# Patient Record
Sex: MALE | Race: WHITE | HISPANIC OR LATINO | Employment: UNEMPLOYED | URBAN - METROPOLITAN AREA
[De-identification: names, ages, dates, MRNs, and addresses within clinical notes are randomized per-mention and may not be internally consistent; named-entity substitution may affect disease eponyms.]

---

## 2017-01-16 ENCOUNTER — APPOINTMENT (EMERGENCY)
Dept: CT IMAGING | Facility: HOSPITAL | Age: 47
End: 2017-01-16

## 2017-01-16 ENCOUNTER — HOSPITAL ENCOUNTER (EMERGENCY)
Facility: HOSPITAL | Age: 47
Discharge: HOME | End: 2017-01-17
Attending: EMERGENCY MEDICINE | Admitting: EMERGENCY MEDICINE

## 2017-01-16 ENCOUNTER — APPOINTMENT (EMERGENCY)
Dept: RADIOLOGY | Facility: HOSPITAL | Age: 47
End: 2017-01-16

## 2017-01-16 DIAGNOSIS — R07.9 CHEST PAIN: Primary | ICD-10-CM

## 2017-01-16 LAB
ANION GAP SERPL CALCULATED.3IONS-SCNC: 11 MMOL/L (ref 4–13)
APTT PPP: 20 SECONDS (ref 24–36)
ATRIAL RATE: 107 BPM
BASOPHILS # BLD AUTO: 0 THOUSANDS/ΜL (ref 0–0.1)
BASOPHILS NFR BLD AUTO: 0 % (ref 0–1)
BUN SERPL-MCNC: 12 MG/DL (ref 5–25)
CALCIUM SERPL-MCNC: 9.2 MG/DL (ref 8.3–10.1)
CHLORIDE SERPL-SCNC: 101 MMOL/L (ref 100–108)
CO2 SERPL-SCNC: 29 MMOL/L (ref 21–32)
CREAT SERPL-MCNC: 1.05 MG/DL (ref 0.6–1.3)
DEPRECATED D DIMER PPP: 899 NG/ML (FEU) (ref 0–424)
EOSINOPHIL # BLD AUTO: 0 THOUSAND/ΜL (ref 0–0.61)
EOSINOPHIL NFR BLD AUTO: 0 % (ref 0–6)
ERYTHROCYTE [DISTWIDTH] IN BLOOD BY AUTOMATED COUNT: 14 % (ref 11.6–15.1)
GFR SERPL CREATININE-BSD FRML MDRD: >60 ML/MIN/1.73SQ M
GLUCOSE SERPL-MCNC: 147 MG/DL (ref 65–140)
HCT VFR BLD AUTO: 39.8 % (ref 36.5–49.3)
HGB BLD-MCNC: 13.7 G/DL (ref 12–17)
INR PPP: 0.95 (ref 0.86–1.16)
LYMPHOCYTES # BLD AUTO: 1.13 THOUSANDS/ΜL (ref 0.6–4.47)
LYMPHOCYTES NFR BLD AUTO: 15 % (ref 14–44)
MCH RBC QN AUTO: 30.4 PG (ref 26.8–34.3)
MCHC RBC AUTO-ENTMCNC: 34.4 G/DL (ref 31.4–37.4)
MCV RBC AUTO: 88 FL (ref 82–98)
MONOCYTES # BLD AUTO: 0.15 THOUSAND/ΜL (ref 0.17–1.22)
MONOCYTES NFR BLD AUTO: 2 % (ref 4–12)
NEUTROPHILS # BLD AUTO: 6.14 THOUSANDS/ΜL (ref 1.85–7.62)
NEUTS SEG NFR BLD AUTO: 83 % (ref 43–75)
NRBC BLD AUTO-RTO: 0 /100 WBCS
P AXIS: 85 DEGREES
PLATELET # BLD AUTO: 196 THOUSANDS/UL (ref 149–390)
PMV BLD AUTO: 10.5 FL (ref 8.9–12.7)
POTASSIUM SERPL-SCNC: 4.5 MMOL/L (ref 3.5–5.3)
PR INTERVAL: 162 MS
PROTHROMBIN TIME: 12.7 SECONDS (ref 12–14.3)
QRS AXIS: 87 DEGREES
QRSD INTERVAL: 94 MS
QT INTERVAL: 320 MS
QTC INTERVAL: 427 MS
RBC # BLD AUTO: 4.5 MILLION/UL (ref 3.88–5.62)
SODIUM SERPL-SCNC: 141 MMOL/L (ref 136–145)
SPECIMEN SOURCE: NORMAL
T WAVE AXIS: 54 DEGREES
TROPONIN I BLD-MCNC: 0.01 NG/ML (ref 0–0.08)
VENTRICULAR RATE: 107 BPM
WBC # BLD AUTO: 7.42 THOUSAND/UL (ref 4.31–10.16)

## 2017-01-16 PROCEDURE — 96374 THER/PROPH/DIAG INJ IV PUSH: CPT

## 2017-01-16 PROCEDURE — 71020 HB CHEST X-RAY 2VW FRONTAL&LATL: CPT

## 2017-01-16 PROCEDURE — 81002 URINALYSIS NONAUTO W/O SCOPE: CPT | Performed by: EMERGENCY MEDICINE

## 2017-01-16 PROCEDURE — 85610 PROTHROMBIN TIME: CPT | Performed by: EMERGENCY MEDICINE

## 2017-01-16 PROCEDURE — 84484 ASSAY OF TROPONIN QUANT: CPT

## 2017-01-16 PROCEDURE — 85379 FIBRIN DEGRADATION QUANT: CPT | Performed by: EMERGENCY MEDICINE

## 2017-01-16 PROCEDURE — 36415 COLL VENOUS BLD VENIPUNCTURE: CPT | Performed by: EMERGENCY MEDICINE

## 2017-01-16 PROCEDURE — 80048 BASIC METABOLIC PNL TOTAL CA: CPT | Performed by: EMERGENCY MEDICINE

## 2017-01-16 PROCEDURE — 96361 HYDRATE IV INFUSION ADD-ON: CPT

## 2017-01-16 PROCEDURE — 85025 COMPLETE CBC W/AUTO DIFF WBC: CPT | Performed by: EMERGENCY MEDICINE

## 2017-01-16 PROCEDURE — 93005 ELECTROCARDIOGRAM TRACING: CPT

## 2017-01-16 PROCEDURE — 85730 THROMBOPLASTIN TIME PARTIAL: CPT | Performed by: EMERGENCY MEDICINE

## 2017-01-16 RX ORDER — ASPIRIN 81 MG/1
324 TABLET, CHEWABLE ORAL ONCE
Status: COMPLETED | OUTPATIENT
Start: 2017-01-16 | End: 2017-01-16

## 2017-01-16 RX ORDER — AMITRIPTYLINE HYDROCHLORIDE 50 MG/1
50 TABLET, FILM COATED ORAL
COMMUNITY
End: 2018-10-19 | Stop reason: SDUPTHER

## 2017-01-16 RX ORDER — MORPHINE SULFATE 4 MG/ML
4 INJECTION, SOLUTION INTRAMUSCULAR; INTRAVENOUS ONCE
Status: COMPLETED | OUTPATIENT
Start: 2017-01-16 | End: 2017-01-16

## 2017-01-16 RX ORDER — OMEPRAZOLE 20 MG/1
20 CAPSULE, DELAYED RELEASE ORAL EVERY MORNING
COMMUNITY
End: 2019-07-24

## 2017-01-16 RX ORDER — ONDANSETRON 2 MG/ML
4 INJECTION INTRAMUSCULAR; INTRAVENOUS ONCE
Status: COMPLETED | OUTPATIENT
Start: 2017-01-16 | End: 2017-01-16

## 2017-01-16 RX ORDER — TRAMADOL HYDROCHLORIDE 50 MG/1
50 TABLET ORAL EVERY 6 HOURS PRN
COMMUNITY
End: 2017-04-30

## 2017-01-16 RX ADMIN — ONDANSETRON 4 MG: 2 INJECTION INTRAMUSCULAR; INTRAVENOUS at 22:05

## 2017-01-16 RX ADMIN — ASPIRIN 81 MG 324 MG: 81 TABLET ORAL at 21:51

## 2017-01-16 RX ADMIN — SODIUM CHLORIDE 1000 ML: 0.9 INJECTION, SOLUTION INTRAVENOUS at 22:06

## 2017-01-16 RX ADMIN — MORPHINE SULFATE 4 MG: 4 INJECTION, SOLUTION INTRAMUSCULAR; INTRAVENOUS at 22:06

## 2017-01-17 ENCOUNTER — APPOINTMENT (EMERGENCY)
Dept: CT IMAGING | Facility: HOSPITAL | Age: 47
End: 2017-01-17

## 2017-01-17 VITALS
WEIGHT: 315 LBS | RESPIRATION RATE: 16 BRPM | DIASTOLIC BLOOD PRESSURE: 81 MMHG | TEMPERATURE: 98.7 F | OXYGEN SATURATION: 97 % | SYSTOLIC BLOOD PRESSURE: 166 MMHG | HEART RATE: 91 BPM

## 2017-01-17 LAB
BACTERIA UR QL AUTO: ABNORMAL /HPF
BILIRUB UR QL STRIP: NEGATIVE
CLARITY UR: CLEAR
COLOR UR: YELLOW
GLUCOSE UR STRIP-MCNC: NEGATIVE MG/DL
HGB UR QL STRIP.AUTO: ABNORMAL
KETONES UR STRIP-MCNC: NEGATIVE MG/DL
LEUKOCYTE ESTERASE UR QL STRIP: NEGATIVE
NITRITE UR QL STRIP: NEGATIVE
NON-SQ EPI CELLS URNS QL MICRO: ABNORMAL /HPF
PH UR STRIP.AUTO: 7 [PH] (ref 4.5–8)
PROT UR STRIP-MCNC: NEGATIVE MG/DL
RBC #/AREA URNS AUTO: ABNORMAL /HPF
SP GR UR STRIP.AUTO: 1.01 (ref 1–1.03)
UROBILINOGEN UR QL STRIP.AUTO: 0.2 E.U./DL
WBC #/AREA URNS AUTO: ABNORMAL /HPF

## 2017-01-17 PROCEDURE — 99285 EMERGENCY DEPT VISIT HI MDM: CPT

## 2017-01-17 PROCEDURE — 71275 CT ANGIOGRAPHY CHEST: CPT

## 2017-01-17 PROCEDURE — 87086 URINE CULTURE/COLONY COUNT: CPT

## 2017-01-17 PROCEDURE — 81001 URINALYSIS AUTO W/SCOPE: CPT

## 2017-01-17 RX ORDER — NAPROXEN 500 MG/1
500 TABLET ORAL 2 TIMES DAILY WITH MEALS
Qty: 20 TABLET | Refills: 0 | Status: SHIPPED | OUTPATIENT
Start: 2017-01-17 | End: 2017-04-30

## 2017-01-17 RX ADMIN — IOHEXOL 100 ML: 350 INJECTION, SOLUTION INTRAVENOUS at 00:35

## 2017-01-18 LAB — BACTERIA UR CULT: NORMAL

## 2017-01-25 ENCOUNTER — ALLSCRIPTS OFFICE VISIT (OUTPATIENT)
Dept: OTHER | Facility: OTHER | Age: 47
End: 2017-01-25

## 2017-01-25 DIAGNOSIS — R31.9 HEMATURIA: ICD-10-CM

## 2017-01-25 DIAGNOSIS — R07.9 CHEST PAIN: ICD-10-CM

## 2017-01-25 DIAGNOSIS — Z12.5 ENCOUNTER FOR SCREENING FOR MALIGNANT NEOPLASM OF PROSTATE: ICD-10-CM

## 2017-01-25 DIAGNOSIS — R73.09 OTHER ABNORMAL GLUCOSE: ICD-10-CM

## 2017-01-25 DIAGNOSIS — R39.15 URGENCY OF URINATION: ICD-10-CM

## 2017-01-25 DIAGNOSIS — E66.01 MORBID (SEVERE) OBESITY DUE TO EXCESS CALORIES (HCC): ICD-10-CM

## 2017-04-20 ENCOUNTER — TRANSCRIBE ORDERS (OUTPATIENT)
Dept: LAB | Facility: CLINIC | Age: 47
End: 2017-04-20

## 2017-04-20 ENCOUNTER — APPOINTMENT (OUTPATIENT)
Dept: LAB | Facility: CLINIC | Age: 47
End: 2017-04-20
Payer: COMMERCIAL

## 2017-04-20 DIAGNOSIS — E66.01 MORBID (SEVERE) OBESITY DUE TO EXCESS CALORIES (HCC): ICD-10-CM

## 2017-04-20 DIAGNOSIS — Z12.5 ENCOUNTER FOR SCREENING FOR MALIGNANT NEOPLASM OF PROSTATE: ICD-10-CM

## 2017-04-20 DIAGNOSIS — R73.09 OTHER ABNORMAL GLUCOSE: ICD-10-CM

## 2017-04-20 DIAGNOSIS — R39.15 URGENCY OF URINATION: ICD-10-CM

## 2017-04-20 DIAGNOSIS — R31.9 HEMATURIA: ICD-10-CM

## 2017-04-20 DIAGNOSIS — R07.9 CHEST PAIN: ICD-10-CM

## 2017-04-20 LAB
25(OH)D3 SERPL-MCNC: 11.7 NG/ML (ref 30–100)
ALBUMIN SERPL BCP-MCNC: 3.3 G/DL (ref 3.5–5)
ALP SERPL-CCNC: 81 U/L (ref 46–116)
ALT SERPL W P-5'-P-CCNC: 38 U/L (ref 12–78)
ANION GAP SERPL CALCULATED.3IONS-SCNC: 6 MMOL/L (ref 4–13)
AST SERPL W P-5'-P-CCNC: 24 U/L (ref 5–45)
BACTERIA UR QL AUTO: ABNORMAL /HPF
BASOPHILS # BLD AUTO: 0.02 THOUSANDS/ΜL (ref 0–0.1)
BASOPHILS NFR BLD AUTO: 0 % (ref 0–1)
BILIRUB SERPL-MCNC: 0.47 MG/DL (ref 0.2–1)
BILIRUB UR QL STRIP: NEGATIVE
BUN SERPL-MCNC: 10 MG/DL (ref 5–25)
CALCIUM SERPL-MCNC: 8.7 MG/DL (ref 8.3–10.1)
CHLORIDE SERPL-SCNC: 101 MMOL/L (ref 100–108)
CHOLEST SERPL-MCNC: 157 MG/DL (ref 50–200)
CLARITY UR: CLEAR
CO2 SERPL-SCNC: 31 MMOL/L (ref 21–32)
COLOR UR: YELLOW
CREAT SERPL-MCNC: 0.8 MG/DL (ref 0.6–1.3)
CREAT UR-MCNC: 217 MG/DL
DEPRECATED D DIMER PPP: 231 NG/ML (FEU) (ref 0–424)
EOSINOPHIL # BLD AUTO: 0.17 THOUSAND/ΜL (ref 0–0.61)
EOSINOPHIL NFR BLD AUTO: 3 % (ref 0–6)
ERYTHROCYTE [DISTWIDTH] IN BLOOD BY AUTOMATED COUNT: 13.9 % (ref 11.6–15.1)
EST. AVERAGE GLUCOSE BLD GHB EST-MCNC: 123 MG/DL
GFR SERPL CREATININE-BSD FRML MDRD: >60 ML/MIN/1.73SQ M
GLUCOSE P FAST SERPL-MCNC: 94 MG/DL (ref 65–99)
GLUCOSE UR STRIP-MCNC: NEGATIVE MG/DL
HBA1C MFR BLD: 5.9 % (ref 4.2–6.3)
HCT VFR BLD AUTO: 43.2 % (ref 36.5–49.3)
HDLC SERPL-MCNC: 46 MG/DL (ref 40–60)
HGB BLD-MCNC: 14.6 G/DL (ref 12–17)
HGB UR QL STRIP.AUTO: NEGATIVE
HYALINE CASTS #/AREA URNS LPF: ABNORMAL /LPF
KETONES UR STRIP-MCNC: NEGATIVE MG/DL
LDLC SERPL CALC-MCNC: 87 MG/DL (ref 0–100)
LEUKOCYTE ESTERASE UR QL STRIP: NEGATIVE
LYMPHOCYTES # BLD AUTO: 2.38 THOUSANDS/ΜL (ref 0.6–4.47)
LYMPHOCYTES NFR BLD AUTO: 37 % (ref 14–44)
MCH RBC QN AUTO: 30 PG (ref 26.8–34.3)
MCHC RBC AUTO-ENTMCNC: 33.8 G/DL (ref 31.4–37.4)
MCV RBC AUTO: 89 FL (ref 82–98)
MICROALBUMIN UR-MCNC: 50.1 MG/L (ref 0–20)
MICROALBUMIN/CREAT 24H UR: 23 MG/G CREATININE (ref 0–30)
MONOCYTES # BLD AUTO: 0.48 THOUSAND/ΜL (ref 0.17–1.22)
MONOCYTES NFR BLD AUTO: 8 % (ref 4–12)
NEUTROPHILS # BLD AUTO: 3.31 THOUSANDS/ΜL (ref 1.85–7.62)
NEUTS SEG NFR BLD AUTO: 52 % (ref 43–75)
NITRITE UR QL STRIP: NEGATIVE
NON-SQ EPI CELLS URNS QL MICRO: ABNORMAL /HPF
NRBC BLD AUTO-RTO: 0 /100 WBCS
PH UR STRIP.AUTO: 6.5 [PH] (ref 4.5–8)
PLATELET # BLD AUTO: 191 THOUSANDS/UL (ref 149–390)
PMV BLD AUTO: 10.6 FL (ref 8.9–12.7)
POTASSIUM SERPL-SCNC: 3.7 MMOL/L (ref 3.5–5.3)
PROT SERPL-MCNC: 7.1 G/DL (ref 6.4–8.2)
PROT UR STRIP-MCNC: ABNORMAL MG/DL
PSA SERPL-MCNC: 0.4 NG/ML (ref 0–4)
RBC # BLD AUTO: 4.87 MILLION/UL (ref 3.88–5.62)
RBC #/AREA URNS AUTO: ABNORMAL /HPF
SODIUM SERPL-SCNC: 138 MMOL/L (ref 136–145)
SP GR UR STRIP.AUTO: 1.02 (ref 1–1.03)
T4 FREE SERPL-MCNC: 0.99 NG/DL (ref 0.76–1.46)
TRIGL SERPL-MCNC: 118 MG/DL
TSH SERPL DL<=0.05 MIU/L-ACNC: 3.77 UIU/ML (ref 0.36–3.74)
UROBILINOGEN UR QL STRIP.AUTO: 0.2 E.U./DL
WBC # BLD AUTO: 6.38 THOUSAND/UL (ref 4.31–10.16)
WBC #/AREA URNS AUTO: ABNORMAL /HPF

## 2017-04-20 PROCEDURE — 80061 LIPID PANEL: CPT

## 2017-04-20 PROCEDURE — 36415 COLL VENOUS BLD VENIPUNCTURE: CPT

## 2017-04-20 PROCEDURE — 80053 COMPREHEN METABOLIC PANEL: CPT

## 2017-04-20 PROCEDURE — G0103 PSA SCREENING: HCPCS

## 2017-04-20 PROCEDURE — 83036 HEMOGLOBIN GLYCOSYLATED A1C: CPT

## 2017-04-20 PROCEDURE — 82306 VITAMIN D 25 HYDROXY: CPT

## 2017-04-20 PROCEDURE — 82043 UR ALBUMIN QUANTITATIVE: CPT

## 2017-04-20 PROCEDURE — 85025 COMPLETE CBC W/AUTO DIFF WBC: CPT

## 2017-04-20 PROCEDURE — 82570 ASSAY OF URINE CREATININE: CPT

## 2017-04-20 PROCEDURE — 84443 ASSAY THYROID STIM HORMONE: CPT

## 2017-04-20 PROCEDURE — 85379 FIBRIN DEGRADATION QUANT: CPT

## 2017-04-20 PROCEDURE — 84439 ASSAY OF FREE THYROXINE: CPT

## 2017-04-20 PROCEDURE — 81001 URINALYSIS AUTO W/SCOPE: CPT

## 2017-04-30 ENCOUNTER — HOSPITAL ENCOUNTER (EMERGENCY)
Facility: HOSPITAL | Age: 47
Discharge: HOME | End: 2017-04-30
Admitting: EMERGENCY MEDICINE
Payer: COMMERCIAL

## 2017-04-30 VITALS
TEMPERATURE: 97.5 F | RESPIRATION RATE: 20 BRPM | DIASTOLIC BLOOD PRESSURE: 83 MMHG | OXYGEN SATURATION: 96 % | SYSTOLIC BLOOD PRESSURE: 160 MMHG | WEIGHT: 315 LBS | HEART RATE: 90 BPM

## 2017-04-30 DIAGNOSIS — M54.9 CHRONIC BACK PAIN GREATER THAN 3 MONTHS DURATION: Primary | ICD-10-CM

## 2017-04-30 DIAGNOSIS — G89.29 BILATERAL CHRONIC KNEE PAIN: ICD-10-CM

## 2017-04-30 DIAGNOSIS — G89.29 CHRONIC BACK PAIN GREATER THAN 3 MONTHS DURATION: Primary | ICD-10-CM

## 2017-04-30 DIAGNOSIS — M25.561 BILATERAL CHRONIC KNEE PAIN: ICD-10-CM

## 2017-04-30 DIAGNOSIS — M25.562 BILATERAL CHRONIC KNEE PAIN: ICD-10-CM

## 2017-04-30 PROCEDURE — 99283 EMERGENCY DEPT VISIT LOW MDM: CPT

## 2017-04-30 PROCEDURE — 96374 THER/PROPH/DIAG INJ IV PUSH: CPT

## 2017-04-30 RX ORDER — KETOROLAC TROMETHAMINE 30 MG/ML
30 INJECTION, SOLUTION INTRAMUSCULAR; INTRAVENOUS ONCE
Status: COMPLETED | OUTPATIENT
Start: 2017-04-30 | End: 2017-04-30

## 2017-04-30 RX ORDER — NAPROXEN 500 MG/1
500 TABLET ORAL 2 TIMES DAILY WITH MEALS
Qty: 30 TABLET | Refills: 0 | Status: SHIPPED | OUTPATIENT
Start: 2017-04-30 | End: 2017-10-18 | Stop reason: ALTCHOICE

## 2017-04-30 RX ORDER — LIDOCAINE 50 MG/G
1 PATCH TOPICAL ONCE
Status: DISCONTINUED | OUTPATIENT
Start: 2017-04-30 | End: 2017-04-30 | Stop reason: HOSPADM

## 2017-04-30 RX ORDER — LIDOCAINE 50 MG/G
1 PATCH TOPICAL EVERY 24 HOURS
Qty: 10 PATCH | Refills: 0 | Status: SHIPPED | OUTPATIENT
Start: 2017-04-30 | End: 2017-10-18 | Stop reason: ALTCHOICE

## 2017-04-30 RX ADMIN — LIDOCAINE 1 PATCH: 50 PATCH CUTANEOUS at 03:16

## 2017-04-30 RX ADMIN — KETOROLAC TROMETHAMINE 30 MG: 30 INJECTION, SOLUTION INTRAMUSCULAR at 03:16

## 2017-05-03 ENCOUNTER — ALLSCRIPTS OFFICE VISIT (OUTPATIENT)
Dept: OTHER | Facility: OTHER | Age: 47
End: 2017-05-03

## 2017-06-19 ENCOUNTER — HOSPITAL ENCOUNTER (OUTPATIENT)
Dept: NUCLEAR MEDICINE | Facility: HOSPITAL | Age: 47
End: 2017-06-19
Payer: COMMERCIAL

## 2017-06-19 ENCOUNTER — HOSPITAL ENCOUNTER (OUTPATIENT)
Dept: NUCLEAR MEDICINE | Facility: HOSPITAL | Age: 47
Discharge: HOME | End: 2017-06-19
Payer: COMMERCIAL

## 2017-06-19 ENCOUNTER — GENERIC CONVERSION - ENCOUNTER (OUTPATIENT)
Dept: OTHER | Facility: OTHER | Age: 47
End: 2017-06-19

## 2017-06-19 ENCOUNTER — HOSPITAL ENCOUNTER (OUTPATIENT)
Dept: NON INVASIVE DIAGNOSTICS | Facility: HOSPITAL | Age: 47
Discharge: HOME | End: 2017-06-19
Payer: COMMERCIAL

## 2017-06-19 DIAGNOSIS — R07.9 CHEST PAIN: ICD-10-CM

## 2017-06-19 RX ADMIN — REGADENOSON 0.4 MG: 0.08 INJECTION, SOLUTION INTRAVENOUS at 09:39

## 2017-07-12 ENCOUNTER — ALLSCRIPTS OFFICE VISIT (OUTPATIENT)
Dept: OTHER | Facility: OTHER | Age: 47
End: 2017-07-12

## 2017-08-01 ENCOUNTER — ALLSCRIPTS OFFICE VISIT (OUTPATIENT)
Dept: OTHER | Facility: OTHER | Age: 47
End: 2017-08-01

## 2017-08-02 ENCOUNTER — HOSPITAL ENCOUNTER (OUTPATIENT)
Dept: NON INVASIVE DIAGNOSTICS | Facility: HOSPITAL | Age: 47
Discharge: HOME | End: 2017-08-02
Payer: COMMERCIAL

## 2017-08-02 ENCOUNTER — APPOINTMENT (OUTPATIENT)
Dept: LAB | Facility: HOSPITAL | Age: 47
End: 2017-08-02
Payer: COMMERCIAL

## 2017-08-02 ENCOUNTER — TRANSCRIBE ORDERS (OUTPATIENT)
Dept: ADMINISTRATIVE | Facility: HOSPITAL | Age: 47
End: 2017-08-02

## 2017-08-02 DIAGNOSIS — S83.242A ACUTE MEDIAL MENISCUS TEAR OF LEFT KNEE, INITIAL ENCOUNTER: ICD-10-CM

## 2017-08-02 DIAGNOSIS — Z01.812 PRE-OPERATIVE LABORATORY EXAMINATION: Primary | ICD-10-CM

## 2017-08-02 DIAGNOSIS — Z79.01 LONG TERM (CURRENT) USE OF ANTICOAGULANTS: ICD-10-CM

## 2017-08-02 DIAGNOSIS — Z01.812 PRE-OPERATIVE LABORATORY EXAMINATION: ICD-10-CM

## 2017-08-02 LAB
ALBUMIN SERPL BCP-MCNC: 3.2 G/DL (ref 3.5–5)
ALP SERPL-CCNC: 73 U/L (ref 46–116)
ALT SERPL W P-5'-P-CCNC: 46 U/L (ref 12–78)
ANION GAP SERPL CALCULATED.3IONS-SCNC: 4 MMOL/L (ref 4–13)
APTT PPP: 28 SECONDS (ref 23–35)
AST SERPL W P-5'-P-CCNC: 38 U/L (ref 5–45)
ATRIAL RATE: 80 BPM
BASOPHILS # BLD AUTO: 0.03 THOUSANDS/ΜL (ref 0–0.1)
BASOPHILS NFR BLD AUTO: 1 % (ref 0–1)
BILIRUB SERPL-MCNC: 0.42 MG/DL (ref 0.2–1)
BUN SERPL-MCNC: 12 MG/DL (ref 5–25)
CALCIUM SERPL-MCNC: 8.4 MG/DL (ref 8.3–10.1)
CHLORIDE SERPL-SCNC: 102 MMOL/L (ref 100–108)
CO2 SERPL-SCNC: 33 MMOL/L (ref 21–32)
CREAT SERPL-MCNC: 0.97 MG/DL (ref 0.6–1.3)
EOSINOPHIL # BLD AUTO: 0.16 THOUSAND/ΜL (ref 0–0.61)
EOSINOPHIL NFR BLD AUTO: 3 % (ref 0–6)
ERYTHROCYTE [DISTWIDTH] IN BLOOD BY AUTOMATED COUNT: 13.9 % (ref 11.6–15.1)
GFR SERPL CREATININE-BSD FRML MDRD: 93 ML/MIN/1.73SQ M
GLUCOSE P FAST SERPL-MCNC: 93 MG/DL (ref 65–99)
HCT VFR BLD AUTO: 43.3 % (ref 36.5–49.3)
HGB BLD-MCNC: 14.8 G/DL (ref 12–17)
INR PPP: 1.01 (ref 0.86–1.16)
LYMPHOCYTES # BLD AUTO: 2.33 THOUSANDS/ΜL (ref 0.6–4.47)
LYMPHOCYTES NFR BLD AUTO: 41 % (ref 14–44)
MCH RBC QN AUTO: 30.2 PG (ref 26.8–34.3)
MCHC RBC AUTO-ENTMCNC: 34.2 G/DL (ref 31.4–37.4)
MCV RBC AUTO: 88 FL (ref 82–98)
MONOCYTES # BLD AUTO: 0.51 THOUSAND/ΜL (ref 0.17–1.22)
MONOCYTES NFR BLD AUTO: 9 % (ref 4–12)
NEUTROPHILS # BLD AUTO: 2.67 THOUSANDS/ΜL (ref 1.85–7.62)
NEUTS SEG NFR BLD AUTO: 46 % (ref 43–75)
NRBC BLD AUTO-RTO: 0 /100 WBCS
P AXIS: 82 DEGREES
PLATELET # BLD AUTO: 172 THOUSANDS/UL (ref 149–390)
PMV BLD AUTO: 10.4 FL (ref 8.9–12.7)
POTASSIUM SERPL-SCNC: 4.1 MMOL/L (ref 3.5–5.3)
PR INTERVAL: 176 MS
PROT SERPL-MCNC: 7.1 G/DL (ref 6.4–8.2)
PROTHROMBIN TIME: 13.3 SECONDS (ref 12.1–14.4)
QRS AXIS: 77 DEGREES
QRSD INTERVAL: 96 MS
QT INTERVAL: 378 MS
QTC INTERVAL: 435 MS
RBC # BLD AUTO: 4.9 MILLION/UL (ref 3.88–5.62)
SODIUM SERPL-SCNC: 139 MMOL/L (ref 136–145)
T WAVE AXIS: 62 DEGREES
VENTRICULAR RATE: 80 BPM
WBC # BLD AUTO: 5.7 THOUSAND/UL (ref 4.31–10.16)

## 2017-08-02 PROCEDURE — 80053 COMPREHEN METABOLIC PANEL: CPT

## 2017-08-02 PROCEDURE — 93005 ELECTROCARDIOGRAM TRACING: CPT

## 2017-08-02 PROCEDURE — 36415 COLL VENOUS BLD VENIPUNCTURE: CPT

## 2017-08-02 PROCEDURE — 85025 COMPLETE CBC W/AUTO DIFF WBC: CPT

## 2017-08-02 PROCEDURE — 85610 PROTHROMBIN TIME: CPT

## 2017-08-02 PROCEDURE — 85730 THROMBOPLASTIN TIME PARTIAL: CPT

## 2017-08-04 ENCOUNTER — ALLSCRIPTS OFFICE VISIT (OUTPATIENT)
Dept: OTHER | Facility: OTHER | Age: 47
End: 2017-08-04

## 2017-08-04 DIAGNOSIS — Z01.818 ENCOUNTER FOR OTHER PREPROCEDURAL EXAMINATION: ICD-10-CM

## 2017-08-10 ENCOUNTER — GENERIC CONVERSION - ENCOUNTER (OUTPATIENT)
Dept: OTHER | Facility: OTHER | Age: 47
End: 2017-08-10

## 2017-08-15 ENCOUNTER — HOSPITAL ENCOUNTER (OUTPATIENT)
Dept: RADIOLOGY | Facility: HOSPITAL | Age: 47
Discharge: HOME | End: 2017-08-15
Payer: COMMERCIAL

## 2017-08-15 ENCOUNTER — TRANSCRIBE ORDERS (OUTPATIENT)
Dept: ADMINISTRATIVE | Facility: HOSPITAL | Age: 47
End: 2017-08-15

## 2017-08-15 DIAGNOSIS — G47.30 SLEEP APNEA, UNSPECIFIED TYPE: Primary | ICD-10-CM

## 2017-08-15 DIAGNOSIS — Z01.818 ENCOUNTER FOR OTHER PREPROCEDURAL EXAMINATION: ICD-10-CM

## 2017-08-15 PROCEDURE — 71020 HB CHEST X-RAY 2VW FRONTAL&LATL: CPT

## 2017-08-18 ENCOUNTER — GENERIC CONVERSION - ENCOUNTER (OUTPATIENT)
Dept: OTHER | Facility: OTHER | Age: 47
End: 2017-08-18

## 2017-08-23 ENCOUNTER — GENERIC CONVERSION - ENCOUNTER (OUTPATIENT)
Dept: OTHER | Facility: OTHER | Age: 47
End: 2017-08-23

## 2017-09-05 ENCOUNTER — GENERIC CONVERSION - ENCOUNTER (OUTPATIENT)
Dept: OTHER | Facility: OTHER | Age: 47
End: 2017-09-05

## 2017-10-04 ENCOUNTER — HOSPITAL ENCOUNTER (OUTPATIENT)
Dept: SLEEP CENTER | Facility: CLINIC | Age: 47
Discharge: HOME | End: 2017-10-04
Payer: COMMERCIAL

## 2017-10-04 ENCOUNTER — TRANSCRIBE ORDERS (OUTPATIENT)
Dept: SLEEP CENTER | Facility: CLINIC | Age: 47
End: 2017-10-04

## 2017-10-04 DIAGNOSIS — G47.33 OSA (OBSTRUCTIVE SLEEP APNEA): Primary | ICD-10-CM

## 2017-10-04 DIAGNOSIS — G47.30 SLEEP APNEA, UNSPECIFIED TYPE: ICD-10-CM

## 2017-10-04 NOTE — CONSULTS
Consultation - [facility] [id number] y o male1970      [id number]      10/4/2017      Physician Requesting Consult:  Dr Chelle Dobbs  Reason for Consult / Principal Problem:  Obstructive sleep apnea   Hx and PE limited by:   number [id number]  HPI: Michael Alvarez is a 56 y o  male, who was referred by his PCP for sleep apnea, he does have multiple medical problems with a history of hypertension, acid reflux osteoarthritis,  He has states he was working in a [employer] about a year ago currently not working  he goes to bed at around 11:00 p m , it takes about an hour a 0 5 hours to fall asleep and he is out of bed at 9 :00 a m  he does have 1-2 nocturnal awakenings for nocturia  When he wakes up in the morning his still tired and fatigued  Does not take a nap during the daytime, although he is very sleepy he states, 1-2 times in a week he takes a nap in the afternoon for about half an hour to 45 minutes and feels refreshed  Review of current systems, history of very loud snoring 2 0 Jovanny smoking for air and witnessed apneic spells, history of chronic pain in the back, teeth grinding, symptoms of restless legs, urinary frequency, heartburn during sleep, poor short-term memory loss, decreased concentration, feelings of depression, feelings of anxiety, excessive daytime sleepiness especially with sitting and reading a book, watching television, sitting inactive in a public place, as a passenger in a car without a break, lying down to rest in the afternoon, sitting quietly after lunch  Social history never smoked no history of any alcohol use, no history of illicit drug use  Does not drink any coffee or tea during the daytime he states  For past medical history significant for acid reflux, hypertension,  No prior surgical history  Family history mother with hypertension  List of current medications neck amitriptyline, vitamin-D, amlodipine, omeprazole    Today on exam  Weight 391 8 lb  Height 5 feet 7 inches  BMI 61 1  Blood pressure 132/82  Heart rate 80 beats per minute  Neck 49 cm  [place] sleepiness score 11  Today on exam  The eyes anicteric sclera, conjunctivae is clear  ENT nares is patent, no evidence of any discharge, crowded oropharyngeal airways, Mallampati score of 4  Neck short and wide supple no lymphadenopathy normal thyroid no nodules  Lungs clear to auscultation  Heart first and second heart sounds are heard no murmur or gallop is heard  Extremities no pedal edema  CNS awake alert and oriented  Assessment  1  Obstructive sleep apnea  2  Morbid obesity  3  Excessive daytime sleepiness  Plan  Patient has signs and symptoms of obstructive sleep apnea  He will undergo an all-night polysomnogram and there is evidence of abnormal nocturnal breathing he will undergo a CPAP titration study for maximal benefit  Etiology pathogenesis of obstructive sleep apnea discussed in detail  Excessive daytime sleepiness possibly secondary to his obstructive sleep apnea  Cautioned against driving when sleepy  Testing procedure was discussed  Recommend weight loss  Follow-up after the testing    Thank you very much for allowing me to participate in the care of this patient the the  Yours Sincerely,  Alannah Roberts MD

## 2017-10-05 ENCOUNTER — HOSPITAL ENCOUNTER (OUTPATIENT)
Dept: SLEEP CENTER | Facility: CLINIC | Age: 47
Discharge: HOME | End: 2017-10-05
Payer: COMMERCIAL

## 2017-10-05 ENCOUNTER — GENERIC CONVERSION - ENCOUNTER (OUTPATIENT)
Dept: OTHER | Facility: OTHER | Age: 47
End: 2017-10-05

## 2017-10-05 ENCOUNTER — GENERIC CONVERSION - ENCOUNTER (OUTPATIENT)
Dept: BARIATRICS | Facility: CLINIC | Age: 47
End: 2017-10-05

## 2017-10-05 DIAGNOSIS — I10 ESSENTIAL (PRIMARY) HYPERTENSION: ICD-10-CM

## 2017-10-05 DIAGNOSIS — E66.01 MORBID (SEVERE) OBESITY DUE TO EXCESS CALORIES (HCC): ICD-10-CM

## 2017-10-05 DIAGNOSIS — E55.9 VITAMIN D DEFICIENCY: ICD-10-CM

## 2017-10-05 DIAGNOSIS — R73.03 PREDIABETES: ICD-10-CM

## 2017-10-05 DIAGNOSIS — R94.6 ABNORMAL RESULTS OF THYROID FUNCTION STUDIES: ICD-10-CM

## 2017-10-05 DIAGNOSIS — Z01.812 ENCOUNTER FOR PREPROCEDURAL LABORATORY EXAMINATION: ICD-10-CM

## 2017-10-05 DIAGNOSIS — G47.33 OSA (OBSTRUCTIVE SLEEP APNEA): ICD-10-CM

## 2017-10-05 PROCEDURE — 95810 POLYSOM 6/> YRS 4/> PARAM: CPT

## 2017-10-18 ENCOUNTER — GENERIC CONVERSION - ENCOUNTER (OUTPATIENT)
Dept: OTHER | Facility: OTHER | Age: 47
End: 2017-10-18

## 2017-10-18 ENCOUNTER — HOSPITAL ENCOUNTER (EMERGENCY)
Facility: HOSPITAL | Age: 47
Discharge: HOME | End: 2017-10-18
Attending: EMERGENCY MEDICINE
Payer: COMMERCIAL

## 2017-10-18 VITALS
WEIGHT: 315 LBS | RESPIRATION RATE: 18 BRPM | DIASTOLIC BLOOD PRESSURE: 92 MMHG | TEMPERATURE: 98.7 F | HEART RATE: 94 BPM | OXYGEN SATURATION: 96 % | SYSTOLIC BLOOD PRESSURE: 166 MMHG

## 2017-10-18 DIAGNOSIS — R07.89 ATYPICAL CHEST PAIN: ICD-10-CM

## 2017-10-18 DIAGNOSIS — I10 UNCONTROLLED HYPERTENSION: Primary | ICD-10-CM

## 2017-10-18 DIAGNOSIS — F41.9 ANXIETY: ICD-10-CM

## 2017-10-18 LAB
ALBUMIN SERPL BCP-MCNC: 3.8 G/DL (ref 3.5–5)
ALP SERPL-CCNC: 95 U/L (ref 46–116)
ALT SERPL W P-5'-P-CCNC: 60 U/L (ref 12–78)
ANION GAP SERPL CALCULATED.3IONS-SCNC: 8 MMOL/L (ref 4–13)
AST SERPL W P-5'-P-CCNC: 31 U/L (ref 5–45)
BASOPHILS # BLD AUTO: 0.02 THOUSANDS/ΜL (ref 0–0.1)
BASOPHILS NFR BLD AUTO: 0 % (ref 0–1)
BILIRUB SERPL-MCNC: 0.32 MG/DL (ref 0.2–1)
BUN SERPL-MCNC: 11 MG/DL (ref 5–25)
CALCIUM SERPL-MCNC: 9 MG/DL (ref 8.3–10.1)
CHLORIDE SERPL-SCNC: 100 MMOL/L (ref 100–108)
CO2 SERPL-SCNC: 31 MMOL/L (ref 21–32)
CREAT SERPL-MCNC: 1.04 MG/DL (ref 0.6–1.3)
EOSINOPHIL # BLD AUTO: 0.17 THOUSAND/ΜL (ref 0–0.61)
EOSINOPHIL NFR BLD AUTO: 2 % (ref 0–6)
ERYTHROCYTE [DISTWIDTH] IN BLOOD BY AUTOMATED COUNT: 14.3 % (ref 11.6–15.1)
GFR SERPL CREATININE-BSD FRML MDRD: 86 ML/MIN/1.73SQ M
GLUCOSE SERPL-MCNC: 103 MG/DL (ref 65–140)
HCT VFR BLD AUTO: 46.4 % (ref 36.5–49.3)
HGB BLD-MCNC: 16.1 G/DL (ref 12–17)
LYMPHOCYTES # BLD AUTO: 3.16 THOUSANDS/ΜL (ref 0.6–4.47)
LYMPHOCYTES NFR BLD AUTO: 35 % (ref 14–44)
MCH RBC QN AUTO: 31.1 PG (ref 26.8–34.3)
MCHC RBC AUTO-ENTMCNC: 34.7 G/DL (ref 31.4–37.4)
MCV RBC AUTO: 90 FL (ref 82–98)
MONOCYTES # BLD AUTO: 0.6 THOUSAND/ΜL (ref 0.17–1.22)
MONOCYTES NFR BLD AUTO: 7 % (ref 4–12)
NEUTROPHILS # BLD AUTO: 5.14 THOUSANDS/ΜL (ref 1.85–7.62)
NEUTS SEG NFR BLD AUTO: 56 % (ref 43–75)
NRBC BLD AUTO-RTO: 0 /100 WBCS
PLATELET # BLD AUTO: 213 THOUSANDS/UL (ref 149–390)
PMV BLD AUTO: 10.4 FL (ref 8.9–12.7)
POTASSIUM SERPL-SCNC: 3.9 MMOL/L (ref 3.5–5.3)
PROT SERPL-MCNC: 8.1 G/DL (ref 6.4–8.2)
RBC # BLD AUTO: 5.18 MILLION/UL (ref 3.88–5.62)
SODIUM SERPL-SCNC: 139 MMOL/L (ref 136–145)
SPECIMEN SOURCE: NORMAL
TROPONIN I BLD-MCNC: 0 NG/ML (ref 0–0.08)
WBC # BLD AUTO: 9.09 THOUSAND/UL (ref 4.31–10.16)

## 2017-10-18 PROCEDURE — 85025 COMPLETE CBC W/AUTO DIFF WBC: CPT | Performed by: EMERGENCY MEDICINE

## 2017-10-18 PROCEDURE — 99285 EMERGENCY DEPT VISIT HI MDM: CPT

## 2017-10-18 PROCEDURE — 93005 ELECTROCARDIOGRAM TRACING: CPT | Performed by: EMERGENCY MEDICINE

## 2017-10-18 PROCEDURE — 36415 COLL VENOUS BLD VENIPUNCTURE: CPT | Performed by: EMERGENCY MEDICINE

## 2017-10-18 PROCEDURE — 84484 ASSAY OF TROPONIN QUANT: CPT

## 2017-10-18 PROCEDURE — 93005 ELECTROCARDIOGRAM TRACING: CPT

## 2017-10-18 PROCEDURE — 80053 COMPREHEN METABOLIC PANEL: CPT | Performed by: EMERGENCY MEDICINE

## 2017-10-18 RX ORDER — ASPIRIN 81 MG/1
324 TABLET, CHEWABLE ORAL ONCE
Status: COMPLETED | OUTPATIENT
Start: 2017-10-18 | End: 2017-10-18

## 2017-10-18 RX ADMIN — ASPIRIN 81 MG 324 MG: 81 TABLET ORAL at 22:36

## 2017-10-19 LAB
ATRIAL RATE: 0 BPM
ATRIAL RATE: 89 BPM
P AXIS: 74 DEGREES
PR INTERVAL: 168 MS
QRS AXIS: 0 DEGREES
QRS AXIS: 59 DEGREES
QRSD INTERVAL: 0 MS
QRSD INTERVAL: 94 MS
QT INTERVAL: 0 MS
QT INTERVAL: 356 MS
QTC INTERVAL: 0 MS
QTC INTERVAL: 433 MS
T WAVE AXIS: 0 DEGREES
T WAVE AXIS: 33 DEGREES
VENTRICULAR RATE: 0 BPM
VENTRICULAR RATE: 89 BPM

## 2017-10-19 NOTE — DISCHARGE INSTRUCTIONS
Hypertension   WHAT YOU NEED TO KNOW:   Hypertension is high blood pressure (BP)  Your BP is the force of your blood moving against the walls of your arteries  Normal BP is less than 120/80  Prehypertension is between 120/80 and 139/89  Hypertension is 140/90 or higher  Hypertension causes your BP to get so high that your heart has to work much harder than normal  This can damage your heart  You can control hypertension with a healthy lifestyle or medicines  A controlled blood pressure helps protect your organs, such as your heart, lungs, brain, and kidneys  DISCHARGE INSTRUCTIONS:   Call 911 for any of the following:   · You have discomfort in your chest that feels like squeezing, pressure, fullness, or pain  · You become confused or have difficulty speaking  · You suddenly feel lightheaded or have trouble breathing  · You have pain or discomfort in your back, neck, jaw, stomach, or arm  Seek care immediately if:   · You have a severe headache or vision loss  · You have weakness in an arm or leg  Contact your healthcare provider if:   · You feel faint, dizzy, confused, or drowsy  · You have been taking your BP medicine and your BP is still higher than your healthcare provider says it should be  · You have questions or concerns about your condition or care  Medicines: You may  need any of the following:  · Medicine  may be used to help lower your BP  You may need more than one type of medicine  Take the medicine exactly as directed  · Diuretics  help decrease extra fluid that collects in your body  This will help lower your BP  You may urinate more often while you take this medicine  · Cholesterol medicine  helps lower your cholesterol level  A low cholesterol level helps prevent heart disease and makes it easier to control your blood pressure  · Take your medicine as directed  Contact your healthcare provider if you think your medicine is not helping or if you have side effects  Tell him or her if you are allergic to any medicine  Keep a list of the medicines, vitamins, and herbs you take  Include the amounts, and when and why you take them  Bring the list or the pill bottles to follow-up visits  Carry your medicine list with you in case of an emergency  Follow up with your healthcare provider as directed: You will need to return to have your BP checked and to have other lab tests done  Write down your questions so you remember to ask them during your visits  Manage hypertension:  Talk with your healthcare provider about these and other ways to manage hypertension:  · Check your BP at home  Sit and rest for 5 minutes before you take your BP  Extend your arm and support it on a flat surface  Your arm should be at the same level as your heart  Follow the directions that came with your BP monitor  If possible, take at least 2 BP readings each time  Take your BP at least twice a day at the same times each day, such as morning and evening  Keep a record of your BP readings and bring it to your follow-up visits  Ask your healthcare provider what your BP should be  · Limit sodium (salt) as directed  Too much sodium can affect your fluid balance  Check labels to find low-sodium or no-salt-added foods  Some low-sodium foods use potassium salts for flavor  Too much potassium can also cause health problems  Your healthcare provider will tell you how much sodium and potassium are safe for you to have in a day  He or she may recommend that you limit sodium to 2,300 mg a day  · Follow the meal plan recommended by your healthcare provider  A dietitian or your provider can give you more information on low-sodium plans or the DASH (Dietary Approaches to Stop Hypertension) eating plan  The DASH plan is low in sodium, unhealthy fats, and total fat  It is high in potassium, calcium, and fiber  · Exercise to maintain a healthy weight    Exercise at least 30 minutes per day, on most days of the week  This will help decrease your blood pressure  Ask your healthcare provider about the best exercise plan for you  · Decrease stress  This may help lower your BP  Learn ways to relax, such as deep breathing or listening to music  · Limit alcohol  Women should limit alcohol to 1 drink a day  Men should limit alcohol to 2 drinks a day  A drink of alcohol is 12 ounces of beer, 5 ounces of wine, or 1½ ounces of liquor  · Do not smoke  Nicotine and other chemicals in cigarettes and cigars can increase your BP and also cause lung damage  Ask your healthcare provider for information if you currently smoke and need help to quit  E-cigarettes or smokeless tobacco still contain nicotine  Talk to your healthcare provider before you use these products  · Manage any other health conditions you have  Health conditions such as diabetes can increase your risk for hypertension  Follow your healthcare provider's instructions and take all your medicines as directed  © 2017 [employer] [employer] Information is for End User's use only and may not be sold, redistributed or otherwise used for commercial purposes  All illustrations and images included in CareNotes® are the copyrighted property of [employer]  or Mike Johnson  The above information is an  only  It is not intended as medical advice for individual conditions or treatments  Talk to your doctor, nurse or pharmacist before following any medical regimen to see if it is safe and effective for you  Anxiety   WHAT YOU SHOULD KNOW:   Anxiety is a condition that causes you to feel excessive worry, uneasiness, or fear  Family or work stress, smoking, caffeine, and alcohol can increase your risk for anxiety  Certain medicines or health conditions can also increase your risk  Anxiety may begin gradually, and can become a long-term condition if it is not managed or treated     AFTER YOU LEAVE:   Medicines: · Medicines  can help you feel more calm and relaxed, and decrease your symptoms  · Take your medicine as directed  Contact your healthcare provider if you think your medicine is not helping or if you have side effects  Tell him if you are allergic to any medicine  Keep a list of the medicines, vitamins, and herbs you take  Include the amounts, and when and why you take them  Bring the list or the pill bottles to follow-up visits  Carry your medicine list with you in case of an emergency  Follow up with your healthcare provider within 2 weeks or as directed:  Write down your questions so you remember to ask them during your visits  Manage anxiety:   · Go to counseling as directed  Cognitive behavioral therapy can help you understand and change how you react to events that trigger your symptoms  · Find ways to manage your symptoms  Activities such as exercise, meditation, or listening to music can help you relax  · Practice deep breathing  Breathing can change how your body reacts to stress  Focus on taking slow, deep breaths several times a day, or during an anxiety attack  Breathe in through your nose, and out through your mouth  · Avoid caffeine  Caffeine can make your symptoms worse  Avoid foods or drinks that are meant to increase your energy level  · Limit or avoid alcohol  Ask your healthcare provider if alcohol is safe for you  You may not be able to drink alcohol if you take certain anxiety or depression medicines  Limit alcohol to 1 drink per day if you are a woman  Limit alcohol to 2 drinks per day if you are a man  A drink of alcohol is 12 ounces of beer, 5 ounces of wine, or 1½ ounces of liquor  Contact your healthcare provider if:   · Your symptoms get worse or do not get better with treatment  · You think your medicine may be causing side effects  · Your anxiety keeps you from doing your regular daily activities      · You have new symptoms since your last visit     · You have questions or concerns about your condition or care  Seek care immediately or call 911 if:   · You have chest pain, tightness, or heaviness that may spread to your shoulders, arms, jaw, neck, or back  · You feel like hurting yourself or someone else  · You feel dizzy, lightheaded, or faint  © 2014 [address] [employer] is for End User's use only and may not be sold, redistributed or otherwise used for commercial purposes  All illustrations and images included in CareNotes® are the copyrighted property of [employer] [facility] , [employer]  or Mike Johnson  The above information is an  only  It is not intended as medical advice for individual conditions or treatments  Talk to your doctor, nurse or pharmacist before following any medical regimen to see if it is safe and effective for you

## 2017-10-19 NOTE — ED PROVIDER NOTES
History  Chief Complaint   Patient presents with    Headache     patient reports being at [facility] tonight chest BP  BP elevated and low a few times  started having chest pain and headache  head feels karan as well   Chest Pain     32 yo obese male who tells me he was recently diagnosed with HTN (PCP had been monitoring hie elevated BP) and started on norvasc 5mg QD 2 weeks ago  Today he went to [facility] to check his BP and "freaked out" when he saw 176/100  Took it 5 times in a row and "it was all over the place" and got fleeting L sided CP and began breathing fast   Discussed ability to check baseline labs but even he agrees this was likely anxiety related to noting elevated BP  History provided by:  Patient   used: No    Hypertension   Severity:  Mild  Onset quality:  Gradual  Duration: has had high BP for months - PCP placed himon norvasc 2 weeks ago - noted to be 176/100 at pharmacy just PTA  Relieved by:  Nothing  Worsened by:  Nothing  Ineffective treatments:  None tried  Associated symptoms: anxiety and chest pain (fleeting L sided CP when he saw elevated BP)    Associated symptoms: no abdominal pain, no blurred vision, no confusion, no fatigue, no fever, no headaches, no loss of consciousness, no nausea, no neck pain, no palpitations, no shortness of breath, no tinnitus and not vomiting        Prior to Admission Medications   Prescriptions Last Dose Informant Patient Reported? Taking? Cholecalciferol (VITAMIN D PO)   Yes Yes   Sig: Take 1 tablet by mouth   Tapentadol HCl (NUCYNTA PO)   Yes No   Sig: Take by mouth   amitriptyline (ELAVIL) 50 mg tablet   Yes No   Sig: Take 50 mg by mouth 2 (two) times a day   omeprazole (PriLOSEC) 20 mg delayed release capsule   Yes No   Sig: Take 20 mg by mouth daily      Facility-Administered Medications: None       Past Medical History:   Diagnosis Date    Hypertension        History reviewed  No pertinent surgical history      History reviewed  No pertinent family history  I have reviewed and agree with the history as documented  Social History   Substance Use Topics    Smoking status: Never Smoker    Smokeless tobacco: Never Used    Alcohol use No        Review of Systems   Constitutional: Negative for chills, fatigue and fever  HENT: Negative for congestion, sore throat and tinnitus  Eyes: Negative for blurred vision and visual disturbance  Respiratory: Negative for shortness of breath and wheezing  Cardiovascular: Positive for chest pain (fleeting L sided CP when he saw elevated BP)  Negative for palpitations  Gastrointestinal: Negative for abdominal pain, diarrhea, nausea and vomiting  Genitourinary: Negative for dysuria  Musculoskeletal: Negative for neck pain and neck stiffness  Skin: Negative for pallor and rash  Neurological: Negative for loss of consciousness and headaches  Psychiatric/Behavioral: Negative for confusion  The patient is nervous/anxious  All other systems reviewed and are negative  Physical Exam  ED Triage Vitals [10/18/17 2038]   Temperature Pulse Respirations Blood Pressure SpO2   98 7 °F (37 1 °C) 102 20 167/91 98 %      Temp src Heart Rate Source Patient Position - Orthostatic VS BP Location FiO2 (%)   -- Monitor -- Right arm --      Pain Score       8           Physical Exam   Constitutional: He is oriented to person, place, and time  He appears well-developed and well-nourished  No distress  Morbidly obese   HENT:   Head: Normocephalic and atraumatic  Right Ear: External ear normal    Left Ear: External ear normal    Mouth/Throat: Oropharynx is clear and moist    Eyes: EOM are normal  Pupils are equal, round, and reactive to light  Neck: Normal range of motion  Neck supple  Cardiovascular: Normal rate and regular rhythm  No murmur heard  Pulmonary/Chest: Effort normal and breath sounds normal  He exhibits no tenderness  Abdominal: Soft   Bowel sounds are normal  He exhibits no distension  There is no tenderness  Musculoskeletal: Normal range of motion  He exhibits no edema  Neurological: He is alert and oriented to person, place, and time  Skin: Skin is warm  No rash noted  No pallor  Psychiatric: He has a normal mood and affect  His behavior is normal    Nursing note and vitals reviewed  ED Medications  Medications   aspirin chewable tablet 324 mg (324 mg Oral Given 10/18/17 8299)       Diagnostic Studies  Labs Reviewed   CBC AND DIFFERENTIAL - Normal       Result Value Ref Range Status    WBC 9 09  4 31 - 10 16 Thousand/uL Final    RBC 5 18  3 88 - 5 62 Million/uL Final    Hemoglobin 16 1  12 0 - 17 0 g/dL Final    Hematocrit 46 4  36 5 - 49 3 % Final    MCV 90  82 - 98 fL Final    MCH 31 1  26 8 - 34 3 pg Final    MCHC 34 7  31 4 - 37 4 g/dL Final    RDW 14 3  11 6 - 15 1 % Final    MPV 10 4  8 9 - 12 7 fL Final    Platelets 292  642 - 390 Thousands/uL Final    nRBC 0  /100 WBCs Final    Neutrophils Relative 56  43 - 75 % Final    Lymphocytes Relative 35  14 - 44 % Final    Monocytes Relative 7  4 - 12 % Final    Eosinophils Relative 2  0 - 6 % Final    Basophils Relative 0  0 - 1 % Final    Neutrophils Absolute 5 14  1 85 - 7 62 Thousands/µL Final    Lymphocytes Absolute 3 16  0 60 - 4 47 Thousands/µL Final    Monocytes Absolute 0 60  0 17 - 1 22 Thousand/µL Final    Eosinophils Absolute 0 17  0 00 - 0 61 Thousand/µL Final    Basophils Absolute 0 02  0 00 - 0 10 Thousands/µL Final   POCT TROPONIN - Normal    POC Troponin I 0 00  0 00 - 0 08 ng/ml Final    Specimen Type VENOUS   Final    Narrative:     [employer] i-Stat handheld analyzer 99% cutoff is > 0 08ng/mL in [facility]    o cTnI 99% cutoff is useful only when applied to patients in the clinical setting of myocardial ischemia  o cTnI 99% cutoff should be interpreted in the context of clinical history, ECG findings and possibly cardiac imaging to establish correct diagnosis    o cTnI 99% cutoff may be suggestive but clearly not indicative of a coronary event without the clinical setting of myocardial ischemia  COMPREHENSIVE METABOLIC PANEL    Sodium 225  136 - 145 mmol/L Final    Potassium 3 9  3 5 - 5 3 mmol/L Final    Chloride 100  100 - 108 mmol/L Final    CO2 31  21 - 32 mmol/L Final    Anion Gap 8  4 - 13 mmol/L Final    BUN 11  5 - 25 mg/dL Final    Creatinine 1 04  0 60 - 1 30 mg/dL Final    Comment: Standardized to IDMS reference method    Glucose 103  65 - 140 mg/dL Final    Comment:   If the patient is fasting, the ADA then defines impaired fasting glucose as > 100 mg/dL and diabetes as > or equal to 123 mg/dL  Specimen collection should occur prior to Sulfasalazine administration due to the potential for falsely depressed results  Specimen collection should occur prior to Sulfapyridine administration due to the potential for falsely elevated results  Calcium 9 0  8 3 - 10 1 mg/dL Final    AST 31  5 - 45 U/L Final    Comment:   Specimen collection should occur prior to Sulfasalazine administration due to the potential for falsely depressed results  ALT 60  12 - 78 U/L Final    Comment:   Specimen collection should occur prior to Sulfasalazine administration due to the potential for falsely depressed results  Alkaline Phosphatase 95  46 - 116 U/L Final    Total Protein 8 1  6 4 - 8 2 g/dL Final    Albumin 3 8  3 5 - 5 0 g/dL Final    Total Bilirubin 0 32  0 20 - 1 00 mg/dL Final    eGFR 86  ml/min/1 73sq m Final    Narrative:     National Kidney Disease Education Program recommendations are as follows:  GFR calculation is accurate only with a steady state creatinine  Chronic Kidney disease less than 60 ml/min/1 73 sq  meters  Kidney failure less than 15 ml/min/1 73 sq  meters         No orders to display       Procedures  ECG 12 Lead Documentation  Date/Time: 10/18/2017 10:23 PM  Performed by: Francisco Ascencio  Authorized by: Francisco Ascencio     Indications / Diagnosis: CP  Interpretation:     Interpretation: normal    Rate:     ECG rate:  89    ECG rate assessment: normal    Rhythm:     Rhythm: sinus rhythm    Ectopy:     Ectopy: none    QRS:     QRS axis:  Normal    QRS intervals:  Normal  Conduction:     Conduction: normal    ST segments:     ST segments:  Normal  T waves:     T waves: normal            Phone Contacts  ED Phone Contact    ED Course  ED Course as of Oct 18 2357   Wed Oct 18, 2017   2204 Pt seen and examined  34 yo obese male who tells me he was recently diagnosed with HTN (PCP had been monitoring hie elevated BP) and started on norvasc 5mg QD 2 weeks ago  Today he went to [facility] to check his BP and "freaked out" when he saw 176/100  Took it 5 times in a row and "it was all over the place" and got fleeting L sided CP and began breathing fast   Discussed ability to check baseline labs but even he agrees this was likely anxiety related to noting elevated BP  Discussed no need to give anything currently for BP but need to see PCP as scheduled so they can discuss either increasing norvasc to 10mg daily or adding another agent  Also discussed how cuff sizes can effect BP (he is large male) and not to take it 5 times in a row  Will check labs, EKG and give ASA      2302 Trop neg  CBC WNL  [address], pt feels great at present  Will d/c home for f/u with PCP for repeat BP and medication adjustment as needed  MDM  CritCare Time    Disposition  Final diagnoses:   Uncontrolled hypertension   Anxiety   Atypical chest pain     ED Disposition     ED Disposition Condition Comment    Discharge  [facility] discharge to home/self care  Condition at discharge: Good        Follow-up Information     Follow up With Specialties Details Why Contact Info    Ti Leija, [address] [facility] Call for BP check and discussion about BP med increase vs addition of another if elevated [address] [facility] [address]  [phone] Discharge Medication List as of 10/18/2017 11:06 PM      CONTINUE these medications which have NOT CHANGED    Details   Cholecalciferol (VITAMIN D PO) Take 1 tablet by mouth, Historical Med      amitriptyline (ELAVIL) 50 mg tablet Take 50 mg by mouth 2 (two) times a day, Until Discontinued, Historical Med      omeprazole (PriLOSEC) 20 mg delayed release capsule Take 20 mg by mouth daily, Until Discontinued, Historical Med      Tapentadol HCl (NUCYNTA PO) Take by mouth, Until Discontinued, Historical Med           No discharge procedures on file      ED Provider  Electronically Signed by       Dora Muller DO  10/18/17 6512

## 2017-10-19 NOTE — ED NOTES
Pt states "i checked my b/p at [facility] and got 5 differnet readings   My head feels hot"     Charlotte KussmaulGeisinger-Lewistown Hospital  10/18/17 4374

## 2017-10-20 ENCOUNTER — GENERIC CONVERSION - ENCOUNTER (OUTPATIENT)
Dept: OTHER | Facility: OTHER | Age: 47
End: 2017-10-20

## 2017-10-24 ENCOUNTER — HOSPITAL ENCOUNTER (OUTPATIENT)
Dept: SLEEP CENTER | Facility: CLINIC | Age: 47
Discharge: HOME | End: 2017-10-24
Payer: COMMERCIAL

## 2017-10-24 DIAGNOSIS — G47.33 OSA (OBSTRUCTIVE SLEEP APNEA): ICD-10-CM

## 2017-10-24 PROCEDURE — 95811 POLYSOM 6/>YRS CPAP 4/> PARM: CPT

## 2017-10-30 ENCOUNTER — ALLSCRIPTS OFFICE VISIT (OUTPATIENT)
Dept: OTHER | Facility: OTHER | Age: 47
End: 2017-10-30

## 2017-10-30 ENCOUNTER — APPOINTMENT (OUTPATIENT)
Dept: LAB | Facility: CLINIC | Age: 47
End: 2017-10-30
Payer: COMMERCIAL

## 2017-10-30 ENCOUNTER — TRANSCRIBE ORDERS (OUTPATIENT)
Dept: LAB | Facility: CLINIC | Age: 47
End: 2017-10-30

## 2017-10-30 DIAGNOSIS — R94.6 ABNORMAL RESULTS OF THYROID FUNCTION STUDIES: ICD-10-CM

## 2017-10-30 DIAGNOSIS — I10 ESSENTIAL (PRIMARY) HYPERTENSION: ICD-10-CM

## 2017-10-30 DIAGNOSIS — E66.01 MORBID (SEVERE) OBESITY DUE TO EXCESS CALORIES (HCC): ICD-10-CM

## 2017-10-30 DIAGNOSIS — R73.03 PREDIABETES: ICD-10-CM

## 2017-10-30 DIAGNOSIS — E55.9 VITAMIN D DEFICIENCY: ICD-10-CM

## 2017-10-30 LAB
25(OH)D3 SERPL-MCNC: 46.3 NG/ML (ref 30–100)
ALBUMIN SERPL BCP-MCNC: 3.6 G/DL (ref 3.5–5)
ALP SERPL-CCNC: 81 U/L (ref 46–116)
ALT SERPL W P-5'-P-CCNC: 62 U/L (ref 12–78)
ANION GAP SERPL CALCULATED.3IONS-SCNC: 3 MMOL/L (ref 4–13)
AST SERPL W P-5'-P-CCNC: 41 U/L (ref 5–45)
BASOPHILS # BLD AUTO: 0.03 THOUSANDS/ΜL (ref 0–0.1)
BASOPHILS NFR BLD AUTO: 1 % (ref 0–1)
BILIRUB SERPL-MCNC: 0.31 MG/DL (ref 0.2–1)
BUN SERPL-MCNC: 10 MG/DL (ref 5–25)
CALCIUM SERPL-MCNC: 8.8 MG/DL (ref 8.3–10.1)
CHLORIDE SERPL-SCNC: 101 MMOL/L (ref 100–108)
CHOLEST SERPL-MCNC: 96 MG/DL (ref 50–200)
CO2 SERPL-SCNC: 34 MMOL/L (ref 21–32)
CREAT SERPL-MCNC: 1 MG/DL (ref 0.6–1.3)
CREAT UR-MCNC: 43.3 MG/DL
EOSINOPHIL # BLD AUTO: 0.11 THOUSAND/ΜL (ref 0–0.61)
EOSINOPHIL NFR BLD AUTO: 2 % (ref 0–6)
ERYTHROCYTE [DISTWIDTH] IN BLOOD BY AUTOMATED COUNT: 14.4 % (ref 11.6–15.1)
EST. AVERAGE GLUCOSE BLD GHB EST-MCNC: 123 MG/DL
GFR SERPL CREATININE-BSD FRML MDRD: 90 ML/MIN/1.73SQ M
GLUCOSE P FAST SERPL-MCNC: 89 MG/DL (ref 65–99)
HBA1C MFR BLD: 5.9 % (ref 4.2–6.3)
HCT VFR BLD AUTO: 46.4 % (ref 36.5–49.3)
HDLC SERPL-MCNC: 44 MG/DL (ref 40–60)
HGB BLD-MCNC: 15.6 G/DL (ref 12–17)
LDLC SERPL CALC-MCNC: 32 MG/DL (ref 0–100)
LYMPHOCYTES # BLD AUTO: 2.41 THOUSANDS/ΜL (ref 0.6–4.47)
LYMPHOCYTES NFR BLD AUTO: 40 % (ref 14–44)
MCH RBC QN AUTO: 30.4 PG (ref 26.8–34.3)
MCHC RBC AUTO-ENTMCNC: 33.6 G/DL (ref 31.4–37.4)
MCV RBC AUTO: 90 FL (ref 82–98)
MICROALBUMIN UR-MCNC: 7.2 MG/L (ref 0–20)
MICROALBUMIN/CREAT 24H UR: 17 MG/G CREATININE (ref 0–30)
MONOCYTES # BLD AUTO: 0.45 THOUSAND/ΜL (ref 0.17–1.22)
MONOCYTES NFR BLD AUTO: 8 % (ref 4–12)
NEUTROPHILS # BLD AUTO: 3.01 THOUSANDS/ΜL (ref 1.85–7.62)
NEUTS SEG NFR BLD AUTO: 49 % (ref 43–75)
NRBC BLD AUTO-RTO: 0 /100 WBCS
PLATELET # BLD AUTO: 230 THOUSANDS/UL (ref 149–390)
PMV BLD AUTO: 10.7 FL (ref 8.9–12.7)
POTASSIUM SERPL-SCNC: 4.1 MMOL/L (ref 3.5–5.3)
PROT SERPL-MCNC: 7.4 G/DL (ref 6.4–8.2)
RBC # BLD AUTO: 5.14 MILLION/UL (ref 3.88–5.62)
SODIUM SERPL-SCNC: 138 MMOL/L (ref 136–145)
T4 FREE SERPL-MCNC: 0.93 NG/DL (ref 0.76–1.46)
TRIGL SERPL-MCNC: 99 MG/DL
TSH SERPL DL<=0.05 MIU/L-ACNC: 1.52 UIU/ML (ref 0.36–3.74)
WBC # BLD AUTO: 6.02 THOUSAND/UL (ref 4.31–10.16)

## 2017-10-30 PROCEDURE — 82043 UR ALBUMIN QUANTITATIVE: CPT

## 2017-10-30 PROCEDURE — 36415 COLL VENOUS BLD VENIPUNCTURE: CPT

## 2017-10-30 PROCEDURE — 85025 COMPLETE CBC W/AUTO DIFF WBC: CPT

## 2017-10-30 PROCEDURE — 84443 ASSAY THYROID STIM HORMONE: CPT

## 2017-10-30 PROCEDURE — 80061 LIPID PANEL: CPT

## 2017-10-30 PROCEDURE — 84439 ASSAY OF FREE THYROXINE: CPT

## 2017-10-30 PROCEDURE — 82306 VITAMIN D 25 HYDROXY: CPT

## 2017-10-30 PROCEDURE — 80053 COMPREHEN METABOLIC PANEL: CPT

## 2017-10-30 PROCEDURE — 82570 ASSAY OF URINE CREATININE: CPT

## 2017-10-30 PROCEDURE — 83036 HEMOGLOBIN GLYCOSYLATED A1C: CPT

## 2017-11-03 ENCOUNTER — ALLSCRIPTS OFFICE VISIT (OUTPATIENT)
Dept: OTHER | Facility: OTHER | Age: 47
End: 2017-11-03

## 2017-11-04 NOTE — CONSULTS
Assessment  1  Obesity, morbid, BMI 40 0-49 9 (278 01) (E66 01)   2  Hypertension (401 9) (I10)   3  Prediabetes (790 29) (R73 03)   4  Chronic back pain (724 5,338 29) (M54 9,G89 29)   5  GERD (gastroesophageal reflux disease) (530 81) (K21 9)    Review of Systems  Focused-Male:   Constitutional: no fever or chills, feels well, no tiredness, no recent weight loss or weight gain  ENT: no complaints of earache, no loss of hearing, no nosebleeds or nasal discharge, no sore throat or hoarseness  Cardiovascular: no complaints of slow or fast heart rate, no chest pain, no palpitations, no leg claudication or lower extremity edema  Respiratory: no complaints of shortness of breath, no wheezing or cough, no dyspnea on exertion, no orthopnea or PND  Gastrointestinal: no complaints of abdominal pain, no constipation, no nausea or vomiting, no diarrhea or bloody stools  Genitourinary: no complaints of dysuria or incontinence, no hesitancy, no nocturia, no genital lesion, no inadequacy of penile erection  Musculoskeletal: no complaints of arthralgia, no myalgia, no joint swelling or stiffness, no limb pain or swelling  Integumentary: no complaints of skin rash or lesion, no itching or dry skin, no skin wounds  Neurological: no complaints of headache, no confusion, no numbness or tingling, no dizziness or fainting  ROS Reviewed:   ROS reviewed        Active Problems   · Abnormal blood sugar (790 29) (R73 09)   · Abnormal TSH (790 6) (R94 6)   · At risk for obstructive sleep apnea (V49 89) (Z91 89)   · Bilateral anterior knee pain (719 46) (M25 561,M25 562)   · Chronic back pain (724 5,338 29) (M54 9,G89 29)   · Depression screening (V79 0) (Z13 89)   · Encounter for prostate cancer screening (V76 44) (Z12 5)   · GERD (gastroesophageal reflux disease) (530 81) (K21 9)   · Hematuria (599 70) (R31 9)   · Hypertension (401 9) (I10)   · Left knee pain (719 46) (M25 562)   · Microalbuminuria (791 0) (R80 9)   · Need for influenza vaccination (V04 81) (Z23)   · Obesity, morbid, BMI 40 0-49 9 (278 01) (E66 01)   · Prediabetes (790 29) (R73 03)   · Preoperative evaluation to rule out surgical contraindication (V72 83) (Z01 818)   · Pre-operative laboratory examination (V72 63) (S68 406)   · Suspicious nevus (238 2) (D22 9)   · Urinary urgency (788 63) (R39 15)   · Vitamin D deficiency (268 9) (E55 9)    Discussion/Summary  Discussion Summary:   57 yo male with a long standing h/o of obesity and inability to sustain any meaningful weight loss on his own despite several attempts  is interested in the Laparoscopic Gastric Bypass  a part of his pre op evaluation, he will be referred to a cardiologist and for a sleep evaluation and consult  needs an EGD to evaluate the anatomy of his GI tract have spent over 45 minutes with him face to face in the office today discussing his options and details of the surgery  We have seen an animation of the surgery on the computer that illustrates how the operation is done and how the anatomy will be altered with the procedure  Over 50% of this was coordinating care have discussed and educated the patient with regards to the components of our multidisciplinary program and the importance of compliance and follow up in the post operative period  was given the opportunity to ask questions and I have answered all of them  patient was also instructed with regards to the importance of behavior modification, nutritional counseling, support meeting attendance and lifestyle changes that are important to ensure success  there is a great statistical chance of improvement or even resolution of most of his associated comorbidities, the results vary from patient to patient and they largely depend on his commitment and compliance  needs to lose 40 lbs prior to the operation  Chief Complaint  Chief Complaint Free Text Note Form: Patient in office today for for consult  2/6 weight check        History of Present Illness  HPI: 52-year-old male with a longstanding history of morbid obesity  Here today to discuss bariatric options  Bariatric Surgery Evaluation: The patient is being seen for an initial visit for bariatric surgery evaluation  Symptoms:  inability to lose weight  Initial presentation included inability to lose weight  Reported interest in weight loss is high  Diets tried in the past include low calorie, low carbohydrate and low fat  The patient exercises infrequently  Past Medical History  1  History of Intermittent chest pain (786 50) (R07 9)  Active Problems And Past Medical History Reviewed: The active problems and past medical history were reviewed and updated today  Surgical History  1  Denied: History of Recent Surgery  Surgical History Reviewed: The surgical history was reviewed and updated today  Family History  Mother    1  Family history of hypertension (V17 49) (Z82 49)  Father    2  Family history of malignant neoplasm of prostate (V16 42) (Z80 42)   3  Family history of Prostate enlargement  Maternal Grandmother    4  Family history of hypertension (V17 49) (Z82 49)  Family History Reviewed: The family history was reviewed and updated today  Social History   · Current non-drinker of alcohol (V49 89) (Z78 9)   · Does not use illicit drugs (B59 99) (Z78 9)   · Never smoker  Social History Reviewed: The social history was reviewed and updated today  Current Meds   1  Amitriptyline HCl - 50 MG Oral Tablet; TAKE ONE TABLET AT BEDTIME; Therapy: 98KBJ0704 to (Evaluate:42Zho1277) Recorded   2  AmLODIPine Besylate 5 MG Oral Tablet; TOME ALOK TABLETA TODOS LOS ORELLANA; Therapy: 14Rpy7435 to (Evaluate:18Nov2017)  Requested for: 19Oct2017; Last   Rx:19Oct2017 Ordered   3  AmLODIPine Besylate TABS; Therapy: (Recorded:78Btm5141) to Recorded   4  Lisinopril-Hydrochlorothiazide 10-12 5 MG Oral Tablet; take 1 tablet by mouth once daily;    Therapy: 87LPZ6463 to (Evaluate:01Spy4501) Requested for: 07GKO9790; Last   Rx:01Gcg2920 Ordered   5  Nucynta 100 MG Oral Tablet; take one tablet twice daily; Therapy: 17VZA8584 to Recorded   6  Omeprazole 20 MG Oral Capsule Delayed Release; Therapy: 02ZSV1007 to Recorded   7  Vitamin D (Ergocalciferol) 89047 UNIT Oral Capsule; TAKE 1 CAPSULE WEEKLY; Therapy: 21XZC5380 to [id number])  Requested for: 76VMG4155; Last   US:31LHG7253 Ordered  Medication List Reviewed: The medication list was reviewed and updated today  Allergies  1  No Known Drug Allergies    Vitals  Vital Signs    Recorded: 43KBG9991 01:48PM   Temperature 98 F   Heart Rate 80   Respiration 18   Systolic 187   Diastolic 70   Height 5 ft 7 5 in   Weight 379 lb    BMI Calculated 58 48   BSA Calculated 2 67     Physical Exam    Constitutional   General appearance: No acute distress, well appearing and well nourished  well developed,-- appears healthy,-- morbidly obese,-- well hydrated-- and-- appearance reflects stated age     Psychiatric   Orientation to person, place and time: Normal     Mood and affect: Normal          Future Appointments    Date/Time Provider Specialty Site   12/05/2017 01:00 PM [facility] B[facility]ric Medicine [facility]     Signatures   Electronically signed by : LISHA Kim ; Nov  3 2017  2:08PM EST                       (Author)

## 2017-11-06 ENCOUNTER — TRANSCRIBE ORDERS (OUTPATIENT)
Dept: SLEEP CENTER | Facility: CLINIC | Age: 47
End: 2017-11-06

## 2017-11-06 DIAGNOSIS — G47.33 OSA (OBSTRUCTIVE SLEEP APNEA): Primary | ICD-10-CM

## 2017-11-07 ENCOUNTER — GENERIC CONVERSION - ENCOUNTER (OUTPATIENT)
Dept: OTHER | Facility: OTHER | Age: 47
End: 2017-11-07

## 2017-11-14 ENCOUNTER — HOSPITAL ENCOUNTER (EMERGENCY)
Facility: HOSPITAL | Age: 47
Discharge: HOME | End: 2017-11-15
Attending: EMERGENCY MEDICINE | Admitting: EMERGENCY MEDICINE
Payer: COMMERCIAL

## 2017-11-14 ENCOUNTER — APPOINTMENT (EMERGENCY)
Dept: CT IMAGING | Facility: HOSPITAL | Age: 47
End: 2017-11-14
Payer: COMMERCIAL

## 2017-11-14 VITALS
HEART RATE: 89 BPM | SYSTOLIC BLOOD PRESSURE: 149 MMHG | RESPIRATION RATE: 16 BRPM | DIASTOLIC BLOOD PRESSURE: 68 MMHG | TEMPERATURE: 98.8 F | OXYGEN SATURATION: 98 % | WEIGHT: 315 LBS

## 2017-11-14 DIAGNOSIS — M25.562 ACUTE PAIN OF LEFT KNEE: ICD-10-CM

## 2017-11-14 DIAGNOSIS — G89.29 CHRONIC PAIN OF LEFT KNEE: Primary | ICD-10-CM

## 2017-11-14 DIAGNOSIS — M17.12 OSTEOARTHRITIS OF LEFT KNEE: ICD-10-CM

## 2017-11-14 DIAGNOSIS — M25.462 EFFUSION OF LEFT KNEE JOINT: ICD-10-CM

## 2017-11-14 DIAGNOSIS — M25.562 CHRONIC PAIN OF LEFT KNEE: Primary | ICD-10-CM

## 2017-11-14 LAB
ALBUMIN SERPL BCP-MCNC: 3.7 G/DL (ref 3.5–5)
ALP SERPL-CCNC: 83 U/L (ref 46–116)
ALT SERPL W P-5'-P-CCNC: 51 U/L (ref 12–78)
ANION GAP SERPL CALCULATED.3IONS-SCNC: 5 MMOL/L (ref 4–13)
AST SERPL W P-5'-P-CCNC: 40 U/L (ref 5–45)
BASOPHILS # BLD AUTO: 0.03 THOUSANDS/ΜL (ref 0–0.1)
BASOPHILS NFR BLD AUTO: 0 % (ref 0–1)
BILIRUB SERPL-MCNC: 0.51 MG/DL (ref 0.2–1)
BUN SERPL-MCNC: 12 MG/DL (ref 5–25)
CALCIUM SERPL-MCNC: 9.2 MG/DL (ref 8.3–10.1)
CHLORIDE SERPL-SCNC: 99 MMOL/L (ref 100–108)
CO2 SERPL-SCNC: 34 MMOL/L (ref 21–32)
CREAT SERPL-MCNC: 1.19 MG/DL (ref 0.6–1.3)
CRP SERPL QL: 7.4 MG/L
EOSINOPHIL # BLD AUTO: 0.07 THOUSAND/ΜL (ref 0–0.61)
EOSINOPHIL NFR BLD AUTO: 1 % (ref 0–6)
ERYTHROCYTE [DISTWIDTH] IN BLOOD BY AUTOMATED COUNT: 14.3 % (ref 11.6–15.1)
ERYTHROCYTE [SEDIMENTATION RATE] IN BLOOD: 15 MM/HOUR (ref 0–10)
GFR SERPL CREATININE-BSD FRML MDRD: 73 ML/MIN/1.73SQ M
GLUCOSE SERPL-MCNC: 131 MG/DL (ref 65–140)
HCT VFR BLD AUTO: 44.3 % (ref 36.5–49.3)
HGB BLD-MCNC: 14.9 G/DL (ref 12–17)
LYMPHOCYTES # BLD AUTO: 2.45 THOUSANDS/ΜL (ref 0.6–4.47)
LYMPHOCYTES NFR BLD AUTO: 27 % (ref 14–44)
MCH RBC QN AUTO: 29.8 PG (ref 26.8–34.3)
MCHC RBC AUTO-ENTMCNC: 33.6 G/DL (ref 31.4–37.4)
MCV RBC AUTO: 89 FL (ref 82–98)
MONOCYTES # BLD AUTO: 0.57 THOUSAND/ΜL (ref 0.17–1.22)
MONOCYTES NFR BLD AUTO: 6 % (ref 4–12)
NEUTROPHILS # BLD AUTO: 5.99 THOUSANDS/ΜL (ref 1.85–7.62)
NEUTS SEG NFR BLD AUTO: 66 % (ref 43–75)
PLATELET # BLD AUTO: 237 THOUSANDS/UL (ref 149–390)
PMV BLD AUTO: 10.1 FL (ref 8.9–12.7)
POTASSIUM SERPL-SCNC: 3.7 MMOL/L (ref 3.5–5.3)
PROT SERPL-MCNC: 7.5 G/DL (ref 6.4–8.2)
RBC # BLD AUTO: 5 MILLION/UL (ref 3.88–5.62)
SODIUM SERPL-SCNC: 138 MMOL/L (ref 136–145)
WBC # BLD AUTO: 9.11 THOUSAND/UL (ref 4.31–10.16)

## 2017-11-14 PROCEDURE — 85025 COMPLETE CBC W/AUTO DIFF WBC: CPT | Performed by: EMERGENCY MEDICINE

## 2017-11-14 PROCEDURE — 96375 TX/PRO/DX INJ NEW DRUG ADDON: CPT

## 2017-11-14 PROCEDURE — 85652 RBC SED RATE AUTOMATED: CPT | Performed by: EMERGENCY MEDICINE

## 2017-11-14 PROCEDURE — 36415 COLL VENOUS BLD VENIPUNCTURE: CPT | Performed by: EMERGENCY MEDICINE

## 2017-11-14 PROCEDURE — 86140 C-REACTIVE PROTEIN: CPT | Performed by: EMERGENCY MEDICINE

## 2017-11-14 PROCEDURE — 80053 COMPREHEN METABOLIC PANEL: CPT | Performed by: EMERGENCY MEDICINE

## 2017-11-14 PROCEDURE — 96374 THER/PROPH/DIAG INJ IV PUSH: CPT

## 2017-11-14 PROCEDURE — 73701 CT LOWER EXTREMITY W/DYE: CPT

## 2017-11-14 RX ORDER — LIDOCAINE 50 MG/G
1 PATCH TOPICAL ONCE
Status: DISCONTINUED | OUTPATIENT
Start: 2017-11-14 | End: 2017-11-15 | Stop reason: HOSPADM

## 2017-11-14 RX ORDER — KETOROLAC TROMETHAMINE 30 MG/ML
30 INJECTION, SOLUTION INTRAMUSCULAR; INTRAVENOUS ONCE
Status: COMPLETED | OUTPATIENT
Start: 2017-11-14 | End: 2017-11-14

## 2017-11-14 RX ORDER — MORPHINE SULFATE 4 MG/ML
4 INJECTION, SOLUTION INTRAMUSCULAR; INTRAVENOUS ONCE
Status: COMPLETED | OUTPATIENT
Start: 2017-11-14 | End: 2017-11-14

## 2017-11-14 RX ORDER — OXYCODONE HYDROCHLORIDE AND ACETAMINOPHEN 5; 325 MG/1; MG/1
1 TABLET ORAL ONCE
Status: COMPLETED | OUTPATIENT
Start: 2017-11-14 | End: 2017-11-14

## 2017-11-14 RX ORDER — NAPROXEN 500 MG/1
500 TABLET ORAL 2 TIMES DAILY PRN
Qty: 20 TABLET | Refills: 0 | Status: SHIPPED | OUTPATIENT
Start: 2017-11-14 | End: 2018-04-12

## 2017-11-14 RX ORDER — LIDOCAINE 50 MG/G
1 PATCH TOPICAL EVERY 24 HOURS
Qty: 30 PATCH | Refills: 0 | Status: SHIPPED | OUTPATIENT
Start: 2017-11-14 | End: 2018-05-10 | Stop reason: HOSPADM

## 2017-11-14 RX ORDER — TRAMADOL HYDROCHLORIDE 50 MG/1
50 TABLET ORAL EVERY 6 HOURS PRN
Qty: 20 TABLET | Refills: 0 | Status: SHIPPED | OUTPATIENT
Start: 2017-11-14 | End: 2017-11-19

## 2017-11-14 RX ADMIN — KETOROLAC TROMETHAMINE 30 MG: 30 INJECTION, SOLUTION INTRAMUSCULAR at 21:34

## 2017-11-14 RX ADMIN — OXYCODONE HYDROCHLORIDE AND ACETAMINOPHEN 1 TABLET: 5; 325 TABLET ORAL at 23:10

## 2017-11-14 RX ADMIN — MORPHINE SULFATE 4 MG: 4 INJECTION, SOLUTION INTRAMUSCULAR; INTRAVENOUS at 21:36

## 2017-11-14 RX ADMIN — IOHEXOL 100 ML: 350 INJECTION, SOLUTION INTRAVENOUS at 22:08

## 2017-11-14 RX ADMIN — LIDOCAINE 1 PATCH: 50 PATCH CUTANEOUS at 21:32

## 2017-11-15 ENCOUNTER — HOSPITAL ENCOUNTER (OUTPATIENT)
Dept: SLEEP CENTER | Facility: CLINIC | Age: 47
Discharge: HOME | End: 2017-11-15
Payer: COMMERCIAL

## 2017-11-15 ENCOUNTER — TRANSCRIBE ORDERS (OUTPATIENT)
Dept: SLEEP CENTER | Facility: CLINIC | Age: 47
End: 2017-11-15

## 2017-11-15 DIAGNOSIS — G47.33 OSA (OBSTRUCTIVE SLEEP APNEA): ICD-10-CM

## 2017-11-15 DIAGNOSIS — G47.33 OBSTRUCTIVE SLEEP APNEA SYNDROME: Primary | ICD-10-CM

## 2017-11-15 PROCEDURE — 99284 EMERGENCY DEPT VISIT MOD MDM: CPT

## 2017-11-15 NOTE — PROGRESS NOTES
Progress Note - [address] [facility] 56 y o  male MRN: [id number]  Unit/Bed#:  Encounter: [id number]    Dear Barbara Chung   Mr Lynne aSba  Is here for follow-up of his obstructive sleep apnea  He was recently diagnosed with moderate obstructive sleep apnea increasing in severity during the REM stage, and he had a CPAP titration study done and is here for follow-up      medical history list of current current medications unchanged from prior visit  Today on exam   Weight 375 8 lb   Height 5 feet 7 inches   BMI 59   Blood pressure 134/82   Heart rate 80   Neck 49 cm   Wittmann sleepiness score 3   Today on exam   ENT nares is patent, no evidence of any discharge, crowded oropharyngeal airways, Mallampati score of 4   Neck short and wide supple no lymphadenopathy normal thyroid no nodules  Lungs clear to auscultation  Heart first and second heart sounds are heard no murmur or gallop is heard  Extremities bilateral trace pedal edema  CNS awake alert oriented x3  Assessment  1  Obstructive sleep apnea  2  Morbid obesity  Plan   Discussed his recent CPAP titration study where he was titrated to CPAP of 20 cm of water  With attenuation of the apneas and hypopneas  Discussed with the patient the study in detail and discussed the need for compliance with the CPAP machine understands and verbalizes  Recommend weight loss he does follow up with Bariatric Clinic and he states he is planning to have the bariatric surgery  Cautioned against driving when sleepy  follow-up in 3 months with the CPAP download compliance    Thank you very much for allowing me to participate in the care of this patient    yours sincerely   Gloria Wilde

## 2017-11-15 NOTE — ED PROVIDER NOTES
History  Chief Complaint   Patient presents with    Knee Pain     Patient reports in July of 2016 patient injured left knee at work and since has been recieving therapy  today patient recieved an injection for his knee and has since had increased pain and swelling  Morbidly obese 55 yo male with chronic knee pain since injury July 2016  Per pt he was supposed to have a knee replacement 2 weeks ago but no ([facility] decided to try a "gel injection" today and since that time has been in severe pain and unable to move L knee  No warmth noted, difficult to assess for swelling due to body habitus but no difference noted when compared to R knee  NV intact distally  When I went to move leg he immediately jerked it back to position of comfort so he is able to use it but hurts to  Pt clearly in pain, crying  History provided by:  Patient   used: Yes    Knee Pain   Location:  Knee  Time since incident: has hurt for 1/5 years but worsened since injection at [facility] today  Injury: yes    Knee location:  L knee  Pain details:     Quality:  Aching    Radiates to:  Does not radiate    Severity:  Severe    Onset quality:  Gradual    Duration:  8 hours    Timing:  Constant    Progression:  Worsening  Chronicity:  Chronic  Worsened by:  Bearing weight (any movement)  Ineffective treatments:  None tried  Associated symptoms: decreased ROM, stiffness and swelling    Associated symptoms: no fever and no neck pain        Prior to Admission Medications   Prescriptions Last Dose Informant Patient Reported? Taking?    Cholecalciferol (VITAMIN D PO)   Yes Yes   Sig: Take 1 tablet by mouth   Tapentadol HCl (NUCYNTA PO)   Yes Yes   Sig: Take by mouth   amitriptyline (ELAVIL) 50 mg tablet   Yes Yes   Sig: Take 50 mg by mouth 2 (two) times a day   omeprazole (PriLOSEC) 20 mg delayed release capsule   Yes Yes   Sig: Take 20 mg by mouth daily      Facility-Administered Medications: None Past Medical History:   Diagnosis Date    Hypertension        History reviewed  No pertinent surgical history  History reviewed  No pertinent family history  I have reviewed and agree with the history as documented  Social History   Substance Use Topics    Smoking status: Never Smoker    Smokeless tobacco: Never Used    Alcohol use No        Review of Systems   Constitutional: Negative for chills and fever  HENT: Negative for congestion and sore throat  Eyes: Negative for visual disturbance  Respiratory: Negative for shortness of breath and wheezing  Cardiovascular: Negative for chest pain and palpitations  Gastrointestinal: Negative for abdominal pain, diarrhea, nausea and vomiting  Genitourinary: Negative for dysuria  Musculoskeletal: Positive for arthralgias (L knee pain) and stiffness  Negative for neck pain and neck stiffness  Skin: Negative for pallor and rash  Neurological: Negative for headaches  Psychiatric/Behavioral: Negative for confusion  All other systems reviewed and are negative  Physical Exam  ED Triage Vitals [11/14/17 2024]   Temperature Pulse Respirations Blood Pressure SpO2   98 8 °F (37 1 °C) 98 18 161/71 96 %      Temp Source Heart Rate Source Patient Position - Orthostatic VS BP Location FiO2 (%)   Oral Monitor;Radial Lying Right arm --      Pain Score       Worst Possible Pain           Orthostatic Vital Signs  Vitals:    11/14/17 2024 11/14/17 2236   BP: 161/71 149/68   Pulse: 98 89   Patient Position - Orthostatic VS: Lying Lying       Physical Exam   Constitutional: He is oriented to person, place, and time  He appears well-developed and well-nourished  He appears distressed (appears in pain)  Morbidly obese   HENT:   Head: Normocephalic and atraumatic  Mouth/Throat: Oropharynx is clear and moist    Neck: Normal range of motion  Neck supple  Cardiovascular: Normal rate and regular rhythm  No murmur heard    Pulmonary/Chest: Effort normal and breath sounds normal    Abdominal: Soft  Bowel sounds are normal  He exhibits no distension  There is no tenderness  Musculoskeletal: He exhibits tenderness (diffuse tenderness)  He exhibits no edema  L knee - unable to ROM due to pain, NV intact distally, no swelling or warmth noted, no cellulitis at injection site (covered with bandaid)   Neurological: He is alert and oriented to person, place, and time  Skin: Skin is warm  Capillary refill takes less than 2 seconds  No rash noted  No erythema  No pallor  Nursing note and vitals reviewed        ED Medications  Medications   lidocaine (LIDODERM) 5 % patch 1 patch (1 patch Transdermal Medication Applied 11/14/17 2132)   ketorolac (TORADOL) 30 mg/mL injection 30 mg (30 mg Intravenous Given 11/14/17 2134)   morphine (PF) 4 mg/mL injection 4 mg (4 mg Intravenous Given 11/14/17 2136)   iohexol (OMNIPAQUE) 350 MG/ML injection (MULTI-DOSE) 100 mL (100 mL Intravenous Given 11/14/17 2208)   oxyCODONE-acetaminophen (PERCOCET) 5-325 mg per tablet 1 tablet (1 tablet Oral Given 11/14/17 2310)       Diagnostic Studies  Results Reviewed     Procedure Component Value Units Date/Time    Sedimentation rate, automated [[id number]]  (Abnormal) Collected:  11/14/17 2127    Lab Status:  Final result Specimen:  Blood from Arm, Right Updated:  11/14/17 2301     Sed Rate 15 (H) mm/hour     Comprehensive metabolic panel [[id number]]  (Abnormal) Collected:  11/14/17 2127    Lab Status:  Final result Specimen:  Blood from Arm, Right Updated:  11/14/17 2224     Sodium 138 mmol/L      Potassium 3 7 mmol/L      Chloride 99 (L) mmol/L      CO2 34 (H) mmol/L      Anion Gap 5 mmol/L      BUN 12 mg/dL      Creatinine 1 19 mg/dL      Glucose 131 mg/dL      Calcium 9 2 mg/dL      AST 40 U/L      ALT 51 U/L      Alkaline Phosphatase 83 U/L      Total Protein 7 5 g/dL      Albumin 3 7 g/dL      Total Bilirubin 0 51 mg/dL      eGFR 73 ml/min/1 73sq m     Narrative:         National Kidney Disease Education Program recommendations are as follows:  GFR calculation is accurate only with a steady state creatinine  Chronic Kidney disease less than 60 ml/min/1 73 sq  meters  Kidney failure less than 15 ml/min/1 73 sq  meters  C-reactive protein [[id number]]  (Abnormal) Collected:  11/14/17 2127    Lab Status:  Final result Specimen:  Blood from Arm, Right Updated:  11/14/17 2224     CRP 7 4 (H) mg/L     CBC and differential [[id number]]  (Normal) Collected:  11/14/17 2127    Lab Status:  Final result Specimen:  Blood from Arm, Right Updated:  11/14/17 2202     WBC 9 11 Thousand/uL      RBC 5 00 Million/uL      Hemoglobin 14 9 g/dL      Hematocrit 44 3 %      MCV 89 fL      MCH 29 8 pg      MCHC 33 6 g/dL      RDW 14 3 %      MPV 10 1 fL      Platelets 241 Thousands/uL      Neutrophils Relative 66 %      Lymphocytes Relative 27 %      Monocytes Relative 6 %      Eosinophils Relative 1 %      Basophils Relative 0 %      Neutrophils Absolute 5 99 Thousands/µL      Lymphocytes Absolute 2 45 Thousands/µL      Monocytes Absolute 0 57 Thousand/µL      Eosinophils Absolute 0 07 Thousand/µL      Basophils Absolute 0 03 Thousands/µL                  CT knee left w contrast   Final Result by Rakesh Rg MD (11/14 8938)         1  Small joint effusion  Small prepatellar soft tissue swelling/thickening  2  Mild to moderate degenerative osteoarthritis most severe along the medial compartment  Consider follow-up with knee MRI which would be much more sensitive and specific for soft tissue evaluation  If there is concern for osteomyelitis, MRI would be much more sensitive and specific  Workstation performed: [id number]                    Procedures  Procedures       Phone Contacts  ED Phone Contact    ED Course  ED Course as of Nov 15 0009   Tue Nov 14, 2017   2103 Pt seen and examined  Morbidly obese 57 yo male with chronic knee pain since injury July 2016    Per pt he was supposed to have a knee replacement 2 weeks ago but no ([facility] [employer] decided to try a "gel injection" today and since that time has been in severe pain and unable to move L knee  No warmth noted, difficult to assess for swelling due to body habitus but no difference noted when compared to R knee  NV intact distally  When I went to move leg he immediately jerked it back to position of comfort so he is able to use it but hurts to  Pt clearly in pain, crying  Will give IV morphine, toradol, lidoderm patch and check labs, CT L knee  [facility] results  Pt did not get much relief from meds  Percocet ordered, aware nothing stronger will be given  2331 CT confirms - 1  Small joint effusion  Small prepatellar soft tissue swelling/thickening  2  Mild to moderate degenerative osteoarthritis most severe along the medial compartment  Consider follow-up with knee MRI which would be much more sensitive and specific for soft tissue evaluation  Will d/c home in ace wrap and on crutches or walker for f/u with ortho  MDM  CritCare Time    Disposition  Final diagnoses:   Chronic pain of left knee   Acute pain of left knee   Effusion of left knee joint   Osteoarthritis of left knee     Time reflects when diagnosis was documented in both MDM as applicable and the Disposition within this note     Time User Action Codes Description Comment    11/14/2017 11:34 PM Dom Hodges Add [D84 759,  G89 29] Chronic pain of left knee     11/14/2017 11:34 PM Dom Hodges Add [M25 562] Acute pain of left knee     11/14/2017 11:35 PM Estefanía HUSAIN Add [M25 462] Effusion of left knee joint     11/14/2017 11:35 PM Dom Hodges Add [M17 12] Osteoarthritis of left knee       ED Disposition     ED Disposition Condition Comment    Discharge  [facility] discharge to home/self care      Condition at discharge: Good        Follow-up Information     Follow up With Specialties Details Why Contact Info Dejan Webb, [facility] Medicine Call  [address] [place]  [address]  [phone]      your orthopedist who injected your knee  Call          Discharge Medication List as of 11/14/2017 11:37 PM      START taking these medications    Details   lidocaine (LIDODERM) 5 % Place 1 patch on the skin every 24 hours for 30 days Remove & Discard patch within 12 hours or as directed by MD, Starting Tue 11/14/2017, Until Thu 12/14/2017, Print      naproxen (NAPROSYN) 500 mg tablet Take 1 tablet by mouth 2 (two) times a day as needed for mild pain or moderate pain for up to 10 days, Starting Tue 11/14/2017, Until Fri 11/24/2017, Print      traMADol (ULTRAM) 50 mg tablet Take 1 tablet by mouth every 6 (six) hours as needed for moderate pain for up to 5 days, Starting Tue 11/14/2017, Until Sun 11/19/2017, Print         CONTINUE these medications which have NOT CHANGED    Details   amitriptyline (ELAVIL) 50 mg tablet Take 50 mg by mouth 2 (two) times a day, Until Discontinued, Historical Med      Cholecalciferol (VITAMIN D PO) Take 1 tablet by mouth, Historical Med      omeprazole (PriLOSEC) 20 mg delayed release capsule Take 20 mg by mouth daily, Until Discontinued, Historical Med      Tapentadol HCl (NUCYNTA PO) Take by mouth, Until Discontinued, Historical Med           No discharge procedures on file      ED Provider  Electronically Signed by           Francisco Mac DO  11/15/17 0009

## 2017-11-15 NOTE — ED NOTES
Patient transported to [facility], [address] [place] [address]  11/14/17 6656

## 2017-11-15 NOTE — DISCHARGE INSTRUCTIONS
Knee Pain   WHAT YOU NEED TO KNOW:   Knee pain may start suddenly, or it may be a long-term problem  You may have pain on the side, front, or back of your knee  You may have knee stiffness and swelling  You may hear popping sounds or feel like your knee is giving way or locking up as you walk  You may feel pain when you sit, stand, walk, or climb up and down stairs  Knee pain can be caused by conditions such as obesity, inflammation, or strains or tears in ligaments or tendons  DISCHARGE INSTRUCTIONS:   Follow up with your healthcare provider within 24 hours or as directed: You may need follow-up treatments, such as steroid injections to decrease pain  Write down your questions so you remember to ask them during your visits  Self-care:   · Rest  your knee so it can heal  Limit activities that increase your pain  · Ice  can help reduce swelling  Wrap ice in a towel and put it on your knee for as long and as often as directed  · Compression  with a brace or bandage can help reduce swelling  Use a brace or bandage only as directed  · Elevation  helps decrease pain and swelling  Elevate your knee while you are sitting or lying down  Prop your leg on pillows to keep your knee above the level of your heart  Medicines:   · NSAIDs  help decrease swelling and pain or fever  This medicine is available with or without a doctor's order  NSAIDs can cause stomach bleeding or kidney problems in certain people  If you take blood thinner medicine, always ask your healthcare provider if NSAIDs are safe for you  Always read the medicine label and follow directions  · Acetaminophen  decreases pain and fever  It is available without a doctor's order  Ask how much to take and when to take it  Follow directions  Acetaminophen can cause liver damage if not taken correctly  · Take your medicine as directed  Contact your healthcare provider if you think your medicine is not helping or if you have side effects   Tell him or her if you are allergic to any medicine  Keep a list of the medicines, vitamins, and herbs you take  Include the amounts, and when and why you take them  Bring the list or the pill bottles to follow-up visits  Carry your medicine list with you in case of an emergency  Exercise as directed: You may need to see a physical therapist or do recommended exercises to improve movement and decrease your pain  You may be directed to walk, swim, or ride a bike  Follow your exercise plan exactly as directed to avoid further injury  Contact your healthcare provider if:   · You have questions or concerns about your condition or care  Return to the emergency department if:   · Your pain is worse, even after treatment  · You cannot bend or straighten your leg completely  · The swelling around your knee does not go down even with treatment  · Your knee is painful and hot to the touch  © 2017 [address] Information is for End User's use only and may not be sold, redistributed or otherwise used for commercial purposes  All illustrations and images included in CareNotes® are the copyrighted property of [employer]  or [employer] Johnson  The above information is an  only  It is not intended as medical advice for individual conditions or treatments  Talk to your doctor, nurse or pharmacist before following any medical regimen to see if it is safe and effective for you  Osteoarthritis   WHAT YOU NEED TO KNOW:   Osteoarthritis (OA) occurs when cartilage (tissue that cushions a joint) wears away slowly and causes the bones to rub together  OA is a long-term condition that often affects the hands, neck, lower back, knees, and hips  OA is also called arthrosis or degenerative joint disease  DISCHARGE INSTRUCTIONS:   Return to the emergency department if:   · You have severe pain  · You cannot move your joint    Contact your healthcare provider if:   · You have a fever     · Your joint is red and tender  · You have questions or concerns about your condition or care  Medicines:   · Acetaminophen  is used to decrease pain  It is available without a doctor's order  Ask how much to take and how often to take it  Follow directions  Acetaminophen can cause liver damage if not taken correctly  · NSAIDs , such as ibuprofen, help decrease swelling, pain, and fever  This medicine is available with or without a doctor's order  NSAIDs can cause stomach bleeding or kidney problems in certain people  If you take blood thinner medicine, always ask your healthcare provider if NSAIDs are safe for you  Always read the medicine label and follow directions  · Prescription pain medicine  may be given to decrease severe pain if other medicines do not work  Take the medicine as directed  Do not wait until the pain is severe before you take your medicine  · Take your medicine as directed  Contact your healthcare provider if you think your medicine is not helping or if you have side effects  Tell him or her if you are allergic to any medicine  Keep a list of the medicines, vitamins, and herbs you take  Include the amounts, and when and why you take them  Bring the list or the pill bottles to follow-up visits  Carry your medicine list with you in case of an emergency  Follow up with your healthcare provider as directed:  Write down your questions so you remember to ask them during your visits  Go to physical therapy as directed:  A physical therapist teaches you exercises to help improve movement and strength, and to decrease pain in your joints  The exercises also help lower your risk for loss of function  Manage your OA:   · Stay active  Physical activity may reduce your pain and improve your ability to do daily activities  Avoid activities that cause pain  Ask your healthcare provider what type of exercise would be best for you  · Maintain a healthy weight    This helps decrease the strain on the joints in your back, hips, knees, ankles, and feet  Ask your healthcare provider how much you should weigh  Ask him to help you create a weight loss plan if you are overweight  · Use heat or ice  on your joints as directed  Heat and ice help decrease pain, swelling, and muscle spasms  Use a heating pad on a low setting or take a warm bath  Use an ice pack, or put crushed ice in a plastic bag  Cover it with a towel  · Massage  the muscles around the joint to relieve pain and stiffness  · Use a cane, crutches, or a walker  to protect and relieve pressure on your ankle, knee, and hip joints  You may also be prescribed shoe inserts to decrease pressure in your joints  · Wear flat or low-heeled shoes  This will help decrease pain and reduce pressure on your ankle, knee, and hip joints  © 2017 [address] Information is for End User's use only and may not be sold, redistributed or otherwise used for commercial purposes  All illustrations and images included in CareNotes® are the copyrighted property of [employer]  or Mike Johnson  The above information is an  only  It is not intended as medical advice for individual conditions or treatments  Talk to your doctor, nurse or pharmacist before following any medical regimen to see if it is safe and effective for you  Swollen Knee Joint   WHAT YOU NEED TO KNOW:   A swollen knee joint may be caused by arthritis or by an injury or trauma, such as a knee sprain  It may also happen if you exercise too much  It may be painful to bend or straighten your knee, or walk  DISCHARGE INSTRUCTIONS:   Return to the emergency department if:   · Your knee locks or gives way  This may cause you to fall  · Your feet or toes start to look pale or feel cold  · You cannot bear weight on your leg, or you have severe pain even after treatment  Contact your healthcare provider if:   · You have a fever  · You have redness or warmth over your knee  · The swelling does not decrease with treatment  · It gets harder or more painful to straighten your leg at the knee  · Your knee weakens, or you continue to limp  · You have questions or concerns about your condition or care  Medicines:  · NSAIDs , such as ibuprofen, help decrease swelling, pain, and fever  This medicine is available with or without a doctor's order  NSAIDs can cause stomach bleeding or kidney problems in certain people  If you take blood thinner medicine, always ask your healthcare provider if NSAIDs are safe for you  Always read the medicine label and follow directions  · Take your medicine as directed  Contact your healthcare provider if you think your medicine is not helping or if you have side effects  Tell him of her if you are allergic to any medicine  Keep a list of the medicines, vitamins, and herbs you take  Include the amounts, and when and why you take them  Bring the list or the pill bottles to follow-up visits  Carry your medicine list with you in case of an emergency  Self-care:   · Rest  your knee  Avoid activities that make the swelling or pain worse  You may need to avoid putting weight on your knee while you have pain  Crutches, a cane, or a walker can be used to avoid putting weight on your knee while it heals  · Apply ice  on your knee for 15 to 20 minutes every hour or as directed  Use an ice pack, or put crushed ice in a plastic bag  Cover it with a towel  Ice helps prevent tissue damage and decreases swelling and pain  · Compress your knee with a brace or bandage to help reduce swelling  Use a brace or bandage only as directed  · Elevate  your knee above the level of your heart as often as you can  This will help decrease swelling and pain  Prop your joint on pillows or blankets to keep it elevated comfortably       · Apply heat  on your knee for 20 to 30 minutes every 2 hours for as many days as directed  Heat helps decrease pain  Physical therapy:  A physical therapist teaches you exercises to help improve movement and strength, and to decrease pain  Follow up with your healthcare provider as directed:  Write down your questions so you remember to ask them during your visits  © 2017 [address] Information is for End User's use only and may not be sold, redistributed or otherwise used for commercial purposes  All illustrations and images included in CareNotes® are the copyrighted property of [employer]  or Mike Johnson  The above information is an  only  It is not intended as medical advice for individual conditions or treatments  Talk to your doctor, nurse or pharmacist before following any medical regimen to see if it is safe and effective for you

## 2017-12-05 ENCOUNTER — GENERIC CONVERSION - ENCOUNTER (OUTPATIENT)
Dept: OTHER | Facility: OTHER | Age: 47
End: 2017-12-05

## 2017-12-07 ENCOUNTER — ALLSCRIPTS OFFICE VISIT (OUTPATIENT)
Dept: OTHER | Facility: OTHER | Age: 47
End: 2017-12-07

## 2017-12-08 NOTE — CONSULTS
Assessment    1  Preoperative cardiovascular examination (V72 81) (Z01 810)   2  Hypertension (401 9) (I10)   3  Prediabetes (790 29) (R73 03)   4  Obstructive sleep apnea of adult (327 23) (G47 33)   5  Obesity, morbid, BMI 40 0-49 9 (278 01) (E66 01)    Plan  Preoperative cardiovascular examination    · Follow-up PRN Evaluation and Treatment  Follow-up  Status: Complete  Done:[id number]   Ordered;Preoperative cardiovascular examination; Ordered By: Michelle Sorto Performed:  Due: 55NKZ6437    Discussion/Summary    Mr Veronica Licea is a 41-year-old gentleman who presents to the office today for preoperative evaluation for upcoming bariatric surgery  Prior to proceeding a cardiology consultation has been sought  patient denies any cardiopulmonary symptoms with the activity he is able to perform  His ECG is without ischemic changes  He did undergo a stress test in June which did not reveal any evidence of ischemia or scar  Therefore he can proceed to the operating room at relatively low risk without any further testing  specific follow-up will be arranged  However if it is felt he needs re-evaluation at any point in the future I would be glad to see him again  History of Present Illness  Cardiology HPI Free Text Note Form [facility]: Mr Veronica Licea is a 41-year-old male who presents to the office today for preoperative evaluation  He is scheduled to undergo laparoscopic gastric bypass in the near future and prior to proceeding a cardiology consultation has been sought  denies any prior cardiac history  He leads a sedentary lifestyle  He is able to ascend steps albeit slowly without any difficulty  With the activity he is able to he denies any exertional chest pain or shortness of breath  He denies any signs or symptoms suggestive heart failure including increasing lower extremity edema, paroxysmal nocturnal dyspnea, orthopnea, acute weight gain or increasing abdominal girth  He denies lightheadedness, syncope or presyncope  He denies palpitations or signs or symptoms of claudication  does carry the diagnosis of obstructive sleep apnea  He had been compliant with CPAP but has not been wearing it recently due to the fact the settings on his machine need to be adjusted  denies ever having had general anesthesia in the past       Review of Systems     Cardiac: as noted in HPI  Skin: No complaints of nonhealing sores or skin rash  Genitourinary: No complaints of recurrent urinary tract infections, frequent urination at night, difficult urination, blood in urine, kidney stones, loss of bladder control, no kidney or prostate problems, no erectile dysfunction  Psychological: No complaints of feeling depressed, anxiety, panic attacks, or difficulty concentrating  Respiratory: as noted in HPI  HEENT: No complaints of serious problems, hearing problems, nose problems, throat problems, or snoring  Gastrointestinal: No complaints of liver problems, nausea, vomiting, heartburn, constipation, bloody stools, diarrhea, problems swallowing, adbominal pain, or rectal bleeding  Hematologic: No complaints of bleeding disorders, anemia, blood clots, or excessive brusing  Neurological: No complaints of numbness, tingling, dizziness, weakness, seizures, headaches, syncope or fainting, AM fatigue, daytime sleepiness, no witnessed apnea episodes  Musculoskeletal: No complaints of arthritis, back pain, or painfull swelling  Active Problems  1  Abnormal blood sugar (790 29) (R73 09)   2  Abnormal TSH (790 6) (R94 6)   3  At risk for obstructive sleep apnea (V49 89) (Z91 89)   4  Bilateral anterior knee pain (719 46) (M25 561,M25 562)   5  Chronic back pain (724 5,338 29) (M54 9,G89 29)   6  Depression screening (V79 0) (Z13 89)   7  Encounter for prostate cancer screening (V76 44) (Z12 5)   8  GERD (gastroesophageal reflux disease) (530 81) (K21 9)   9  Hematuria (599 70) (R31 9)   10  Hypertension (401 9) (I10)   11   Left knee pain (719 46) (M25 562)   12  Microalbuminuria (791 0) (R80 9)   13  Need for influenza vaccination (V04 81) (Z23)   14  Obesity, morbid, BMI 40 0-49 9 (278 01) (E66 01)   15  Prediabetes (790 29) (R73 03)   16  Preoperative evaluation to rule out surgical contraindication (V72 83) (Z01 818)   17  Pre-operative laboratory examination (V72 63) (Z01 812)   18  Suspicious nevus (238 2) (D22 9)   19  Urinary urgency (788 63) (R39 15)   20  Vitamin D deficiency (268 9) (E55 9)    Past Medical History   · History of Intermittent chest pain (786 50) (R07 9)    The active problems and past medical history were reviewed and updated today  Surgical History   · Denied: History of Recent Surgery    The surgical history was reviewed and updated today  Family History  Mother    · Family history of hypertension (V17 49) (Z82 49)  Father    · Family history of malignant neoplasm of prostate (V16 42) (Z80 45)   · Family history of Prostate enlargement  Maternal Grandmother    · Family history of hypertension (V17 49) (Z82 49)  Family History Reviewed: The family history was reviewed and updated today  Social History     · Current non-drinker of alcohol (V49 89) (Z78 9)   · Does not use illicit drugs (J64 95) (Z78 9)   · Never smoker  The social history was reviewed and updated today  Current Meds   1  Amitriptyline HCl - 50 MG Oral Tablet; TAKE ONE TABLET AT BEDTIME; Therapy: 37SNL9123 to (Evaluate:53Qvs9038) Recorded   2  AmLODIPine Besylate 5 MG Oral Tablet; TOME ALOK TABLETA TODOS LOS ORELLANA; Therapy: 44Arx3170 to (Evaluate:23Mar2018)  Requested for: 85WPJ9511; Last Rx:23Nov2017 Ordered   3  Lisinopril-Hydrochlorothiazide 10-12 5 MG Oral Tablet; take 1 tablet by mouth once daily; Therapy: 83NTO9992 to (Oswald Dickerson)  Requested for: 39PMZ2588; Last Rx:20Oct2017 Ordered   4  Nucynta 100 MG Oral Tablet; take one tablet twice daily; Therapy: 11ODE8226 to Recorded   5   Omeprazole 20 MG Oral Capsule Delayed Release; Therapy: 97SFH5049 to Recorded   6  Vitamin D (Ergocalciferol) 13805 UNIT Oral Capsule; TAKE 1 CAPSULE WEEKLY; Therapy: 52YLQ3544 to (Evaluate:18Oct2017)  Requested for: 34TYP6789; Last KB:81PPK8562 Ordered    The medication list was reviewed and updated today  Allergies  1  No Known Drug Allergies    Vitals  Signs     Heart Rate: 101  Pulse Quality: Normal, L Radial  Respiration Quality: Normal  Respiration: 16  Systolic: 106  Diastolic: 74  Height: 5 ft 7 5 in  Weight: 374 lb   BMI Calculated: 57 71  BSA Calculated: 2 65    Physical Exam   Constitutional  General appearance: No acute distress, well appearing and well nourished  Eyes  Conjunctiva and Sclera examination: Conjunctiva pink, sclera anicteric  Ears, Nose, Mouth, and Throat - Oropharynx: Clear, nares are clear, mucous membranes are moist   Neck  Neck and thyroid: Normal, supple, trachea midline, no thyromegaly  Pulmonary  Respiratory effort: No increased work of breathing or signs of respiratory distress  Auscultation of lungs: Clear to auscultation, no rales, no rhonchi, no wheezing, good air movement  Cardiovascular  Auscultation of heart: Normal rate and rhythm, normal S1 and S2, no murmurs  Heart sounds: the heart sounds were distant  Carotid pulses: Normal, 2+ bilaterally  Examination of extremities for edema and/or varicosities: Normal    Abdomen  Abdomen: Non-tender and no distention  Liver and spleen: No hepatomegaly or splenomegaly  Musculoskeletal Gait and station: Normal gait  -- Digits and nails: Normal without clubbing or cyanosis  -- Inspection/palpation of joints, bones, and muscles: Normal, ROM normal    Skin - Skin and subcutaneous tissue: Normal without rashes or lesions  Skin is warm and well perfused, normal turgor  Neurologic - Cranial nerves: II - XII intact  -- Speech: Normal    Psychiatric - Orientation to person, place, and time: Normal -- Mood and affect: Normal       Results/Data   Rhythm and rate: sinus tachycardia  Future Appointments    Date/Time Provider Specialty Site   01/08/2018 01:30 PM Kathi Davis [facility] Bariatric Medicine [facility]     End of Encounter Meds    1  Amitriptyline HCl - 50 MG Oral Tablet; TAKE ONE TABLET AT BEDTIME; Therapy: 37JQN1802 to (Evaluate:73Mds9396) Recorded   2  Nucynta 100 MG Oral Tablet; take one tablet twice daily; Therapy: 03KNL4066 to Recorded    3  Omeprazole 20 MG Oral Capsule Delayed Release; Therapy: 75UCW1105 to Recorded    4  AmLODIPine Besylate 5 MG Oral Tablet; TOME ALOK TABLETA TODOS LOS ORELLANA; Therapy: 88Fcr5057 to (Evaluate:23Mar2018)  Requested for: 99NBW6009; Last Rx:23Nov2017 Ordered   5  Lisinopril-Hydrochlorothiazide 10-12 5 MG Oral Tablet; take 1 tablet by mouth once daily; Therapy: 37XYG9828 to (Kellie Akers)  Requested for: 29KCU5354; Last Rx:20Oct2017 Ordered    6  Vitamin D (Ergocalciferol) 30538 UNIT Oral Capsule; TAKE 1 CAPSULE WEEKLY;  Therapy: 63GJW6245 to [id number])  Requested for: 14BMU3197; Last HX:96SXX9849 Ordered    Signatures   Electronically signed by : Elicia Najjar, DO; Dec  7 2017  8:25AM EST                       (Author)

## 2017-12-11 RX ORDER — AMLODIPINE BESYLATE 5 MG/1
5 TABLET ORAL DAILY
COMMUNITY
End: 2018-03-23 | Stop reason: SDUPTHER

## 2017-12-11 RX ORDER — LISINOPRIL AND HYDROCHLOROTHIAZIDE 12.5; 1 MG/1; MG/1
1 TABLET ORAL DAILY
COMMUNITY
End: 2018-03-01 | Stop reason: SDUPTHER

## 2017-12-12 ENCOUNTER — ANESTHESIA EVENT (OUTPATIENT)
Dept: GASTROENTEROLOGY | Facility: HOSPITAL | Age: 47
End: 2017-12-12
Payer: COMMERCIAL

## 2017-12-13 ENCOUNTER — ANESTHESIA (OUTPATIENT)
Dept: GASTROENTEROLOGY | Facility: HOSPITAL | Age: 47
End: 2017-12-13
Payer: COMMERCIAL

## 2017-12-13 ENCOUNTER — HOSPITAL ENCOUNTER (OUTPATIENT)
Facility: HOSPITAL | Age: 47
Setting detail: OUTPATIENT SURGERY
Discharge: HOME | End: 2017-12-13
Attending: SURGERY | Admitting: SURGERY
Payer: COMMERCIAL

## 2017-12-13 VITALS
OXYGEN SATURATION: 98 % | RESPIRATION RATE: 18 BRPM | SYSTOLIC BLOOD PRESSURE: 116 MMHG | BODY MASS INDEX: 47.74 KG/M2 | HEART RATE: 86 BPM | TEMPERATURE: 97.3 F | WEIGHT: 315 LBS | DIASTOLIC BLOOD PRESSURE: 86 MMHG | HEIGHT: 68 IN

## 2017-12-13 DIAGNOSIS — E66.01 MORBID (SEVERE) OBESITY DUE TO EXCESS CALORIES (HCC): ICD-10-CM

## 2017-12-13 PROCEDURE — 88342 IMHCHEM/IMCYTCHM 1ST ANTB: CPT | Performed by: SURGERY

## 2017-12-13 PROCEDURE — 88305 TISSUE EXAM BY PATHOLOGIST: CPT | Performed by: SURGERY

## 2017-12-13 RX ORDER — PROPOFOL 10 MG/ML
INJECTION, EMULSION INTRAVENOUS AS NEEDED
Status: DISCONTINUED | OUTPATIENT
Start: 2017-12-13 | End: 2017-12-13 | Stop reason: SURG

## 2017-12-13 RX ORDER — SODIUM CHLORIDE 9 MG/ML
125 INJECTION, SOLUTION INTRAVENOUS CONTINUOUS
Status: DISCONTINUED | OUTPATIENT
Start: 2017-12-13 | End: 2017-12-13 | Stop reason: HOSPADM

## 2017-12-13 RX ADMIN — SODIUM CHLORIDE 125 ML/HR: 0.9 INJECTION, SOLUTION INTRAVENOUS at 08:39

## 2017-12-13 RX ADMIN — PROPOFOL 50 MG: 10 INJECTION, EMULSION INTRAVENOUS at 09:24

## 2017-12-13 RX ADMIN — PROPOFOL 150 MG: 10 INJECTION, EMULSION INTRAVENOUS at 09:23

## 2017-12-13 NOTE — H&P
This is a 56 y o  male with a history of morbid obesity and Body mass index is 58 48 kg/m²  Here for an EGD to evaluate the anatomy of the GI tract  Physical Exam    AAOx3  RRR  CTA B  Abdomen obese  Benign  A/P:    This is a 56 y o  male with a history of morbid obesity and Body mass index is 58 48 kg/m²       Will proceed with the EGD and biopsies        Yvonne Escobar MD  12/13/17

## 2017-12-13 NOTE — ANESTHESIA POSTPROCEDURE EVALUATION
Post-Op Assessment Note      CV Status:  Stable    Mental Status:  Alert and awake    Hydration Status:  Euvolemic    PONV Controlled:  Controlled    Airway Patency:  Patent    Post Op Vitals Reviewed: Yes          Staff: Anesthesiologist, CRNA           /86 (12/13/17 0945)    Temp     Pulse 86 (12/13/17 0945)   Resp 18 (12/13/17 0945)    SpO2 98 % (12/13/17 0945)

## 2017-12-13 NOTE — ANESTHESIA PREPROCEDURE EVALUATION
Review of Systems/Medical History  Patient summary reviewed  Chart reviewed  No history of anesthetic complications     Cardiovascular  Hypertension controlled,    Pulmonary  Sleep apnea CPAP, ,        GI/Hepatic    GERD well controlled,        Negative  ROS        Endo/Other  Diabetes (PRE DIABETIC) ,      GYN       Hematology  Negative hematology ROS      Musculoskeletal  Obesity  super morbid obesity, Back pain , chronic back pain and lumbar pain,        Neurology  Negative neurology ROS      Psychology   Depression , being treated for depression,   Chronic opioid dependence         Physical Exam    Airway    Mallampati score: III  TM Distance: >3 FB  Neck ROM: full     Dental   No notable dental hx     Cardiovascular  Rhythm: regular, Rate: normal, Cardiovascular exam normal    Pulmonary  Pulmonary exam normal Breath sounds clear to auscultation,     Other Findings        Anesthesia Plan  ASA Score- 3       Anesthesia Type- IV sedation with anesthesia with ASA Monitors  Additional Monitors:   Airway Plan:           Induction- intravenous  Informed Consent- Anesthetic plan and risks discussed with patient  I personally reviewed this patient with the CRNA  Discussed and agreed on the Anesthesia Plan with the CRNA  Sherre Soulier

## 2017-12-13 NOTE — OP NOTE
[facility]   [address] [place] [address], [address]  [phone] (Fax)      Operative Report  ESOPHAGOGASTRODUODENOSCOPY (EGD), with BIOPSY     Patient Name: Reyna Bravo    :  1970  MRN: [id number]  Patient Location: [facility] 01  Surgery Date : 2017  Surgeons:  Surgeon(s) and Role:     Ernestina Evans MD - Primary     * Francine Hunt MD - Fellow    Diagnosis:    Pre-Op Diagnosis Codes: Morbid (severe) obesity due to excess calories ([facility] [address] ) [E66 01]  Body mass index is 58 48 kg/m²  Post-Op Diagnosis Codes:     * Morbid (severe) obesity due to excess calories ([address] ) [E66 01]     * Body mass index is 58 48 kg/m²  Procedure  1  Endoscopy with Biopsy    Specimen(s):  ID Type Source Tests Collected by Time Destination   1 : gastric bx r/o h pylori Tissue Stomach TISSUE EXAM Perry Wilkins MD 2017 [id number]        Estimated Blood Loss:  Minimal      Anesthesia Type:   IV Sedation with Anesthesia    Operative Indications: Morbid (severe) obesity due to excess calories ([address] ) [E66 01]  Body mass index is 58 48 kg/m²  Operative Findings:    Gastritis    Complications:   None    Procedure and Technique:        Indication for the procedure     The patient is a 56 y o  male with a long standing history of morbid obesity who presented to the office for a bariatric operation  The risks, benefits and alternatives to the procedure were discussed with the patient and informed consent was obtained for an upper endoscopy and biopsy to asses the anatomy of the GI tract prior to the surgical procedure      Operative Technique     The patient was brought to the GI suite and was placed in a supine position  EKG trace, pulse, blood pressure and oxygen saturation were monitored throughout the procedure  A time out was called and the patient was identified by name and arm band   The patient was placed in a left lateral decubitus position and received IV sedation with propofol by the anesthesia staff  The endoscope was introduced through the mouth and advanced under under direct visualization  The esophagus was normal in appearance  The stomach showed evidence of  inflammation  There were no mucosal lesions, polyps nor ulcerations    The first and second portions of the duodenum were inspected and were normal in appearance  A biopsy was taken from the antrum times two with a forceps grasper to rule out the presence of H  Pylori  A retroflex view of the GE junction was obtained and was normal in appearance     The GE junction was again inspected upon withdrawing the scope and was normal in appearance      Impression     Gastritis        Patient Disposition:  PACU     Signature: Jeri Lance MD  Date: December 13, 2017  Time: 9:30 AM

## 2017-12-20 ENCOUNTER — HOSPITAL ENCOUNTER (OUTPATIENT)
Dept: SLEEP CENTER | Facility: CLINIC | Age: 47
Discharge: HOME | End: 2017-12-21
Payer: COMMERCIAL

## 2017-12-20 ENCOUNTER — TRANSCRIBE ORDERS (OUTPATIENT)
Dept: SLEEP CENTER | Facility: CLINIC | Age: 47
End: 2017-12-20

## 2017-12-20 DIAGNOSIS — G47.33 OBSTRUCTIVE SLEEP APNEA SYNDROME: Primary | ICD-10-CM

## 2017-12-20 DIAGNOSIS — G47.33 OBSTRUCTIVE SLEEP APNEA SYNDROME: ICD-10-CM

## 2017-12-20 NOTE — PROGRESS NOTES
Progress Note - [address] [facility] 56 y o  male MRN: [id number]  Unit/Bed#:  Encounter: [id number]    Dear Ayesha Castro   Mr Ivonne Petty now is here for follow-up of his obstructive sleep apnea  He was recently diagnosed with moderate obstructive sleep apnea increasing is severe ED during the REM stage and was given his CPAP last visit, which he has been using, has been having some difficulty using the CPAP machine for which he was call back for an earlier appointment for evaluation  He states he has not able to tolerate the CPAP because of the type of mask that he has he states, with the nasal pillows he wants a change of mask to a fullface mask to give it a try, also he states he has had insomnia in the past which has gotten worse with the use of the CPAP he states, not able to sleep for more than 3 hours during the night  Also he was set up at 20 cm of water initially could not tolerate for which his  pressure has been decreased to 15 currently  Today on exam   Weight 371 2 lb   Height 5 feet 7 inches   BMI 58   Blood pressure 138/80   Heart rate 100   Neck 49 cm   Eva sleepiness score 2    CPAP pressure of 15 cm of water  DME company and medical   Today on exam   The ENT nares is patent, no evidence of any discharge, crowded oropharyngeal airways, Mallampati score of 4   Neck short and wide supple no lymphadenopathy normal thyroid no nodules  Lungs clear to auscultation  Heart first and second heart sounds are heard no murmur or gallop is heard  Extremities no pedal edema  CNS awake alert oriented x3  Assessment  1  Obstructive sleep apnea  2  Insomnia  3  Morbid  obesity   Plan   Reviewed his CPAP download compliance with 73 3% compliance on an average, for greater than 4 hours is 0%, residual AHI 3 7      discussed with the respiratory therapist, for a change in mask to a fullface mask, he will have a mask that appointment  Also will give him zolpidem 10 mg at bedtime, p r n   As needed for insomnia  Discussed with the patient regarding the need for compliance with the CPAP machine understands and verbalizes  Saline nasal spray 1 spray into each nostril twice daily  Recommend weight loss  Follow-up in 6 weeks p r n  Earlier as needed   with number [id number] was used to discussed with the patient      Thank you very much for allowing me to participate in the care of this patient  Yours sincerely   Dinh Enriquez

## 2018-01-08 ENCOUNTER — GENERIC CONVERSION - ENCOUNTER (OUTPATIENT)
Dept: OTHER | Facility: OTHER | Age: 48
End: 2018-01-08

## 2018-01-09 ENCOUNTER — GENERIC CONVERSION - ENCOUNTER (OUTPATIENT)
Dept: OTHER | Facility: OTHER | Age: 48
End: 2018-01-09

## 2018-01-09 NOTE — RESULT NOTES
Discussion/Summary     A1C is still 5 9, which shows prediabetes  Otherwise, lab testing looks good  Continue to work on lifestyle changes to lose weight for planned bariatric surgery  Follow up in January, as planned  Verified Results  (1) CBC/PLT/DIFF 30Oct2017 10:29AM Ruth Solitario Order Number: [id number]     Test Name Result Flag Reference   WBC COUNT 6 02 Thousand/uL  4 31-10 16   RBC COUNT 5 14 Million/uL  3 88-5 62   HEMOGLOBIN 15 6 g/dL  12 0-17 0   HEMATOCRIT 46 4 %  36 5-49 3   MCV 90 fL  82-98   MCH 30 4 pg  26 8-34 3   MCHC 33 6 g/dL  31 4-37 4   RDW 14 4 %  11 6-15 1   MPV 10 7 fL  8 9-12 7   PLATELET COUNT 019 Thousands/uL  149-390   nRBC AUTOMATED 0 /100 WBCs     NEUTROPHILS RELATIVE PERCENT 49 %  43-75   LYMPHOCYTES RELATIVE PERCENT 40 %  14-44   MONOCYTES RELATIVE PERCENT 8 %  4-12   EOSINOPHILS RELATIVE PERCENT 2 %  0-6   BASOPHILS RELATIVE PERCENT 1 %  0-1   NEUTROPHILS ABSOLUTE COUNT 3 01 Thousands/? ??L  1 85-7 62   LYMPHOCYTES ABSOLUTE COUNT 2 41 Thousands/? ??L  0 60-4 47   MONOCYTES ABSOLUTE COUNT 0 45 Thousand/? ??L  0 17-1 22   EOSINOPHILS ABSOLUTE COUNT 0 11 Thousand/? ??L  0 00-0 61   BASOPHILS ABSOLUTE COUNT 0 03 Thousands/? ??L  0 00-0 10     (1) COMPREHENSIVE METABOLIC PANEL [id number] 52:58OY Ruth Solitario Order Number: [id number]     Test Name Result Flag Reference   SODIUM 138 mmol/L  136-145   POTASSIUM 4 1 mmol/L  3 5-5 3   CHLORIDE 101 mmol/L  100-108   CARBON DIOXIDE 34 mmol/L H 21-32   ANION GAP (CALC) 3 mmol/L L 4-13   BLOOD UREA NITROGEN 10 mg/dL  5-25   CREATININE 1 00 mg/dL  0 60-1 30   Standardized to IDMS reference method   CALCIUM 8 8 mg/dL  8 3-10 1   BILI, TOTAL 0 31 mg/dL  0 20-1 00   ALK PHOSPHATAS 81 U/L     ALT (SGPT) 62 U/L  12-78   Specimen collection should occur prior to Sulfasalazine and/or Sulfapyridine administration due to the potential for falsely depressed results     AST(SGOT) 41 U/L  5-45   Specimen collection should occur prior to Sulfasalazine administration due to the potential for falsely depressed results  ALBUMIN 3 6 g/dL  3 5-5 0   TOTAL PROTEIN 7 4 g/dL  6 4-8 2   eGFR 90 ml/min/1 73sq m     National Kidney Disease Education Program recommendations are as follows:  GFR calculation is accurate only with a steady state creatinine  Chronic Kidney disease less than 60 ml/min/1 73 sq  meters  Kidney failure less than 15 ml/min/1 73 sq  meters  GLUCOSE FASTING 89 mg/dL  65-99   Specimen collection should occur prior to Sulfasalazine administration due to the potential for falsely depressed results  Specimen collection should occur prior to Sulfapyridine administration due to the potential for falsely elevated results  (1) HEMOGLOBIN A1C 30Oct2017 10:29AM Laneta Reading Order Number: [id number]     Test Name Result Flag Reference   HEMOGLOBIN A1C 5 9 %  4 2-6 3   EST  AVG  GLUCOSE 123 mg/dl       (1) LIPID PANEL, FASTING [id number] 10:29AM Laneta Reading Order Number: [id number]     Test Name Result Flag Reference   CHOLESTEROL 96 mg/dL     HDL,DIRECT 44 mg/dL  40-60   Specimen collection should occur prior to Metamizole administration due to the potential for falsley depressed results  LDL CHOLESTEROL CALCULATED 32 mg/dL  0-100   Triglyceride:        Normal <150 mg/dl   Borderline High 150-199 mg/dl   High 200-499 mg/dl   Very High >499 mg/dl      Cholesterol:       Desirable <200 mg/dl    Borderline High 200-239 mg/dl    High >239 mg/dl      HDL Cholesterol:       High>59 mg/dL    Low <41 mg/dL      This screening LDL is a calculated result  It does not have the accuracy of the Direct Measured LDL in the monitoring of patients with hyperlipidemia and/or statin therapy  Direct Measure LDL (JMO131) must be ordered separately in these patients     TRIGLYCERIDES 99 mg/dL  <=150   Specimen collection should occur prior to N-Acetylcysteine or Metamizole administration due to the potential for falsely depressed results  (1) MICROALBUMIN CREATININE RATIO, RANDOM URINE 30Oct2017 10:29AM [employer] Order Number: [id number]     Test Name Result Flag Reference   MICROALBUMIN/ CREAT R 17 mg/g creatinine  0-30   MICROALBUMIN,URINE 7 2 mg/L  0 0-20 0   CREATININE URINE 43 3 mg/dL       (1) T4, FREE [id number] 10:29AM Ford Solorzano    Order Number: [id number]     Test Name Result Flag Reference   T4,FREE 0 93 ng/dL  0 76-1 46   Specimen collection should occur prior to Sulfasalazine administration due to the potential for falsely elevated results  (1) TSH 30Oct2017 10:29AM [employer] Order Number: [id number]     Test Name Result Flag Reference   TSH 1 520 uIU/mL  0 358-3 740   Patients undergoing fluorescein dye angiography may retain small amounts of fluorescein in the body for 48-72 hours post procedure  Samples containing fluorescein can produce falsely depressed TSH values  If the patient had this procedure,a specimen should be resubmitted post fluorescein clearance  (1) VITAMIN D 25-HYDROXY 30Oct2017 10:29AM [employer] Order Number: [id number]     Test Name Result Flag Reference   VIT D 25-HYDROX 46 3 ng/mL  30 0-100 0   This assay is a certified procedure of the CDC Vitamin D Standardization Certification Program (VDSCP)     Deficiency <20ng/ml   Insufficiency 20-30ng/ml   Sufficient  ng/ml     *Patients undergoing fluorescein dye angiography may retain small amounts of fluorescein in the body for 48-72 hours post procedure  Samples containing fluorescein can produce falsely elevated Vitamin D values  If the patient had this procedure, a specimen should be resubmitted post fluorescein clearance         Signatures   Electronically signed by : Jaimie Varner, Brenden West ; Nov 7 2017 10:09PM EST                       (Author)

## 2018-01-12 NOTE — RESULT NOTES
Discussion/Summary   Stress test was negative for ischemia or lack of appropriate blood flow to the heart  Verified Results  NM MYOCARDIAL PERFUSION SPECT (RX STRESS AND/OR REST) [id number] 07:51AM Doristine Dancer TW Order Number: [id number]    - Patient Instructions: To schedule this appointment, please contact Central Scheduling at [phone]  Test Name Result Flag Reference   NM MYOCARDIAL PERFUSION SPECT (RX STRESS AND/OR REST) (Report)     Tasha Deluca [address]  [facility], [address]   [phone]     Rest/Stress Gated SPECT Myocardial Perfusion Imaging After Regadenoson     Patient: Rubi Villeda   MR number: [id number]   Account number: [de-identified]   : 1970   Age: 40 years   Gender: Male   Status: Outpatient   Location: Stress lab   Height: 68 in   Weight: 387 lb   BP: 148/ 107 mmHg     Allergies: NO KNOWN ALLERGIES     Diagnosis: R07 9 - Chest pain, unspecified     Primary Physician: HERMELINDA Santos   Technician: Thomas Duong   RN: Alfredo Guidry RN BSN   Referring Physician: HERMELINDA Santos   Group: [facility]   Report Prepared By[de-identified] Alfredo Guidry RN BSN   Interpreting Physician: Cecilio Gilamn MD     INDICATIONS: Detection of coronary artery disease  HISTORY: The patient is a 40year old  male  Chest pain status: chest pain  Coronary artery disease risk factors: none  Cardiovascular history: none significant  Co-morbidity: obesity  Medications: no cardiac drugs  PHYSICAL EXAM: Baseline physical exam screening: normal lung exam      REST ECG: Normal sinus rhythm at 90 beats per minute  PROCEDURE: The study was performed in the stress lab  A regadenoson infusion pharmacologic stress test was performed  Gated SPECT myocardial perfusion imaging was performed after stress and at rest  Systolic blood pressure was 148 mmHg, at   the start of the study   Diastolic blood pressure was 107 mmHg, at the start of the study  The heart rate was 92 bpm, at the start of the study  IV double checked  Regadenoson protocol:   HR bpm SBP mmHg DBP mmHg Symptoms   Baseline 92 148 107 none   Immediate 125 179 104 mild dyspnea, fatigue   3 min 114 198 100 subsiding   5 min 104 177 99 none   No medications or fluids given  The patient also performed low level exercise  STRESS SUMMARY: Duration of pharmacologic stress was 3 min  Functional capacity could not be adequately assessed  Maximal heart rate during stress was 125 bpm  The heart rate response to stress was normal  There was resting hypertension   with a hypertensive blood pressure response to stress  The rate-pressure product for the peak heart rate and blood pressure was 22662  There was no chest pain during stress  The stress test was terminated due to protocol completion  Pre   oxygen saturation: 98 %  Peak oxygen saturation: 98 %  The stress ECG was negative for ischemia  There were no stress arrhythmias or conduction abnormalities  ISOTOPE ADMINISTRATION:   Resting isotope administration Stress isotope administration   Agent Tetrofosmin Tetrofosmin   Dose 15 1 mCi 48 8 mCi   Date 06/19/2017 06/19/2017   Injection time 08:08 09:39   Imaging time 08:39 10:18   Injection-image interval 31 min 39 min     The radiopharmaceutical was injected at the peak effect of pharmacologic stress  MYOCARDIAL PERFUSION IMAGING:   The image quality was fair  Rotating projection images reveal mild diaphragmatic attenuation  Left ventricular size was normal  The TID ratio was 0 96  PERFUSION DEFECTS:   - There was a small, mildly severe, paradoxical (reverse redistribution) myocardial perfusion defect of the basal inferior wall due to diaphragm attenuation  GATED SPECT:   The calculated left ventricular ejection fraction was 52 %  Left ventricular ejection fraction was within normal limits by visual estimate   There was no left ventricular regional abnormality  SUMMARY:   - Stress results: There was resting hypertension with a hypertensive blood pressure response to stress  There was no chest pain during stress  - ECG conclusions: The stress ECG was negative for ischemia  - Perfusion imaging: There was a small, mildly severe, paradoxical (reverse redistribution) myocardial perfusion defect of the basal inferior wall due to diaphragm attenuation    - Gated SPECT: The calculated left ventricular ejection fraction was 52 %  Left ventricular ejection fraction was within normal limits by visual estimate  There was no left ventricular regional abnormality  IMPRESSIONS: Normal study after pharmacologic vasodilation without reproduction of symptoms  There was image artifact, without diagnostic evidence for perfusion abnormality   Left ventricular systolic function was normal      Prepared and signed by     Effie Duane, MD   Signed 06/19/2017 12:41:13       Signatures   Electronically signed by : Sandie Nguyen, [address] [place] [id number]; Jun 28 2017  1:00PM EST                       (Author)

## 2018-01-12 NOTE — CONSULTS
Chief Complaint  Pre op clearance, plan left knee surgery 17, states had EKG done 2 days ago @ [facility]  [facility]  Walks with cane  Speaks Nepalese  States surgery to be done at [facility]       History of Present Illness  Pre-Op Visit (Brief): The patient is being seen for a preoperative visit and left knee surgery on 17  Surgical Risk Assessment:   Prior Anesthesia: He had no prior anesthesia and no prior adverse reaction to general anesthesia  Pertinent Past Medical History: wears dentures and obesity, but no angina, no arrhythmia, no CAD, CAD without prior MI, CAD without recent PCI, no CHF, no chronic liver disease, no acute hepatitis, no coagulation delay, no primary hypercoagulable state, no secondary hypercoagulable state, no pulmonary embolism, no DVT, does not use anticoagulants, no diabetes, does not use insulin, no thyroid disease, no neck osteoarthrosis, no TMJ osteoarthrosis, no seizure disorder, no CVA, no asthma, no COPD, not KAT, no renal disease and no low serum albumin  Exercise Capacity: Does have pain in his knees, but able to walk four blocks without symptoms and able to walk two flights of stairs without symptoms  Lifestyle Factors: denies alcohol use, denies tobacco use and denies illegal drug use  Symptoms: no easy bleeding, no easy bruising, no frequent nosebleeds, no chest pain, no cough, no dyspnea, no edema, no palpitations and no wheezing  Other KAT risk factors include high BMI, male gender and large neck circumference, but age under 48  Predicted risk of KAT: Moderate  Pertinent Family History: Half-brother with brain cancer  at age 21, but no family history of an adverse reaction to anesthesia, no aneurysm, no bleeding problems, no ischemic heart disease and no stroke  Living Situation: May need assistance at home, friend works  living independently with non-family members, private residence, more than one floor and lives on the second floor  (Lives with a friend who works)  HPI: [facility]  #[id number]  Here for preoperative evaluation for upcoming left knee surgery on 8/25/17 at [facility]  Dr Samantha Arreguin  He also sees Dr Los Jeffers from work comp for his leg pain and difficulty sleeping  When he was there, he was given a prescription for norvasc for his blood pressure, which he states was elevated that day  He was also recommended to have a sleep study  Review of Systems    Constitutional: no fever and no chills  Eyes: No complaints of eye pain, no red eyes, no discharge from eyes, no itchy eyes  ENT: no complaints of earache, no hearing loss, no nosebleeds, no nasal discharge, no sore throat, no hoarseness  Cardiovascular: No complaints of slow heart rate, no fast heart rate, no chest pain, no palpitations, no leg claudication, no lower extremity  Respiratory: No complaints of shortness of breath, no wheezing, no cough, no SOB on exertion, no orthopnea or PND  Gastrointestinal: No complaints of abdominal pain, no constipation, no nausea or vomiting, no diarrhea or bloody stools  Genitourinary: No complaints of dysuria, no incontinence, no hesitancy, no nocturia, no genital lesion, no testicular pain  Musculoskeletal: Bilateral knee pain, but as noted in HPI  Integumentary: No complaints of skin rash or skin lesions, no itching, no skin wound, no dry skin  Neurological: No compliants of headache, no confusion, no convulsions, no numbness or tingling, no dizziness or fainting, no limb weakness, no difficulty walking  Psychiatric: Is not suicidal, no sleep disturbances, no anxiety or depression, no change in personality, no emotional problems  Endocrine: No complaints of proptosis, no hot flashes, no muscle weakness, no erectile dysfunction, no deepening of the voice, no feelings of weakness  Hematologic/Lymphatic: No complaints of swollen glands, no swollen glands in the neck, does not bleed easily, no easy bruising  Active Problems    1  Abnormal blood sugar (790 29) (R73 09)   2  Bilateral anterior knee pain (719 46) (M25 561,M25 562)   3  Chronic back pain (724 5,338 29) (M54 9,G89 29)   4  Depression screening (V79 0) (Z13 89)   5  Encounter for prostate cancer screening (V76 44) (Z12 5)   6  GERD (gastroesophageal reflux disease) (530 81) (K21 9)   7  Hematuria (599 70) (R31 9)   8  Microalbuminuria (791 0) (R80 9)   9  Need for influenza vaccination (V04 81) (Z23)   10  Obesity, morbid, BMI 40 0-49 9 (278 01) (E66 01)   11  Prediabetes (790 29) (R73 03)   12  Suspicious nevus (238 2) (D22 9)   13  Urinary urgency (788 63) (R39 15)   14  Vitamin D deficiency (268 9) (E55 9)    Past Medical History    · History of Intermittent chest pain (786 50) (R07 9)    The active problems and past medical history were reviewed and updated today  Surgical History    · Denied: History of Recent Surgery    The surgical history was reviewed and updated today  Family History    · Family history of hypertension (V17 49) (Z82 49)    · Family history of malignant neoplasm of prostate (V16 42) (Z80 42)   · Family history of Prostate enlargement    The family history was reviewed and updated today  Social History    · Current non-drinker of alcohol (V49 89) (Z78 9)   · Does not use illicit drugs (E92 90) (Z78 9)   · Never smoker  The social history was reviewed and updated today  Current Meds   1  Amitriptyline HCl - 50 MG Oral Tablet; TAKE ONE TABLET AT BEDTIME; Therapy: 08QIZ2834 to (Evaluate:69Qkr4308) Recorded   2  Naproxen 500 MG Oral Tablet; Therapy: 40WVN4811 to Recorded   3  Nucynta 100 MG Oral Tablet; take one tablet twice daily; Therapy: 22KER7562 to Recorded   4  Omeprazole 20 MG Oral Capsule Delayed Release; Therapy: 65MBD3077 to Recorded   5  Vitamin D (Ergocalciferol) 03305 UNIT Oral Capsule; TAKE 1 CAPSULE WEEKLY;    Therapy: 27QTK3521 to (Evaluate:18Oct2017)  Requested for: 67YVZ0273; Last   FN:42DYY6758 Ordered    The medication list was reviewed and updated today  Allergies    1  No Known Drug Allergies    Vitals  Signs    Temperature: 96 4 F, Tympanic  Heart Rate: 90  Respiration: 20  Systolic: 148  Diastolic: 78  Height: 5 ft 7 5 in  Weight: 394 lb   BMI Calculated: 60 8  BSA Calculated: 2 71  O2 Saturation: 97  Pain Scale: 8    Physical Exam    Constitutional   General appearance: Abnormal   well developed, morbidly obese, clothing appropriate, well groomed and well hydrated  Eyes   Conjunctiva and lids: No swelling, erythema, or discharge  Pupils and irises: Equal, round and reactive to light  Ears, Nose, Mouth, and Throat   External inspection of ears and nose: Normal     Otoscopic examination: Tympanic membrance translucent with normal light reflex  Canals patent without erythema  Oropharynx: Abnormal   The posterior pharynx was not erythematous and did not have an exudate  Inspection of the oropharynx showed visible hard and soft palate and base of the uvula (Mallampati class 3)  Oral mucosa was moist, but was normal  The palate examination showed no abnormalities  The tongue was normal  The tonsils were normal    Pulmonary   Respiratory effort: No increased work of breathing or signs of respiratory distress  Auscultation of lungs: Clear to auscultation  Cardiovascular   Auscultation of heart: Normal rate and rhythm, normal S1 and S2, without murmurs  Examination of extremities for edema and/or varicosities: Abnormal   no edema  varicosities noted bilaterally  Abdomen   Abdomen: Abnormal   The abdomen was obese  Bowel sounds were normal  Skin findings: striae  The abdomen was soft and nontender  no masses palpated  no CVA tenderness   Liver and spleen: No hepatomegaly or splenomegaly  Lymphatic   Palpation of lymph nodes in neck: No lymphadenopathy  Musculoskeletal   Gait and station: Abnormal   (Ambulates with a cane) Gait evaluation demonstrated limping on the left     Digits and nails: Normal without clubbing or cyanosis  Inspection/palpation of joints, bones, and muscles: Abnormal   Tenderness in knees bilaterally with flexion/extension  No crepitus noted  Skin   Skin and subcutaneous tissue: Normal without rashes or lesions  Neurologic   Cranial nerves: Cranial nerves 2-12 intact  Reflexes: 2+ and symmetric  Sensation: No sensory loss  Psychiatric   Orientation to person, place and time: Normal     Mood and affect: Normal        Results/Data  Concan Sleepiness Score 04Aug2017 09:33AM Shala Banda     Test Name Result Flag Reference   Concan Score 2 - Normal     Answer Summary: Q1-0, Q2-1, Q3-0, Q4-0, Q5-1, Q6-0, Q7-0, Q8-0     * XR CHEST PA & LATERAL 15Aug2017 04:08PM Khurram Cervantes Order Number: [id number]     Test Name Result Flag Reference   XR CHEST PA & LATERAL (Report)     CHEST      INDICATION: Preprocedural examination  COMPARISON: Chest radiograph 1/16/2017     VIEWS: Frontal and lateral projections     IMAGES: 2     FINDINGS:        Cardiomediastinal silhouette appears unremarkable  The lungs are clear  No pneumothorax or pleural effusion  Visualized osseous structures appear within normal limits for the patient's age  IMPRESSION:     No active pulmonary disease         Workstation performed: [id number]     Signed by:   Shreya Mims MD   8/18/17     (1) PT WITH INR [id number] 01:47PM [facility], Provider   Test ordered by: Malorie Manrique     Test Name Result Flag Reference   INR 1 01  0 86-1 16   PT 13 3 seconds  12 1-14 4     (1) CBC/PLT/DIFF 66Rvu4779 01:47PM [facility], Provider   Test ordered by: Malorie Manrique     Test Name Result Flag Reference   WBC COUNT 5 70 Thousand/uL  4 31-10 16   RBC COUNT 4 90 Million/uL  3 88-5 62   HEMOGLOBIN 14 8 g/dL  12 0-17 0   HEMATOCRIT 43 3 %  36 5-49 3   MCV 88 fL  82-98   MCH 30 2 pg  26 8-34 3   MCHC 34 2 g/dL  31 4-37 4   RDW 13 9 %  11 6-15 1   MPV 10 4 fL  8 9-12 7   PLATELET COUNT 534 Thousands/uL  149-390 nRBC AUTOMATED 0 /100 WBCs     NEUTROPHILS RELATIVE PERCENT 46 %  43-75   LYMPHOCYTES RELATIVE PERCENT 41 %  14-44   MONOCYTES RELATIVE PERCENT 9 %  4-12   EOSINOPHILS RELATIVE PERCENT 3 %  0-6   BASOPHILS RELATIVE PERCENT 1 %  0-1   NEUTROPHILS ABSOLUTE COUNT 2 67 Thousands/? ??L  1 85-7 62   LYMPHOCYTES ABSOLUTE COUNT 2 33 Thousands/? ??L  0 60-4 47   MONOCYTES ABSOLUTE COUNT 0 51 Thousand/? ??L  0 17-1 22   EOSINOPHILS ABSOLUTE COUNT 0 16 Thousand/? ??L  0 00-0 61   BASOPHILS ABSOLUTE COUNT 0 03 Thousands/? ??L  0 00-0 10   This bloodwork is non-fasting  Please drink two glasses of water morning of  bloodwork  (1) COMPREHENSIVE METABOLIC PANEL [id number] 25:87PE [facility], Provider   Test ordered by: Jose Carlos Duff     Test Name Result Flag Reference   SODIUM 139 mmol/L  136-145   POTASSIUM 4 1 mmol/L  3 5-5 3   Slightly Hemolyzed; Results May be Affected   CHLORIDE 102 mmol/L  100-108   CARBON DIOXIDE 33 mmol/L H 21-32   ANION GAP (CALC) 4 mmol/L  4-13   BLOOD UREA NITROGEN 12 mg/dL  5-25   CREATININE 0 97 mg/dL  0 60-1 30   Standardized to IDMS reference method   CALCIUM 8 4 mg/dL  8 3-10 1   BILI, TOTAL 0 42 mg/dL  0 20-1 00   ALK PHOSPHATAS 73 U/L     ALT (SGPT) 46 U/L  12-78   AST(SGOT) 38 U/L  5-45   Slightly Hemolyzed; Results May be Affected   ALBUMIN 3 2 g/dL L 3 5-5 0   TOTAL PROTEIN 7 1 g/dL  6 4-8 2   eGFR 93 ml/min/1 73sq m     National Kidney Disease Education Program recommendations are as follows:  GFR calculation is accurate only with a steady state creatinine  Chronic Kidney disease less than 60 ml/min/1 73 sq  meters  Kidney failure less than 15 ml/min/1 73 sq  meters     GLUCOSE FASTING 93 mg/dL  65-99     (1) APTT [id number] 01:47PM [facility], Provider   Test ordered by: Jose Carlos Duff     Test Name Result Flag Reference   PARTIAL THROMBOPLASTIN TIME 28 seconds  23-35   Therapeutic Heparin Range = 60-90 seconds     ECG 12-LEAD [id number] 01:44PM [facility], Provider   Test ordered by: Jose Carlos Duff     Test Name Result Flag Reference   ECG 12-LEAD      Normal sinus rhythm   Normal ECG   Confirmed by Meeta Toribio ([id number]) on 2017 2:36:05 PM     NM MYOCARDIAL PERFUSION SPECT (RX STRESS AND/OR REST) 66OMJ8853 07:51AM Casey Neff   TW Order Number: [id number]    - Patient Instructions: To schedule this appointment, please contact Central Scheduling at [phone]  Test Name Result Flag Reference   NM MYOCARDIAL PERFUSION SPECT (RX STRESS AND/OR REST) (Report)     [facility]  [facility], [address]   [phone]     Rest/Stress Gated SPECT Myocardial Perfusion Imaging After Regadenoson     Patient: Neeru Julian   MR number: [id number]   Account number: [de-identified]   : 1970   Age: 40 years   Gender: Male   Status: Outpatient   Location: Stress lab   Height: 68 in   Weight: 387 lb   BP: 148/ 107 mmHg     Allergies: NO KNOWN ALLERGIES     Diagnosis: R07 9 - Chest pain, unspecified     Primary Physician: HERMELINDA Palmer   Technician: Margurette Siemens   RN: Lawana Nyhan, RN BSN   Referring Physician: HERMELINDA Palmer   Group: [facility]   Report Prepared By[de-identified] Lawana Nyhan, RN BSN   Interpreting Physician: Shai Crawford MD     INDICATIONS: Detection of coronary artery disease  HISTORY: The patient is a 40year old  male  Chest pain status: chest pain  Coronary artery disease risk factors: none  Cardiovascular history: none significant  Co-morbidity: obesity  Medications: no cardiac drugs  PHYSICAL EXAM: Baseline physical exam screening: normal lung exam      REST ECG: Normal sinus rhythm at 90 beats per minute  PROCEDURE: The study was performed in the stress lab  A regadenoson infusion pharmacologic stress test was performed  Gated SPECT myocardial perfusion imaging was performed after stress and at rest  Systolic blood pressure was 148 mmHg, at   the start of the study   Diastolic blood pressure was 107 mmHg, at the start of the study  The heart rate was 92 bpm, at the start of the study  IV double checked  Regadenoson protocol:   HR bpm SBP mmHg DBP mmHg Symptoms   Baseline 92 148 107 none   Immediate 125 179 104 mild dyspnea, fatigue   3 min 114 198 100 subsiding   5 min 104 177 99 none   No medications or fluids given  The patient also performed low level exercise  STRESS SUMMARY: Duration of pharmacologic stress was 3 min  Functional capacity could not be adequately assessed  Maximal heart rate during stress was 125 bpm  The heart rate response to stress was normal  There was resting hypertension   with a hypertensive blood pressure response to stress  The rate-pressure product for the peak heart rate and blood pressure was 51558  There was no chest pain during stress  The stress test was terminated due to protocol completion  Pre   oxygen saturation: 98 %  Peak oxygen saturation: 98 %  The stress ECG was negative for ischemia  There were no stress arrhythmias or conduction abnormalities  ISOTOPE ADMINISTRATION:   Resting isotope administration Stress isotope administration   Agent Tetrofosmin Tetrofosmin   Dose 15 1 mCi 48 8 mCi   Date 06/19/2017 06/19/2017   Injection time 08:08 09:39   Imaging time 08:39 10:18   Injection-image interval 31 min 39 min     The radiopharmaceutical was injected at the peak effect of pharmacologic stress  MYOCARDIAL PERFUSION IMAGING:   The image quality was fair  Rotating projection images reveal mild diaphragmatic attenuation  Left ventricular size was normal  The TID ratio was 0 96  PERFUSION DEFECTS:   - There was a small, mildly severe, paradoxical (reverse redistribution) myocardial perfusion defect of the basal inferior wall due to diaphragm attenuation  GATED SPECT:   The calculated left ventricular ejection fraction was 52 %  Left ventricular ejection fraction was within normal limits by visual estimate   There was no left ventricular regional abnormality  SUMMARY:   - Stress results: There was resting hypertension with a hypertensive blood pressure response to stress  There was no chest pain during stress  - ECG conclusions: The stress ECG was negative for ischemia  - Perfusion imaging: There was a small, mildly severe, paradoxical (reverse redistribution) myocardial perfusion defect of the basal inferior wall due to diaphragm attenuation    - Gated SPECT: The calculated left ventricular ejection fraction was 52 %  Left ventricular ejection fraction was within normal limits by visual estimate  There was no left ventricular regional abnormality  IMPRESSIONS: Normal study after pharmacologic vasodilation without reproduction of symptoms  There was image artifact, without diagnostic evidence for perfusion abnormality  Left ventricular systolic function was normal      Prepared and signed by     Gloria Paz MD   Signed 06/19/2017 12:41:13     Assessment    1  Preoperative evaluation to rule out surgical contraindication (V72 83) (Z01 818)   2  Obesity, morbid, BMI 40 0-49 9 (278 01) (E66 01)   3  Left knee pain (719 46) (M25 562)   4  At risk for obstructive sleep apnea (V49 89) (Z91 89)    Plan   * -  SLEEP CENTER Co-Management  risk for sleep apnea  Status: Hold For - Scheduling  Requested for: 35Fuw9900  Ordered; For: At risk for obstructive sleep apnea;  Ordered By: Agustín Blake  Performed:   Due: 19FMJ5510   * XR CHEST PA & LATERAL; Status:Resulted - Requires Verification;   Done: 15YKV5748 12:00AM  CQT:00JWS2039; Ordered; For:Preoperative evaluation to rule out surgical contraindication; Ordered By:Mingo Bruno;      Discussion/Summary  Surgical Clearance: He is at a LOW TO MODERATE risk from a cardiovascular standpoint at this time without any additional cardiac testing  Reevaluation needed, if he should present with symptoms prior to surgery/procedure  Attachments: nuclear study  Additional medical information:   Fax for Dr Molly Torres - [phone]  Will hold on prescribing norvasc at this time  Patient has not had elevated blood pressures at office visits  Possible side effects of new medications were reviewed with the patient/guardian today  The treatment plan was reviewed with the patient/guardian  The patient/guardian understands and agrees with the treatment plan      End of Encounter Meds    1  Naproxen 500 MG Oral Tablet; Therapy: 50UTS7082 to Recorded    2  Amitriptyline HCl - 50 MG Oral Tablet; TAKE ONE TABLET AT BEDTIME; Therapy: 96XYE7061 to (Evaluate:17Lmp8708) Recorded   3  Nucynta 100 MG Oral Tablet; take one tablet twice daily; Therapy: 41OMQ7166 to Recorded    4  Omeprazole 20 MG Oral Capsule Delayed Release; Therapy: 49WRG2357 to Recorded    5  Vitamin D (Ergocalciferol) 18578 UNIT Oral Capsule; TAKE 1 CAPSULE WEEKLY; Therapy: 18KJV4878 to 40-45-11-94)  Requested for: 92QYO9088; Last   [id number] Ordered    Signatures   Electronically signed by : Brenden Richardson [facility];  Aug 18 2017  1:18PM EST                       (Author)    Electronically signed by : LISHA Leija ; Aug 22 2017  2:02PM EST                       (Author)

## 2018-01-12 NOTE — PROGRESS NOTES
History of Present Illness  Bariatric MNT [facility] Screening Preop [facility]:     He was on time  His surgeon is Dr Irma Oakes  The patient was present at the session and his Lorena Prince (provided Scottish interpretation) attended the session  The diagnosis/reason for the appointment is: He has a BMI of 60 8 and will need to lose 10% of his ABW  He has the following comorbidities: hypertension and GERD  Exercise Frequency:  He exercises currrently in physical therapy  Relationship to food: He grazes  He felt/thought they felt his best at 140-180 lbs pounds He did not complete a food journal 24-Hour Food Recall Breakfast 7am 2 eggs, ham, 1 slice of bread  Lunch: 1pm vegetables and fish  Dinner: 7pm vegetables and fish  Snacks: 10am banana  3-4pm cornflakes and 2% milk  He drinks at least 64oz daily cups of water daily He drinks 4oz coffee and 2% milk caffienated beverages daily   His obesity/being overweight is related to excessive enrgy intakes, sedentary lifestyle and is evidenced by: BMI 60 8  Knowledge Deficit Prior to Education  He is new to the diet  Barriers to education: language  Medical Nutrition Therapy Intervention: Daily Food /Exercise Diary, Lifestyle /Behavior Modification Techniques, Basic Pathophysiology of Obesity, Checklist of the Overview of Lesson Plans and Surgical changes to Stomach/GI  Area's Reviewed: Lifestyle [place] Modification Techniques, Lesson Plans in [place] and Pre- surgery goals /rules  Brief Review of Vitamins  His comprehension of the presented material was good  His receptivity to the presented material was good  His motivation was good  Provided: Nutrition Guidelines for Gastric Surgery, Bariatric Program Pre- Surgery Nutrition and Goals  Patient is a 56year old who is here for nutritional evaluation for weight loss surgery  Patient is Scottish speaking, interpretation provided by Lorena Prince LCSW   I reviewed comorbidities and medications prior to session  he first recalls having problems with weight gain at the age of 41   he has dieted in the past with variable success but would regain the weight back  (refer to diet history for details)  For his personal pre-operative goals he will: 1  measure portions 2  food journal at least 3 days per week 3  practice 30/60 rule he will complete all 6 lesson plans in his bariatric booklet  he was instructed on the importance of consistent vitamin and mineral intake after his surgery to prevent deficiencies  he currently takes a vitamin d   Recommended that he take an adult multivitamin/mineral plus 2000 IU of vitamin D3  Reviewed importance of support after surgery and discussed the web forum, ben, pep rally and support group  Patient states adequate knowledge of nutrition, exercise and behavior modification required for long term success  Pt  is recommended for surgery and is aware that he will be required to follow the 2 week pre operative liver shrinking diet prior to surgery  Pt also understands that he needs to implement lifestyle changes in preparation for surgery  Patient is aware that he has 6 months of weigh ins required by his insurance  Patient is also aware that 10% weight loss is required prior to surgery due to his BMI being above 55  10% =39 45 lbs, goal xlniwi=442 6 lbs      Active Problems    1  Abnormal blood sugar (790 29) (R73 09)   2  At risk for obstructive sleep apnea (V49 89) (Z91 89)   3  Bilateral anterior knee pain (719 46) (M25 561,M25 562)   4  Chronic back pain (724 5,338 29) (M54 9,G89 29)   5  Depression screening (V79 0) (Z13 89)   6  Encounter for prostate cancer screening (V76 44) (Z12 5)   7  GERD (gastroesophageal reflux disease) (530 81) (K21 9)   8  Hematuria (599 70) (R31 9)   9  Hypertension (401 9) (I10)   10  Left knee pain (719 46) (M25 562)   11  Microalbuminuria (791 0) (R80 9)   12  Need for influenza vaccination (V04 81) (Z23)   13   Obesity, morbid, BMI 40 0-49 9 (278 01) (E66 01)   14  Prediabetes (790 29) (R73 03)   15  Preoperative evaluation to rule out surgical contraindication (V72 83) (Z01 818)   16  Suspicious nevus (238 2) (D22 9)   17  Urinary urgency (788 63) (R39 15)   18  Vitamin D deficiency (268 9) (E55 9)    Past Medical History    1  History of Intermittent chest pain (786 50) (R07 9)    Surgical History    1  Denied: History of Recent Surgery    Family History  Mother    1  Family history of hypertension (V17 49) (Z82 49)  Father    2  Family history of malignant neoplasm of prostate (V16 42) (Z80 42)   3  Family history of Prostate enlargement    Social History    · Current non-drinker of alcohol (V49 89) (Z78 9)   · Does not use illicit drugs (U78 66) (Z78 9)   · Never smoker    Current Meds   1  Amitriptyline HCl - 50 MG Oral Tablet; TAKE ONE TABLET AT BEDTIME; Therapy: 96HYL2540 to (Evaluate:88Szb3199) Recorded   2  AmLODIPine Besylate TABS; Therapy: (Recorded:06Nyi7969) to Recorded   3  Naproxen 500 MG Oral Tablet; Therapy: 07RQZ1011 to Recorded   4  Nucynta 100 MG Oral Tablet; take one tablet twice daily; Therapy: 45XIV6331 to Recorded   5  Omeprazole 20 MG Oral Capsule Delayed Release; Therapy: 87SAR5473 to Recorded   6  Vitamin D (Ergocalciferol) 12488 UNIT Oral Capsule; TAKE 1 CAPSULE WEEKLY; Therapy: 30ROS3112 to (Evaluate:18Oct2017)  Requested for: 19GVN7312; Last   TV:92SRR5283 Ordered    Allergies    1  No Known Drug Allergies    Vitals  Signs   Recorded: 05Sep2017 03:34PM   Height: 5 ft 7 5 in  Weight: 394 lb   BMI Calculated: 60 8  BSA Calculated: 2 71    Future Appointments    Date/Time Provider Specialty Site   11/03/2017 02:00 PM LISHA Brunner   General Surgery [facility]   10/05/2017 02:00 PM Harrell Canavan, [facility] Bariatric Medicine [facility]     Signatures   Electronically signed by : RICH Schmitt; Sep  5 2017  3:38PM EST                       (Author) Electronically signed by : LISHA Tamayo ; Sep  6 2017 10:50AM EST                       (Validation)

## 2018-01-13 VITALS
HEART RATE: 114 BPM | BODY MASS INDEX: 47.74 KG/M2 | TEMPERATURE: 97.8 F | DIASTOLIC BLOOD PRESSURE: 70 MMHG | WEIGHT: 315 LBS | HEIGHT: 68 IN | OXYGEN SATURATION: 97 % | SYSTOLIC BLOOD PRESSURE: 118 MMHG | RESPIRATION RATE: 20 BRPM

## 2018-01-13 VITALS
BODY MASS INDEX: 47.74 KG/M2 | SYSTOLIC BLOOD PRESSURE: 134 MMHG | TEMPERATURE: 97.8 F | HEART RATE: 88 BPM | DIASTOLIC BLOOD PRESSURE: 80 MMHG | HEIGHT: 68 IN | WEIGHT: 315 LBS

## 2018-01-13 VITALS
TEMPERATURE: 97.9 F | BODY MASS INDEX: 47.74 KG/M2 | WEIGHT: 315 LBS | SYSTOLIC BLOOD PRESSURE: 130 MMHG | HEART RATE: 88 BPM | HEIGHT: 68 IN | RESPIRATION RATE: 20 BRPM | OXYGEN SATURATION: 96 % | DIASTOLIC BLOOD PRESSURE: 80 MMHG

## 2018-01-13 VITALS
HEIGHT: 68 IN | OXYGEN SATURATION: 97 % | DIASTOLIC BLOOD PRESSURE: 78 MMHG | WEIGHT: 315 LBS | RESPIRATION RATE: 20 BRPM | TEMPERATURE: 96.4 F | HEART RATE: 90 BPM | BODY MASS INDEX: 47.74 KG/M2 | SYSTOLIC BLOOD PRESSURE: 114 MMHG

## 2018-01-13 VITALS — WEIGHT: 315 LBS | HEIGHT: 68 IN | BODY MASS INDEX: 47.74 KG/M2

## 2018-01-14 VITALS
TEMPERATURE: 98 F | DIASTOLIC BLOOD PRESSURE: 70 MMHG | SYSTOLIC BLOOD PRESSURE: 122 MMHG | RESPIRATION RATE: 18 BRPM | HEIGHT: 68 IN | HEART RATE: 80 BPM | WEIGHT: 315 LBS | BODY MASS INDEX: 47.74 KG/M2

## 2018-01-14 VITALS
RESPIRATION RATE: 20 BRPM | WEIGHT: 315 LBS | DIASTOLIC BLOOD PRESSURE: 80 MMHG | BODY MASS INDEX: 47.74 KG/M2 | HEART RATE: 92 BPM | OXYGEN SATURATION: 95 % | TEMPERATURE: 97.6 F | HEIGHT: 68 IN | SYSTOLIC BLOOD PRESSURE: 138 MMHG

## 2018-01-14 VITALS
SYSTOLIC BLOOD PRESSURE: 160 MMHG | HEIGHT: 68 IN | BODY MASS INDEX: 47.74 KG/M2 | TEMPERATURE: 98.1 F | RESPIRATION RATE: 14 BRPM | WEIGHT: 315 LBS | HEART RATE: 96 BPM | DIASTOLIC BLOOD PRESSURE: 110 MMHG

## 2018-01-14 VITALS — BODY MASS INDEX: 47.74 KG/M2 | HEIGHT: 68 IN | WEIGHT: 315 LBS

## 2018-01-15 NOTE — PROGRESS NOTES
Chief Complaint  Pt is here to check her Blood Pressure  LC      Active Problems    1  Abnormal blood sugar (790 29) (R73 09)   2  Abnormal TSH (790 6) (R94 6)   3  At risk for obstructive sleep apnea (V49 89) (Z91 89)   4  Bilateral anterior knee pain (719 46) (M25 561,M25 562)   5  Chronic back pain (724 5,338 29) (M54 9,G89 29)   6  Depression screening (V79 0) (Z13 89)   7  Encounter for prostate cancer screening (V76 44) (Z12 5)   8  GERD (gastroesophageal reflux disease) (530 81) (K21 9)   9  Hematuria (599 70) (R31 9)   10  Hypertension (401 9) (I10)   11  Left knee pain (719 46) (M25 562)   12  Microalbuminuria (791 0) (R80 9)   13  Need for influenza vaccination (V04 81) (Z23)   14  Obesity, morbid, BMI 40 0-49 9 (278 01) (E66 01)   15  Prediabetes (790 29) (R73 03)   16  Preoperative evaluation to rule out surgical contraindication (V72 83) (Z01 818)   17  Pre-operative laboratory examination (V72 63) (Z01 812)   18  Suspicious nevus (238 2) (D22 9)   19  Urinary urgency (788 63) (R39 15)   20  Vitamin D deficiency (268 9) (E55 9)    Current Meds   1  Amitriptyline HCl - 50 MG Oral Tablet; TAKE ONE TABLET AT BEDTIME; Therapy: 04XUT7243 to (Evaluate:10Our9167) Recorded   2  AmLODIPine Besylate 5 MG Oral Tablet; TOME ALOK TABLETA TODOS LOS ORELLANA; Therapy: 00Rul3255 to (Evaluate:77Dub9178)  Requested for: 19Oct2017; Last   Rx:19Oct2017 Ordered   3  AmLODIPine Besylate TABS; Therapy: (Recorded:40Cfp1031) to Recorded   4  Lisinopril-Hydrochlorothiazide 10-12 5 MG Oral Tablet; take 1 tablet by mouth once   daily; Therapy: 95SPR2203 to (53 459 91 54)  Requested for: 50RTJ0507; Last   Rx:20Oct2017 Ordered   5  Nucynta 100 MG Oral Tablet; take one tablet twice daily; Therapy: 17DXI7997 to Recorded   6  Omeprazole 20 MG Oral Capsule Delayed Release; Therapy: 17MJS0188 to Recorded   7  Vitamin D (Ergocalciferol) 94906 UNIT Oral Capsule; TAKE 1 CAPSULE WEEKLY;    Therapy: 23SOD1351 to (Evaluate:10Nct6079)  Requested for: 25JYU3791; Last   AU:91DKQ5809 Ordered    Allergies    1  No Known Drug Allergies    Vitals  Signs    Heart Rate: 88  Systolic: 818  Diastolic: 70    Assessment    1  Hypertension (401 9) (I10)    Discussion/Summary    Will continue medications as prescribed  If patient experiences any dizziness he is to call the office and we will reassess his medications  Future Appointments    Date/Time Provider Specialty Site   11/03/2017 02:00 PM LISHA Pollock   General Surgery [facility]   12/05/2017 01:00 PM Cecilio Johnson [facility] Bariatric Medicine [facility]     Signatures   Electronically signed by : Scooby Pina; Oct 31 2017  9:43PM EST                       (Author)    Electronically signed by : LISHA Orellana ; Nov 1 2017  4:26PM EST
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
## 2018-01-16 NOTE — PROGRESS NOTES
Assessment    1  Preoperative evaluation to rule out surgical contraindication (V72 83) (Z01 818)   2  Obesity, morbid, BMI 40 0-49 9 (278 01) (E66 01)   3  Left knee pain (719 46) (M25 562)   4  At risk for obstructive sleep apnea (V49 89) (Z91 89)    Plan   *1 -  SLEEP CENTER Co-Management  risk for sleep apnea  Status: Hold For - Scheduling  Requested for: 33Wva5903  Ordered; For: At risk for obstructive sleep apnea;  Ordered By: Henrietta Other  Performed:   Due: 68YPH6774   * XR CHEST PA & LATERAL; Status:Resulted - Requires Verification;   Done: 17MJV9941 12:00AM  IIS:85PSU7529; Ordered; For:Preoperative evaluation to rule out surgical contraindication; Ordered By:Laura Bruno;      Discussion/Summary  Surgical Clearance: He is at a LOW TO MODERATE risk from a cardiovascular standpoint at this time without any additional cardiac testing  Reevaluation needed, if he should present with symptoms prior to surgery/procedure  Attachments: nuclear study  Additional medical information:  Fax for Dr Aristides Truong - [phone]  Will hold on prescribing norvasc at this time  Patient has not had elevated blood pressures at office visits  Possible side effects of new medications were reviewed with the patient/guardian today  The treatment plan was reviewed with the patient/guardian  The patient/guardian understands and agrees with the treatment plan      Chief Complaint  Pre op clearance, plan left knee surgery 08/25/17, states had EKG done 2 days ago @ [facility]  Walks with cane  Speaks Kiswahili  States [facility] to [facility] done [email or web address]       History of Present Illness  Pre-Op Visit (Brief): The patient is being seen for a preoperative visit and left knee surgery on 8/25/17  Surgical Risk Assessment:   Prior Anesthesia: He had no prior anesthesia and no prior adverse reaction to general anesthesia   Pertinent Past Medical History: wears dentures and obesity, but no angina, no arrhythmia, no CAD, CAD without prior MI, CAD without recent PCI, no CHF, no chronic liver disease, no acute hepatitis, no coagulation delay, no primary hypercoagulable state, no secondary hypercoagulable state, no pulmonary embolism, no DVT, does not use anticoagulants, no diabetes, does not use insulin, no thyroid disease, no neck osteoarthrosis, no TMJ osteoarthrosis, no seizure disorder, no CVA, no asthma, no COPD, not KAT, no renal disease and no low serum albumin  Exercise Capacity: Does have pain in his knees, but able to walk four blocks without symptoms and able to walk two flights of stairs without symptoms  Lifestyle Factors: denies alcohol use, denies tobacco use and denies illegal drug use  Symptoms: no easy bleeding, no easy bruising, no frequent nosebleeds, no chest pain, no cough, no dyspnea, no edema, no palpitations and no wheezing  Other KAT risk factors include high BMI, male gender and large neck circumference, but age under 48  Predicted risk of KAT: Moderate  Pertinent Family History: Half-brother with brain cancer  at age 21, but no family history of an adverse reaction to anesthesia, no aneurysm, no bleeding problems, no ischemic heart disease and no stroke  Living Situation: May need assistance at home, friend works  living independently with non-family members, private residence, more than one floor and lives on the second floor  (Lives with a friend who works)  HPI: [employer]  #[id number]  Here for preoperative evaluation for upcoming left knee surgery on 17 at [facility]  Dr Hector Mittal  He also sees Dr Leigh Jarrell from work comp for his leg pain and difficulty sleeping  When he was there, he was given a prescription for norvasc for his blood pressure, which he states was elevated that day  He was also recommended to have a sleep study  Review of Systems    Constitutional: no fever and no chills  Eyes: No complaints of eye pain, no red eyes, no discharge from eyes, no itchy eyes     ENT: no complaints of earache, no hearing loss, no nosebleeds, no nasal discharge, no sore throat, no hoarseness  Cardiovascular: No complaints of slow heart rate, no fast heart rate, no chest pain, no palpitations, no leg claudication, no lower extremity  Respiratory: No complaints of shortness of breath, no wheezing, no cough, no SOB on exertion, no orthopnea or PND  Gastrointestinal: No complaints of abdominal pain, no constipation, no nausea or vomiting, no diarrhea or bloody stools  Genitourinary: No complaints of dysuria, no incontinence, no hesitancy, no nocturia, no genital lesion, no testicular pain  Musculoskeletal: Bilateral knee pain, but as noted in HPI  Integumentary: No complaints of skin rash or skin lesions, no itching, no skin wound, no dry skin  Neurological: No compliants of headache, no confusion, no convulsions, no numbness or tingling, no dizziness or fainting, no limb weakness, no difficulty walking  Psychiatric: Is not suicidal, no sleep disturbances, no anxiety or depression, no change in personality, no emotional problems  Endocrine: No complaints of proptosis, no hot flashes, no muscle weakness, no erectile dysfunction, no deepening of the voice, no feelings of weakness  Hematologic/Lymphatic: No complaints of swollen glands, no swollen glands in the neck, does not bleed easily, no easy bruising  Active Problems    1  Abnormal blood sugar (790 29) (R73 09)   2  Bilateral anterior knee pain (719 46) (M25 561,M25 562)   3  Chronic back pain (724 5,338 29) (M54 9,G89 29)   4  Depression screening (V79 0) (Z13 89)   5  Encounter for prostate cancer screening (V76 44) (Z12 5)   6  GERD (gastroesophageal reflux disease) (530 81) (K21 9)   7  Hematuria (599 70) (R31 9)   8  Microalbuminuria (791 0) (R80 9)   9  Need for influenza vaccination (V04 81) (Z23)   10  Obesity, morbid, BMI 40 0-49 9 (278 01) (E66 01)   11  Prediabetes (790 29) (R73 03)   12   Suspicious nevus (238 2) (D22 9)   13  Urinary urgency (788 63) (R39 15)   14  Vitamin D deficiency (268 9) (E55 9)    Past Medical History    · History of Intermittent chest pain (786 50) (R07 9)    The active problems and past medical history were reviewed and updated today  Surgical History    · Denied: History of Recent Surgery    The surgical history was reviewed and updated today  Family History  Mother    · Family history of hypertension (V17 49) (Z82 49)  Father    · Family history of malignant neoplasm of prostate (V16 42) (Z80 45)   · Family history of Prostate enlargement    The family history was reviewed and updated today  Social History    · Current non-drinker of alcohol (V49 89) (Z78 9)   · Does not use illicit drugs (L07 86) (Z78 9)   · Never smoker  The social history was reviewed and updated today  Current Meds   1  Amitriptyline HCl - 50 MG Oral Tablet; TAKE ONE TABLET AT BEDTIME; Therapy: 03FYO3117 to (Evaluate:63Psy0125) Recorded   2  Naproxen 500 MG Oral Tablet; Therapy: 83FYV4750 to Recorded   3  Nucynta 100 MG Oral Tablet; take one tablet twice daily; Therapy: 27BQJ0255 to Recorded   4  Omeprazole 20 MG Oral Capsule Delayed Release; Therapy: 30CAY6531 to Recorded   5  Vitamin D (Ergocalciferol) 54093 UNIT Oral Capsule; TAKE 1 CAPSULE WEEKLY; Therapy: 92ZEM5512 to (Evaluate:70Gsn0270)  Requested for: 91XUJ3566; Last   HB:26DNJ7013 Ordered    The medication list was reviewed and updated today  Allergies    1  No Known Drug Allergies    Vitals   Recorded: 09Cml8842 08:34AM   Temperature 96 4 F, Tympanic   Heart Rate 90   Respiration 20   Systolic 672   Diastolic 78   Height 5 ft 7 5 in   Weight 394 lb    BMI Calculated 60 8   BSA Calculated 2 71   O2 Saturation 97   Pain Scale 8     Physical Exam    Constitutional   General appearance: Abnormal   well developed, morbidly obese, clothing appropriate, well groomed and well hydrated     Eyes   Conjunctiva and lids: No swelling, erythema, or discharge  Pupils and irises: Equal, round and reactive to light  Ears, Nose, Mouth, and Throat   External inspection of ears and nose: Normal     Otoscopic examination: Tympanic membrance translucent with normal light reflex  Canals patent without erythema  Oropharynx: Abnormal   The posterior pharynx was not erythematous and did not have an exudate  Inspection of the oropharynx showed visible hard and soft palate and base of the uvula (Mallampati class 3)  Oral mucosa was moist, but was normal  The palate examination showed no abnormalities  The tongue was normal  The tonsils were normal    Pulmonary   Respiratory effort: No increased work of breathing or signs of respiratory distress  Auscultation of lungs: Clear to auscultation  Cardiovascular   Auscultation of heart: Normal rate and rhythm, normal S1 and S2, without murmurs  Examination of extremities for edema and/or varicosities: Abnormal   no edema  varicosities noted bilaterally  Abdomen   Abdomen: Abnormal   The abdomen was obese  Bowel sounds were normal  Skin findings: striae  The abdomen was soft and nontender  no masses palpated  no CVA tenderness   Liver and spleen: No hepatomegaly or splenomegaly  Lymphatic   Palpation of lymph nodes in neck: No lymphadenopathy  Musculoskeletal   Gait and station: Abnormal   (Ambulates with a cane) Gait evaluation demonstrated limping on the left  Digits and nails: Normal without clubbing or cyanosis  Inspection/palpation of joints, bones, and muscles: Abnormal   Tenderness in knees bilaterally with flexion/extension  No crepitus noted  Skin   Skin and subcutaneous tissue: Normal without rashes or lesions  Neurologic   Cranial nerves: Cranial nerves 2-12 intact  Reflexes: 2+ and symmetric  Sensation: No sensory loss      Psychiatric   Orientation to person, place and time: Normal     Mood and affect: Normal        Results/Data  [place] Sleepiness Score 13Yzx9668 09: 33AM Hyland Mcardle     Test Name Result Flag Reference   [place] Score 2 - Normal     Answer Summary: Q1-0, Q2-1, Q3-0, Q4-0, Q5-1, Q6-0, Q7-0, Q8-0     * XR CHEST PA & LATERAL 31Vdq7514 04:08PM Ruth Solitario Order Number: [id number]     Test Name Result Flag Reference   XR CHEST PA & LATERAL (Report)     CHEST      INDICATION: Preprocedural examination  COMPARISON: Chest radiograph 1/16/2017     VIEWS: Frontal and lateral projections     IMAGES: 2     FINDINGS:        Cardiomediastinal silhouette appears unremarkable  The lungs are clear  No pneumothorax or pleural effusion  Visualized osseous structures appear within normal limits for the patient's age  IMPRESSION:     No active pulmonary disease  Workstation performed: [id number]     Signed by:   Juan Pastor MD   8/18/17     (1) PT WITH INR 84Rcm3780 01:47PM EPIC, Provider   Test ordered by: Jose Carlos Duff     Test Name Result Flag Reference   INR 1 01  0 86-1 16   PT 13 3 seconds  12 1-14 4     (1) CBC/PLT/DIFF 35You4362 01:47PM EPIC, Provider   Test ordered by: Jose Carlos Duff     Test Name Result Flag Reference   WBC COUNT 5 70 Thousand/uL  4 31-10 16   RBC COUNT 4 90 Million/uL  3 88-5 62   HEMOGLOBIN 14 8 g/dL  12 0-17 0   HEMATOCRIT 43 3 %  36 5-49 3   MCV 88 fL  82-98   MCH 30 2 pg  26 8-34 3   MCHC 34 2 g/dL  31 4-37 4   RDW 13 9 %  11 6-15 1   MPV 10 4 fL  8 9-12 7   PLATELET COUNT 769 Thousands/uL  149-390   nRBC AUTOMATED 0 /100 WBCs     NEUTROPHILS RELATIVE PERCENT 46 %  43-75   LYMPHOCYTES RELATIVE PERCENT 41 %  14-44   MONOCYTES RELATIVE PERCENT 9 %  4-12   EOSINOPHILS RELATIVE PERCENT 3 %  0-6   BASOPHILS RELATIVE PERCENT 1 %  0-1   NEUTROPHILS ABSOLUTE COUNT 2 67 Thousands/? ??L  1 85-7 62   LYMPHOCYTES ABSOLUTE COUNT 2 33 Thousands/? ??L  0 60-4 47   MONOCYTES ABSOLUTE COUNT 0 51 Thousand/? ??L  0 17-1 22   EOSINOPHILS ABSOLUTE COUNT 0 16 Thousand/? ??L  0 00-0 61   BASOPHILS ABSOLUTE COUNT 0 03 Thousands/? ? ? L 0  00-0 10   This bloodwork is non-fasting  Please drink two glasses of water morning of  bloodwork  (1) COMPREHENSIVE METABOLIC PANEL 84GDN2617 72:15MD [facility], Provider   Test ordered by: Sherol Pac     Test Name Result Flag Reference   SODIUM 139 mmol/L  136-145   POTASSIUM 4 1 mmol/L  3 5-5 3   Slightly Hemolyzed; Results May be Affected   CHLORIDE 102 mmol/L  100-108   CARBON DIOXIDE 33 mmol/L H 21-32   ANION GAP (CALC) 4 mmol/L  4-13   BLOOD UREA NITROGEN 12 mg/dL  5-25   CREATININE 0 97 mg/dL  0 60-1 30   Standardized to IDMS reference method   CALCIUM 8 4 mg/dL  8 3-10 1   BILI, TOTAL 0 42 mg/dL  0 20-1 00   ALK PHOSPHATAS 73 U/L     ALT (SGPT) 46 U/L  12-78   AST(SGOT) 38 U/L  5-45   Slightly Hemolyzed; Results May be Affected   ALBUMIN 3 2 g/dL L 3 5-5 0   TOTAL PROTEIN 7 1 g/dL  6 4-8 2   eGFR 93 ml/min/1 73sq m     National Kidney Disease Education Program recommendations are as follows:  GFR calculation is accurate only with a steady state creatinine  Chronic Kidney disease less than 60 ml/min/1 73 sq  meters  Kidney failure less than 15 ml/min/1 73 sq  meters  GLUCOSE FASTING 93 mg/dL  65-99     (1) APTT 30Xyl8779 01:47PM EPIC, Provider   Test ordered by: Sherol Pac     Test Name Result Flag Reference   PARTIAL THROMBOPLASTIN TIME 28 seconds  23-35   Therapeutic Heparin Range = 60-90 seconds     ECG 12-LEAD 30Jbu2134 01:44PM EP[facility], Provider   Test ordered by: Sherol Pac     Test Name Result Flag Reference   ECG 12-LEAD      Normal sinus rhythm   Normal ECG   Confirmed by Magy Wheeler ([id number]) on 8/2/2017 2:36:05 PM     NM MYOCARDIAL PERFUSION SPECT (RX STRESS AND/OR REST) 26JTE3009 07:51AM Khushboo Harrington Order Number: [id number]    - Patient Instructions: To schedule this appointment, please contact Central Scheduling at [phone]       Test Name Result Flag Reference   NM MYOCARDIAL PERFUSION SPECT (RX STRESS AND/OR REST) (Report)     [facility]   [address]  [place], [address]   [phone]     Rest/Stress Gated SPECT Myocardial Perfusion Imaging After Regadenoson     Patient: Martita Zhang   MR number: [id number]   Account number: [de-identified]   : 1970   Age: 40 years   Gender: Male   Status: Outpatient   Location: Stress lab   Height: 68 in   Weight: 387 lb   BP: 148/ 107 mmHg     Allergies: NO KNOWN ALLERGIES     Diagnosis: R07 9 - Chest pain, unspecified     Primary Physician: HERMELINDA Russell   Technician: Coby Donnelly   RN: ALESHA DunnN   Referring Physician: HERMELINDA Russell   Group: [facility]   Report Prepared By[de-identified] ALESHA DunnN   Interpreting Physician: Pavel Min MD     INDICATIONS: Detection of coronary artery disease  HISTORY: The patient is a 40year old  male  Chest pain status: chest pain  Coronary artery disease risk factors: none  Cardiovascular history: none significant  Co-morbidity: obesity  Medications: no cardiac drugs  PHYSICAL EXAM: Baseline physical exam screening: normal lung exam      REST ECG: Normal sinus rhythm at 90 beats per minute  PROCEDURE: The study was performed in the stress lab  A regadenoson infusion pharmacologic stress test was performed  Gated SPECT myocardial perfusion imaging was performed after stress and at rest  Systolic blood pressure was 148 mmHg, at   the start of the study  Diastolic blood pressure was 107 mmHg, at the start of the study  The heart rate was 92 bpm, at the start of the study  IV double checked  Regadenoson protocol:   HR bpm SBP mmHg DBP mmHg Symptoms   Baseline 92 148 107 none   Immediate 125 179 104 mild dyspnea, fatigue   3 min 114 198 100 subsiding   5 min 104 177 99 none   No medications or fluids given  The patient also performed low level exercise  STRESS SUMMARY: Duration of pharmacologic stress was 3 min  Functional capacity could not be adequately assessed   Maximal heart rate during stress was 125 bpm  The heart rate response to stress was normal  There was resting hypertension   with a hypertensive blood pressure response to stress  The rate-pressure product for the peak heart rate and blood pressure was 60738  There was no chest pain during stress  The stress test was terminated due to protocol completion  Pre   oxygen saturation: 98 %  Peak oxygen saturation: 98 %  The stress ECG was negative for ischemia  There were no stress arrhythmias or conduction abnormalities  ISOTOPE ADMINISTRATION:   Resting isotope administration Stress isotope administration   Agent Tetrofosmin Tetrofosmin   Dose 15 1 mCi 48 8 mCi   Date 06/19/2017 06/19/2017   Injection time 08:08 09:39   Imaging time 08:39 10:18   Injection-image interval 31 min 39 min     The radiopharmaceutical was injected at the peak effect of pharmacologic stress  MYOCARDIAL PERFUSION IMAGING:   The image quality was fair  Rotating projection images reveal mild diaphragmatic attenuation  Left ventricular size was normal  The TID ratio was 0 96  PERFUSION DEFECTS:   - There was a small, mildly severe, paradoxical (reverse redistribution) myocardial perfusion defect of the basal inferior wall due to diaphragm attenuation  GATED SPECT:   The calculated left ventricular ejection fraction was 52 %  Left ventricular ejection fraction was within normal limits by visual estimate  There was no left ventricular regional abnormality  SUMMARY:   - Stress results: There was resting hypertension with a hypertensive blood pressure response to stress  There was no chest pain during stress  - ECG conclusions: The stress ECG was negative for ischemia  - Perfusion imaging: There was a small, mildly severe, paradoxical (reverse redistribution) myocardial perfusion defect of the basal inferior wall due to diaphragm attenuation    - Gated SPECT: The calculated left ventricular ejection fraction was 52 %   Left ventricular ejection fraction was within normal limits by visual estimate  There was no left ventricular regional abnormality  IMPRESSIONS: Normal study after pharmacologic vasodilation without reproduction of symptoms  There was image artifact, without diagnostic evidence for perfusion abnormality  Left ventricular systolic function was normal      Prepared and signed by     Alcon Barr MD   Signed 06/19/2017 12:41:13     Attending Note  Collaborating Physician Note: Collaborating Physician: I agree with the Advanced Practitioner note  End of Encounter Meds    1  Naproxen 500 MG Oral Tablet; Therapy: 16NYT5301 to Recorded    2  Amitriptyline HCl - 50 MG Oral Tablet; TAKE ONE TABLET AT BEDTIME; Therapy: 68MRI7194 to (Evaluate:68Vos2567) Recorded   3  Nucynta 100 MG Oral Tablet; take one tablet twice daily; Therapy: 41ERS6346 to Recorded    4  Omeprazole 20 MG Oral Capsule Delayed Release; Therapy: 31GXR1118 to Recorded    5  Vitamin D (Ergocalciferol) 70335 UNIT Oral Capsule; TAKE 1 CAPSULE WEEKLY; Therapy: 01YOF2544 to [id number])  Requested for: 36AIH9238; Last   GE:02PPG5792 Ordered    Future Appointments    Date/Time Provider Specialty Site   09/05/2017 01:00 PM Bariatric 1, Nurse Schedule  [facility]     Signatures   Electronically signed by : Sonya Devlin [address] [place] [address];  Aug 18 2017  1:18PM EST                       (Author)    Electronically signed by : Dallie Shone, M D ; Aug 22 2017  2:02PM EST                       (Author)

## 2018-01-16 NOTE — RESULT NOTES
Verified Results  * XR CHEST PA & LATERAL [id number] 04:08PM Isaias Pierson Order Number: [id number]     Test Name Result Flag Reference   XR CHEST PA & LATERAL (Report)     CHEST      INDICATION: Preprocedural examination  COMPARISON: Chest radiograph 1/16/2017     VIEWS: Frontal and lateral projections     IMAGES: 2     FINDINGS:        Cardiomediastinal silhouette appears unremarkable  The lungs are clear  No pneumothorax or pleural effusion  Visualized osseous structures appear within normal limits for the patient's age  IMPRESSION:     No active pulmonary disease         Workstation performed: [id number]     Signed by:   Ellyn Marcano MD   8/18/17

## 2018-01-22 ENCOUNTER — GENERIC CONVERSION - ENCOUNTER (OUTPATIENT)
Dept: OTHER | Facility: OTHER | Age: 48
End: 2018-01-22

## 2018-01-22 VITALS — HEART RATE: 88 BPM | SYSTOLIC BLOOD PRESSURE: 108 MMHG | DIASTOLIC BLOOD PRESSURE: 70 MMHG

## 2018-01-22 VITALS
WEIGHT: 315 LBS | SYSTOLIC BLOOD PRESSURE: 138 MMHG | HEIGHT: 68 IN | BODY MASS INDEX: 47.74 KG/M2 | HEART RATE: 100 BPM | DIASTOLIC BLOOD PRESSURE: 94 MMHG | RESPIRATION RATE: 16 BRPM | TEMPERATURE: 98.2 F

## 2018-01-22 VITALS
SYSTOLIC BLOOD PRESSURE: 142 MMHG | WEIGHT: 315 LBS | RESPIRATION RATE: 16 BRPM | OXYGEN SATURATION: 96 % | DIASTOLIC BLOOD PRESSURE: 86 MMHG | HEIGHT: 68 IN | TEMPERATURE: 98 F | HEART RATE: 109 BPM | BODY MASS INDEX: 47.74 KG/M2

## 2018-01-22 VITALS — SYSTOLIC BLOOD PRESSURE: 140 MMHG | DIASTOLIC BLOOD PRESSURE: 92 MMHG

## 2018-01-22 VITALS — HEART RATE: 88 BPM

## 2018-01-23 VITALS — BODY MASS INDEX: 47.74 KG/M2 | WEIGHT: 315 LBS | HEIGHT: 68 IN

## 2018-01-23 VITALS
WEIGHT: 315 LBS | HEART RATE: 101 BPM | RESPIRATION RATE: 16 BRPM | DIASTOLIC BLOOD PRESSURE: 74 MMHG | HEIGHT: 68 IN | BODY MASS INDEX: 47.74 KG/M2 | SYSTOLIC BLOOD PRESSURE: 132 MMHG

## 2018-01-23 NOTE — CONSULTS
I had the pleasure of evaluating your patient, Gaston Louis  My full evaluation follows:      History of Present Illness  Cardiology [facility] Free Text Note Form [place]: Mr Adalberto Lyons is a 42-year-old male who presents to the office today for preoperative evaluation  He is scheduled to undergo laparoscopic gastric bypass in the near future and prior to proceeding a cardiology consultation has been sought  He denies any prior cardiac history  He leads a sedentary lifestyle  He is able to ascend steps albeit slowly without any difficulty  With the activity he is able to he denies any exertional chest pain or shortness of breath  He denies any signs or symptoms suggestive heart failure including increasing lower extremity edema, paroxysmal nocturnal dyspnea, orthopnea, acute weight gain or increasing abdominal girth  He denies lightheadedness, syncope or presyncope  He denies palpitations or signs or symptoms of claudication  He does carry the diagnosis of obstructive sleep apnea  He had been compliant with CPAP but has not been wearing it recently due to the fact the settings on his machine need to be adjusted  He denies ever having had general anesthesia in the past       Review of Systems      Cardiac: as noted in HPI  Skin: No complaints of nonhealing sores or skin rash  Genitourinary: No complaints of recurrent urinary tract infections, frequent urination at night, difficult urination, blood in urine, kidney stones, loss of bladder control, no kidney or prostate problems, no erectile dysfunction  Psychological: No complaints of feeling depressed, anxiety, panic attacks, or difficulty concentrating  Respiratory: as noted in HPI  HEENT: No complaints of serious problems, hearing problems, nose problems, throat problems, or snoring     Gastrointestinal: No complaints of liver problems, nausea, vomiting, heartburn, constipation, bloody stools, diarrhea, problems swallowing, adbominal pain, or rectal bleeding  Hematologic: No complaints of bleeding disorders, anemia, blood clots, or excessive brusing  Neurological: No complaints of numbness, tingling, dizziness, weakness, seizures, headaches, syncope or fainting, AM fatigue, daytime sleepiness, no witnessed apnea episodes  Musculoskeletal: No complaints of arthritis, back pain, or painfull swelling  Active Problems    1  Abnormal blood sugar (790 29) (R73 09)   2  Abnormal TSH (790 6) (R94 6)   3  At risk for obstructive sleep apnea (V49 89) (Z91 89)   4  Bilateral anterior knee pain (719 46) (M25 561,M25 562)   5  Chronic back pain (724 5,338 29) (M54 9,G89 29)   6  Depression screening (V79 0) (Z13 89)   7  Encounter for prostate cancer screening (V76 44) (Z12 5)   8  GERD (gastroesophageal reflux disease) (530 81) (K21 9)   9  Hematuria (599 70) (R31 9)   10  Hypertension (401 9) (I10)   11  Left knee pain (719 46) (M25 562)   12  Microalbuminuria (791 0) (R80 9)   13  Need for influenza vaccination (V04 81) (Z23)   14  Obesity, morbid, BMI 40 0-49 9 (278 01) (E66 01)   15  Prediabetes (790 29) (R73 03)   16  Preoperative evaluation to rule out surgical contraindication (V72 83) (Z01 818)   17  Pre-operative laboratory examination (V72 63) (Z01 812)   18  Suspicious nevus (238 2) (D22 9)   19  Urinary urgency (788 63) (R39 15)   20  Vitamin D deficiency (268 9) (E55 9)    Past Medical History    · History of Intermittent chest pain (786 50) (R07 9)    The active problems and past medical history were reviewed and updated today  Surgical History    · Denied: History of Recent Surgery    The surgical history was reviewed and updated today  Family History    · Family history of hypertension (V17 49) (Z82 49)    · Family history of malignant neoplasm of prostate (V16 42) (Z80 42)   · Family history of Prostate enlargement    · Family history of hypertension (V17 49) (Z82 49)    The family history was reviewed and updated today         Social History    · Current non-drinker of alcohol (V49 89) (Z78 9)   · Does not use illicit drugs (L23 14) (Z78 9)   · Never smoker  The social history was reviewed and updated today  Current Meds   1  Amitriptyline HCl - 50 MG Oral Tablet; TAKE ONE TABLET AT BEDTIME; Therapy: 19CPJ4912 to (Evaluate:71Oeb1352) Recorded   2  AmLODIPine Besylate 5 MG Oral Tablet; TOME ALOK TABLETA TODOS LOS ORELLANA; Therapy: 93Hwl9284 to (Evaluate:23Mar2018)  Requested for: 63FQN5195; Last   Rx:23Nov2017 Ordered   3  Lisinopril-Hydrochlorothiazide 10-12 5 MG Oral Tablet; take 1 tablet by mouth once daily; Therapy: 31AXQ4629 to (Aubrey Sherman)  Requested for: 16SNQ3702; Last   Rx:20Oct2017 Ordered   4  Nucynta 100 MG Oral Tablet; take one tablet twice daily; Therapy: 96DGO1076 to Recorded   5  Omeprazole 20 MG Oral Capsule Delayed Release; Therapy: 97WJI7634 to Recorded   6  Vitamin D (Ergocalciferol) 47117 UNIT Oral Capsule; TAKE 1 CAPSULE WEEKLY; Therapy: 11SNI5921 to (Evaluate:18Oct2017)  Requested for: 60JCX6769; Last   UB:51JPX1190 Ordered    The medication list was reviewed and updated today  Allergies    1  No Known Drug Allergies    Vitals  Signs    Heart Rate: 101Pulse Quality: Normal, L RadialRespiration Quality: NormalRespiration: 42FWSJSNUU: 929BOBAOYLZU: 13TQTWKK: 5 ft 7 5 inWeight: 374 lb BMI Calculated: 57 71BSA Calculated: 2 65    Physical Exam    Constitutional   General appearance: No acute distress, well appearing and well nourished  Eyes   Conjunctiva and Sclera examination: Conjunctiva pink, sclera anicteric  Ears, Nose, Mouth, and Throat - Oropharynx: Clear, nares are clear, mucous membranes are moist    Neck   Neck and thyroid: Normal, supple, trachea midline, no thyromegaly  Pulmonary   Respiratory effort: No increased work of breathing or signs of respiratory distress  Auscultation of lungs: Clear to auscultation, no rales, no rhonchi, no wheezing, good air movement  Cardiovascular   Auscultation of heart: Normal rate and rhythm, normal S1 and S2, no murmurs  Heart sounds: the heart sounds were distant  Carotid pulses: Normal, 2+ bilaterally  Examination of extremities for edema and/or varicosities: Normal     Abdomen   Abdomen: Non-tender and no distention  Liver and spleen: No hepatomegaly or splenomegaly  Musculoskeletal Gait and station: Normal gait  Digits and nails: Normal without clubbing or cyanosis  Inspection/palpation of joints, bones, and muscles: Normal, ROM normal     Skin - Skin and subcutaneous tissue: Normal without rashes or lesions  Skin is warm and well perfused, normal turgor  Neurologic - Cranial nerves: II - XII intact  Speech: Normal     Psychiatric - Orientation to person, place, and time: Normal  Mood and affect: Normal       Results/Data    Rhythm and rate: sinus tachycardia  Assessment    1  Preoperative cardiovascular examination (V72 81) (Z01 810)   2  Hypertension (401 9) (I10)   3  Prediabetes (790 29) (R73 03)   4  Obstructive sleep apnea of adult (327 23) (G47 33)   5  Obesity, morbid, BMI 40 0-49 9 (278 01) (E66 01)    Plan     Preoperative cardiovascular examination    · Follow-up PRN Evaluation and Treatment  Follow-up  Status: Complete  Done:  29NIT9414   Ordered; For: Preoperative cardiovascular examination; Ordered By: Hermila Castorena Performed:  Due: 48DYQ9519    Discussion/Summary    Mr Jerri Barraza is a 52-year-old gentleman who presents to the office today for preoperative evaluation for upcoming bariatric surgery  Prior to proceeding a cardiology consultation has been sought  The patient denies any cardiopulmonary symptoms with the activity he is able to perform  His ECG is without ischemic changes  He did undergo a stress test in June which did not reveal any evidence of ischemia or scar  Therefore he can proceed to the operating room at relatively low risk without any further testing      No specific follow-up will be arranged  However if it is felt he needs re-evaluation at any point in the future I would be glad to see him again  Thank you very much for allowing me to participate in the care of this patient  If you have any questions, please do not hesitate to contact me        Signatures   Electronically signed by : Beba Tenorio DO; Dec  7 2017  8:25AM EST                       (Author)

## 2018-01-23 NOTE — PROGRESS NOTES
Message  Outreach phone call; appointment(5/6) with ELBA and RD scheduled for [email or web address]  [place]      Active Problems    1  Abnormal blood sugar (790 29) (R73 09)   2  Abnormal TSH (790 6) (R94 6)   3  At risk for obstructive sleep apnea (V49 89) (Z91 89)   4  Bilateral anterior knee pain (719 46) (M25 561,M25 562)   5  Chronic back pain (724 5,338 29) (M54 9,G89 29)   6  Depression screening (V79 0) (Z13 89)   7  Encounter for prostate cancer screening (V76 44) (Z12 5)   8  GERD (gastroesophageal reflux disease) (530 81) (K21 9)   9  Helicobacter pylori (H  pylori) infection (041 86) (A04 8)   10  Hematuria (599 70) (R31 9)   11  Hypertension (401 9) (I10)   12  Left knee pain (719 46) (M25 562)   13  Microalbuminuria (791 0) (R80 9)   14  Morbid obesity with BMI of 50 0-59 9, adult (278 01,V85 43) (E66 01,Z68 43)   15  Need for influenza vaccination (V04 81) (Z23)   16  Obesity, morbid, BMI 40 0-49 9 (278 01) (E66 01)   17  Obstructive sleep apnea of adult (327 23) (G47 33)   18  Prediabetes (790 29) (R73 03)   19  Preoperative cardiovascular examination (V72 81) (Z01 810)   20  Preoperative evaluation to rule out surgical contraindication (V72 83) (Z01 818)   21  Pre-operative laboratory examination (V72 63) (Z01 812)   22  Suspicious nevus (238 2) (D22 9)   23  Urinary urgency (788 63) (R39 15)   24  Vitamin D deficiency (268 9) (E55 9)    Current Meds   1  Amitriptyline HCl - 50 MG Oral Tablet; TAKE ONE TABLET AT BEDTIME; Therapy: 98WQZ2875 to (Evaluate:18Zip7808) Recorded   2  AmLODIPine Besylate 5 MG Oral Tablet; TOME ALOK TABLETA TODOS LOS ORELLANA; Therapy: 04Ptw7320 to (Evaluate:23Mar2018)  Requested for: 00TYW2318; Last   Rx:23Nov2017 Ordered   3  Amoxicillin 500 MG Oral Capsule; TAKE 2 CAPSULE 2 TIMES DAILY UNTIL GONE FOR   14DAYS; Therapy: 54COJ1012 to 26 750019)  Requested for: 86DFO8620; Last   Rx:03Jan2018 Ordered   4   Clarithromycin 500 MG Oral Tablet; TAKE 1 TABLET TWICE DAILY; Therapy: 19WRW5618 to 25 [id number])  Requested for: 77YFS9345; Last   Rx:03Jan2018 Ordered   5  Lisinopril-Hydrochlorothiazide 10-12 5 MG Oral Tablet; take 1 tablet by mouth once   daily; Therapy: 12DCP6311 to (Vivek Fire)  Requested for: 43GLG6765; Last   Rx:20Oct2017 Ordered   6  Nucynta 100 MG Oral Tablet; take one tablet twice daily; Therapy: 07MZM7420 to Recorded   7  Omeprazole 20 MG Oral Capsule Delayed Release; TAKE 1 CAPSULE TWICE DAILY; Therapy: 62SUE1129 to 25 [id number])  Requested for: 79LPO2693; Last   Rx:03Jan2018 Ordered   8  Vitamin D (Ergocalciferol) 27080 UNIT Oral Capsule; Take 1 capsule every other week; Therapy: 26Vvw6589 to (Evaluate:20Jun2018)  Requested for: 19Ogd1200; Last   Rx:38Hwg9234 Ordered   9  Zolpidem Tartrate 5 MG Oral Tablet; Therapy: (Recorded:08Jan2018) to Recorded    Allergies    1   No Known Drug Allergies    Signatures   Electronically signed by : JANELL Hurtado; Jan 9 2018  9:25AM EST                       (Author)

## 2018-01-24 VITALS
WEIGHT: 315 LBS | RESPIRATION RATE: 16 BRPM | HEIGHT: 68 IN | BODY MASS INDEX: 47.74 KG/M2 | TEMPERATURE: 97.7 F | SYSTOLIC BLOOD PRESSURE: 130 MMHG | DIASTOLIC BLOOD PRESSURE: 86 MMHG | HEART RATE: 84 BPM

## 2018-01-24 NOTE — PROGRESS NOTES
Active Problems    1  Abnormal blood sugar (790 29) (R73 09)   2  Abnormal TSH (790 6) (R94 6)   3  At risk for obstructive sleep apnea (V49 89) (Z91 89)   4  Bilateral anterior knee pain (719 46) (M25 561,M25 562)   5  Chronic back pain (724 5,338 29) (M54 9,G89 29)   6  Depression screening (V79 0) (Z13 89)   7  Encounter for prostate cancer screening (V76 44) (Z12 5)   8  GERD (gastroesophageal reflux disease) (530 81) (K21 9)   9  Helicobacter pylori (H  pylori) infection (041 86) (A04 8)   10  Hematuria (599 70) (R31 9)   11  Hypertension (401 9) (I10)   12  Left knee pain (719 46) (M25 562)   13  Microalbuminuria (791 0) (R80 9)   14  Morbid obesity with BMI of 50 0-59 9, adult (278 01,V85 43) (E66 01,Z68 43)   15  Need for influenza vaccination (V04 81) (Z23)   16  Obesity, morbid, BMI 40 0-49 9 (278 01) (E66 01)   17  Obstructive sleep apnea of adult (327 23) (G47 33)   18  Prediabetes (790 29) (R73 03)   19  Preoperative cardiovascular examination (V72 81) (Z01 810)   20  Preoperative evaluation to rule out surgical contraindication (V72 83) (Z01 818)   21  Pre-operative laboratory examination (V72 63) (Z01 812)   22  Suspicious nevus (238 2) (D22 9)   23  Urinary urgency (788 63) (R39 15)   24  Vitamin D deficiency (268 9) (E55 9)    Past Medical History    1  History of Intermittent chest pain (786 50) (R07 9)    Surgical History    1  Denied: History of Recent Surgery    Family History  Mother    1  Family history of hypertension (V17 49) (Z82 49)  Father    2  Family history of malignant neoplasm of prostate (V16 42) (Z80 42)   3  Family history of Prostate enlargement  Maternal Grandmother    4  Family history of hypertension (V17 49) (Z82 49)    Social History    · Current non-drinker of alcohol (V49 89) (Z78 9)   · Does not use illicit drugs (K06 10) (Z78 9)   · Never smoker    Current Meds   1  Amitriptyline HCl - 50 MG Oral Tablet; TAKE ONE TABLET AT BEDTIME;    Therapy: 59PET9618 to (Evaluate:02Loh1257) Recorded   2  AmLODIPine Besylate 5 MG Oral Tablet; TOME ALOK TABLETA TODOS LOS ORELLANA; Therapy: 88Pit1442 to (Evaluate:23Mar2018)  Requested for: 15NWJ9146; Last   Rx:23Nov2017 Ordered   3  Lisinopril-Hydrochlorothiazide 10-12 5 MG Oral Tablet; take 1 tablet by mouth once daily; Therapy: 47XMI7084 to ([phone])  Requested for: 79DBI0531; Last   Rx:20Oct2017 Ordered   4  Nucynta 100 MG Oral Tablet; take one tablet twice daily; Therapy: 37XGT3914 to Recorded   5  Vitamin D (Ergocalciferol) 18371 UNIT Oral Capsule; Take 1 capsule every other week; Therapy: 28Kzf8550 to (Evaluate:20Jun2018)  Requested for: 82Yds0722; Last   Rx:35Vem8010 Ordered   6  Zolpidem Tartrate 5 MG Oral Tablet; Therapy: (Recorded:08Jan2018) to Recorded    Allergies    1  No Known Drug Allergies     Note   Note:   Patient arrived thinking he had appointment today( nope) however I met with him  I weighed him and reviewed workflow  He said he attended support group, completed sleep study and cardiology  Patient said he also reads manual  Patient reminded of appointment with JOYCE Cunningham in March 2018   [place]      Future Appointments    Date/Time Provider Specialty Site   03/08/2018 11:30 AM Nan Reyez [facility] Bariatric Medicine [place] [address] [facility]     Signatures   Electronically signed by : JANELL Mata; Jan 22 2018  3:21PM EST                       (Author)    Electronically signed by : LISHA Mata ; Jan 23 2018  1:37PM EST                       (Validation)

## 2018-02-07 ENCOUNTER — OFFICE VISIT (OUTPATIENT)
Dept: SLEEP CENTER | Facility: CLINIC | Age: 48
End: 2018-02-07
Payer: COMMERCIAL

## 2018-02-07 VITALS
WEIGHT: 315 LBS | HEIGHT: 67 IN | DIASTOLIC BLOOD PRESSURE: 72 MMHG | BODY MASS INDEX: 49.44 KG/M2 | HEART RATE: 82 BPM | SYSTOLIC BLOOD PRESSURE: 122 MMHG

## 2018-02-07 DIAGNOSIS — E66.09 CLASS 1 OBESITY DUE TO EXCESS CALORIES WITH SERIOUS COMORBIDITY IN ADULT, UNSPECIFIED BMI: ICD-10-CM

## 2018-02-07 DIAGNOSIS — G47.33 OBSTRUCTIVE SLEEP APNEA SYNDROME: Primary | ICD-10-CM

## 2018-02-07 PROCEDURE — 99214 OFFICE O/P EST MOD 30 MIN: CPT | Performed by: INTERNAL MEDICINE

## 2018-02-07 NOTE — PROGRESS NOTES
Progress Note - [facility] 56 y o  male MRN: [id number]  Unit/Bed#:  Encounter: [id number]      History of Present Illness   Physician Requesting Consult: No att  providers found  Follow Up Evaluation / Problem:  Obstructive sleep apnea  HPI: Farzaneh Benavidez is a 56y o  year old male who presents with obstructive sleep apnea recently diagnosed with moderate obstructive sleep apnea and on CPAP was titrated to CPAP of 20 centimeters of water last visit when seen he could not tolerate the 20 centimeters he post brought down the pressure to 15 centimeters of water, and also given his insomnia we had given him zolpidem 10 milligrams which she had used, which he states did not work as much, he is currently on amitriptyline and the it is working well for him  ROS: reviewed and no changes  Historical Information   Past History since last sleep center visit: reviewed and no changes    Meds/Allergies   No allergy to any medication no allergies to any medications  No Known Allergies    Today on exam  Eyes anicteric sclera, conjunctivae is clear  ENT nares is patent no evidence of any discharge, crowded oropharyngeal airway Mallampati score of 4  Neck short and wide supple no lymphadenopathy normal thyroid  Heart first and second heart sounds are heard no murmur or gallop is heard  Lungs clear to auscultation  CNS awake alert oriented x3      Lab Results: I have personally reviewed pertinent reports  Reviewed the CPAP download compliance    Assessment:  1    obstructive sleep apnea  2  Insomnia  3    Morbid obesity    Plan:  Reviewed the CPAP download compliance for the past 30 days with 100 percent compliance, he still needs to increase the percent agent time of usage for greater than 4 hours, discussed with the patient understands and verbalizes  Again discussed the need for compliance with the CPAP machine and the consequences of untreated sleep apnea understands and verbalizes  Currently he is not using the zolpidem, stating amitriptyline 50 milligrams twice daily that he he is using is working and he is able to fall asleep which he will continue  I have asked him to continue with the current setting of 15 centimeters we leave him on the setting although the residual AHI is around 7 9, given he is able to tolerate the setting of 15 will leave him on that and asked him to improve his compliance for greater than 4 hours every night and follow-up in the next visit we will slowly bring up the pressure if needed  Recommend weight loss  Cautioned against driving when sleepy  Follow-up in 6 months or p r n  earlier as needed

## 2018-02-07 NOTE — PROGRESS NOTES
Review of Systems      Genitourinary none   Cardiology none   Gastrointestinal none   Neurology none   Constitutional weight change of 10 or more pounds in the past year loss of 26 pounds   Integumentary none   Psychiatry none   Musculoskeletal joint pain, muscle tenderness/aching and back pain   Pulmonary shortness of breath   ENT none   Endocrine none   Hematological none

## 2018-02-07 NOTE — PATIENT INSTRUCTIONS
Apnea del sueño   LO QUE NECESITA SABER:   El apnea del sueño también se conoce soy apnea obstructiva del sueño  Es hilda condición que provoca que usted deje de respirar por 10 segundos o más mientras está dormido  Marylin el sueño normal, gabbie músculos mantienen abierta tatum garganta, permitiendo que el aire pase fácilmente  El apnea del sueño provoca que los músculos y tejidos alrededor de tatum garganta se relajen y obstruyan el paso del aire  El apnea del sueño podría suceder varias veces mientras usted duerme  INSTRUCCIONES SOBRE [facility]ITAL[facility]:   Busque atención médica de inmediato si:   · Usted tiene dolor torácico y dificultad para respirar  Pregúntele a tatum Sharona Pel vitaminas y minerales son adecuados para usted  · Usted se siente cansado o deprimido  · Usted tiene dificultad para mantenerse despierto marylin el día  · Usted tiene dificultad para pensar con claridad  · Usted tiene preguntas o inquietudes acerca de tatum condición o cuidado  Acuda a gabbie consultas de control con tatum médico según le indicaron  Es posible que a usted le negrita exámenes de yessica marylin las citas de seguimiento  Usted necesitará colaborar con tatum médico para determinar el equipo P[employer] y los ajustes adecuados para usted  Anote gabbie preguntas para que se acuerde de hacerlas marylin gabbie visitas  El [place] de tatum síntomas:   · No fume  La nicotina y otras sustancias químicas que contienen los cigarrillos y cigarros pueden dañar los pulmones  Pida información a tatum médico si usted actualmente fuma y necesita ayuda para dejar de fumar  Los cigarrillos electrónicos o tabaco sin humo todavía contienen nicotina  Consulte con tatum médico antes de [employer]  · No ingiera alcohol ni tome sedantes antes de dormir  El alcohol y los sedantes pueden relajar los músculos y tejidos que están alrededor de tatum garganta  [place] puede obstruir el flujo de aire a gabbie pulmones  · Mantenga un peso saludable    El tejido en exceso alrededor de richey garganta puede llegar a restringir richey respiración  [place] a richey médico información si necesita perder peso  · Duerma de un lado o use almohadas especiales diseñadas para evitar el apnea del sueño  Denio ofelia que richey lengua y otros tejidos obstruyan la garganta  Usted también puede intentar elevar la cabecera de richey cama  © 2017 [facility] Information is for End User's use only and may not be sold, redistributed or otherwise used for commercial purposes  All illustrations and images included in CareNotes® are the copyrighted property of [employer]  or Mike Johnson  Esta información es sólo para uso en educación  Richey intención no es darle un consejo médico sobre enfermedades o tratamientos  Colsulte con richey Pia Ledesma farmacéutico antes de seguir cualquier régimen médico para saber si es seguro y efectivo para usted  Sleep Apnea   WHAT YOU NEED TO KNOW:   Sleep apnea is also called obstructive sleep apnea  It is a condition that causes you to stop breathing for 10 seconds or more while you are sleeping  During normal sleep, your throat is kept open by muscles that let air pass through easily  Sleep apnea causes the muscles and tissues around your throat to relax and block air from passing through  Sleep apnea may happen many times while you are asleep  DISCHARGE INSTRUCTIONS:   Seek care immediately if:   · You have chest pain or trouble breathing  Contact your healthcare provider if:   · You feel tired or depressed  · You have trouble staying awake during the day  · You have trouble thinking clearly  · You have questions or concerns about your condition or care  Follow up with your healthcare provider as directed: You may need to have blood tests during your follow-up visits  You will need to work with your healthcare provider to find the right CPAP equipment and settings for you   Write down your questions so you remember to ask them during your visits  Manage your symptoms:   · Do not smoke  Nicotine and other chemicals in cigarettes and cigars can cause lung damage  Ask your healthcare provider for information if you currently smoke and need help to quit  E-cigarettes or smokeless tobacco still contain nicotine  Talk to your healthcare provider before you use these products  · Do not drink alcohol or take sedative medicine before you go to sleep  Alcohol and sedatives can relax the muscles and tissues around your throat  This can block the airflow to your lungs  · Maintain a healthy weight  Excess tissue around your throat may restrict your breathing  Ask your healthcare provider for information if you need to lose weight  · Sleep on your side or use pillows designed to prevent sleep apnea  This prevents your tongue or other tissues from blocking your throat  You can also raise the head of your bed  © 2017 [address] [employer] Information is for End User's use only and may not be sold, redistributed or otherwise used for commercial purposes  All illustrations and images included in CareNotes® are the copyrighted property of A [employer] M [employer]  or Mike Johnson  The above information is an  only  It is not intended as medical advice for individual conditions or treatments  Talk to your doctor, nurse or pharmacist before following any medical regimen to see if it is safe and effective for you

## 2018-02-22 ENCOUNTER — OFFICE VISIT (OUTPATIENT)
Dept: BARIATRICS | Facility: CLINIC | Age: 48
End: 2018-02-22

## 2018-02-22 VITALS — BODY MASS INDEX: 47.74 KG/M2 | HEIGHT: 68 IN | WEIGHT: 315 LBS

## 2018-02-22 DIAGNOSIS — E66.01 MORBID (SEVERE) OBESITY DUE TO EXCESS CALORIES (HCC): Primary | ICD-10-CM

## 2018-02-22 PROCEDURE — RECHECK: Performed by: DIETITIAN, REGISTERED

## 2018-02-22 NOTE — PROGRESS NOTES
Bariatric Follow Up Nutrition Note    Preop  6 Month Program    Type of surgery  Preop  Surgeon: Dr Bhatti Senior  56 y o   male  Ht 5 ft 7 5 in  Wt 271  BMI 57 2      Weight in (lb) to have BMI = 25:161 2 lb   Pre-Op Excess Wt: 209 8 lb      Review of History and Medications   Past Medical History:   Diagnosis Date    Chronic pain disorder     back    CPAP (continuous positive airway pressure) dependence     GERD (gastroesophageal reflux disease)     Hypertension     Morbid obesity ([address] )     Prediabetes     Sleep apnea     Vitamin D deficiency      Past Surgical History:   Procedure Laterality Date    NO PAST SURGERIES      [place] EGD TRANSORAL BIOPSY SINGLE/MULTIPLE N/A 12/13/2017    Procedure: ESOPHAGOGASTRODUODENOSCOPY (EGD), with BIOPSY;  Surgeon: Leonor Stinson MD;  Location: [facility];   Service: Bariatrics     Social History     Social History    Marital status: Single     Spouse name: N/A    Number of children: N/A    Years of education: N/A     Social History Main Topics    Smoking status: Never Smoker    Smokeless tobacco: Never Used    Alcohol use No    Drug use: No    Sexual activity: Not on file     Other Topics Concern    Not on file     Social History Narrative    No narrative on file       Current Outpatient Prescriptions:     amitriptyline (ELAVIL) 50 mg tablet, Take 50 mg by mouth 2 (two) times a day, Disp: , Rfl:     amLODIPine (NORVASC) 5 mg tablet, Take 5 mg by mouth daily, Disp: , Rfl:     Cholecalciferol (VITAMIN D PO), Take 1 tablet by mouth, Disp: , Rfl:     lidocaine (LIDODERM) 5 %, Place 1 patch on the skin every 24 hours for 30 days Remove & Discard patch within 12 hours or as directed by MD, Disp: 30 patch, Rfl: 0    lisinopril-hydrochlorothiazide (PRINZIDE,ZESTORETIC) 10-12 5 MG per tablet, Take 1 tablet by mouth daily, Disp: , Rfl:     naproxen (NAPROSYN) 500 mg tablet, Take 1 tablet by mouth 2 (two) times a day as needed for mild pain or moderate pain for up to 10 days, Disp: 20 tablet, Rfl: 0    omeprazole (PriLOSEC) 20 mg delayed release capsule, Take 20 mg by mouth daily, Disp: , Rfl:     Tapentadol HCl (NUCYNTA PO), Take 100 mg by mouth 2 (two) times a day  , Disp: , Rfl:     Food Intake and Lifestyle Assessment   Food Intake Assessment completed via 24 hour recall  Breakfast: 2 eggs  Snack: diet cheese stick, apple slices ( 1/2 apple)   Lunch: fish, broccoli, 1/2 cup yam or squash  Snack: diet cheese stick apple slices ( 1/2 apple)  Dinner: Premier protein shake or Unjury  Snack: water  Patient was told to increase his potassium intake  He eats a banana every other day  On off days, eats 1/2 yam  Beverage intake: water  Diet texture/stage: regular and high protein  Protein supplement: Premier  Estimated protein intake per day: 70 grams  Estimated fluid intake per day: greater than 64 ounces  Meals eaten away from home: none  Typical meal pattern: 3 meals per day and 2 snacks per day  Eating Behaviors: Large portion sizes    Food allergies or intolerances: none noted  Cultural or Tenriism considerations: n/a    Physical Assessment  Nutrition Related Findings  obesity    Physical Activity  Types of exercise: Walking 40 minutes per day   Current physical limitations: none    Psychosocial Assessment   Support systems: relative(s)  Socioeconomic factors: none noted    Nutrition Diagnosis  Diagnosis: Overweight / Obesity (NC-3 3)  Related to: Food and nutrition-related knowledge deficit  As Evidenced by: BMI>35     Interventions and Teaching   Patient educated on post-op nutrition guidelines  Patient educated and handouts provided    Capacity of post-surgery stomach  Adequate hydration  Exercise  Suggestions for pre-op diet  Protein supplements  Appropriate carbohydrate, protein, and fat intake, and food/fluid choices to maximize safe weight loss, nutrient intake, and tolerance   Dietary and lifestyle changes  Techniques for self monitoring and keeping daily food journal    Education provided to: patient    Barriers to learning: Language and interpretation provided by [employer] interpretor:  Renetta Villasenor # [id number]    Readiness to change: action    Comprehension: demonstrated understanding     Expected Compliance: good    Goals  Food journal, Complete lession plans 1-6 and continue with 3 meals plus 2 snack, one premier protein shake per day for one meal, continue walking daily  practice chewing food well and timing meals to take 15 minutes     Time Spent:   30 Minutes Education provided in [facility] [place] provided with Khmer list of high potassium foods as requested by patient    Interpretation provided by [employer] interpretor Raquel # [id number]

## 2018-03-01 RX ORDER — LISINOPRIL AND HYDROCHLOROTHIAZIDE 25; 20 MG/1; MG/1
TABLET ORAL
Refills: 5 | COMMUNITY
Start: 2018-02-12 | End: 2018-03-29 | Stop reason: SDUPTHER

## 2018-03-01 RX ORDER — TAPENTADOL HYDROCHLORIDE 100 MG/1
100 TABLET, FILM COATED ORAL 2 TIMES DAILY PRN
Refills: 0 | Status: ON HOLD | COMMUNITY
Start: 2018-02-13 | End: 2018-05-10

## 2018-03-01 RX ORDER — ERGOCALCIFEROL 1.25 MG/1
CAPSULE ORAL
Refills: 1 | COMMUNITY
Start: 2018-02-12 | End: 2018-09-20

## 2018-03-12 PROBLEM — K21.9 GERD (GASTROESOPHAGEAL REFLUX DISEASE): Status: ACTIVE | Noted: 2018-03-12

## 2018-03-12 PROBLEM — I10 BENIGN ESSENTIAL HTN: Status: ACTIVE | Noted: 2018-03-12

## 2018-03-12 PROBLEM — E66.01 MORBID OBESITY WITH BMI OF 50.0-59.9, ADULT (HCC): Status: ACTIVE | Noted: 2018-03-12

## 2018-03-12 PROBLEM — G47.33 OSA (OBSTRUCTIVE SLEEP APNEA): Status: ACTIVE | Noted: 2018-03-12

## 2018-03-13 VITALS — HEIGHT: 68 IN | BODY MASS INDEX: 47.74 KG/M2 | WEIGHT: 315 LBS

## 2018-03-23 DIAGNOSIS — I10 ESSENTIAL HYPERTENSION: Primary | ICD-10-CM

## 2018-03-23 RX ORDER — AMLODIPINE BESYLATE 5 MG/1
5 TABLET ORAL DAILY
Qty: 90 TABLET | Refills: 0 | Status: SHIPPED | OUTPATIENT
Start: 2018-03-23 | End: 2018-03-29 | Stop reason: SDUPTHER

## 2018-03-23 RX ORDER — AMLODIPINE BESYLATE 5 MG/1
TABLET ORAL
Qty: 30 TABLET | Refills: 3 | OUTPATIENT
Start: 2018-03-23

## 2018-03-23 NOTE — TELEPHONE ENCOUNTER
Please review request for medication refill  I am no longer associated with the [facility] and no longer the patient's PCP

## 2018-03-29 ENCOUNTER — OFFICE VISIT (OUTPATIENT)
Dept: FAMILY MEDICINE CLINIC | Facility: CLINIC | Age: 48
End: 2018-03-29
Payer: COMMERCIAL

## 2018-03-29 VITALS
RESPIRATION RATE: 18 BRPM | SYSTOLIC BLOOD PRESSURE: 124 MMHG | OXYGEN SATURATION: 98 % | BODY MASS INDEX: 49.44 KG/M2 | HEIGHT: 67 IN | TEMPERATURE: 97.6 F | WEIGHT: 315 LBS | DIASTOLIC BLOOD PRESSURE: 76 MMHG | HEART RATE: 116 BPM

## 2018-03-29 DIAGNOSIS — E66.01 MORBID OBESITY WITH BMI OF 50.0-59.9, ADULT (HCC): ICD-10-CM

## 2018-03-29 DIAGNOSIS — H04.129 DRY EYE: Primary | ICD-10-CM

## 2018-03-29 DIAGNOSIS — I10 ESSENTIAL HYPERTENSION: ICD-10-CM

## 2018-03-29 PROCEDURE — 99214 OFFICE O/P EST MOD 30 MIN: CPT | Performed by: FAMILY MEDICINE

## 2018-03-29 RX ORDER — AMLODIPINE BESYLATE 5 MG/1
5 TABLET ORAL DAILY
Qty: 90 TABLET | Refills: 0 | Status: SHIPPED | OUTPATIENT
Start: 2018-03-29 | End: 2018-07-17 | Stop reason: SDUPTHER

## 2018-03-29 RX ORDER — LISINOPRIL AND HYDROCHLOROTHIAZIDE 25; 20 MG/1; MG/1
1 TABLET ORAL DAILY
Qty: 90 TABLET | Refills: 1 | Status: SHIPPED | OUTPATIENT
Start: 2018-03-29 | End: 2018-05-10 | Stop reason: HOSPADM

## 2018-03-29 NOTE — PROGRESS NOTES
Assessment/Plan:    Dry eye  Referred to optometrist  Start artificial tears  Start          Problem List Items Addressed This Visit        Other    Morbid obesity with BMI of 50 0-59 9, adult ([facility] )    Dry eye - Primary     Referred to optometrist  Start artificial tears  Start          Relevant Medications    artificial tears (REFRESH) ophthalmic ointment      Other Visit Diagnoses     Essential hypertension        Relevant Medications    amLODIPine (NORVASC) 5 mg tablet    lisinopril-hydrochlorothiazide (PRINZIDE,ZESTORETIC) 20-25 MG per tablet            Subjective:      Patient ID: Jen iRvera is a 56 y o  male  57 yo morbidly obese  male, here today complaining of dry, itchy eyes for about 1 month  States symptoms are worse after working on his computer for more than 2 to 3 hours  Patient states he has been seen and evaluated by bariatric surgery  As per patient, he was told he was ready for the weight loss surgery once his insurance approved, however erik states this was over a month ago and he has not received any further information from Dr Cristopher Acevedo' office  The following portions of the patient's history were reviewed and updated as appropriate:   He  has a past medical history of Abnormal blood sugar; Abnormal TSH; Bilateral anterior knee pain; Chronic pain disorder; CPAP (continuous positive airway pressure) dependence; GERD (gastroesophageal reflux disease); Hematuria; History of Helicobacter pylori infection; Hypertension; Morbid obesity ([facility] ); Prediabetes; Sleep apnea; Suspicious nevus; and Vitamin D deficiency    He   Patient Active Problem List    Diagnosis Date Noted    Dry eye 03/29/2018    Morbid obesity with BMI of 50 0-59 9, adult ([facility] ) 03/12/2018    KAT (obstructive sleep apnea) 03/12/2018    Benign essential HTN 03/12/2018    GERD (gastroesophageal reflux disease) 03/12/2018     He  has a past surgical history that includes pr egd transoral biopsy single/multiple (N/A, 12/13/2017) and No past surgeries  His family history includes Hypertension in his maternal grandmother and mother; Prostate cancer in his father  He  reports that he has never smoked  He has never used smokeless tobacco  He reports that he does not drink alcohol or use drugs  Current Outpatient Prescriptions   Medication Sig Dispense Refill    amitriptyline (ELAVIL) 50 mg tablet Take 50 mg by mouth 2 (two) times a day      amLODIPine (NORVASC) 5 mg tablet Take 1 tablet (5 mg total) by mouth daily 90 tablet 0    artificial tears (REFRESH) ophthalmic ointment Administer 1 application to both eyes daily at bedtime 1 Tube 1    Cholecalciferol (VITAMIN D PO) Take 1 tablet by mouth      ergocalciferol (VITAMIN D2) 50,000 units TAKE 1 CAPSULE EVERY OTHER WEEK  1    lidocaine (LIDODERM) 5 % Place 1 patch on the skin every 24 hours for 30 days Remove & Discard patch within 12 hours or as directed by MD 30 patch 0    lisinopril-hydrochlorothiazide (PRINZIDE,ZESTORETIC) 20-25 MG per tablet Take 1 tablet by mouth daily 90 tablet 1    naproxen (NAPROSYN) 500 mg tablet Take 1 tablet by mouth 2 (two) times a day as needed for mild pain or moderate pain for up to 10 days 20 tablet 0    NUCYNTA tablet Take 100 mg by mouth 2 (two) times a day as needed  0    omeprazole (PriLOSEC) 20 mg delayed release capsule Take 20 mg by mouth daily       No current facility-administered medications for this visit       Review of Systems   Eyes: Positive for redness and itching  Respiratory: Positive for chest tightness  Musculoskeletal: Positive for back pain, gait problem and joint swelling  All other systems reviewed and are negative          Objective:      /76 (BP Location: Left arm, Patient Position: Sitting, Cuff Size: Extra-Large)   Pulse (!) 116   Temp 97 6 °F (36 4 °C) (Tympanic)   Resp 18   Ht 5' 7" (1 702 m)   Wt (!) 167 kg (367 lb 9 oz)   SpO2 98%   BMI 57 57 kg/m²          Physical Exam Constitutional: He is oriented to person, place, and time  He appears well-developed  HENT:   Head: Normocephalic  Right Ear: External ear normal    Left Ear: External ear normal    Nose: Nose normal    Mouth/Throat: Oropharynx is clear and moist    Eyes: Conjunctivae and EOM are normal  Pupils are equal, round, and reactive to light  Neck: Normal range of motion  Neck supple  No thyromegaly present  Cardiovascular: Normal rate, regular rhythm and normal heart sounds  Pulmonary/Chest: Effort normal and breath sounds normal    Abdominal: Soft  There is no tenderness  There is no rebound and no guarding  Musculoskeletal: Normal range of motion  Neurological: He is alert and oriented to person, place, and time  He has normal reflexes  Skin: Skin is dry  Psychiatric: He has a normal mood and affect  Nursing note and vitals reviewed

## 2018-04-05 ENCOUNTER — OFFICE VISIT (OUTPATIENT)
Dept: BARIATRICS | Facility: CLINIC | Age: 48
End: 2018-04-05

## 2018-04-05 VITALS — HEIGHT: 68 IN | BODY MASS INDEX: 47.74 KG/M2 | WEIGHT: 315 LBS

## 2018-04-05 DIAGNOSIS — E66.01 MORBID OBESITY WITH BMI OF 50.0-59.9, ADULT (HCC): Primary | ICD-10-CM

## 2018-04-05 PROCEDURE — RECHECK: Performed by: DIETITIAN, REGISTERED

## 2018-04-05 NOTE — PROGRESS NOTES
Bariatric Follow Up Nutrition Note    Preop  6 Month Program    Type of surgery  Preop  Surgeon: Dr Ramy Borden  56 y o   male  Ht 5 ft 7 5 in  Wt 271  BMI 57 2    Height 5' 7 5" (1 715 m), weight (!) 170 kg (374 lb 4 8 oz)  Body mass index is 57 76 kg/m²  Weight in (lb) to have BMI = 25:161 2 lb   Pre-Op Excess Wt: 209 8 lb      Review of History and Medications   Past Medical History:   Diagnosis Date    Abnormal blood sugar     Abnormal TSH     Bilateral anterior knee pain     Chronic pain disorder     back    CPAP (continuous positive airway pressure) dependence     GERD (gastroesophageal reflux disease)     Hematuria     History of Helicobacter pylori infection     Hypertension     Morbid obesity ([address] )     Prediabetes     Sleep apnea     Suspicious nevus     Vitamin D deficiency      Past Surgical History:   Procedure Laterality Date    NO PAST SURGERIES      AS PER [facility]    [place] EGD TRANSORAL BIOPSY SINGLE/MULTIPLE N/A 12/13/2017    Procedure: ESOPHAGOGASTRODUODENOSCOPY (EGD), with BIOPSY;  Surgeon: Adriane Cardenas MD;  Location: [facility];   Service: Bariatrics     Social History     Social History    Marital status: Single     Spouse name: N/A    Number of children: N/A    Years of education: N/A     Social History Main Topics    Smoking status: Never Smoker    Smokeless tobacco: Never Used    Alcohol use No    Drug use: No    Sexual activity: Not on file     Other Topics Concern    Not on file     Social History Narrative    No narrative on file       Current Outpatient Prescriptions:     amitriptyline (ELAVIL) 50 mg tablet, Take 50 mg by mouth 2 (two) times a day, Disp: , Rfl:     amLODIPine (NORVASC) 5 mg tablet, Take 1 tablet (5 mg total) by mouth daily, Disp: 90 tablet, Rfl: 0    artificial tears (REFRESH) ophthalmic ointment, Administer 1 application to both eyes daily at bedtime, Disp: 1 Tube, Rfl: 1    Cholecalciferol (VITAMIN D PO), Take 1 tablet by mouth, Disp: , Rfl:     ergocalciferol (VITAMIN D2) 50,000 units, TAKE 1 CAPSULE EVERY OTHER WEEK , Disp: , Rfl: 1    lidocaine (LIDODERM) 5 %, Place 1 patch on the skin every 24 hours for 30 days Remove & Discard patch within 12 hours or as directed by MD, Disp: 30 patch, Rfl: 0    lisinopril-hydrochlorothiazide (PRINZIDE,ZESTORETIC) 20-25 MG per tablet, Take 1 tablet by mouth daily, Disp: 90 tablet, Rfl: 1    naproxen (NAPROSYN) 500 mg tablet, Take 1 tablet by mouth 2 (two) times a day as needed for mild pain or moderate pain for up to 10 days, Disp: 20 tablet, Rfl: 0    NUCYNTA tablet, Take 100 mg by mouth 2 (two) times a day as needed, Disp: , Rfl: 0    omeprazole (PriLOSEC) 20 mg delayed release capsule, Take 20 mg by mouth daily, Disp: , Rfl:     Food Intake and Lifestyle Assessment   Food Intake Assessment completed via 24 hour recall( patient forgot his food log )  Breakfast: 2 eggs  Snack: apple or banana   Lunch: salad ( glo, robyn, spinach) with lean protein   Snack: diet cheese stick apple slices ( 1/2 apple) or orange  Dinner: fish or chicken, salad- sometime Premier protein shake   Snack: water  Patient was told to increase his potassium intake  He eats a banana every other day     Beverage intake: water  Diet texture/stage: regular and high protein  Protein supplement: Premier  Estimated protein intake per day: 70 grams  Estimated fluid intake per day: greater than 64 ounces  Meals eaten away from home: none  Typical meal pattern: 3 meals per day and 2 snacks per day  Eating Behaviors: Large portion sizes    Food allergies or intolerances: none noted  Cultural or Druze considerations: n/a    Physical Assessment  Nutrition Related Findings  obesity    Physical Activity  Types of exercise: Walking as able  Current physical limitations: using cane, joint pain     Psychosocial Assessment   Support systems: relative(s)  Socioeconomic factors: none noted    Nutrition Diagnosis  Diagnosis: Overweight / Obesity (NC-3 3)  Related to: Food and nutrition-related knowledge deficit  As Evidenced by: BMI>35     Interventions and Teaching   Patient educated on post-op nutrition guidelines  Patient educated and handouts provided  Adequate hydration  Suggestions for pre-op diet  Protein supplements  Appropriate carbohydrate, protein, and fat intake, and food/fluid choices to maximize safe weight loss, nutrient intake, and tolerance   Dietary and lifestyle changes  Techniques for self monitoring and keeping daily food journal  Daily multivitamin and mineral supplement     Education provided to: patient    Barriers to learning: Language and interpretation provided by Alana Perry LCSW ( Frisian)    Readiness to change: action    Comprehension: demonstrated understanding     Expected Compliance: good    Goals  Food journal, Complete lession plans 1-6 and continue with 3 meals plus 2 snack, one premier protein shake per day for one meal, continue walking daily    practice chewing food well and timing meals to take 15 minutes     Time Spent:   30 Minutes Education provided in [address] [place] [address] St- interpretation provided by Alana Perry LCSW

## 2018-04-10 PROBLEM — K21.9 GASTROESOPHAGEAL REFLUX DISEASE: Status: ACTIVE | Noted: 2018-04-10

## 2018-04-10 PROBLEM — E66.01 MORBID OBESITY (HCC): Status: ACTIVE | Noted: 2018-04-10

## 2018-04-10 PROBLEM — I10 BENIGN HYPERTENSION: Status: ACTIVE | Noted: 2018-04-10

## 2018-04-11 ENCOUNTER — TELEPHONE (OUTPATIENT)
Dept: BARIATRICS | Facility: CLINIC | Age: 48
End: 2018-04-11

## 2018-04-11 DIAGNOSIS — E66.01 MORBID (SEVERE) OBESITY DUE TO EXCESS CALORIES (HCC): Primary | ICD-10-CM

## 2018-04-11 RX ORDER — SCOLOPAMINE TRANSDERMAL SYSTEM 1 MG/1
1 PATCH, EXTENDED RELEASE TRANSDERMAL ONCE AS NEEDED
Status: CANCELLED | OUTPATIENT
Start: 2018-05-08

## 2018-04-11 RX ORDER — MINERAL OIL AND PETROLATUM 150; 830 MG/G; MG/G
OINTMENT OPHTHALMIC
Refills: 1 | COMMUNITY
Start: 2018-04-02 | End: 2021-10-06

## 2018-04-11 RX ORDER — OXYCODONE HYDROCHLORIDE AND ACETAMINOPHEN 5; 325 MG/1; MG/1
1 TABLET ORAL EVERY 4 HOURS PRN
Qty: 30 TABLET | Refills: 0 | Status: SHIPPED | OUTPATIENT
Start: 2018-04-11 | End: 2018-07-12

## 2018-04-11 RX ORDER — SODIUM CHLORIDE 9 MG/ML
125 INJECTION, SOLUTION INTRAVENOUS CONTINUOUS
Status: CANCELLED | OUTPATIENT
Start: 2018-05-08

## 2018-04-11 NOTE — TELEPHONE ENCOUNTER
Outreach phone call; no response but left message about team meeting: time, location, bring manual, eat breakfast, plan for lunch and bring a drink  It is a long day and I will offer the meeting in [facility]

## 2018-04-12 ENCOUNTER — OFFICE VISIT (OUTPATIENT)
Dept: BARIATRICS | Facility: CLINIC | Age: 48
End: 2018-04-12
Payer: COMMERCIAL

## 2018-04-12 ENCOUNTER — APPOINTMENT (OUTPATIENT)
Dept: PREADMISSION TESTING | Facility: HOSPITAL | Age: 48
End: 2018-04-12
Payer: COMMERCIAL

## 2018-04-12 VITALS
TEMPERATURE: 98 F | SYSTOLIC BLOOD PRESSURE: 120 MMHG | HEART RATE: 68 BPM | WEIGHT: 315 LBS | HEIGHT: 68 IN | BODY MASS INDEX: 47.74 KG/M2 | DIASTOLIC BLOOD PRESSURE: 90 MMHG

## 2018-04-12 DIAGNOSIS — K21.9 GASTROESOPHAGEAL REFLUX DISEASE WITHOUT ESOPHAGITIS: ICD-10-CM

## 2018-04-12 DIAGNOSIS — G47.33 OSA (OBSTRUCTIVE SLEEP APNEA): ICD-10-CM

## 2018-04-12 DIAGNOSIS — I10 BENIGN ESSENTIAL HTN: ICD-10-CM

## 2018-04-12 DIAGNOSIS — E66.01 MORBID OBESITY WITH BMI OF 50.0-59.9, ADULT (HCC): Primary | ICD-10-CM

## 2018-04-12 PROBLEM — R73.03 PREDIABETES: Status: ACTIVE | Noted: 2017-05-04

## 2018-04-12 PROCEDURE — 80053 COMPREHEN METABOLIC PANEL: CPT

## 2018-04-12 PROCEDURE — 99213 OFFICE O/P EST LOW 20 MIN: CPT | Performed by: SURGERY

## 2018-04-12 PROCEDURE — 85027 COMPLETE CBC AUTOMATED: CPT

## 2018-04-12 RX ORDER — HEPARIN SODIUM 5000 [USP'U]/ML
5000 INJECTION, SOLUTION INTRAVENOUS; SUBCUTANEOUS
Status: CANCELLED | OUTPATIENT
Start: 2018-04-13 | End: 2018-04-14

## 2018-04-12 NOTE — H&P
BARIATRIC H&P - BARIATRIC [address] [place] 56 y o  male MRN: [id number]  Unit/Bed#:  Encounter: [id number]      HPI:  Reyna Bravo is a 56 y o  male who presents with a long-standing history of morbid obesity  He was found to be a good candidate to undergo a bariatric operation upon [facility]  He is here today to discuss details of his surgery  Review of Systems   All other systems reviewed and are negative  Historical Information   Past Medical History:   Diagnosis Date    Abnormal blood sugar     Abnormal TSH     Back pain     Bilateral anterior knee pain     Chronic pain disorder     back    CPAP (continuous positive airway pressure) dependence     DDD (degenerative disc disease), lumbar     GERD (gastroesophageal reflux disease)     Hematuria     History of Helicobacter pylori infection     Hypertension     Morbid obesity ([address] )     Prediabetes     Sleep apnea     Suspicious nevus     Use of cane as ambulatory aid     Vitamin D deficiency      Past Surgical History:   Procedure Laterality Date    [place] EGD TRANSORAL BIOPSY SINGLE/MULTIPLE N/A 12/13/2017    Procedure: ESOPHAGOGASTRODUODENOSCOPY (EGD), with BIOPSY;  Surgeon: Perry Wilkins MD;  Location: [facility];   Service: Bariatrics     Social History   History   Alcohol Use No     History   Drug Use No     History   Smoking Status    Never Smoker   Smokeless Tobacco    Never Used     Family History: non-contributory    Meds/Allergies   all medications and allergies reviewed  No Known Allergies    Objective     Current Vitals:   Blood Pressure: 120/90 (04/12/18 1329)  Pulse: 68 (04/12/18 1329)  Temperature: 98 °F (36 7 °C) (04/12/18 1329)  Height: 5' 7 5" (171 5 cm) (04/12/18 1329)  Weight - Scale: (!) 170 kg (374 lb 8 oz) (04/12/18 1329)      Invasive Devices     Peripheral Intravenous Line            Peripheral IV 10/18/17 Left Antecubital 175 days                Physical Exam Constitutional: He is oriented to person, place, and time  Vital signs are normal  He appears well-developed and well-nourished  No distress  HENT:   Head: Normocephalic and atraumatic  Nose: Nose normal    Mouth/Throat: Oropharynx is clear and moist    Eyes: Conjunctivae are normal  Right eye exhibits no discharge  No scleral icterus  Neck: Normal range of motion  Neck supple  Cardiovascular: Normal rate, regular rhythm, normal heart sounds and intact distal pulses  Pulmonary/Chest: Effort normal and breath sounds normal  No stridor  No respiratory distress  He has no wheezes  He has no rales  He exhibits no tenderness  Abdominal: Soft  Normal appearance and bowel sounds are normal  There is no tenderness  There is no rebound, no guarding and no CVA tenderness  Obese  Benign  Musculoskeletal: Normal range of motion  Lymphadenopathy:     He has no cervical adenopathy  Neurological: He is alert and oriented to person, place, and time  Skin: Skin is warm and dry  No rash noted  He is not diaphoretic  No erythema  Psychiatric: He has a normal mood and affect  His behavior is normal  Judgment and thought content normal    Nursing note and vitals reviewed  Lab Results: I have personally reviewed pertinent lab results  Imaging: I have personally reviewed pertinent reports  EKG, Pathology, and Other Studies: I have personally reviewed pertinent reports  His EGD revealed gastritis and the pathology  A  Stomach, biopsy:             - Antral and oxyntic mucosa with chronic inactive gastritis  - Helicobacter pylori organisms in moderate numbers are identified on the immunohistochemical stain, performed with an appropriate control              - No intestinal metaplasia, dysplasia or malignancy is identified  He has completed his treatment for the H pylori already      Assessment/PLAN:    56 y o  yo male morbidly obese found to be a good candidate to undergo a weight loss operation upon being enrolled here at the [facility]       Patient has a long history of morbid obesity and is presenting to discuss the surgical weight loss options  Despite the patient best efforts patient was unable to lose any meaningful or sustainable weight using nonsurgical means  We had a long discussion regarding all the surgical weight-loss options at our disposal at this point and reviewed the risks and benefits of each procedure in details as it relates to her age, BMI and medical conditions  He has been pre certified to undergo a Laparoscopic Vonnie-en-Y gastric bypass  Here today to review his pre op test results  Has been medically cleared for the procedure     ++++++++++++++++++++++++++++++++++++  He has obstructive sleep apnea and wears a CPAP machine   ++++++++++++++++++++++++++++++++++++    I have discussed with him at length the risks and benefits of the operation and reiterated the components of our multidisciplinary program and the importance of compliance and follow up in the post operative period  Although there is a great statistical chance of improvement or even resolution of most of his associated comorbidities, the results vary from patient to patient and they largely depend on his commitment  The patient was also instructed with regards to the importance of behavior modification, nutritional counseling, support meeting attendance and lifestyle changes that are important to ensure success  He was given the opportunity to ask questions and I have answered all of them  I have addressed with the patient the level of CODE STATUS for this hospital stay and after explaining the different options currently he wishes to be a Level I  He understands and wishes to proceed  Still needs to lose 20 lbs prior to surgery    Will return next week for a weight check and if the weight loss is at least half the way there, the surgery date will not be re scheduled  Patient understands and agrees

## 2018-04-23 ENCOUNTER — TELEPHONE (OUTPATIENT)
Dept: BARIATRICS | Facility: CLINIC | Age: 48
End: 2018-04-23

## 2018-05-04 PROBLEM — G47.30 SLEEP APNEA: Status: ACTIVE | Noted: 2018-05-04

## 2018-05-04 PROBLEM — I10 BENIGN ESSENTIAL HYPERTENSION: Status: ACTIVE | Noted: 2018-05-04

## 2018-05-07 ENCOUNTER — ANESTHESIA EVENT (OUTPATIENT)
Dept: PERIOP | Facility: HOSPITAL | Age: 48
DRG: 403 | End: 2018-05-07
Payer: COMMERCIAL

## 2018-05-07 NOTE — ANESTHESIA PREPROCEDURE EVALUATION
Review of Systems/Medical History  Patient summary reviewed  Chart reviewed  No history of anesthetic complications     Cardiovascular  Hypertension on > 1 medication,    Pulmonary  Sleep apnea CPAP,        GI/Hepatic    GERD well controlled,        Negative  ROS        Endo/Other  Diabetes (PRE DIABETIC) well controlled type 2 Oral agent,   Obesity  super morbid obesity   GYN       Hematology  Negative hematology ROS      Musculoskeletal  Back pain , lumbar pain,   Arthritis     Neurology  Negative neurology ROS      Psychology   Depression , being treated for depression,   Chronic opioid dependence            Physical Exam    Airway    Mallampati score: III  TM Distance: >3 FB  Neck ROM: full     Dental   No notable dental hx     Cardiovascular  Rhythm: regular, Rate: normal, Cardiovascular exam normal    Pulmonary  Pulmonary exam normal Breath sounds clear to auscultation,     Other Findings        Anesthesia Plan  ASA Score- 3     Anesthesia Type- general with ASA Monitors  Additional Monitors:   Airway Plan:     Comment: SCOP patch ordered  Plan Factors- Patient instructed to abstain from smoking on day of procedure  Patient did not smoke on day of surgery  Induction- intravenous  Postoperative Plan- Plan for postoperative opioid use  Informed Consent- Anesthetic plan and risks discussed with patient

## 2018-05-08 ENCOUNTER — HOSPITAL ENCOUNTER (INPATIENT)
Facility: HOSPITAL | Age: 48
LOS: 2 days | Discharge: HOME | DRG: 403 | End: 2018-05-10
Attending: SURGERY | Admitting: SURGERY
Payer: COMMERCIAL

## 2018-05-08 ENCOUNTER — ANESTHESIA (OUTPATIENT)
Dept: PERIOP | Facility: HOSPITAL | Age: 48
DRG: 403 | End: 2018-05-08
Payer: COMMERCIAL

## 2018-05-08 DIAGNOSIS — N99.89 POSTOPERATIVE URINARY RETENTION: ICD-10-CM

## 2018-05-08 DIAGNOSIS — I10 BENIGN ESSENTIAL HTN: ICD-10-CM

## 2018-05-08 DIAGNOSIS — R33.8 POSTOPERATIVE URINARY RETENTION: ICD-10-CM

## 2018-05-08 DIAGNOSIS — G47.33 OSA (OBSTRUCTIVE SLEEP APNEA): Primary | ICD-10-CM

## 2018-05-08 DIAGNOSIS — E66.01 MORBID OBESITY WITH BMI OF 50.0-59.9, ADULT (HCC): ICD-10-CM

## 2018-05-08 DIAGNOSIS — I10 BENIGN ESSENTIAL HYPERTENSION: ICD-10-CM

## 2018-05-08 DIAGNOSIS — R73.03 PREDIABETES: ICD-10-CM

## 2018-05-08 LAB — GLUCOSE SERPL-MCNC: 137 MG/DL (ref 65–140)

## 2018-05-08 PROCEDURE — 0D164ZA BYPASS STOMACH TO JEJUNUM, PERCUTANEOUS ENDOSCOPIC APPROACH: ICD-10-PCS | Performed by: SURGERY

## 2018-05-08 PROCEDURE — 82948 REAGENT STRIP/BLOOD GLUCOSE: CPT

## 2018-05-08 PROCEDURE — C9113 INJ PANTOPRAZOLE SODIUM, VIA: HCPCS | Performed by: SURGERY

## 2018-05-08 PROCEDURE — 99252 IP/OBS CONSLTJ NEW/EST SF 35: CPT | Performed by: PHYSICIAN ASSISTANT

## 2018-05-08 PROCEDURE — 43644 LAP GASTRIC BYPASS/ROUX-EN-Y: CPT | Performed by: SURGERY

## 2018-05-08 RX ORDER — OXYCODONE HCL 5 MG/5 ML
5 SOLUTION, ORAL ORAL EVERY 4 HOURS PRN
Status: DISCONTINUED | OUTPATIENT
Start: 2018-05-08 | End: 2018-05-10 | Stop reason: HOSPADM

## 2018-05-08 RX ORDER — MORPHINE SULFATE 4 MG/ML
4 INJECTION, SOLUTION INTRAMUSCULAR; INTRAVENOUS EVERY 2 HOUR PRN
Status: DISCONTINUED | OUTPATIENT
Start: 2018-05-08 | End: 2018-05-10 | Stop reason: HOSPADM

## 2018-05-08 RX ORDER — PROPOFOL 10 MG/ML
INJECTION, EMULSION INTRAVENOUS AS NEEDED
Status: DISCONTINUED | OUTPATIENT
Start: 2018-05-08 | End: 2018-05-08 | Stop reason: SURG

## 2018-05-08 RX ORDER — ACETAMINOPHEN 160 MG/5ML
325 SUSPENSION, ORAL (FINAL DOSE FORM) ORAL EVERY 4 HOURS PRN
Status: DISCONTINUED | OUTPATIENT
Start: 2018-05-08 | End: 2018-05-10 | Stop reason: HOSPADM

## 2018-05-08 RX ORDER — FENTANYL CITRATE 50 UG/ML
INJECTION, SOLUTION INTRAMUSCULAR; INTRAVENOUS AS NEEDED
Status: DISCONTINUED | OUTPATIENT
Start: 2018-05-08 | End: 2018-05-08 | Stop reason: SURG

## 2018-05-08 RX ORDER — ONDANSETRON 2 MG/ML
4 INJECTION INTRAMUSCULAR; INTRAVENOUS EVERY 4 HOURS PRN
Status: DISCONTINUED | OUTPATIENT
Start: 2018-05-08 | End: 2018-05-10 | Stop reason: HOSPADM

## 2018-05-08 RX ORDER — GLYCOPYRROLATE 0.2 MG/ML
INJECTION INTRAMUSCULAR; INTRAVENOUS AS NEEDED
Status: DISCONTINUED | OUTPATIENT
Start: 2018-05-08 | End: 2018-05-08 | Stop reason: SURG

## 2018-05-08 RX ORDER — ACETAMINOPHEN 160 MG/5ML
320 SUSPENSION, ORAL (FINAL DOSE FORM) ORAL EVERY 4 HOURS PRN
Status: DISCONTINUED | OUTPATIENT
Start: 2018-05-08 | End: 2018-05-10 | Stop reason: HOSPADM

## 2018-05-08 RX ORDER — SODIUM CHLORIDE, SODIUM LACTATE, POTASSIUM CHLORIDE, CALCIUM CHLORIDE 600; 310; 30; 20 MG/100ML; MG/100ML; MG/100ML; MG/100ML
50 INJECTION, SOLUTION INTRAVENOUS CONTINUOUS
Status: DISCONTINUED | OUTPATIENT
Start: 2018-05-08 | End: 2018-05-10

## 2018-05-08 RX ORDER — BUPIVACAINE HYDROCHLORIDE AND EPINEPHRINE 5; 5 MG/ML; UG/ML
INJECTION, SOLUTION PERINEURAL AS NEEDED
Status: DISCONTINUED | OUTPATIENT
Start: 2018-05-08 | End: 2018-05-08 | Stop reason: HOSPADM

## 2018-05-08 RX ORDER — MIDAZOLAM HYDROCHLORIDE 1 MG/ML
INJECTION INTRAMUSCULAR; INTRAVENOUS AS NEEDED
Status: DISCONTINUED | OUTPATIENT
Start: 2018-05-08 | End: 2018-05-08 | Stop reason: SURG

## 2018-05-08 RX ORDER — OXYCODONE HCL 5 MG/5 ML
10 SOLUTION, ORAL ORAL EVERY 4 HOURS PRN
Status: DISCONTINUED | OUTPATIENT
Start: 2018-05-08 | End: 2018-05-10 | Stop reason: HOSPADM

## 2018-05-08 RX ORDER — HYDROMORPHONE HYDROCHLORIDE 2 MG/ML
INJECTION, SOLUTION INTRAMUSCULAR; INTRAVENOUS; SUBCUTANEOUS AS NEEDED
Status: DISCONTINUED | OUTPATIENT
Start: 2018-05-08 | End: 2018-05-08 | Stop reason: SURG

## 2018-05-08 RX ORDER — HYDRALAZINE HYDROCHLORIDE 20 MG/ML
5 INJECTION INTRAMUSCULAR; INTRAVENOUS EVERY 4 HOURS PRN
Status: DISCONTINUED | OUTPATIENT
Start: 2018-05-08 | End: 2018-05-10 | Stop reason: HOSPADM

## 2018-05-08 RX ORDER — MORPHINE SULFATE 2 MG/ML
2 INJECTION, SOLUTION INTRAMUSCULAR; INTRAVENOUS EVERY 2 HOUR PRN
Status: DISCONTINUED | OUTPATIENT
Start: 2018-05-08 | End: 2018-05-10 | Stop reason: HOSPADM

## 2018-05-08 RX ORDER — ONDANSETRON 2 MG/ML
4 INJECTION INTRAMUSCULAR; INTRAVENOUS ONCE AS NEEDED
Status: DISCONTINUED | OUTPATIENT
Start: 2018-05-08 | End: 2018-05-08 | Stop reason: HOSPADM

## 2018-05-08 RX ORDER — PANTOPRAZOLE SODIUM 40 MG/1
40 INJECTION, POWDER, FOR SOLUTION INTRAVENOUS
Status: DISCONTINUED | OUTPATIENT
Start: 2018-05-08 | End: 2018-05-10 | Stop reason: HOSPADM

## 2018-05-08 RX ORDER — FENTANYL CITRATE/PF 50 MCG/ML
25 SYRINGE (ML) INJECTION
Status: COMPLETED | OUTPATIENT
Start: 2018-05-08 | End: 2018-05-08

## 2018-05-08 RX ORDER — ROCURONIUM BROMIDE 10 MG/ML
INJECTION, SOLUTION INTRAVENOUS AS NEEDED
Status: DISCONTINUED | OUTPATIENT
Start: 2018-05-08 | End: 2018-05-08 | Stop reason: SURG

## 2018-05-08 RX ORDER — SODIUM CHLORIDE 9 MG/ML
125 INJECTION, SOLUTION INTRAVENOUS CONTINUOUS
Status: DISCONTINUED | OUTPATIENT
Start: 2018-05-08 | End: 2018-05-08

## 2018-05-08 RX ORDER — HEPARIN SODIUM 5000 [USP'U]/ML
5000 INJECTION, SOLUTION INTRAVENOUS; SUBCUTANEOUS
Status: COMPLETED | OUTPATIENT
Start: 2018-05-08 | End: 2018-05-08

## 2018-05-08 RX ORDER — SCOLOPAMINE TRANSDERMAL SYSTEM 1 MG/1
1 PATCH, EXTENDED RELEASE TRANSDERMAL ONCE AS NEEDED
Status: DISCONTINUED | OUTPATIENT
Start: 2018-05-08 | End: 2018-05-08

## 2018-05-08 RX ORDER — DEXAMETHASONE SODIUM PHOSPHATE 4 MG/ML
8 INJECTION, SOLUTION INTRA-ARTICULAR; INTRALESIONAL; INTRAMUSCULAR; INTRAVENOUS; SOFT TISSUE ONCE AS NEEDED
Status: DISCONTINUED | OUTPATIENT
Start: 2018-05-08 | End: 2018-05-08 | Stop reason: HOSPADM

## 2018-05-08 RX ADMIN — METRONIDAZOLE 500 MG: 500 INJECTION, SOLUTION INTRAVENOUS at 11:13

## 2018-05-08 RX ADMIN — ACETAMINOPHEN 325 MG: 160 SUSPENSION ORAL at 18:38

## 2018-05-08 RX ADMIN — ACETAMINOPHEN 325 MG: 160 SUSPENSION ORAL at 23:11

## 2018-05-08 RX ADMIN — OXYCODONE HYDROCHLORIDE 10 MG: 5 SOLUTION ORAL at 18:38

## 2018-05-08 RX ADMIN — HYDROMORPHONE HYDROCHLORIDE 1 MG: 2 INJECTION, SOLUTION INTRAMUSCULAR; INTRAVENOUS; SUBCUTANEOUS at 13:03

## 2018-05-08 RX ADMIN — ROCURONIUM BROMIDE 50 MG: 10 INJECTION INTRAVENOUS at 11:02

## 2018-05-08 RX ADMIN — SODIUM CHLORIDE: 0.9 INJECTION, SOLUTION INTRAVENOUS at 11:47

## 2018-05-08 RX ADMIN — OXYCODONE HYDROCHLORIDE 10 MG: 5 SOLUTION ORAL at 23:11

## 2018-05-08 RX ADMIN — HYDROMORPHONE HYDROCHLORIDE 0.5 MG: 1 INJECTION, SOLUTION INTRAMUSCULAR; INTRAVENOUS; SUBCUTANEOUS at 13:47

## 2018-05-08 RX ADMIN — PANTOPRAZOLE SODIUM 40 MG: 40 INJECTION, POWDER, FOR SOLUTION INTRAVENOUS at 16:41

## 2018-05-08 RX ADMIN — LIDOCAINE HYDROCHLORIDE 50 MG: 20 INJECTION, SOLUTION INTRAVENOUS at 11:00

## 2018-05-08 RX ADMIN — SODIUM CHLORIDE: 0.9 INJECTION, SOLUTION INTRAVENOUS at 12:31

## 2018-05-08 RX ADMIN — SODIUM CHLORIDE 125 ML/HR: 0.9 INJECTION, SOLUTION INTRAVENOUS at 13:58

## 2018-05-08 RX ADMIN — GLYCOPYRROLATE 0.4 MG: 0.2 INJECTION, SOLUTION INTRAMUSCULAR; INTRAVENOUS at 12:39

## 2018-05-08 RX ADMIN — MIDAZOLAM 2 MG: 1 INJECTION INTRAMUSCULAR; INTRAVENOUS at 10:54

## 2018-05-08 RX ADMIN — PROPOFOL 200 MG: 10 INJECTION, EMULSION INTRAVENOUS at 11:00

## 2018-05-08 RX ADMIN — SCOPALAMINE 1 PATCH: 1 PATCH, EXTENDED RELEASE TRANSDERMAL at 09:12

## 2018-05-08 RX ADMIN — SODIUM CHLORIDE, SODIUM LACTATE, POTASSIUM CHLORIDE, AND CALCIUM CHLORIDE 125 ML/HR: .6; .31; .03; .02 INJECTION, SOLUTION INTRAVENOUS at 16:41

## 2018-05-08 RX ADMIN — FENTANYL CITRATE 25 MCG: 50 INJECTION, SOLUTION INTRAMUSCULAR; INTRAVENOUS at 13:30

## 2018-05-08 RX ADMIN — FENTANYL CITRATE 25 MCG: 50 INJECTION, SOLUTION INTRAMUSCULAR; INTRAVENOUS at 13:41

## 2018-05-08 RX ADMIN — HYDROMORPHONE HYDROCHLORIDE 1 MG: 2 INJECTION, SOLUTION INTRAMUSCULAR; INTRAVENOUS; SUBCUTANEOUS at 11:29

## 2018-05-08 RX ADMIN — SODIUM CHLORIDE 125 ML/HR: 0.9 INJECTION, SOLUTION INTRAVENOUS at 09:28

## 2018-05-08 RX ADMIN — NEOSTIGMINE METHYLSULFATE 3 MG: 1 INJECTION, SOLUTION INTRAMUSCULAR; INTRAVENOUS; SUBCUTANEOUS at 12:39

## 2018-05-08 RX ADMIN — MORPHINE SULFATE 2 MG: 2 INJECTION, SOLUTION INTRAMUSCULAR; INTRAVENOUS at 16:41

## 2018-05-08 RX ADMIN — HYDROMORPHONE HYDROCHLORIDE 0.5 MG: 1 INJECTION, SOLUTION INTRAMUSCULAR; INTRAVENOUS; SUBCUTANEOUS at 13:55

## 2018-05-08 RX ADMIN — HEPARIN SODIUM 5000 UNITS: 5000 INJECTION, SOLUTION INTRAVENOUS; SUBCUTANEOUS at 09:34

## 2018-05-08 RX ADMIN — CEFAZOLIN SODIUM 3000 MG: 10 INJECTION, POWDER, FOR SOLUTION INTRAVENOUS at 18:38

## 2018-05-08 RX ADMIN — FENTANYL CITRATE 25 MCG: 50 INJECTION, SOLUTION INTRAMUSCULAR; INTRAVENOUS at 13:25

## 2018-05-08 RX ADMIN — FENTANYL CITRATE 25 MCG: 50 INJECTION, SOLUTION INTRAMUSCULAR; INTRAVENOUS at 13:35

## 2018-05-08 RX ADMIN — FENTANYL CITRATE 150 MCG: 50 INJECTION, SOLUTION INTRAMUSCULAR; INTRAVENOUS at 11:00

## 2018-05-08 RX ADMIN — Medication 3000 MG: at 11:06

## 2018-05-08 RX ADMIN — FENTANYL CITRATE 50 MCG: 50 INJECTION, SOLUTION INTRAMUSCULAR; INTRAVENOUS at 12:06

## 2018-05-08 NOTE — CONSULTS
Inpatient Medical Consultation - [facility]    Patient Information: Pham Hernandez 56 y o  male MRN: [id number]  Unit/Bed#: E5 -01 Encounter: [id number]  PCP: Olivier Ramon MD  Date of Admission:  5/8/2018  Date of Consultation: 05/08/18  Requesting Physician: Tanya Baires MD    Reason For Consultation:   Postoperative medical management    Assessment/Plan:    · Morbid obesity/BMI 55 26:  Status post elective laparoscopic Vonnie-en-Y gastric bypass with intraoperative EGD  Postoperative day number 0  Management per primary team, bariatric  Counseled on avoidance NSAID use  · Essential hypertension:  Home regimen of amlodipine 5 mg daily, lisinopril/HCTZ 20/25 mg daily  BP stable here 120/66, 127/59  If SBP is greater than 150 and 3 separate occasions consider discharge on a antihypertensive  · GERD: continue PPI    · Chronic pain: On nycynta 100 mg BID  This will ED switched to short-acting formulation  · KAT continue CPAP    · Anxiety/depression: continue amitriptyline      VTE Prophylaxis: Heparin  / sequential compression device     Counseling / Coordination of Care Time: 30 minutes  Greater than 50% of total time spent on patient counseling and coordination of care  History of Present Illness:    Pham Hernandez is a 56 y o  male who is originally admitted to the bariatric service on 5/8/2018 due to inability to lose meaningful and sustainable weight loss through conservative measures  Patient speaks broken [place]  He presents for elective laparoscopic Vonnie-en-Y gastric bypass with intraoperative EGD Patient tolerated the procedure well with no known complications and minimal blood loss  We are consulted for postoperative medical management  Patient's past medical history of essential hypertension, GERD, KAT, pre diabetes, chronic pain for which he takes nucynta 100 mg BID  This is the long acting form       Postoperatively patient is in expected abdominal pain relief with pain medication  Currently on 3 L oxygen nasal cannula  Has not ambulated yet  Belching on exam   Denies any other complaints  No chest pain, chest pressure, palpitations, shortness of breath  Does not smoke, drink alcohol, or use drugs  Review of Systems:    Review of Systems   Constitutional: Negative for chills, diaphoresis and fatigue  HENT: Negative for congestion  Respiratory: Negative for apnea, cough, choking, chest tightness, shortness of breath, wheezing and stridor  Cardiovascular: Negative for chest pain, palpitations and leg swelling  Gastrointestinal: Positive for abdominal pain and nausea  Negative for abdominal distention, constipation and diarrhea  Genitourinary: Negative for dysuria  Musculoskeletal: Negative for arthralgias  Neurological: Negative for dizziness, facial asymmetry and headaches  Psychiatric/Behavioral: Negative for agitation and behavioral problems  Past Medical and Surgical History:     Past Medical History:   Diagnosis Date    Abnormal blood sugar     Abnormal TSH     Back pain     Bilateral anterior knee pain     Chronic pain disorder     back    CPAP (continuous positive airway pressure) dependence     DDD (degenerative disc disease), lumbar     GERD (gastroesophageal reflux disease)     Hematuria     History of Helicobacter pylori infection     Hypertension     Morbid obesity ([address] )     Prediabetes     Sleep apnea     Suspicious nevus     Use of cane as ambulatory aid     Vitamin D deficiency        Past Surgical History:   Procedure Laterality Date    [place] EGD TRANSORAL BIOPSY SINGLE/MULTIPLE N/A 12/13/2017    Procedure: ESOPHAGOGASTRODUODENOSCOPY (EGD), with BIOPSY;  Surgeon: Brian Mcallister MD;  Location: [facility];   Service: Bariatrics       Meds/Allergies:    all medications and allergies reviewed    Allergies: No Known Allergies    Social History:     Marital Status: Single    Substance Use History:   History Alcohol Use No     History   Smoking Status    Never Smoker   Smokeless Tobacco    Never Used     History   Drug Use No       Family History:    non-contributory    Physical Exam:     Vitals:   Blood Pressure: 120/66 (05/08/18 1432)  Pulse: 82 (05/08/18 1432)  Temperature: 98 °F (36 7 °C) (05/08/18 1432)  Temp Source: Temporal (05/08/18 1432)  Respirations: 18 (05/08/18 1432)  Height: 5' 7" (170 2 cm) (05/08/18 0839)  Weight - Scale: (!) 160 kg (352 lb 12 8 oz) (05/08/18 0839)  SpO2: 94 % (05/08/18 1432)    Physical Exam   Constitutional: He appears well-developed and well-nourished  No distress  HENT:   Head: Normocephalic and atraumatic  Eyes: Conjunctivae are normal    Cardiovascular: Normal rate, regular rhythm and normal heart sounds  Pulmonary/Chest: Effort normal and breath sounds normal    On 3 L oxygen NC   Abdominal: Soft  Bowel sounds are normal  He exhibits no distension and no mass  There is no tenderness  There is no rebound and no guarding  Abdominal incisions clean dry and intact   Skin: He is not diaphoretic  Nursing note and vitals reviewed  Additional Data:     Lab Results: I have personally reviewed pertinent reports  Invalid input(s): LABALBU        Imaging: I have personally reviewed pertinent reports  No results found  ** Please Note: This note has been constructed using a voice recognition system   **

## 2018-05-08 NOTE — H&P (VIEW-ONLY)
BARIATRIC H&P - BARIATRIC [address] [place] 56 y o  male MRN: [id number]  Unit/Bed#:  Encounter: [id number]      HPI:  Valentina Chatterjee is a 56 y o  male who presents with a long-standing history of morbid obesity  He was found to be a good candidate to undergo a bariatric operation upon [facility]  He is here today to discuss details of his surgery  Review of Systems   All other systems reviewed and are negative  Historical Information   Past Medical History:   Diagnosis Date    Abnormal blood sugar     Abnormal TSH     Back pain     Bilateral anterior knee pain     Chronic pain disorder     back    CPAP (continuous positive airway pressure) dependence     DDD (degenerative disc disease), lumbar     GERD (gastroesophageal reflux disease)     Hematuria     History of Helicobacter pylori infection     Hypertension     Morbid obesity ([address] )     Prediabetes     Sleep apnea     Suspicious nevus     Use of cane as ambulatory aid     Vitamin D deficiency      Past Surgical History:   Procedure Laterality Date    [place] EGD TRANSORAL BIOPSY SINGLE/MULTIPLE N/A 12/13/2017    Procedure: ESOPHAGOGASTRODUODENOSCOPY (EGD), with BIOPSY;  Surgeon: Jessica Anderson MD;  Location: [facility];   Service: Bariatrics     Social History   History   Alcohol Use No     History   Drug Use No     History   Smoking Status    Never Smoker   Smokeless Tobacco    Never Used     Family History: non-contributory    Meds/Allergies   all medications and allergies reviewed  No Known Allergies    Objective     Current Vitals:   Blood Pressure: 120/90 (04/12/18 1329)  Pulse: 68 (04/12/18 1329)  Temperature: 98 °F (36 7 °C) (04/12/18 1329)  Height: 5' 7 5" (171 5 cm) (04/12/18 1329)  Weight - Scale: (!) 170 kg (374 lb 8 oz) (04/12/18 1329)      Invasive Devices     Peripheral Intravenous Line            Peripheral IV 10/18/17 Left Antecubital 175 days                Physical Exam Constitutional: He is oriented to person, place, and time  Vital signs are normal  He appears well-developed and well-nourished  No distress  HENT:   Head: Normocephalic and atraumatic  Nose: Nose normal    Mouth/Throat: Oropharynx is clear and moist    Eyes: Conjunctivae are normal  Right eye exhibits no discharge  No scleral icterus  Neck: Normal range of motion  Neck supple  Cardiovascular: Normal rate, regular rhythm, normal heart sounds and intact distal pulses  Pulmonary/Chest: Effort normal and breath sounds normal  No stridor  No respiratory distress  He has no wheezes  He has no rales  He exhibits no tenderness  Abdominal: Soft  Normal appearance and bowel sounds are normal  There is no tenderness  There is no rebound, no guarding and no CVA tenderness  Obese  Benign  Musculoskeletal: Normal range of motion  Lymphadenopathy:     He has no cervical adenopathy  Neurological: He is alert and oriented to person, place, and time  Skin: Skin is warm and dry  No rash noted  He is not diaphoretic  No erythema  Psychiatric: He has a normal mood and affect  His behavior is normal  Judgment and thought content normal    Nursing note and vitals reviewed  Lab Results: I have personally reviewed pertinent lab results  Imaging: I have personally reviewed pertinent reports  EKG, Pathology, and Other Studies: I have personally reviewed pertinent reports  His EGD revealed gastritis and the pathology  A  Stomach, biopsy:             - Antral and oxyntic mucosa with chronic inactive gastritis  - Helicobacter pylori organisms in moderate numbers are identified on the immunohistochemical stain, performed with an appropriate control              - No intestinal metaplasia, dysplasia or malignancy is identified  He has completed his treatment for the H pylori already      Assessment/PLAN:    56 y o  yo male morbidly obese found to be a good candidate to undergo a [facility] loss [facility]       Patient has a long history of morbid obesity and is presenting to discuss the surgical weight loss options  Despite the patient best efforts patient was unable to lose any meaningful or sustainable weight using nonsurgical means  We had a long discussion regarding all the surgical weight-loss options at our disposal at this point and reviewed the risks and benefits of each procedure in details as it relates to her age, BMI and medical conditions  He has been pre certified to undergo a Laparoscopic Vonnie-en-Y gastric bypass  Here today to review his pre op test results  Has been medically cleared for the procedure     ++++++++++++++++++++++++++++++++++++  He has obstructive sleep apnea and wears a CPAP machine   ++++++++++++++++++++++++++++++++++++    I have discussed with him at length the risks and benefits of the operation and reiterated the components of our multidisciplinary program and the importance of compliance and follow up in the post operative period  Although there is a great statistical chance of improvement or even resolution of most of his associated comorbidities, the results vary from patient to patient and they largely depend on his commitment  The patient was also instructed with regards to the importance of behavior modification, nutritional counseling, support meeting attendance and lifestyle changes that are important to ensure success  He was given the opportunity to ask questions and I have answered all of them  I have addressed with the patient the level of CODE STATUS for this hospital stay and after explaining the different options currently he wishes to be a Level I  He understands and wishes to proceed  Still needs to lose 20 lbs prior to surgery    Will return next week for a weight check and if the weight loss is at least half the way there, the surgery date will not be re scheduled  Patient understands and agrees

## 2018-05-08 NOTE — OP NOTE
[facility]   [address] [facility], [address]  [phone] (Fax)      Operative Report  BYPASS GASTRIC  ENRICO-EN-Y LAPAROSCOPIC AND INTRAOPERATIVE EGD     Patient Name: Julia Walton    :  1970  MRN: [id number]  [facility] Location: [facility]  Surgery Date : 2018  Surgeons:  Surgeon(s) and Role:     * Fay Starkey MD - Primary     * Kellie River, [facility] - Observing    Diagnosis:    Pre-Op Diagnosis Codes: Morbid obesity ([facility] ) [E66 01]  Body mass index is 55 26 kg/m²  Benign essential hypertension [I10]  Gastroesophageal reflux disease, esophagitis presence not specified [K21 9]  Sleep apnea, unspecified type [G47 30]    Post-Op Diagnosis Codes:     * Morbid obesity ([facility] ) [E66 01]     * Body mass index is 55 26 kg/m²  * Benign essential hypertension [I10]     * Gastroesophageal reflux disease, esophagitis presence not specified [K21 9]     * Sleep apnea, unspecified type [G47 30]    Procedure  1  Laparoscopic Enrico-en-Y Gastric Bypass  2  Intraoperative Endoscopy    Specimen(s):  * No specimens in log *    Estimated Blood Loss:    20 cc    [REMOVED] NG/OG/Enteral Tube Orogastric 18 Fr Center mouth (Removed)   Status Suction-low continuous 2018 11:20 AM   Number of days: 0       Anesthesia Type:     General    Operative Indications: Morbid obesity ([facility] ) [E66 01]  Body mass index is 55 26 kg/m²  Benign essential hypertension [I10]  Gastroesophageal reflux disease, esophagitis presence not specified [K21 9]  Sleep apnea, unspecified type [G47 30]      Operative Findings:    Normal    Complications:     None    Procedure and Technique:    INDICATION:    Julia Walton is a 56 y o  male with a Body mass index is 55 26 kg/m²  and a long standing history of morbid obesity and inability to lose a significant amount of weight on its own    This patient was found to be a good candidate to undergo a bariatric procedure upon being enrolled here at the [address] [place] [address]  OPERATIVE TECHNIQUE    The patient was taken to the operating room and placed in a supine position  A dose of IV antibiotic prophylaxis that consisted of Ancef 3g and Metronidazole 500mg was given  Also, 5000 units of subcutaneous unfractionated heparin to prevent DVT were administered  Sequential compression devices were placed on both lower extremities  After satisfactory general anesthesia induction and endotracheal intubation was achieved, the extremities were secured to prevent neurovascular and musculoskeletal injuries as best as possible  Subsequently, the abdominal wall was prepped and draped in a surgical standard sterile fashion  After a timeout was done and the patient was properly identified and the type of procedure was confirmed a supra-umbilical transverse skin incision was made, and the subcutaneous tissues dissected  Access to the peritoneal cavity was gained with an optical trocar  With this device, we were able to visualize the layers of the abdominal wall, and enter the peritoneal cavity under direct visualization  Pneumoperitoneum was then established with CO2 insufflation  Under direct laparoscopic visualization, four additional trocars were placed: a 12 mm in the right upper quadrant subcostal position in the anterior axillary line, a 12-mm port was placed in the right flank midclavicular line, a 12-mm port was placed in the left upper quadrant subcostal position in the midclavicular line and another 12-mm port was placed in the left quadrant anterior axillary line lateral to the supraumbilical port  With the trocars in place, the dissection was begun  The omentum of the transverse colon was identified and elevated, this allowed for the ligament of Treitz to be visualized  The small bowel was run about 60 cm to a point distal from the ligament of Treitz and was divided with a stapler and a 60 mm cartridge  The Vonnie limb was then measured at 150 cm, and the 150 cm roby was brought in side-to-side opposition to the biliopancreatic limb  A side-to-side jejunojejunostomy was then created  This was accomplished by first making an antimesenteric enterotomy with cautery energy device  We then positioned the laparoscopic stapler with a 60-mm cartridge within the lumen of the bowel to create a stapled side-to-side anastomosis  The enterotomy was then approximated with a 2-0 silk suture, subsequently elevated and closed transversely utilizing an additional 60-mm cartridge  The resulting mesenteric defect was then closed with a running nonabsorbable suture  A Brolin stitch was placed to prevent kinking  At this point we repositioned the patient into a reverse Trendelenburg and the [facility] liver retractor was placed in the subxiphoid position through the use of a 5-mm trocar incision  We then turned our attention to the gastroesophageal junction  The left maren was skeletonized dissecting at the angle of His  The pars flaccida was identified and incised  The lesser sac was entered below the lesser curve at the level just inferior to the take off of the left gastric artery  The left gastric artery and hepatic vagal branches were preserved  We then created a 30 cc gastric pouch  To accomplish this, serial firings of a laparoscopic stapler 60-mm cartridge were utilized  The staple lines were reinforced with a butressing material  We created a pouch based on the lesser curve and in vertical orientation  This was accomplished by a  transverse firing of the stapler along the inferior edge of the pouch and then vertical serial firings of the stapler to the angle of His  This completely  the pouch from the gastric remnant  A 25 mm circular stapler anvil was passed through the mouth and into the esophagus and subsequently placed within the pouch    The inferior edge of the pouch was then opened with cautery and the anvil stem was brought through the anterior aspect of the pouch close to the staple line  We proceeded to divide the omentum all the way to the transverse colon  The end of the Vonnie limb was opened with the cautery and the circular stapling device was brought through the dilated left upper quadrant 12-mm port site, and passed through the open end of the Vonnie limb  The Vonnie limb was then passed in a antecolic and antegastric position to the pouch  This was accomplished without tension and without twist   The stem of the stapling device was then brought through the antimesenteric border of the Vonnie limb adjacent to the pouch  The stem and the anvil were united and the stapler was fired  This created an end-to-side gastrojejunostomy  The excess Vonnie limb proximal to the anastomosis was then resected with the cautery energy device and a laparoscopic stapler with a 60-mm cartridge  The anastomosis was reinforced with interrupted absorbable sutures at the intersection of the staple lines  The distal Vonnie limb was occluded and an EGD as well as an air insufflation test were performed  No intraoperative bleeding nor leaks were detected  I then covered the G-J anastomosis with a tongue of omentum in a Stepan patch fashion and secured it in place with a single 2/0 Vicryl stitch  The sponge, needle and instrument count was reported complete  The 12-mm port site on the left flank that was dilated for the circular stapler as well as the Visiport trocar were then closed with the use of a suture closure device and a 0 absorbable suture  The liver retractor was removed under direct laparoscopic visualization, and no bleeding was noted  The remaining ports were then also removed under laparoscopic visualization  The skin incisions were all closed with 4-0 absorbable subcuticular suture   The patient tolerated the procedure well, was extubated uneventfully and was transferred to the recovery room in stable condition  I was present for the entire length of the procedure as the attending of record  No qualified resident was available to assist   The presence of an assistant was necessary for camera holding, traction and counter traction and for help with suturing and stapling in addition to performing the intraop-EGD        Patient Disposition:    PACU     Signature: Ligia Murray MD  Date: May 8, 2018  Time: 12:36 PM

## 2018-05-08 NOTE — CASE MANAGEMENT
Initial Clinical Review    Age/Sex: 56 y o  male    Surgery Date:    5/8/2018    Procedure:   Pre-Op Diagnosis Codes: [facility] ) [E66 01]  Body mass index is 55 26 kg/m²  Benign essential hypertension [I10]  Gastroesophageal reflux disease, esophagitis presence not specified [K21 9]  Sleep apnea, unspecified type [G47 30]     Post-Op Diagnosis Codes:     * Morbid obesity ([facility] ) [E66 01]     * Body mass index is 55 26 kg/m²  * Benign essential hypertension [I10]     * Gastroesophageal reflux disease, esophagitis presence not specified [K21 9]     * Sleep apnea, unspecified type [G47 30]     Procedure  1  Laparoscopic Vonnie-en-Y Gastric Bypass  Intraoperative Endoscopy    Anesthesia:    general    Admission Orders: Date/Time/Statement: 5/8/18 @ 1244     Orders Placed This Encounter   Procedures    Inpatient Admission     Standing Status:   Standing     Number of Occurrences:   1     Order Specific Question:   Admitting Physician     Answer:   Ayesha Guzmán     Order Specific Question:   Level of Care     Answer:   Med Surg [16]     Order Specific Question:   Bed Type     Answer:   Bariatric [1]     Order Specific Question:   Estimated length of stay     Answer:   One Midnight       Vital Signs: /69   Pulse 97   Temp (!) 97 4 °F (36 3 °C)   Resp 18   Ht 5' 7" (1 702 m)   Wt (!) 160 kg (352 lb 12 8 oz)   SpO2 93%   BMI 55 26 kg/m²     Diet:        Diet Orders            Start     Ordered    05/08/18 1800  Diet Bariatric; Bariatric Clear Liquid  Diet effective now     Question Answer Comment   Diet Type Bariatric    Bariatric Bariatric Clear Liquid    RD to adjust diet per protocol?  No        05/08/18 1418          Mobility:   As  celine    DVT Prophylaxis:   SCD'S    Pain Control:   Pain Medications             amitriptyline (ELAVIL) 50 mg tablet Take 50 mg by mouth 2 (two) times a day    NUCYNTA tablet Take 100 mg by mouth 2 (two) times a day as needed    oxyCODONE-acetaminophen (PERCOCET) 5-325 mg per tablet Take 1 tablet by mouth every 4 (four) hours as needed for moderate pain Earliest Fill Date: 4/11/18 Max Daily Amount: 6 tablets        Tele  Cons  IM    PER   IM CONSULT:  · Morbid obesity/BMI 55 26:  Status post elective laparoscopic Vonnie-en-Y gastric bypass with intraoperative EGD  Postoperative day number 0  Management per primary team, bariatric  Counseled on avoidance NSAID use      · Essential hypertension:  Home regimen of amlodipine 5 mg daily, lisinopril/HCTZ 20/25 mg daily  BP stable here 120/66, 127/59  If SBP is greater than 150 and 3 separate occasions consider discharge on a antihypertensive      · GERD: continue PPI     · Chronic pain: On nycynta 100 mg BID  This will ED switched to short-acting formulation      · KAT continue CPAP     · Anxiety/depression: continue amitriptyline    Thank you,  [facility] in the [facility] by Mike Johnson for 2017  Network Utilization Review Department  Phone: [phone]; Fax [phone]  ATTENTION: The Network Utilization Review Department is now centralized for our 7 Facilities  Make a note that we have a new phone and fax numbers for our Department  Please call with any questions or concerns to [phone] and carefully follow the prompts so that you are directed to the right person  All voicemails are confidential  Fax any determinations, approvals, denials, and requests for initial or continue stay review clinical to [phone]  Due to HIGH CALL volume, it would be easier if you could please send faxed requests to expedite your requests and in part, help us provide discharge notifications faster

## 2018-05-09 LAB
ANION GAP SERPL CALCULATED.3IONS-SCNC: 8 MMOL/L (ref 4–13)
BUN SERPL-MCNC: 8 MG/DL (ref 5–25)
CALCIUM SERPL-MCNC: 8.2 MG/DL (ref 8.3–10.1)
CHLORIDE SERPL-SCNC: 102 MMOL/L (ref 100–108)
CO2 SERPL-SCNC: 28 MMOL/L (ref 21–32)
CREAT SERPL-MCNC: 0.83 MG/DL (ref 0.6–1.3)
ERYTHROCYTE [DISTWIDTH] IN BLOOD BY AUTOMATED COUNT: 14.6 % (ref 11.6–15.1)
GFR SERPL CREATININE-BSD FRML MDRD: 106 ML/MIN/1.73SQ M
GLUCOSE SERPL-MCNC: 134 MG/DL (ref 65–140)
HCT VFR BLD AUTO: 39.2 % (ref 36.5–49.3)
HGB BLD-MCNC: 13.2 G/DL (ref 12–17)
MCH RBC QN AUTO: 29.9 PG (ref 26.8–34.3)
MCHC RBC AUTO-ENTMCNC: 33.7 G/DL (ref 31.4–37.4)
MCV RBC AUTO: 89 FL (ref 82–98)
PLATELET # BLD AUTO: 168 THOUSANDS/UL (ref 149–390)
PMV BLD AUTO: 10.6 FL (ref 8.9–12.7)
POTASSIUM SERPL-SCNC: 3.5 MMOL/L (ref 3.5–5.3)
RBC # BLD AUTO: 4.42 MILLION/UL (ref 3.88–5.62)
SODIUM SERPL-SCNC: 138 MMOL/L (ref 136–145)
WBC # BLD AUTO: 10.36 THOUSAND/UL (ref 4.31–10.16)

## 2018-05-09 PROCEDURE — 85027 COMPLETE CBC AUTOMATED: CPT | Performed by: SURGERY

## 2018-05-09 PROCEDURE — 99232 SBSQ HOSP IP/OBS MODERATE 35: CPT | Performed by: INTERNAL MEDICINE

## 2018-05-09 PROCEDURE — C9113 INJ PANTOPRAZOLE SODIUM, VIA: HCPCS | Performed by: SURGERY

## 2018-05-09 PROCEDURE — 80048 BASIC METABOLIC PNL TOTAL CA: CPT | Performed by: SURGERY

## 2018-05-09 RX ORDER — POTASSIUM CHLORIDE 20MEQ/15ML
20 LIQUID (ML) ORAL ONCE
Status: COMPLETED | OUTPATIENT
Start: 2018-05-09 | End: 2018-05-09

## 2018-05-09 RX ORDER — TAMSULOSIN HYDROCHLORIDE 0.4 MG/1
0.4 CAPSULE ORAL
Status: DISCONTINUED | OUTPATIENT
Start: 2018-05-09 | End: 2018-05-10 | Stop reason: HOSPADM

## 2018-05-09 RX ORDER — AMLODIPINE BESYLATE 5 MG/1
5 TABLET ORAL EVERY MORNING
Status: DISCONTINUED | OUTPATIENT
Start: 2018-05-09 | End: 2018-05-10 | Stop reason: HOSPADM

## 2018-05-09 RX ORDER — AMITRIPTYLINE HYDROCHLORIDE 50 MG/1
50 TABLET, FILM COATED ORAL 2 TIMES DAILY
Status: DISCONTINUED | OUTPATIENT
Start: 2018-05-09 | End: 2018-05-10 | Stop reason: HOSPADM

## 2018-05-09 RX ORDER — TAMSULOSIN HYDROCHLORIDE 0.4 MG/1
0.4 CAPSULE ORAL
Status: DISCONTINUED | OUTPATIENT
Start: 2018-05-09 | End: 2018-05-09

## 2018-05-09 RX ADMIN — ACETAMINOPHEN 325 MG: 160 SUSPENSION ORAL at 12:23

## 2018-05-09 RX ADMIN — OXYCODONE HYDROCHLORIDE 10 MG: 5 SOLUTION ORAL at 12:23

## 2018-05-09 RX ADMIN — ACETAMINOPHEN 325 MG: 160 SUSPENSION ORAL at 20:51

## 2018-05-09 RX ADMIN — OXYCODONE HYDROCHLORIDE 10 MG: 5 SOLUTION ORAL at 20:50

## 2018-05-09 RX ADMIN — PANTOPRAZOLE SODIUM 40 MG: 40 INJECTION, POWDER, FOR SOLUTION INTRAVENOUS at 09:01

## 2018-05-09 RX ADMIN — SODIUM CHLORIDE, SODIUM LACTATE, POTASSIUM CHLORIDE, AND CALCIUM CHLORIDE 125 ML/HR: .6; .31; .03; .02 INJECTION, SOLUTION INTRAVENOUS at 08:20

## 2018-05-09 RX ADMIN — TAMSULOSIN HYDROCHLORIDE 0.4 MG: 0.4 CAPSULE ORAL at 12:17

## 2018-05-09 RX ADMIN — CEFAZOLIN SODIUM 3000 MG: 10 INJECTION, POWDER, FOR SOLUTION INTRAVENOUS at 03:34

## 2018-05-09 RX ADMIN — AMITRIPTYLINE HYDROCHLORIDE 50 MG: 50 TABLET, FILM COATED ORAL at 09:32

## 2018-05-09 RX ADMIN — ACETAMINOPHEN 325 MG: 160 SUSPENSION ORAL at 08:19

## 2018-05-09 RX ADMIN — ACETAMINOPHEN 325 MG: 160 SUSPENSION ORAL at 03:38

## 2018-05-09 RX ADMIN — AMLODIPINE BESYLATE 5 MG: 5 TABLET ORAL at 09:32

## 2018-05-09 RX ADMIN — OXYCODONE HYDROCHLORIDE 10 MG: 5 SOLUTION ORAL at 08:19

## 2018-05-09 RX ADMIN — OXYCODONE HYDROCHLORIDE 10 MG: 5 SOLUTION ORAL at 03:38

## 2018-05-09 RX ADMIN — POTASSIUM CHLORIDE 20 MEQ: 20 SOLUTION ORAL at 08:18

## 2018-05-09 RX ADMIN — AMITRIPTYLINE HYDROCHLORIDE 50 MG: 50 TABLET, FILM COATED ORAL at 17:26

## 2018-05-09 NOTE — POST OP PROGRESS NOTES
Bariatric Surgery Progress Note    Subjective  + Urinary retention requiring straight catheterization twice last night  Advancing PO hydration, ambulation, spirometry  Pain/nausea control adequate      Objective  Vitals:    05/09/18 0728   BP: 147/83   Pulse: 83   Resp: 19   Temp: 98 8 °F (37 1 °C)   SpO2: 97%     NAD, alert  Normal inspiratory effort  Abdomen soft, non-distended, appropriately tender  Incisions c/d/i    Labs  K 3 5  H/H 13 2/39 2    Assessment  47M POD 1 s/p lap RYGB    Plan  - decrease IVF  - stop telemetry/pulse ox  - start flomax  - replete K  - will discharge once able to void spontaneously and more comfortable  - encourage incentive spirometry, ambulation, clear liquids

## 2018-05-10 VITALS
HEART RATE: 91 BPM | RESPIRATION RATE: 18 BRPM | BODY MASS INDEX: 49.44 KG/M2 | SYSTOLIC BLOOD PRESSURE: 151 MMHG | DIASTOLIC BLOOD PRESSURE: 78 MMHG | HEIGHT: 67 IN | WEIGHT: 315 LBS | TEMPERATURE: 98.4 F | OXYGEN SATURATION: 93 %

## 2018-05-10 PROCEDURE — C9113 INJ PANTOPRAZOLE SODIUM, VIA: HCPCS | Performed by: SURGERY

## 2018-05-10 PROCEDURE — 99024 POSTOP FOLLOW-UP VISIT: CPT | Performed by: SURGERY

## 2018-05-10 PROCEDURE — 99232 SBSQ HOSP IP/OBS MODERATE 35: CPT | Performed by: INTERNAL MEDICINE

## 2018-05-10 RX ORDER — LISINOPRIL 20 MG/1
20 TABLET ORAL DAILY
Qty: 30 TABLET | Refills: 0 | Status: SHIPPED | OUTPATIENT
Start: 2018-05-10 | End: 2018-05-10 | Stop reason: HOSPADM

## 2018-05-10 RX ORDER — TAMSULOSIN HYDROCHLORIDE 0.4 MG/1
0.4 CAPSULE ORAL
Qty: 10 CAPSULE | Refills: 0 | Status: SHIPPED | OUTPATIENT
Start: 2018-05-10 | End: 2018-05-10 | Stop reason: HOSPADM

## 2018-05-10 RX ORDER — TAPENTADOL HYDROCHLORIDE 100 MG/1
50 TABLET, FILM COATED ORAL 2 TIMES DAILY PRN
Qty: 20 TABLET | Refills: 0 | Status: SHIPPED | OUTPATIENT
Start: 2018-05-10 | End: 2018-05-20

## 2018-05-10 RX ADMIN — PANTOPRAZOLE SODIUM 40 MG: 40 INJECTION, POWDER, FOR SOLUTION INTRAVENOUS at 08:07

## 2018-05-10 RX ADMIN — ACETAMINOPHEN 325 MG: 160 SUSPENSION ORAL at 11:07

## 2018-05-10 RX ADMIN — ACETAMINOPHEN 325 MG: 160 SUSPENSION ORAL at 02:39

## 2018-05-10 RX ADMIN — AMLODIPINE BESYLATE 5 MG: 5 TABLET ORAL at 08:07

## 2018-05-10 RX ADMIN — OXYCODONE HYDROCHLORIDE 10 MG: 5 SOLUTION ORAL at 11:07

## 2018-05-10 RX ADMIN — OXYCODONE HYDROCHLORIDE 10 MG: 5 SOLUTION ORAL at 02:38

## 2018-05-10 RX ADMIN — SODIUM CHLORIDE, SODIUM LACTATE, POTASSIUM CHLORIDE, AND CALCIUM CHLORIDE 50 ML/HR: .6; .31; .03; .02 INJECTION, SOLUTION INTRAVENOUS at 06:13

## 2018-05-10 NOTE — DISCHARGE SUMMARY
Discharge Summary - Alex James 56 y o  male MRN: [id number]    Unit/Bed#: E5 -01 Encounter: [id number]    Admission Date: 5/8/2018     Discharge Date: 05/10/18    Admitting Diagnosis: Morbid obesity ([facility] [address] ) [E66 01]  Benign essential hypertension [I10]  Gastroesophageal reflux disease, esophagitis presence not specified [K21 9]  Sleep apnea, unspecified type [G47 30]    Secondary Diagnosis:   Past Medical History:   Diagnosis Date    Abnormal blood sugar     Abnormal TSH     Back pain     Bilateral anterior knee pain     Chronic pain disorder     back    CPAP (continuous positive airway pressure) dependence     DDD (degenerative disc disease), lumbar     GERD (gastroesophageal reflux disease)     Hematuria     History of Helicobacter pylori infection     Hypertension     Morbid obesity ([facility] )     Prediabetes     Sleep apnea     Suspicious nevus     Use of cane as ambulatory aid     Vitamin D deficiency        Discharge Diagnosis: Same    Procedures Performed: Procedure(s):  BYPASS GASTRIC  ENRICO-EN-Y LAPAROSCOPIC AND INTRAOPERATIVE EGD    Consults: [facility] Course: Patient with morbid obesity was admitted to the hospital on 5/8/2018 for elective Procedure(s):  BYPASS GASTRIC  ENRICO-EN-Y LAPAROSCOPIC AND INTRAOPERATIVE EGD  Postoperatively, the patient was stable and transferred to the [place] [address] floor for further observation  Postop day one, the patient was tolerating a liquid diet and pain was controlled oral pain medications, however his comorbidities and some postoperative urinary retention that required 2 episodes of straight catheterization prompted continued observation  On postoperative day 2 his vital signs were in a normal range and he had successfully voided twice  The patient was ambulating without assistance, vital signs and lab work were stable  The patient was cleared for discharge on 05/10/18        Disposition: The patient should follow up with Tracy Sigala MD in 2 weeks for a post op check and with Kassi Vazuqez MD as needed for medical management  The patient should refer to the handout "Discharge Instructions" for further information  Call physician for temperature over 101, wound redness or discharge, vomiting, or intolerance to diet  Discharge Medications:  See after visit summary for reconciled discharge medications provided to patient and family  This text is generated with voice recognition software  There may be translation, syntax,  or grammatical errors  If you have any questions, please contact the dictating provider

## 2018-05-10 NOTE — PROGRESS NOTES
[facility] Internal Medicine Progress Note  Patient: Nile Rios 56 y o  male   MRN: [id number]  PCP: Maru Lora MD  Unit/Bed#: E5 -01 Encounter: [id number]  Date Of Visit: 05/10/18    Assessment:    Principal Problem: Morbid obesity ([address] )  Active Problems:    Gastroesophageal reflux disease    Benign essential hypertension    Sleep apnea    CPAP (continuous positive airway pressure) dependence    Hypertension      Plan:    · Morbid obesity status post RYGB by bariatric service  · Hypertension would continue amlodipine lisinopril hydrochlorothiazide combination on discharge presently stable  · Urine retention postop no doubt in relation at underlying anesthesia was started on Flomax by primary service a now able to void and may be DC  · Sleep apnea nightly CPAP  · Hypokalemia repletion ordered  · Chronic pain,onNucynta as outpatient which is long-acting 100 mg b i d  presently on oxycodone which he had also been taking at home for breakthrough pain will need to changed to short-acting Nucynta as outpatient unavailable here should follow up also with pain management      VTE Pharmacologic Prophylaxis:   Pharmacologic: Enoxaparin (Lovenox)  Mechanical VTE Prophylaxis in Place: Yes    Discussions with Specialists or Other Care Team Provider: no    Time Spent for Care: 45 minutes  More than 50% of total time spent on counseling and coordination of care as described above  Subjective:   Some urine retention overnight requiring straight catheterization seems to be tolerating clear liquids, usual knee pain, somewhat ameliorated by oxycodone ordered, no signs of significant withdrawal symptoms, was placed on Flomax in now able to avoid on his own    Objective:     Vitals:   Temp (24hrs), Av °F (36 7 °C), Min:97 7 °F (36 5 °C), Max:98 4 °F (36 9 °C)    HR:  [83-93] 91  Resp:  [18] 18  BP: (103-151)/(54-80) 151/78  SpO2:  [92 %-96 %] 93 %  Body mass index is 55 26 kg/m²       Input and Output Summary (last 24 hours): Intake/Output Summary (Last 24 hours) at 05/10/18 0825  Last data filed at 05/10/18 4522   Gross per 24 hour   Intake              900 ml   Output              551 ml   Net              349 ml       Physical Exam:     Physical Exam:   General appearance: alert, morbidly obese, appears stated age and cooperative  Head: Normocephalic, without obvious abnormality, atraumatic  Lungs: clear to auscultation bilaterally  Heart: regular rate and rhythm  Abdomen: soft, non-tender; bowel sounds normal; no masses,  no organomegaly incisions clean dry intact  Back: negative  Extremities: extremities normal, atraumatic, no cyanosis or edema bilateral knee pains  Neurologic: Grossly normal      Additional Data:     Labs:      Results from last 7 days  Lab Units 05/09/18  0518   WBC Thousand/uL 10 36*   HEMOGLOBIN g/dL 13 2   HEMATOCRIT % 39 2   PLATELETS Thousands/uL 168       Results from last 7 days  Lab Units 05/09/18  0518   SODIUM mmol/L 138   POTASSIUM mmol/L 3 5   CHLORIDE mmol/L 102   CO2 mmol/L 28   BUN mg/dL 8   CREATININE mg/dL 0 83   CALCIUM mg/dL 8 2*   GLUCOSE RANDOM mg/dL 134           * I Have Reviewed All Lab Data Listed Above  * Additional Pertinent Lab Tests Reviewed: [facility] Admission Reviewed    Imaging:  No results found  Imaging Reports Reviewed Today Include:  None  Imaging Personally Reviewed by Myself Includes:  None  No results found       Recent Cultures (last 7 days):           Last 24 Hours Medication List:     Current Facility-Administered Medications:  acetaminophen 320 mg Oral Q4H PRN Cedric Noguera MD   oxyCODONE 5 mg Oral Q4H PRN Cedric Noguera MD   And       acetaminophen 325 mg Oral Q4H PRN Cedric Noguera MD   oxyCODONE 10 mg Oral Q4H PRN Cedric Noguera MD   And       acetaminophen 325 mg Oral Q4H PRN Cedric Noguera MD   amitriptyline 50 mg Oral BID Crow Coronado MD   amLODIPine 5 mg Oral QAM Crow Coronado MD   hydrALAZINE 5 mg Intravenous Q4H PRN Jeri Lance MD   morphine injection 4 mg Intravenous Q2H PRN Jeri Lance MD   morphine injection 2 mg Intravenous Q2H PRN Jeri Lance MD   ondansetron 4 mg Intravenous Q4H PRN Jeri Lance MD   pantoprazole 40 mg Intravenous Q24H Dimitri Khan MD   phenol 2 spray Mouth/Throat Q2H PRN Jeri Lance MD   tamsulosin 0 4 mg Oral Daily With Philip Craig MD        Today, Patient Was Seen By: Arya Hardy MD    ** Please Note: Dragon 360 Dictation voice to text software may have been used in the creation of this document   **

## 2018-05-10 NOTE — POST OP PROGRESS NOTES
Bariatric Surgery Progress Note    Subjective  No adverse events  Advancing PO hydration, ambulation, spirometry  Pain/nausea control adequate  Blood pressure control has improved, and the patient has recently had full regain of bladder function this morning      Objective  Vitals:    05/10/18 0735   BP: 151/78   Pulse: 91   Resp: 18   Temp: 98 4 °F (36 9 °C)   SpO2: 93%     NAD, alert  Normal inspiratory effort  Abdomen soft, non-distended, appropriately tender  Incisions c/d/i    Labs  None    Assessment  47M POD 2 s/p lap RYGB; urinary retention resolved    Plan  - discharge home today  - patient stated that he does not take lisinopril/hydrochlorothiazide at home, this will be discontinued given his good blood pressure control following surgery  - encourage incentive spirometry, ambulation, clear liquids

## 2018-05-10 NOTE — NURSING NOTE
Reviewed all after care summary with  on blue line: Ernestine Meier [id number]  Patient stated he was comfortable with instructions and all questions were answered adequately  Patient left with female relative/friend and

## 2018-05-11 ENCOUNTER — TRANSITIONAL CARE MANAGEMENT (OUTPATIENT)
Dept: FAMILY MEDICINE CLINIC | Facility: CLINIC | Age: 48
End: 2018-05-11

## 2018-05-11 ENCOUNTER — TELEPHONE (OUTPATIENT)
Dept: BARIATRICS | Facility: CLINIC | Age: 48
End: 2018-05-11

## 2018-05-11 NOTE — TELEPHONE ENCOUNTER
Outreach phone call; no response but left message for a return phone call  I would like to ask a few post-op ?

## 2018-05-14 RX ORDER — LISINOPRIL AND HYDROCHLOROTHIAZIDE 25; 20 MG/1; MG/1
TABLET ORAL
COMMUNITY
Start: 2018-04-04 | End: 2018-07-12

## 2018-05-16 ENCOUNTER — TELEPHONE (OUTPATIENT)
Dept: BARIATRICS | Facility: CLINIC | Age: 48
End: 2018-05-16

## 2018-05-16 NOTE — TELEPHONE ENCOUNTER
Outreach phone call; reviewed discharge instructions and asked patient a series of questions regarding  To care while in the hospital and so forth  Patient asked several questions which were addressed  He understood all discharge instructions and reported he received excellent care  He was instructed to call office if he has further concerns or questions  I spoke with patient in [address] [facility]

## 2018-05-18 ENCOUNTER — OFFICE VISIT (OUTPATIENT)
Dept: BARIATRICS | Facility: CLINIC | Age: 48
End: 2018-05-18

## 2018-05-18 VITALS
SYSTOLIC BLOOD PRESSURE: 122 MMHG | BODY MASS INDEX: 47.74 KG/M2 | TEMPERATURE: 98.5 F | DIASTOLIC BLOOD PRESSURE: 68 MMHG | RESPIRATION RATE: 20 BRPM | HEIGHT: 68 IN | HEART RATE: 80 BPM | WEIGHT: 315 LBS

## 2018-05-18 DIAGNOSIS — Z98.84 BARIATRIC SURGERY STATUS: ICD-10-CM

## 2018-05-18 DIAGNOSIS — E66.01 MORBID OBESITY WITH BMI OF 50.0-59.9, ADULT (HCC): Primary | ICD-10-CM

## 2018-05-18 DIAGNOSIS — E66.01 MORBID OBESITY (HCC): Primary | ICD-10-CM

## 2018-05-18 PROCEDURE — 99024 POSTOP FOLLOW-UP VISIT: CPT | Performed by: SURGERY

## 2018-05-18 PROCEDURE — RECHECK: Performed by: DIETITIAN, REGISTERED

## 2018-05-18 NOTE — PROGRESS NOTES
POST OP UP VISIT - BARIATRIC SURGERY  Graciela Llanos 56 y o  male MRN: [id number]  Unit/Bed#:  Encounter: [id number]      HPI:  Graciela Llanos is a 56 y o  male status post laparoscopic Vonnie-en-Y gastric bypass  Review of Systems    Historical Information   Past Medical History:   Diagnosis Date    Abnormal blood sugar     Abnormal TSH     Back pain     Bilateral anterior knee pain     Chronic pain disorder     back    CPAP (continuous positive airway pressure) dependence     DDD (degenerative disc disease), lumbar     GERD (gastroesophageal reflux disease)     Hematuria     History of Helicobacter pylori infection     Hypertension     Morbid obesity ([address] )     Prediabetes     Sleep apnea     Suspicious nevus     Use of cane as ambulatory aid     Vitamin D deficiency      Past Surgical History:   Procedure Laterality Date    AK EGD TRANSORAL BIOPSY SINGLE/MULTIPLE N/A 12/13/2017    Procedure: ESOPHAGOGASTRODUODENOSCOPY (EGD), with BIOPSY;  Surgeon: Joe Garcia MD;  Location: [facility];   Service: Bariatrics    AK LAP GASTRIC BYPASS/VONNIE-EN-Y N/A 5/8/2018    Procedure: BYPASS GASTRIC  VONNIE-EN-Y LAPAROSCOPIC AND INTRAOPERATIVE EGD;  Surgeon: Joe Garcia MD;  Location: [facility];  Service: Bariatrics     Social History   History   Alcohol Use No     History   Drug Use No     History   Smoking Status    Never Smoker   Smokeless Tobacco    Never Used     Family History: non-contributory    Meds/Allergies   all medications and allergies reviewed  No Known Allergies    Objective       Current Vitals:   Blood Pressure: 122/68 (05/18/18 0938)  Pulse: 80 (05/18/18 0938)  Temperature: 98 5 °F (36 9 °C) (05/18/18 0938)  Respirations: 20 (05/18/18 0938)  Height: 5' 7 5" (171 5 cm) (05/18/18 2421)  Weight - Scale: (!) 172 kg (378 lb 8 oz) (05/18/18 2722)    [unfilled]    Invasive Devices          No matching active lines, drains, or airways          Physical Exam   Constitutional: He is oriented to person, place, and time  He appears well-developed  No distress  Abdominal: Soft  Bowel sounds are normal  He exhibits no distension and no mass  There is no tenderness  There is no rebound and no guarding  Abdomen is benign  Incisions are healing well  No evidence of infection   Neurological: He is alert and oriented to person, place, and time  Psychiatric: He has a normal mood and affect  His behavior is normal  Judgment and thought content normal    Vitals reviewed  Assessment/PLAN:    56 y o  male status post laparoscopic Vonnie-en-Y gastric bypass in May 8 of 2018  Doing well post op  No major issues  Increase physical activity as tolerated and as instructed  Advance diet as instructed by our dietitians today and as indicated in the binder  Follow up in one month as scheduled                Cedric Noguera MD  5/18/2018  9:54 AM

## 2018-05-18 NOTE — PROGRESS NOTES
Weight Management Nutrition Class     Diagnosis: Morbid Obesity    Bariatric Surgeon: Dr Perry Wilkins    Surgery: Gastric Bypass Laparoscopic    Class: first post op note   Kalyan April present and provided Maltese translation  Topics discussed today include:     fluid goals post op, protein goals post op, chew food well, diet progression, protein supplems, vitamin/mineral supplements and calcium supplements    Patient was able to verbalize basic diet (protein, fluid, vitamin and mineral) recommendations and possible nutrition-related complications   Yes

## 2018-05-29 ENCOUNTER — PROCEDURE VISIT (OUTPATIENT)
Dept: FAMILY MEDICINE CLINIC | Facility: CLINIC | Age: 48
End: 2018-05-29
Payer: COMMERCIAL

## 2018-05-29 VITALS
RESPIRATION RATE: 18 BRPM | DIASTOLIC BLOOD PRESSURE: 86 MMHG | TEMPERATURE: 97.6 F | WEIGHT: 315 LBS | BODY MASS INDEX: 49.44 KG/M2 | SYSTOLIC BLOOD PRESSURE: 134 MMHG | OXYGEN SATURATION: 96 % | HEART RATE: 108 BPM | HEIGHT: 67 IN

## 2018-05-29 DIAGNOSIS — E66.01 MORBID OBESITY WITH BMI OF 50.0-59.9, ADULT (HCC): ICD-10-CM

## 2018-05-29 DIAGNOSIS — I10 BENIGN ESSENTIAL HYPERTENSION: Primary | ICD-10-CM

## 2018-05-29 PROCEDURE — 99495 TRANSJ CARE MGMT MOD F2F 14D: CPT | Performed by: FAMILY MEDICINE

## 2018-05-29 NOTE — PROGRESS NOTES
Assessment/Plan:     Stop Lisinopril/HCTZ   BW ordered         Problem List Items Addressed This Visit        Cardiovascular and Mediastinum    Benign essential hypertension - Primary    Relevant Orders    CBC and differential    Comprehensive metabolic panel    Lipid panel    Vitamin D 25 hydroxy    Microalbumin / creatinine urine ratio    TSH, 3rd generation with T4 reflex       Other    Morbid obesity with BMI of 50 0-59 9, adult (HCC)    Relevant Orders    CBC and differential    Comprehensive metabolic panel    Lipid panel    Vitamin D 25 hydroxy    Microalbumin / creatinine urine ratio    TSH, 3rd generation with T4 reflex           Subjective:     Patient ID: Kristal Lazcano is a 56 y o  male  57 yo  male with morbid obesity status post bypass gastric dulce-en-y laparoscopic and intraoperative EGD  Postoperatively, patient was tolerating a liquid diet and pain was controlled oral pain medications, however had some postoperative urinary retention that required 2 episodes of straight catheterization prompted continued observation  On postoperative day 2 his vital signs were in a normal range and he had successfully voided twice  The patient was ambulating without assistance, vital signs and lab work were stable  The patient was cleared for discharge on 05/10/18  He is here today for follow up  Has had one episode of emesis after eating a piece of dry fish  No other issues with swallowing  Tolerating liquids and solids well  Complains of mild abdominal pain on LUQ near surgical site  Pain is sporadic, not radiated lasts for several seconds, described as a piercing sensation, not related to food, worse with movement  Also complains of pain under his L scapula  Not radiated worse with deep breath, has bee improving since discharge  Review of Systems   Gastrointestinal:        As described in HPI   All other systems reviewed and are negative          Objective:     Physical Exam Constitutional: He is oriented to person, place, and time  He appears well-developed  HENT:   Head: Normocephalic  Right Ear: External ear normal    Left Ear: External ear normal    Nose: Nose normal    Mouth/Throat: Oropharynx is clear and moist    Eyes: Conjunctivae and EOM are normal  Pupils are equal, round, and reactive to light  Neck: Normal range of motion  Neck supple  No thyromegaly present  Cardiovascular: Normal rate, regular rhythm and normal heart sounds  Pulmonary/Chest: Effort normal and breath sounds normal    Abdominal: Soft  There is tenderness in the left upper quadrant  There is no rebound and no guarding  Musculoskeletal: Normal range of motion  Neurological: He is alert and oriented to person, place, and time  He has normal reflexes  Skin: Skin is dry  Psychiatric: He has a normal mood and affect  Nursing note and vitals reviewed  Vitals:    05/29/18 0901   BP: 134/86   BP Location: Left arm   Patient Position: Sitting   Cuff Size: Extra-Large   Pulse: (!) 108   Resp: 18   Temp: 97 6 °F (36 4 °C)   TempSrc: Tympanic   SpO2: 96%   Weight: (!) 153 kg (338 lb 5 oz)   Height: 5' 7" (1 702 m)       Transitional Care Management Review:  Rene Diaz is a 56 y o  male here for TCM follow up       During the TCM phone call patient stated:    Date and time hospital follow up call was made:  5/11/2018  2:21 PM  Hospital care reviewed:  Records reviewed  Patient was hopsitalized at:  [address]  Date of admission:  5/8/18  Date of discharge:  5/10/18  Diagnosis:  BYPASS GASTRIC PROCEDURE  Were the patients medicaitons reviewed and updated:  No  Comments:  TCMXX/NC (Pt will f/u with Dr Irma Mccoy)             Marco A Ching MD

## 2018-06-28 ENCOUNTER — OFFICE VISIT (OUTPATIENT)
Dept: BARIATRICS | Facility: CLINIC | Age: 48
End: 2018-06-28

## 2018-06-28 VITALS — BODY MASS INDEX: 47.74 KG/M2 | HEIGHT: 68 IN | WEIGHT: 315 LBS

## 2018-06-28 VITALS — BODY MASS INDEX: 47.74 KG/M2 | WEIGHT: 315 LBS | HEIGHT: 68 IN

## 2018-06-28 DIAGNOSIS — Z98.84 BARIATRIC SURGERY STATUS: Primary | ICD-10-CM

## 2018-06-28 DIAGNOSIS — E66.01 OBESITY, CLASS III, BMI 40-49.9 (MORBID OBESITY) (HCC): ICD-10-CM

## 2018-06-28 PROCEDURE — RECHECK: Performed by: DIETITIAN, REGISTERED

## 2018-06-28 PROCEDURE — RECHECK

## 2018-06-28 NOTE — PROGRESS NOTES
Weight Management Nutrition Class     Diagnosis: Morbid Obesity    Bariatric Surgeon: Dr Vanessa Medina    Surgery: Gastric Bypass Laparoscopic    Class: 5 week post op     Topics discussed today include:     fluid goals post op, protein goals post op, constipation, chew food well, exercise, avoidance of alcohol, PPI use, diet progression, dumping syndrome, protein supplems, vitamin/mineral supplements, calcium supplements and additional vitamin B12    Patient was able to verbalize basic diet (protein, fluid, vitamin and mineral) recommendations and possible nutrition-related complications  Yes     Inga Olmos LCSW present and provided Lao interpretation

## 2018-06-28 NOTE — PROGRESS NOTES
Met with patient and provided translation services when patient met with RD for 5 week follow-up class  Patient reminded of appointment with RD on 8/28/18

## 2018-06-29 ENCOUNTER — TRANSCRIBE ORDERS (OUTPATIENT)
Dept: LAB | Facility: CLINIC | Age: 48
End: 2018-06-29

## 2018-06-29 ENCOUNTER — APPOINTMENT (OUTPATIENT)
Dept: LAB | Facility: CLINIC | Age: 48
End: 2018-06-29
Payer: COMMERCIAL

## 2018-06-29 LAB
25(OH)D3 SERPL-MCNC: 37.1 NG/ML (ref 30–100)
ALBUMIN SERPL BCP-MCNC: 3.6 G/DL (ref 3.5–5)
ALP SERPL-CCNC: 76 U/L (ref 46–116)
ALT SERPL W P-5'-P-CCNC: 86 U/L (ref 12–78)
ANION GAP SERPL CALCULATED.3IONS-SCNC: 7 MMOL/L (ref 4–13)
AST SERPL W P-5'-P-CCNC: 58 U/L (ref 5–45)
BASOPHILS # BLD AUTO: 0.03 THOUSANDS/ΜL (ref 0–0.1)
BASOPHILS NFR BLD AUTO: 0 % (ref 0–1)
BILIRUB SERPL-MCNC: 1.01 MG/DL (ref 0.2–1)
BUN SERPL-MCNC: 18 MG/DL (ref 5–25)
CALCIUM SERPL-MCNC: 8.6 MG/DL (ref 8.3–10.1)
CHLORIDE SERPL-SCNC: 102 MMOL/L (ref 100–108)
CHOLEST SERPL-MCNC: 99 MG/DL (ref 50–200)
CO2 SERPL-SCNC: 31 MMOL/L (ref 21–32)
CREAT SERPL-MCNC: 1 MG/DL (ref 0.6–1.3)
CREAT UR-MCNC: 461 MG/DL
EOSINOPHIL # BLD AUTO: 0.04 THOUSAND/ΜL (ref 0–0.61)
EOSINOPHIL NFR BLD AUTO: 1 % (ref 0–6)
ERYTHROCYTE [DISTWIDTH] IN BLOOD BY AUTOMATED COUNT: 15.4 % (ref 11.6–15.1)
GFR SERPL CREATININE-BSD FRML MDRD: 89 ML/MIN/1.73SQ M
GLUCOSE P FAST SERPL-MCNC: 82 MG/DL (ref 65–99)
HCT VFR BLD AUTO: 44.7 % (ref 36.5–49.3)
HDLC SERPL-MCNC: 41 MG/DL (ref 40–60)
HGB BLD-MCNC: 14.8 G/DL (ref 12–17)
IMM GRANULOCYTES # BLD AUTO: 0.01 THOUSAND/UL (ref 0–0.2)
IMM GRANULOCYTES NFR BLD AUTO: 0 % (ref 0–2)
LDLC SERPL CALC-MCNC: 39 MG/DL (ref 0–100)
LYMPHOCYTES # BLD AUTO: 2.97 THOUSANDS/ΜL (ref 0.6–4.47)
LYMPHOCYTES NFR BLD AUTO: 43 % (ref 14–44)
MCH RBC QN AUTO: 29.5 PG (ref 26.8–34.3)
MCHC RBC AUTO-ENTMCNC: 33.1 G/DL (ref 31.4–37.4)
MCV RBC AUTO: 89 FL (ref 82–98)
MICROALBUMIN UR-MCNC: 30.5 MG/L (ref 0–20)
MICROALBUMIN/CREAT 24H UR: 7 MG/G CREATININE (ref 0–30)
MONOCYTES # BLD AUTO: 0.6 THOUSAND/ΜL (ref 0.17–1.22)
MONOCYTES NFR BLD AUTO: 9 % (ref 4–12)
NEUTROPHILS # BLD AUTO: 3.22 THOUSANDS/ΜL (ref 1.85–7.62)
NEUTS SEG NFR BLD AUTO: 47 % (ref 43–75)
NONHDLC SERPL-MCNC: 58 MG/DL
NRBC BLD AUTO-RTO: 0 /100 WBCS
PLATELET # BLD AUTO: 222 THOUSANDS/UL (ref 149–390)
PMV BLD AUTO: 11.5 FL (ref 8.9–12.7)
POTASSIUM SERPL-SCNC: 3.8 MMOL/L (ref 3.5–5.3)
PROT SERPL-MCNC: 7.1 G/DL (ref 6.4–8.2)
RBC # BLD AUTO: 5.01 MILLION/UL (ref 3.88–5.62)
SODIUM SERPL-SCNC: 140 MMOL/L (ref 136–145)
TRIGL SERPL-MCNC: 93 MG/DL
TSH SERPL DL<=0.05 MIU/L-ACNC: 1.83 UIU/ML (ref 0.36–3.74)
WBC # BLD AUTO: 6.87 THOUSAND/UL (ref 4.31–10.16)

## 2018-06-29 PROCEDURE — 36415 COLL VENOUS BLD VENIPUNCTURE: CPT | Performed by: FAMILY MEDICINE

## 2018-06-29 PROCEDURE — 80061 LIPID PANEL: CPT | Performed by: FAMILY MEDICINE

## 2018-06-29 PROCEDURE — 82570 ASSAY OF URINE CREATININE: CPT | Performed by: FAMILY MEDICINE

## 2018-06-29 PROCEDURE — 80053 COMPREHEN METABOLIC PANEL: CPT | Performed by: FAMILY MEDICINE

## 2018-06-29 PROCEDURE — 82306 VITAMIN D 25 HYDROXY: CPT | Performed by: FAMILY MEDICINE

## 2018-06-29 PROCEDURE — 84443 ASSAY THYROID STIM HORMONE: CPT | Performed by: FAMILY MEDICINE

## 2018-06-29 PROCEDURE — 82043 UR ALBUMIN QUANTITATIVE: CPT | Performed by: FAMILY MEDICINE

## 2018-06-29 PROCEDURE — 85025 COMPLETE CBC W/AUTO DIFF WBC: CPT | Performed by: FAMILY MEDICINE

## 2018-07-12 ENCOUNTER — APPOINTMENT (EMERGENCY)
Dept: CT IMAGING | Facility: HOSPITAL | Age: 48
End: 2018-07-12
Payer: COMMERCIAL

## 2018-07-12 ENCOUNTER — HOSPITAL ENCOUNTER (EMERGENCY)
Facility: HOSPITAL | Age: 48
Discharge: HOME | End: 2018-07-12
Attending: EMERGENCY MEDICINE | Admitting: EMERGENCY MEDICINE
Payer: COMMERCIAL

## 2018-07-12 VITALS
BODY MASS INDEX: 47.06 KG/M2 | WEIGHT: 305 LBS | DIASTOLIC BLOOD PRESSURE: 56 MMHG | OXYGEN SATURATION: 98 % | RESPIRATION RATE: 18 BRPM | TEMPERATURE: 98.7 F | HEART RATE: 87 BPM | SYSTOLIC BLOOD PRESSURE: 144 MMHG

## 2018-07-12 DIAGNOSIS — N30.90 CYSTITIS: Primary | ICD-10-CM

## 2018-07-12 LAB
ALBUMIN SERPL BCP-MCNC: 3.4 G/DL (ref 3.5–5)
ALP SERPL-CCNC: 99 U/L (ref 46–116)
ALT SERPL W P-5'-P-CCNC: 66 U/L (ref 12–78)
ANION GAP SERPL CALCULATED.3IONS-SCNC: 7 MMOL/L (ref 4–13)
AST SERPL W P-5'-P-CCNC: 59 U/L (ref 5–45)
BACTERIA UR QL AUTO: ABNORMAL /HPF
BASOPHILS # BLD AUTO: 0.01 THOUSANDS/ΜL (ref 0–0.1)
BASOPHILS NFR BLD AUTO: 0 % (ref 0–1)
BILIRUB DIRECT SERPL-MCNC: 0.3 MG/DL (ref 0–0.2)
BILIRUB SERPL-MCNC: 0.86 MG/DL (ref 0.2–1)
BILIRUB UR QL STRIP: ABNORMAL
BUN SERPL-MCNC: 16 MG/DL (ref 5–25)
CALCIUM SERPL-MCNC: 9.2 MG/DL (ref 8.3–10.1)
CHLORIDE SERPL-SCNC: 103 MMOL/L (ref 100–108)
CLARITY UR: ABNORMAL
CO2 SERPL-SCNC: 31 MMOL/L (ref 21–32)
COLOR UR: ABNORMAL
CREAT SERPL-MCNC: 1.19 MG/DL (ref 0.6–1.3)
EOSINOPHIL # BLD AUTO: 0.1 THOUSAND/ΜL (ref 0–0.61)
EOSINOPHIL NFR BLD AUTO: 1 % (ref 0–6)
ERYTHROCYTE [DISTWIDTH] IN BLOOD BY AUTOMATED COUNT: 15 % (ref 11.6–15.1)
GFR SERPL CREATININE-BSD FRML MDRD: 72 ML/MIN/1.73SQ M
GLUCOSE SERPL-MCNC: 94 MG/DL (ref 65–140)
GLUCOSE UR STRIP-MCNC: NEGATIVE MG/DL
HCT VFR BLD AUTO: 43.4 % (ref 36.5–49.3)
HGB BLD-MCNC: 14.7 G/DL (ref 12–17)
HGB UR QL STRIP.AUTO: ABNORMAL
KETONES UR STRIP-MCNC: ABNORMAL MG/DL
LEUKOCYTE ESTERASE UR QL STRIP: ABNORMAL
LIPASE SERPL-CCNC: 136 U/L (ref 73–393)
LYMPHOCYTES # BLD AUTO: 2.08 THOUSANDS/ΜL (ref 0.6–4.47)
LYMPHOCYTES NFR BLD AUTO: 23 % (ref 14–44)
MAGNESIUM SERPL-MCNC: 2.6 MG/DL (ref 1.6–2.6)
MCH RBC QN AUTO: 30.6 PG (ref 26.8–34.3)
MCHC RBC AUTO-ENTMCNC: 33.9 G/DL (ref 31.4–37.4)
MCV RBC AUTO: 90 FL (ref 82–98)
MONOCYTES # BLD AUTO: 0.82 THOUSAND/ΜL (ref 0.17–1.22)
MONOCYTES NFR BLD AUTO: 9 % (ref 4–12)
NEUTROPHILS # BLD AUTO: 6.14 THOUSANDS/ΜL (ref 1.85–7.62)
NEUTS SEG NFR BLD AUTO: 67 % (ref 43–75)
NITRITE UR QL STRIP: NEGATIVE
NON-SQ EPI CELLS URNS QL MICRO: ABNORMAL /HPF
NRBC BLD AUTO-RTO: 0 /100 WBCS
PH UR STRIP.AUTO: 6 [PH] (ref 4.5–8)
PLATELET # BLD AUTO: 162 THOUSANDS/UL (ref 149–390)
PMV BLD AUTO: 11.8 FL (ref 8.9–12.7)
POTASSIUM SERPL-SCNC: 4.2 MMOL/L (ref 3.5–5.3)
PROT SERPL-MCNC: 7.7 G/DL (ref 6.4–8.2)
PROT UR STRIP-MCNC: ABNORMAL MG/DL
RBC # BLD AUTO: 4.81 MILLION/UL (ref 3.88–5.62)
RBC #/AREA URNS AUTO: ABNORMAL /HPF
SODIUM SERPL-SCNC: 141 MMOL/L (ref 136–145)
SP GR UR STRIP.AUTO: 1.02 (ref 1–1.03)
UROBILINOGEN UR QL STRIP.AUTO: 1 E.U./DL
WBC # BLD AUTO: 9.15 THOUSAND/UL (ref 4.31–10.16)
WBC #/AREA URNS AUTO: ABNORMAL /HPF

## 2018-07-12 PROCEDURE — 87086 URINE CULTURE/COLONY COUNT: CPT

## 2018-07-12 PROCEDURE — 83690 ASSAY OF LIPASE: CPT | Performed by: EMERGENCY MEDICINE

## 2018-07-12 PROCEDURE — 96374 THER/PROPH/DIAG INJ IV PUSH: CPT

## 2018-07-12 PROCEDURE — 96361 HYDRATE IV INFUSION ADD-ON: CPT

## 2018-07-12 PROCEDURE — 74177 CT ABD & PELVIS W/CONTRAST: CPT

## 2018-07-12 PROCEDURE — 83735 ASSAY OF MAGNESIUM: CPT | Performed by: EMERGENCY MEDICINE

## 2018-07-12 PROCEDURE — 87077 CULTURE AEROBIC IDENTIFY: CPT

## 2018-07-12 PROCEDURE — 85025 COMPLETE CBC W/AUTO DIFF WBC: CPT | Performed by: EMERGENCY MEDICINE

## 2018-07-12 PROCEDURE — 36415 COLL VENOUS BLD VENIPUNCTURE: CPT | Performed by: EMERGENCY MEDICINE

## 2018-07-12 PROCEDURE — 81001 URINALYSIS AUTO W/SCOPE: CPT

## 2018-07-12 PROCEDURE — 87186 SC STD MICRODIL/AGAR DIL: CPT

## 2018-07-12 PROCEDURE — 80076 HEPATIC FUNCTION PANEL: CPT | Performed by: EMERGENCY MEDICINE

## 2018-07-12 PROCEDURE — 99284 EMERGENCY DEPT VISIT MOD MDM: CPT

## 2018-07-12 PROCEDURE — 80048 BASIC METABOLIC PNL TOTAL CA: CPT | Performed by: EMERGENCY MEDICINE

## 2018-07-12 RX ORDER — MORPHINE SULFATE 4 MG/ML
4 INJECTION, SOLUTION INTRAMUSCULAR; INTRAVENOUS ONCE
Status: COMPLETED | OUTPATIENT
Start: 2018-07-12 | End: 2018-07-12

## 2018-07-12 RX ORDER — CEPHALEXIN 250 MG/1
500 CAPSULE ORAL EVERY 6 HOURS SCHEDULED
Status: DISCONTINUED | OUTPATIENT
Start: 2018-07-12 | End: 2018-07-12 | Stop reason: HOSPADM

## 2018-07-12 RX ORDER — CEPHALEXIN 500 MG/1
500 CAPSULE ORAL EVERY 6 HOURS SCHEDULED
Qty: 28 CAPSULE | Refills: 0 | Status: SHIPPED | OUTPATIENT
Start: 2018-07-12 | End: 2018-07-19

## 2018-07-12 RX ORDER — PHENAZOPYRIDINE HYDROCHLORIDE 200 MG/1
200 TABLET, FILM COATED ORAL 3 TIMES DAILY
Qty: 6 TABLET | Refills: 0 | Status: SHIPPED | OUTPATIENT
Start: 2018-07-12 | End: 2018-09-05 | Stop reason: CLARIF

## 2018-07-12 RX ADMIN — MORPHINE SULFATE 4 MG: 4 INJECTION, SOLUTION INTRAMUSCULAR; INTRAVENOUS at 17:03

## 2018-07-12 RX ADMIN — IOHEXOL 50 ML: 240 INJECTION, SOLUTION INTRATHECAL; INTRAVASCULAR; INTRAVENOUS; ORAL at 19:04

## 2018-07-12 RX ADMIN — SODIUM CHLORIDE 1000 ML: 0.9 INJECTION, SOLUTION INTRAVENOUS at 17:02

## 2018-07-12 RX ADMIN — CEPHALEXIN 500 MG: 250 CAPSULE ORAL at 19:52

## 2018-07-12 RX ADMIN — IOHEXOL 100 ML: 350 INJECTION, SOLUTION INTRAVENOUS at 18:39

## 2018-07-12 NOTE — DISCHARGE INSTRUCTIONS
[place] el keflex 4 veces al día marylin los próximos 7 días  El número para el urólogo está incluido en estas instrucciones de mercedes, llame por la mañana para programar hilda lizy para el seguimiento, debe verlo en la oficina para evaluar el sangrado  Puedes usar el piridium marylin los próximos 2 días  [place] puede [employer] de tatum orina  Cistitis intersticial   LO QUE NECESITA SABER:   La cistitis intersticial también se conoce soy síndrome de vejiga dolorosa  La cistitis intersticial es hilda condición que causa dolor en tatum vejiga y [place]  Usted también podría tener úlceras en la vejiga  La causa de la cistitis intersticial aún no se conoce  INSTRUCCIONES SOBRE [facility]:   Pregúntele a tatum Katherine Fent vitaminas y minerales son adecuados para usted  · Gabbie síntomas empeoran o desarrolla síntomas nuevos  · Usted tiene preguntas o inquietudes acerca de tatum condición o cuidado  Medicamentos:   · Medicamentos,  Se podrían administrar medicamentospara aliviar gabbie síntomas, soy el [place], [place] y frecuencia urinaria  · [place] gabbie medicamentos soy se le haya indicado  Consulte con tatum médico si usted lloyd que tatum medicamento no le está ayudando o si presenta efectos secundarios  Infórmele si es alérgico a algún medicamento  Mantenga hilda lista actualizada de los [place], las vitaminas y los productos herbales que janelle  Incluya los siguientes datos de los medicamentos: cantidad, frecuencia y motivo de administración  Traiga con usted la lista o los envases de la píldoras a gabbie citas de seguimiento  Lleve la lista de los medicamentos con usted en ricardo de hilda emergencia  [place] de tatum síntomas:   · [place] los ejercicios de Kegel según las indicaciones  Los ejercicios de Kegel sirven para fortalecer los músculos que [employer] movimientos intestinales y la [place]  P[place] a tatum médico más [employer] ejercicios de Kegel  Contraiga los músculos pélvicos lentamente  Usted seguramente sentirá soy si estuviera aguantando la orina o gases  Contenga los músculos y cuente hasta 3  Relájese, contráigalos rápidamente y suelte  Repita el ciclo 10 veces  [place] 10 series de ejercicios Kegel, 5 veces por día  No contenga la respiración cuando asia los ejercicios Kegel  Mantenga relajados los músculos del BJURHOLM, espalda y piernas  · No fume  El fumado podría empeorar gabbie síntomas  La nicotina y otros químicos de los cigarrillos y cigarros también pueden provocar daño en los pulmones  Pida información a tatum médico si usted actualmente fuma y necesita ayuda para dejar de fumar  Los cigarrillos electrónicos o tabaco sin humo todavía contienen nicotina  Consulte con tatum médico antes de [employer]  · Entrene tatum vejiga para orinar con menos frecuencia  [place] puede hacerse si usted se [place] and [place] a usar el baño a horas ya programadas  Trate de aguantar la orina cuando siente la [place] de orinar  Por ejemplo, contenga tatum orina por 5 minutos cuando sienta ganas de orinar  Conforme se le vaya facilitando, contenga la orina por 10 minutos  · Controle el estrés  El estrés podría empeorar gabbie síntomas  Tatum médico podría recomendarle que busque formas de [employer] estrés, soy técnicas de relajamiento  Acuda a gabbie consultas de control con tatum médico según le indicaron  Anote gabbie preguntas para que se acuerde de hacerlas marylin gabbie visitas  © 2017 [address] [employer] Information is for End User's use only and may not be sold, redistributed or otherwise used for commercial purposes  All illustrations and images included in CareNotes® are the copyrighted property of A D A M , Inc  or Mike Alex  Esta información es sólo para uso en educación  Tatum intención no es darle un consejo médico sobre enfermedades o tratamientos   Colsulte con tatum Mardell Darien farmacéutico antes de seguir cualquier régimen médico para saber si es seguro y [employer] para usted

## 2018-07-12 NOTE — ED NOTES
After triage, patient reports he has also had blood in his stool        Herman Erwin RN  07/12/18 9642

## 2018-07-14 LAB — BACTERIA UR CULT: ABNORMAL

## 2018-07-17 DIAGNOSIS — I10 ESSENTIAL HYPERTENSION: ICD-10-CM

## 2018-07-17 RX ORDER — AMLODIPINE BESYLATE 5 MG/1
5 TABLET ORAL DAILY
Qty: 90 TABLET | Refills: 0 | Status: SHIPPED | OUTPATIENT
Start: 2018-07-17 | End: 2018-10-18 | Stop reason: SDUPTHER

## 2018-07-19 ENCOUNTER — CONSULT (OUTPATIENT)
Dept: FAMILY MEDICINE CLINIC | Facility: CLINIC | Age: 48
End: 2018-07-19
Payer: COMMERCIAL

## 2018-07-19 VITALS
WEIGHT: 308 LBS | RESPIRATION RATE: 18 BRPM | HEART RATE: 102 BPM | BODY MASS INDEX: 46.68 KG/M2 | SYSTOLIC BLOOD PRESSURE: 132 MMHG | HEIGHT: 68 IN | OXYGEN SATURATION: 97 % | TEMPERATURE: 97.6 F | DIASTOLIC BLOOD PRESSURE: 76 MMHG

## 2018-07-19 DIAGNOSIS — I83.12 VARICOSE VEINS OF LEFT LOWER EXTREMITY WITH INFLAMMATION: Primary | ICD-10-CM

## 2018-07-19 DIAGNOSIS — N30.90 CYSTITIS: ICD-10-CM

## 2018-07-19 PROCEDURE — 99213 OFFICE O/P EST LOW 20 MIN: CPT | Performed by: FAMILY MEDICINE

## 2018-07-19 NOTE — PROGRESS NOTES
Assessment/Plan:    No problem-specific Assessment & Plan notes found for this encounter  Problem List Items Addressed This Visit        Cardiovascular and Mediastinum    Varicose veins of left lower extremity with inflammation - Primary    Relevant Orders    Ambulatory referral to Vascular Surgery       Genitourinary    Cystitis    Relevant Orders    UA (URINE) with reflex to Microscopic            Subjective:      Patient ID: Layla Gomes is a 53 y o  male  54 yo  male s/p Baritric surgery, seen at [facility] for hematuria and dysuria, diagnosed with cystitis, started on Keflex, states is still having minor discomfort with urination  Has not seen any blood in the urine but urine is dark yellow  Complains of painful varicose veins of L thigh  Pain is worse as the day goes by  Denies fever, chills, flank pain         The following portions of the patient's history were reviewed and updated as appropriate:   He  has a past medical history of Abnormal blood sugar; Abnormal TSH; Back pain; Bilateral anterior knee pain; Chronic pain disorder; CPAP (continuous positive airway pressure) dependence; DDD (degenerative disc disease), lumbar; GERD (gastroesophageal reflux disease); Hematuria; History of Helicobacter pylori infection; Hypertension; Knee pain; Morbid obesity ([address] ); Prediabetes; Sleep apnea; Suspicious nevus; Use of cane as ambulatory aid; and Vitamin D deficiency    He   Patient Active Problem List    Diagnosis Date Noted    Varicose veins of left lower extremity with inflammation 07/19/2018    Cystitis 07/19/2018    CPAP (continuous positive airway pressure) dependence     Hypertension     Benign essential hypertension 05/04/2018    Sleep apnea 05/04/2018    Morbid obesity ([facility] ) 04/10/2018    Benign hypertension 04/10/2018    Gastroesophageal reflux disease 04/10/2018    Dry eye 03/29/2018    Morbid obesity with BMI of 50 0-59 9, adult ([facility] ) 03/12/2018    KAT (obstructive sleep apnea) 03/12/2018    Benign essential HTN 03/12/2018    GERD (gastroesophageal reflux disease) 03/12/2018    Prediabetes 05/04/2017     He  has a past surgical history that includes pr egd transoral biopsy single/multiple (N/A, 12/13/2017) and pr lap gastric bypass/dulce-en-y (N/A, 5/8/2018)  His family history includes Hypertension in his maternal grandmother and mother; Prostate cancer in his father  He  reports that he has never smoked  He has never used smokeless tobacco  He reports that he does not drink alcohol or use drugs  Current Outpatient Prescriptions   Medication Sig Dispense Refill    amitriptyline (ELAVIL) 50 mg tablet Take 50 mg by mouth 2 (two) times a day      amLODIPine (NORVASC) 5 mg tablet Take 1 tablet (5 mg total) by mouth daily 90 tablet 0    artificial tear (LUBRIFRESH P M ) 83-15 % ophthalmic ointment ADMINISTER 1 APPLICATION TO BOTH EYES DAILY AT BEDTIME  1    artificial tears (REFRESH) ophthalmic ointment Administer 1 application to both eyes daily at bedtime 1 Tube 1    cephalexin (KEFLEX) 500 mg capsule Take 1 capsule (500 mg total) by mouth every 6 (six) hours for 7 days 28 capsule 0    Cholecalciferol (VITAMIN D PO) Take 1 tablet by mouth daily        ergocalciferol (VITAMIN D2) 50,000 units TAKE 1 CAPSULE EVERY OTHER WEEK  1    omeprazole (PriLOSEC) 20 mg delayed release capsule Take 20 mg by mouth every morning        phenazopyridine (PYRIDIUM) 200 mg tablet Take 1 tablet (200 mg total) by mouth 3 (three) times a day 6 tablet 0     No current facility-administered medications for this visit        Current Outpatient Prescriptions on File Prior to Visit   Medication Sig    amitriptyline (ELAVIL) 50 mg tablet Take 50 mg by mouth 2 (two) times a day    amLODIPine (NORVASC) 5 mg tablet Take 1 tablet (5 mg total) by mouth daily    artificial tear (LUBRIFRESH P M ) 83-15 % ophthalmic ointment ADMINISTER 1 APPLICATION TO BOTH EYES DAILY AT BEDTIME    artificial tears (REFRESH) ophthalmic ointment Administer 1 application to both eyes daily at bedtime    cephalexin (KEFLEX) 500 mg capsule Take 1 capsule (500 mg total) by mouth every 6 (six) hours for 7 days    Cholecalciferol (VITAMIN D PO) Take 1 tablet by mouth daily      ergocalciferol (VITAMIN D2) 50,000 units TAKE 1 CAPSULE EVERY OTHER WEEK   omeprazole (PriLOSEC) 20 mg delayed release capsule Take 20 mg by mouth every morning      phenazopyridine (PYRIDIUM) 200 mg tablet Take 1 tablet (200 mg total) by mouth 3 (three) times a day     No current facility-administered medications on file prior to visit       Review of Systems   Cardiovascular:        As per HPI   Genitourinary:        As per HPI   All other systems reviewed and are negative  Objective:      /76 (BP Location: Left arm, Patient Position: Sitting, Cuff Size: Extra-Large)   Pulse 102   Temp 97 6 °F (36 4 °C) (Tympanic)   Resp 18   Ht 5' 7 5" (1 715 m)   Wt (!) 140 kg (308 lb)   SpO2 97%   BMI 47 53 kg/m²          Physical Exam   Constitutional: He is oriented to person, place, and time  He appears well-developed  HENT:   Head: Normocephalic  Right Ear: External ear normal    Left Ear: External ear normal    Nose: Nose normal    Mouth/Throat: Oropharynx is clear and moist    Eyes: Conjunctivae and EOM are normal  Pupils are equal, round, and reactive to light  Neck: Normal range of motion  Neck supple  No thyromegaly present  Cardiovascular: Normal rate, regular rhythm and normal heart sounds  Varicose veins on L lateral thigh    Pulmonary/Chest: Effort normal and breath sounds normal    Abdominal: Soft  There is no tenderness  There is no rebound and no guarding  Musculoskeletal: Normal range of motion  Neurological: He is alert and oriented to person, place, and time  He has normal reflexes  Skin: Skin is dry  Psychiatric: He has a normal mood and affect  Nursing note and vitals reviewed

## 2018-07-24 ENCOUNTER — APPOINTMENT (OUTPATIENT)
Dept: LAB | Facility: CLINIC | Age: 48
End: 2018-07-24
Payer: COMMERCIAL

## 2018-07-24 LAB
BACTERIA UR QL AUTO: ABNORMAL /HPF
BILIRUB UR QL STRIP: ABNORMAL
CAOX CRY URNS QL MICRO: ABNORMAL /HPF
CLARITY UR: ABNORMAL
COLOR UR: ABNORMAL
GLUCOSE UR STRIP-MCNC: NEGATIVE MG/DL
HGB UR QL STRIP.AUTO: NEGATIVE
KETONES UR STRIP-MCNC: ABNORMAL MG/DL
LEUKOCYTE ESTERASE UR QL STRIP: ABNORMAL
MUCOUS THREADS UR QL AUTO: ABNORMAL
NITRITE UR QL STRIP: NEGATIVE
NON-SQ EPI CELLS URNS QL MICRO: ABNORMAL /HPF
PH UR STRIP.AUTO: 6 [PH] (ref 4.5–8)
PROT UR STRIP-MCNC: ABNORMAL MG/DL
RBC #/AREA URNS AUTO: ABNORMAL /HPF
SP GR UR STRIP.AUTO: 1.03 (ref 1–1.03)
UROBILINOGEN UR QL STRIP.AUTO: 1 E.U./DL
WBC #/AREA URNS AUTO: ABNORMAL /HPF

## 2018-07-24 PROCEDURE — 81001 URINALYSIS AUTO W/SCOPE: CPT | Performed by: FAMILY MEDICINE

## 2018-08-01 ENCOUNTER — OFFICE VISIT (OUTPATIENT)
Dept: SLEEP CENTER | Facility: CLINIC | Age: 48
End: 2018-08-01
Payer: COMMERCIAL

## 2018-08-01 ENCOUNTER — TELEPHONE (OUTPATIENT)
Dept: SLEEP CENTER | Facility: CLINIC | Age: 48
End: 2018-08-01

## 2018-08-01 VITALS
WEIGHT: 305 LBS | HEIGHT: 68 IN | BODY MASS INDEX: 46.23 KG/M2 | HEART RATE: 82 BPM | DIASTOLIC BLOOD PRESSURE: 82 MMHG | SYSTOLIC BLOOD PRESSURE: 130 MMHG

## 2018-08-01 DIAGNOSIS — E66.01 MORBID OBESITY (HCC): ICD-10-CM

## 2018-08-01 DIAGNOSIS — G47.33 OSA (OBSTRUCTIVE SLEEP APNEA): Primary | ICD-10-CM

## 2018-08-01 PROCEDURE — 99214 OFFICE O/P EST MOD 30 MIN: CPT | Performed by: INTERNAL MEDICINE

## 2018-08-01 RX ORDER — LISINOPRIL AND HYDROCHLOROTHIAZIDE 25; 20 MG/1; MG/1
1 TABLET ORAL DAILY
Refills: 1 | COMMUNITY
Start: 2018-07-25 | End: 2018-09-05 | Stop reason: CLARIF

## 2018-08-01 RX ORDER — TAPENTADOL HYDROCHLORIDE 50 MG/1
TABLET, FILM COATED ORAL
Refills: 0 | COMMUNITY
Start: 2018-05-23 | End: 2018-09-20

## 2018-08-01 NOTE — PROGRESS NOTES
Assessment/Plan:   Diagnoses and all orders for this visit:    KAT (obstructive sleep apnea)  -     PAP DME Resupply/Reorder    Morbid obesity ([address] )    Other orders  -     lisinopril-hydrochlorothiazide (PRINZIDE,ZESTORETIC) 20-25 MG per tablet; Take 1 tablet by mouth daily  -     NUCYNTA 50 MG tablet; TAKE ONE TABLET BY MOUTH EVERY 4 TO 6 HOURS AS NEEDED, DO not exceed THREE TABLETS PER DAY     obstructiv sleep apnea currently on CPAP of 15 cm of water doing well  [place] Sleepiness Score today is 1  Reviewed CPAP download compliance with 100% compliance, no evidence of any mask leak, with acceptable residual AHI at 15 cm of water  Discussed with the patient to continue with the current settings change of CPAP supplies cleaning of the supplies also discussed  He will call [facility] [place] [place] 6 months for change of CPAP supplies  Recommend weight loss  Follow-up in 1 year or p r n  earlier  as needed  Return in about 1 year (around 8/1/2019)  All questions are answered to the patient's satisfaction and understanding  He verbalizes understanding  He is encouraged to call with any further questions or concerns  Portions of the record may have been created with voice recognition software  Occasional wrong word or "sound a like" substitutions may have occurred due to the inherent limitations of voice recognition software  Read the chart carefully and recognize, using context, where substitutions have occurred  Electronically Signed by Ifrah Lam MD    ______________________________________________________________________    Chief Complaint:   Chief Complaint   Patient presents with    Follow-up       Patient ID: Rickie Peters is a 53 y o  y o  male has a past medical history of Abnormal blood sugar; Abnormal TSH;  Back pain; Bilateral anterior knee pain; Chronic pain disorder; CPAP (continuous positive airway pressure) dependence; DDD (degenerative disc disease), lumbar; GERD (gastroesophageal reflux disease); Hematuria; History of Helicobacter pylori infection; Hypertension; Knee pain; Morbid obesity ([address] ); Prediabetes; Sleep apnea; Suspicious nevus; Use of cane as ambulatory aid; and Vitamin D deficiency  8/1/2018  Patient is a very pleasant 62-year-old gentleman, diagnosed with moderate obstructive sleep apnea increasing in severity during the REM stage, and currently on CPAP consistently using it  Although he initially he was titrated to a CPAP of 20 could not tolerate trach, we had decrease it to 15 cm of water last visit when he came in, and he has been doing well on the 15 cm with decreased nocturnal awakenings and he feels well rested when he wakes up in the morning and he is able to tolerate the CPAP he states  He does not complain of any nasal congestion or postnasal drip  Review of Systems    Genitourinary none   Cardiology none   Gastrointestinal none   Neurology none   Constitutional none   Integumentary none   Psychiatry none   Musculoskeletal back pain   Pulmonary none   ENT none   Endocrine none   Hematological none         Smoking history: He reports that he has never smoked   He has never used smokeless tobacco     The following portions of the patient's history were reviewed and updated as appropriate: allergies, current medications, past family history, past medical history, past social history, past surgical history and problem list     Immunization History   Administered Date(s) Administered    Influenza Quadrivalent Preservative Free 3 years and older IM 10/20/2017     Current Outpatient Prescriptions   Medication Sig Dispense Refill    amitriptyline (ELAVIL) 50 mg tablet Take 50 mg by mouth 2 (two) times a day      amLODIPine (NORVASC) 5 mg tablet Take 1 tablet (5 mg total) by mouth daily 90 tablet 0    artificial tear (LUBRIFRESH P M ) 83-15 % ophthalmic ointment ADMINISTER 1 APPLICATION TO BOTH EYES DAILY AT BEDTIME  1    artificial tears (REFRESH) ophthalmic ointment Administer 1 application to both eyes daily at bedtime 1 Tube 1    Cholecalciferol (VITAMIN D PO) Take 1 tablet by mouth daily        ergocalciferol (VITAMIN D2) 50,000 units TAKE 1 CAPSULE EVERY OTHER WEEK  1    NUCYNTA 50 MG tablet TAKE ONE TABLET BY MOUTH EVERY 4 TO 6 HOURS AS NEEDED, DO not exceed THREE TABLETS PER DAY  0    omeprazole (PriLOSEC) 20 mg delayed release capsule Take 20 mg by mouth every morning        lisinopril-hydrochlorothiazide (PRINZIDE,ZESTORETIC) 20-25 MG per tablet Take 1 tablet by mouth daily  1    phenazopyridine (PYRIDIUM) 200 mg tablet Take 1 tablet (200 mg total) by mouth 3 (three) times a day 6 tablet 0     No current facility-administered medications for this visit  Allergies: Patient has no known allergies  Objective:  Vitals:    08/01/18 1100   BP: 130/82   Pulse: 82   Weight: (!) 138 kg (305 lb)   Height: 5' 7 5" (1 715 m)        Wt Readings from Last 3 Encounters:   08/01/18 (!) 138 kg (305 lb)   07/19/18 (!) 140 kg (308 lb)   07/12/18 (!) 138 kg (305 lb)     Body mass index is 47 06 kg/m²  Physical Exam   Constitutional: He is oriented to person, place, and time  He appears well-developed and well-nourished  HENT:   Head: Normocephalic and atraumatic  Crowded oropharyngeal airways, Mallampati score 4   Eyes: EOM are normal  Pupils are equal, round, and reactive to light  Neck: Normal range of motion  Neck supple  Short and wide neck   Cardiovascular: Normal rate, regular rhythm and normal heart sounds  Pulmonary/Chest: Effort normal and breath sounds normal    Abdominal: Soft  Bowel sounds are normal    Musculoskeletal: Normal range of motion  Neurological: He is alert and oriented to person, place, and time  Skin: Skin is warm and dry  Psychiatric: He has a normal mood and affect   His behavior is normal             ESS: Total score: 1

## 2018-08-06 RX ORDER — ERGOCALCIFEROL 1.25 MG/1
CAPSULE ORAL
Qty: 8 CAPSULE | Refills: 1 | OUTPATIENT
Start: 2018-08-06

## 2018-08-06 NOTE — TELEPHONE ENCOUNTER
Please review request for medication refill  I am no longer associated with the [facility] and no longer the patient's PCP

## 2018-08-28 ENCOUNTER — OFFICE VISIT (OUTPATIENT)
Dept: BARIATRICS | Facility: CLINIC | Age: 48
End: 2018-08-28
Payer: COMMERCIAL

## 2018-08-28 VITALS
TEMPERATURE: 98.8 F | DIASTOLIC BLOOD PRESSURE: 66 MMHG | HEIGHT: 68 IN | RESPIRATION RATE: 18 BRPM | WEIGHT: 299.5 LBS | SYSTOLIC BLOOD PRESSURE: 122 MMHG | BODY MASS INDEX: 45.39 KG/M2 | HEART RATE: 78 BPM

## 2018-08-28 DIAGNOSIS — G47.33 OSA (OBSTRUCTIVE SLEEP APNEA): ICD-10-CM

## 2018-08-28 DIAGNOSIS — Z98.84 BARIATRIC SURGERY STATUS: Primary | ICD-10-CM

## 2018-08-28 DIAGNOSIS — I10 BENIGN ESSENTIAL HYPERTENSION: ICD-10-CM

## 2018-08-28 DIAGNOSIS — K21.9 GASTROESOPHAGEAL REFLUX DISEASE WITHOUT ESOPHAGITIS: ICD-10-CM

## 2018-08-28 DIAGNOSIS — M79.10 MUSCLE PAIN: ICD-10-CM

## 2018-08-28 DIAGNOSIS — K91.2 POSTSURGICAL MALABSORPTION: ICD-10-CM

## 2018-08-28 PROCEDURE — 99214 OFFICE O/P EST MOD 30 MIN: CPT | Performed by: PHYSICIAN ASSISTANT

## 2018-08-28 RX ORDER — MULTIVITAMIN
1 TABLET ORAL DAILY
COMMUNITY
End: 2018-10-19 | Stop reason: SDUPTHER

## 2018-08-28 RX ORDER — TIZANIDINE HYDROCHLORIDE 2 MG/1
2 CAPSULE, GELATIN COATED ORAL 3 TIMES DAILY
Qty: 20 CAPSULE | Refills: 0 | Status: ON HOLD | OUTPATIENT
Start: 2018-08-28 | End: 2018-09-21 | Stop reason: ALTCHOICE

## 2018-08-28 NOTE — ASSESSMENT & PLAN NOTE
Patient is [place] Islander speaking  I asked our [place] Islander-speaking MA (Natan Colorado) interpret for the visit today-s/p Vonnie-En-Y Gastric Bypass with Dr Va Ramos on 5/8/2018  Overall doing Fairly well with his weight loss/as expected based on higher starting bmi  He is complaining of intermittent burning pain to angle of the trochar site on Left side of abdomen  Pain is worse with movement and with eating per patient  He is not having fever,chills, nausea or vomiting  Is taking his ppi daily    Initial:394 lb  Current: 299 5 lb  EWL: 41%   Osmar: Current  Current BMI is Body mass index is 46 22 kg/m²      Tolerating a regular diet-yes  Eating at least 60 grams of protein per day-yes  Following 30/60 minute rule with liquids-yes  Drinking at least 64 ounces of fluid per day-yes  Drinking carbonated beverages-no  Sufficient exercise-yes  Using NSAIDs regularly-no  Using nicotine-no  Using alcohol-no

## 2018-08-28 NOTE — ASSESSMENT & PLAN NOTE
Since his mild pain is also associated with meals will have him increase his ppi to twice a day for one month and if improved he was advised to decrease it back to once daily

## 2018-08-28 NOTE — ASSESSMENT & PLAN NOTE
Bp is controlled on medication   Advised on symptoms of hypotension and if this occurs advised to follow-up with his PCP

## 2018-08-28 NOTE — ASSESSMENT & PLAN NOTE
Malabsorption- patient is at risk for malabsorption of vitamins/minerals secondary to malabsorption from her procedure and restriction of intakes  Reviewed current supplements and advised on same    He is taking appropriate bariatric supplements-will order routine labs now   Taking 2 celebrate and 3-500 mg calcium citrate with D

## 2018-08-28 NOTE — ASSESSMENT & PLAN NOTE
He is complaining of Left sided abdominal pain/discomfort at the angle of the trochar site  Pain is sometimes burning in nature  Feels it is worse with movement and ? With eating  On exam abdomen was slightly tender to palpation to angle of trochar site  No rebound or guarding, negative rodriges's sign  His pain is most likely musculoskeletal but I am having him increase his omeprazole to twice daily now as well  Advised on rest   Try heating pad /microwave roll up to the area for 10 -15 minutes on and 10-15 minutes off  Do not sleep with heating pad  Advised for 3 days ONLY to add 400 mg ibuprofen every 8 hours taken with milk/protein drink and then to stop use  I will order muscle relaxer for him and advised on drowsiness effect  I offered to see him back in 1-2 weeks to recheck but he declined  He will call within this time frame if he continues to have problems

## 2018-08-28 NOTE — PATIENT INSTRUCTIONS
Follow-up in 1-2  Weeks to check abdominal discomfort  Use a heating pad or heat roll-up (microwave) on abdomen where pain is for 10-15 minutes on and 10-15 minutes off  Do NOT sleep with heating pad  Use light towel between  For 3 days ONLY-take 400 mg of ibuprofen with milk or protein drink every 8 hours  Avoid increased exercise for 7 days-but do some walking  Avoid heavy lifting for a week  I will send a prescription for a muscle relaxer to your pharmacy  This can make you drowsy so avoid driving or activities that require alertness  Also schedule a routine follow-up in 3 months     We kindly ask that you arrive 15 minutes before your scheduled appointment time with your provider to allow for our staff to room you and check your vital signs and update your chart  We thank you for your patience at your visit  Follow diet as discussed  Get lab work done prior to next office visit  It is recommended to check with your insurance BEFORE getting labs done to make sure they are covered by your policy  Make sure to HOLD any multivitamins that may contain biotin and any biotin supplements FOR 5 DAYS before any labs since it can affect the results  Follow vitamin  and mineral recommendations as reviewed with you  Exercise as tolerated  Call our office if you have any problems with abdominal pain especially associated with fever, chills, nausea, vomiting or any other concerns  All  Post-bariatric surgery patients should be aware that very small quantities of any alcohol  can cause impairment and it is very possible not to feel the effect  The effect can be in the system for several hours  It is also a stomach irritant  It is advised to AVOID alcohol, Nonsteroidal antiinflammatory drugs (NSAIDS) and nicotine of all forms   Any of these can cause stomach irritation/pain

## 2018-08-28 NOTE — PROGRESS NOTES
Assessment/Plan:    Muscle pain  He is complaining of Left sided abdominal pain/discomfort at the angle of the trochar site  Pain is sometimes burning in nature  Feels it is worse with movement and ? With eating  On exam abdomen was slightly tender to palpation to angle of trochar site  No rebound or guarding, negative rodriges's sign  His pain is most likely musculoskeletal but I am having him increase his omeprazole to twice daily now as well  Advised on rest   Try heating pad /microwave roll up to the area for 10 -15 minutes on and 10-15 minutes off  Do not sleep with heating pad  Advised for 3 days ONLY to add 400 mg ibuprofen every 8 hours taken with milk/protein drink and then to stop use  I will order muscle relaxer for him and advised on drowsiness effect  I offered to see him back in 1-2 weeks to recheck but he declined  He will call within this time frame if he continues to have problems  Bariatric surgery status  Patient is Hungarian speaking  I asked our Hungarian-speaking MA (Natan Colorado) interpret for the visit today-s/p Vonnie-En-Y Gastric Bypass with Dr Va Ramos on 5/8/2018  Overall doing Fairly well with his weight loss/as expected based on higher starting bmi  He is complaining of intermittent burning pain to angle of the trochar site on Left side of abdomen  Pain is worse with movement and with eating per patient  He is not having fever,chills, nausea or vomiting  Is taking his ppi daily    Initial:394 lb  Current: 299 5 lb  EWL: 41%   Osmar: Current  Current BMI is Body mass index is 46 22 kg/m²      Tolerating a regular diet-yes  Eating at least 60 grams of protein per day-yes  Following 30/60 minute rule with liquids-yes  Drinking at least 64 ounces of fluid per day-yes  Drinking carbonated beverages-no  Sufficient exercise-yes  Using NSAIDs regularly-no  Using nicotine-no  Using alcohol-no      Postsurgical malabsorption  Malabsorption- patient is at risk for malabsorption of vitamins/minerals secondary to malabsorption from her procedure and restriction of intakes  Reviewed current supplements and advised on same    He is taking appropriate bariatric supplements-will order routine labs now  Taking 2 celebrate and 3-500 mg calcium citrate with D    KAT (obstructive sleep apnea)  Is wearing Cpap  Advised that we recommend continuing to use Cpap for up to one year post-op unless otherwise advised by sleep medicine as this may have a beneficial effect on long-term weight loss      Benign essential hypertension  Bp is controlled on medication  Advised on symptoms of hypotension and if this occurs advised to follow-up with his PCP    Gastroesophageal reflux disease  Since his mild pain is also associated with meals will have him increase his ppi to twice a day for one month and if improved he was advised to decrease it back to once daily        Diagnoses and all orders for this visit:    Bariatric surgery status  -     CBC and Platelet; Future  -     Comprehensive metabolic panel; Future  -     Copper Level; Future  -     Ferritin; Future  -     Folate; Future  -     Iron Saturation %; Future  -     Lipid panel; Future  -     PTH, intact; Future  -     Vitamin A; Future  -     Vitamin B1, whole blood; Future  -     Vitamin B12; Future  -     Vitamin D 25 hydroxy; Future  -     Zinc; Future  -     TiZANidine (ZANAFLEX) 2 MG capsule; Take 1 capsule (2 mg total) by mouth 3 (three) times a day    Postsurgical malabsorption  -     CBC and Platelet; Future  -     Comprehensive metabolic panel; Future  -     Copper Level; Future  -     Ferritin; Future  -     Folate; Future  -     Iron Saturation %; Future  -     Lipid panel; Future  -     PTH, intact; Future  -     Vitamin A; Future  -     Vitamin B1, whole blood; Future  -     Vitamin B12; Future  -     Vitamin D 25 hydroxy;  Future  -     Zinc; Future    KAT (obstructive sleep apnea)    Gastroesophageal reflux disease without esophagitis    Benign essential hypertension  -     CBC and Platelet; Future  -     Comprehensive metabolic panel; Future  -     Copper Level; Future  -     Ferritin; Future  -     Folate; Future  -     Iron Saturation %; Future  -     Lipid panel; Future  -     PTH, intact; Future  -     Vitamin A; Future  -     Vitamin B1, whole blood; Future  -     Vitamin B12; Future  -     Vitamin D 25 hydroxy; Future  -     Zinc; Future    Muscle pain  -     TiZANidine (ZANAFLEX) 2 MG capsule; Take 1 capsule (2 mg total) by mouth 3 (three) times a day    Other orders  -     Multiple Vitamin (MULTIVITAMIN) tablet; Take 1 tablet by mouth daily  -     calcium-vitamin D 250-100 MG-UNIT per tablet; Take 1 tablet by mouth 2 (two) times a day          Subjective:      Patient ID: Ambrosio Cummins is a 53 y o  male  He is here in routine follow-up  Tolerating a regular diet  He complains of intermittent burning pain to angle of Left trochar abdominal site  He denies fever,chills, nausea or vomiting with the pain and is moving his bowels well  Pain is Worse with movement and with meals at times per patient  He is taking bariatric supplements and is exercising well  The following portions of the patient's history were reviewed and updated as appropriate: allergies, current medications, past family history, past medical history, past social history, past surgical history and problem list     Review of Systems   Constitutional: Negative for chills and fever  Unexpected weight change: planned weight loss  Respiratory: Negative for shortness of breath and wheezing  Cardiovascular: Negative for chest pain and palpitations  Gastrointestinal: Positive for abdominal pain  Negative for constipation, diarrhea, nausea and vomiting  See HPI/discussion   Psychiatric/Behavioral: Suicidal ideas: no complait of anxiety or depression           Objective:      /66   Pulse 78   Temp 98 8 °F (37 1 °C)   Resp 18   Ht 5' 7 5" (1 715 m)   Wt 136 kg (299 lb 8 oz)   BMI 46 22 kg/m²          Physical Exam   Constitutional: He is oriented to person, place, and time  He appears well-developed and well-nourished  HENT:   Mouth/Throat: Oropharynx is clear and moist    Eyes: Conjunctivae are normal  No scleral icterus  Cardiovascular: Normal rate and regular rhythm  Pulmonary/Chest: Effort normal and breath sounds normal    Abdominal: Soft  Bowel sounds are normal  He exhibits no distension and no mass  There is tenderness  There is no rebound and no guarding  No incisional hernias appreciated  + mild tenderness to palpation to angle of trochar site  Negative rodriges's sign   Musculoskeletal:   Normal gait   Neurological: He is alert and oriented to person, place, and time  Psychiatric: He has a normal mood and affect  His behavior is normal  Judgment and thought content normal    Nursing note and vitals reviewed  GOALS: Continued weight loss with good nutrition intakes    Normal vitamin and mineral levels  Exercise as tolerated  Resolve abdominal/muscle pain    BARRIERS: none identified

## 2018-09-05 ENCOUNTER — CONSULT (OUTPATIENT)
Dept: VASCULAR SURGERY | Facility: CLINIC | Age: 48
End: 2018-09-05
Payer: COMMERCIAL

## 2018-09-05 VITALS
HEIGHT: 67 IN | WEIGHT: 300 LBS | DIASTOLIC BLOOD PRESSURE: 72 MMHG | BODY MASS INDEX: 47.09 KG/M2 | TEMPERATURE: 97.6 F | SYSTOLIC BLOOD PRESSURE: 136 MMHG

## 2018-09-05 DIAGNOSIS — I10 BENIGN ESSENTIAL HTN: ICD-10-CM

## 2018-09-05 DIAGNOSIS — I83.12 VARICOSE VEINS OF LEFT LOWER EXTREMITY WITH INFLAMMATION: Primary | ICD-10-CM

## 2018-09-05 DIAGNOSIS — E66.01 MORBID OBESITY WITH BMI OF 50.0-59.9, ADULT (HCC): ICD-10-CM

## 2018-09-05 PROCEDURE — 99244 OFF/OP CNSLTJ NEW/EST MOD 40: CPT | Performed by: PHYSICIAN ASSISTANT

## 2018-09-05 NOTE — PATIENT INSTRUCTIONS
Varicose Veins   WHAT YOU NEED TO KNOW:   What are varicose veins? Varicose veins are veins that become large, twisted, and swollen  They are common on the back of the calves, knees, and thighs  Varicose veins are caused by valves in your veins that do not work properly  This causes blood to collect and increase pressure in the veins of your legs  The increased pressure causes your veins to stretch, get larger, swell, and twist        What increases my risk for varicose veins? · Pregnancy    · A family history of varicose veins    · Being overweight or obese    · Age 48 years or older    · Sitting or standing for long periods of time    · Wearing tight clothing  What are the signs and symptoms of varicose veins? Your symptoms may be worse after you stand or sit for long periods of time  You may have any of the following:  · Blue, purple, or bulging veins in your legs     · Pain, swelling, or muscle cramps in your legs    · Feeling of fatigue or heaviness in your legs  How are varicose veins diagnosed? Your healthcare provider will examine your legs and ask about your medical history  You may need tests, such as a Doppler ultrasound or duplex scan  These tests show your veins and valves, and how your blood is flowing through them  These tests may also show if there is a blockage or blood clot  How are varicose veins treated? The goal of treatment is to decrease symptoms, improve appearance, and prevent further problems  Treatment will depend on which veins are affected and how severe your condition is  You may need procedures to treat or remove your varicose veins  For example, your healthcare provider may inject a solution or use a laser to close the varicose veins  Surgery to remove long veins may also be done  Ask your healthcare provider for more information about procedures used to treat varicose veins  What can I do to manage my symptoms? · Do not sit or stand for long periods of time    This can cause the blood to collect in your legs and make your symptoms worse  Bend or rotate your ankles several times every hour  Walk around for a few minutes every hour to get blood moving in your legs  · Do not cross your legs when you sit  This decreases blood flow to your feet and can make your symptoms worse  · Do not wear tight clothing or shoes  Do not wear high-heeled shoes  Do not wear clothes that are tight around the waist or knees  · Maintain a healthy weight  Being overweight or obese can make your varicose veins worse  Ask your healthcare provider how much you should weigh  Ask him or her to help you create a weight loss plan if you are overweight  · Wear pressure stockings as directed  The stockings are tight and put pressure on your legs  They improve blood flow and help prevent clots  · Elevate your legs  Keep them above the level of your heart for 15 to 30 minutes several times a day  You can also prop the end of your bed up slightly to elevate your legs while you sleep  This will help blood to flow back to your heart  · Get regular exercise  Talk to your healthcare provider about the best exercise plan for you  Exercise can improve blood flow to your legs and feet  When should I seek immediate care? · You have a wound that does not heal or is infected  · You have an injury that has broken your skin and caused your varicose veins to bleed  · Your leg is swollen and hard  · You notice that your legs or feet are turning blue or black  · Your leg feels warm, tender, and painful  It may look swollen and red  When should I contact my healthcare provider? · You have pain in your leg that does not go away or gets worse  · You notice sudden large bruising on your legs  · You have a rash on your leg  · Your symptoms keep you from doing your daily activities  · You have questions or concerns about your condition or care    CARE AGREEMENT:   You have the right to help plan your care  Learn about your health condition and how it may be treated  Discuss treatment options with your caregivers to decide what care you want to receive  You always have the right to refuse treatment  The above information is an  only  It is not intended as medical advice for individual conditions or treatments  Talk to your doctor, nurse or pharmacist before following any medical regimen to see if it is safe and effective for you  © 2017 [employer]  Information is for End User's use only and may not be sold, redistributed or otherwise used for commercial purposes  All illustrations and images included in CareNotes® are the copyrighted property of [employer]  or Mike Johnson

## 2018-09-05 NOTE — ASSESSMENT & PLAN NOTE
81-year-old Kinyarwanda-speaking male with history of morbid obesity, status post Vonnie-en-Y gastric bypass, hypertension and symptomatic left lateral thigh superficial varicosities and symptoms of venous incompetence    -recommend 3 month trial conservative measures to include daily use of compression stockings, lower extremity elevation, low-sodium diet, weight management, aerobic activity and skin moisturization  -LEVDR in 3 months  -return to office with surgeon in 3 months with Rafael Arellano for re-evaluation  -instructed patient to contact the office in the interim with any questions, concerns or change in symptoms  -patient requests to return to office in January as he will be in [facility] until that time

## 2018-09-05 NOTE — PROGRESS NOTES
Varicose veins of left lower extremity with inflammation  50-year-old Welsh-speaking male with history of morbid obesity, status post Vonnie-en-Y gastric bypass, hypertension and symptomatic left lateral thigh superficial varicosities and symptoms of venous incompetence  -recommend 3 month trial conservative measures to include daily use of compression stockings, lower extremity elevation, low-sodium diet, weight management, aerobic activity and skin moisturization  -LEVDR in 3 months  -return to office with surgeon in 3 months with Mickey Parkinson for re-evaluation  -instructed patient to contact the office in the interim with any questions, concerns or change in symptoms  -patient requests to return to office in January as he will be in \[facility]\"" until that time      Benign essential HTN  -continue to optimize medical management  -management per PCP    Mor[facility] with [facility] 50 0-59 9, [facility]  S/p Vonnie-en-Y gastric bypass by Dr Neftali Miguel  -100 pound weight loss so far  -continue weight management for symptomatic relief of varicose veins      Assessment/Plan   Diagnoses and all orders for this visit:    Varicose veins of left lower extremity with inflammation  -     Compression Stocking  -     VAS reflux lower limb venous duplex study with reflux assessment, complete bilateral; Future    Morbid obesity with BMI of 50 0-59 9, adult ([facility] )    Benign essential HTN        No chief complaint on file  Subjective   Patient ID: Jamila Orellana is a 53 y o  male  Chief complaint: pt is here to discuss VV to left leg  Pt states he has had them for 6 months  Pt states veins are painful, bulging, burning, itchy and swelling  Pt denies use of compression stockings  Pt states VV are interfering with work  Pt has HX of leg injury       50-year-old Welsh-speaking male with history of HTN, KAT, GERD, morbid obesity, status post Vonnie-en-Y gastric bypass, hypertension and symptomatic left lateral thigh superficial varicosities and symptoms of venous incompetence who referred for evaluation of his varicose veins  Patient is evaluated today with the utilization language line  Patient complains of burning, status numbness, pruritus and paresthesias over cluster superficial varicosities on the left anterolateral thigh  He also complains of bilateral lower extremity heaviness, pain and edema with standing for prolonged periods of time  The patient has had a 100 pound weight loss since his gastric bypass surgery  He denies prior history of DVT, PE, hypercoagulable disorder, bleeding varicosities, venous ulcerations, recurrent lower extremity cellulitis, superficial thrombophlebitis or venous stasis dermatitis  He does had notable changes of hemosiderin staining bilateral lower extremities  Patient denies family history of DVT, PE or hypercoagulable disorder  Patient has not been evaluated previously related to his varicose veins  Patient does not use compression stockings or elevate his legs  The following portions of the patient's history were reviewed and updated as appropriate: allergies, current medications, past family history, past medical history, past social history, past surgical history and problem list     Review of Systems   Constitutional: Negative  HENT: Negative  Eyes: Negative  Respiratory: Negative  Cardiovascular: Positive for leg swelling  Gastrointestinal: Negative  Endocrine: Negative  Genitourinary: Negative  Musculoskeletal: Positive for back pain  Skin: Positive for color change  Allergic/Immunologic: Negative  Neurological: Negative  Hematological: Negative  Psychiatric/Behavioral: Negative          Patient Active Problem List   Diagnosis    Morbid obesity with BMI of 50 0-59 9, adult ([facility] )    KAT (obstructive sleep apnea)    Benign essential HTN    GERD (gastroesophageal reflux disease)    Dry eye    Morbid obesity ([facility]    Benign hypertension    Gastroesophageal reflux disease    Prediabetes    Benign essential hypertension    Sleep apnea    CPAP (continuous positive airway pressure) dependence    Hypertension    Varicose veins of left lower extremity with inflammation    Cystitis    Bariatric surgery status    Postsurgical malabsorption    Muscle pain       Past Surgical History:   Procedure Laterality Date    KNEE SURGERY Left 07/27/2018    UT EGD TRANSORAL BIOPSY SINGLE/MULTIPLE N/A 12/13/2017    Procedure: ESOPHAGOGASTRODUODENOSCOPY (EGD), with BIOPSY;  Surgeon: Yariel Velez MD;  [facility]: [facility]; Service: Bariatrics    UT LAP GASTRIC BYPASS/ENRICO-EN-Y N/A 5/8/2018    Procedure: BYPASS GASTRIC  ENRICO-EN-Y LAPAROSCOPIC AND INTRAOPERATIVE EGD;  Surgeon: Yariel Velez MD;  [facility];  Service: Bariatrics       Family History   Problem Relation Age of Onset    Hypertension Mother     Prostate cancer Father         ALSO PROSTATE ENLARGEMENT    Hypertension Maternal Grandmother        Social History     Social History    Marital status: Single     Spouse name: N/A    Number of children: N/A    Years of education: N/A     Occupational History    Not on file       Social History Main Topics    Smoking status: Never Smoker    Smokeless tobacco: Never Used    Alcohol use No    Drug use: No    Sexual activity: Not on file     Other Topics Concern    Not on file     Social History Narrative    No narrative on file       No Known Allergies      Current Outpatient Prescriptions:     amitriptyline (ELAVIL) 50 mg tablet, Take 50 mg by mouth 2 (two) times a day, Disp: , Rfl:     amLODIPine (NORVASC) 5 mg tablet, Take 1 tablet (5 mg total) by mouth daily, Disp: 90 tablet, Rfl: 0    artificial tear (LUBRIFRESH P M ) 83-15 % ophthalmic ointment, ADMINISTER 1 APPLICATION TO BOTH EYES DAILY AT BEDTIME, Disp: , Rfl: 1    artificial tears (REFRESH) ophthalmic ointment, Administer 1 application to both eyes daily at bedtime, Disp: 1 Tube, Rfl: 1    ergocalciferol (VITAMIN D2) 50,000 units, TAKE 1 CAPSULE EVERY OTHER WEEK , Disp: , Rfl: 1    Multiple Vitamin (MULTIVITAMIN) tablet, Take 1 tablet by mouth daily, Disp: , Rfl:     NUCYNTA 50 MG tablet, TAKE ONE TABLET BY MOUTH EVERY 4 TO 6 HOURS AS NEEDED, DO not exceed THREE TABLETS PER DAY, Disp: , Rfl: 0    omeprazole (PriLOSEC) 20 mg delayed release capsule, Take 20 mg by mouth every morning  , Disp: , Rfl:     TiZANidine (ZANAFLEX) 2 MG capsule, Take 1 capsule (2 mg total) by mouth 3 (three) times a day, Disp: 20 capsule, Rfl: 0    Objective     Physical Exam:    General appearance: alert and oriented, in no acute distress  Obese  Skin: Skin color, texture, turgor normal  No rashes or lesions  Neurologic: Grossly normal  Head: Normocephalic, without obvious abnormality, atraumatic  Eyes: PERRL, EOMI, sclerae nonicteric  Throat: lips, mucosa, and tongue normal; teeth and gums normal  Neck: no adenopathy, no carotid bruit, no JVD, supple, symmetrical, trachea midline and thyroid not enlarged, symmetric, no tenderness/mass/nodules  Back: symmetric, no curvature  ROM normal  No CVA tenderness  Lungs: clear to auscultation bilaterally  Chest wall: no tenderness  Heart: regular rate and rhythm, S1, S2 normal, no murmur, click, rub or gallop  Abdomen: soft, non-tender; bowel sounds normal; no masses,  no organomegaly  Extremities: extremities normal, warm and well-perfused; no cyanosis, clubbing, varicose veins noted and Cluster superficial varicosities noted left mid anterior lateral thigh no evidence of truncal varicosities  No venous ulcerations or bleeding varicosities  Mild hemosiderin staining bilateral lower legs    1+ edema bilateral lower extremities    Pulse exam:  Radial: Right: 2+ Left[de-identified] 2+  DP: Right: 2+ Left: 2+  PT: Right: 1+ Left: 1+

## 2018-09-05 NOTE — ASSESSMENT & PLAN NOTE
S/p Vonnie-en-Y gastric bypass by Dr Semaj hWitt  -100 pound weight loss so far  -continue weight management for symptomatic relief of varicose veins

## 2018-09-05 NOTE — LETTER
September 5, 2018     Corinne Saenz MD  [facility]  [address] [place] [address]  [address] [facility]    Patient: Jules Gomez   YOB: 1970   Date of Visit: 9/5/2018       Dear Dr Bharathi Sebastian:    Thank you for referring Jules Gomez to me for evaluation  Below are my notes for this consultation  If you have questions, please do not hesitate to call me  I look forward to following your patient along with you  Sincerely,        Marcella Montgomery,         CC: No Recipients  Doug Hanley PA-C  9/5/2018  4:57 PM  Sign at close encounter  Varicose veins of left lower extremity with inflammation  81-year-old Paraguayan-speaking male with history of morbid obesity, status post Vonnie-en-Y gastric bypass, hypertension and symptomatic left lateral thigh superficial varicosities and symptoms of venous incompetence    -recommend 3 month trial conservative measures to include daily use of compression stockings, lower extremity elevation, low-sodium diet, weight management, aerobic activity and skin moisturization  -LEVDR in 3 months  -return to office with surgeon in 3 months with Lawrence Black for re-evaluation  -instructed patient to contact the office in the interim with any questions, concerns or change in symptoms  -patient requests to return to office in January as he will be in [facility] until that time      Benign essential HTN  -continue to optimize medical management  -management per PCP    Morbid obesity with BMI of 50 0-59 9, adult ([facility] )  S/p Vonnie-en-Y gastric bypass by Dr Bassem Quintanilla  -100 pound weight loss so far  -continue weight management for symptomatic relief of varicose veins      Assessment/Plan   Diagnoses and all orders for this visit:    Varicose veins of left lower extremity with inflammation  -     Compression Stocking  -     VAS reflux lower limb venous duplex study with reflux assessment, complete bilateral; Future    Morbid obesity with BMI of 50 0-59 9, adult ([facility] )    Benign essential HTN        No chief complaint on file  Subjective   Patient ID: Yasmine Cook is a 53 y o  male  Chief complaint: pt is here to discuss VV to left leg  Pt states he has had them for 6 months  Pt states veins are painful, bulging, burning, itchy and swelling  Pt denies use of compression stockings  Pt states VV are interfering with work  Pt has HX of leg injury  76-year-old Solomon Islander-speaking male with history of HTN, KAT, GERD, morbid obesity, status post Vonnie-en-Y gastric bypass, hypertension and symptomatic left lateral thigh superficial varicosities and symptoms of venous incompetence who referred for evaluation of his varicose veins  Patient is evaluated today with the utilization language line  Patient complains of burning, status numbness, pruritus and paresthesias over cluster superficial varicosities on the left anterolateral thigh  He also complains of bilateral lower extremity heaviness, pain and edema with standing for prolonged periods of time  The patient has had a 100 pound weight loss since his gastric bypass surgery  He denies prior history of DVT, PE, hypercoagulable disorder, bleeding varicosities, venous ulcerations, recurrent lower extremity cellulitis, superficial thrombophlebitis or venous stasis dermatitis  He does had notable changes of hemosiderin staining bilateral lower extremities  Patient denies family history of DVT, PE or hypercoagulable disorder  Patient has not been evaluated previously related to his varicose veins  Patient does not use compression stockings or elevate his legs  The following portions of the patient's history were reviewed and updated as appropriate: allergies, current medications, past family history, past medical history, past social history, past surgical history and problem list     Review of Systems   Constitutional: Negative  HENT: Negative  Eyes: Negative  Respiratory: Negative  Cardiovascular: Positive for leg swelling  Gastrointestinal: Negative  Endocrine: Negative  Genitourinary: Negative  Musculoskeletal: Positive for back pain  Skin: Positive for color change  Allergic/Immunologic: Negative  Neurological: Negative  Hematological: Negative  Psychiatric/Behavioral: Negative  Patient Active Problem List   Diagnosis    Morbid obesity with BMI of 50 0-59 9, adult ([facility] )    KAT (obstructive sleep apnea)    Benign essential HTN    GERD (gastroesophageal reflux disease)    Dry eye    [facility]    Benign hypertension    Gastroesophageal reflux disease    Prediabetes    Benign essential hypertension    Sleep apnea    CPAP (continuous positive airway pressure) dependence    Hypertension    Varicose veins of left lower extremity with inflammation    Cystitis    Bariatric surgery status    Postsurgical malabsorption    Muscle pain       Past Surgical History:   Procedure Laterality Date    KNEE SURGERY Left 07/27/2018    PA EGD TRANSORAL BIOPSY SINGLE/MULTIPLE N/A 12/13/2017    Procedure: ESOPHAGOGASTRODUODENOSCOPY (EGD), with BIOPSY;  Surgeon: Evaristo Weinstein MD;  [facility]; Service: Bariatrics    PA LAP GASTRIC BYPASS/ENRICO-EN-Y N/A 5/8/2018    Procedure: BYPASS GASTRIC  ENRICO-EN-Y LAPAROSCOPIC AND INTRAOPERATIVE EGD;  Surgeon: Evaristo Weinstein MD;  [facility];  Service: Bariatrics       Family History   Problem Relation Age of Onset    Hypertension Mother     Prostate cancer Father         ALSO PROSTATE ENLARGEMENT    Hypertension Maternal Grandmother        Social History     Social History    Marital status: Single     Spouse name: N/A    Number of children: N/A    Years of education: N/A     Occupational History    Not on file       Social History Main Topics    Smoking status: Never Smoker    Smokeless tobacco: Never Used    Alcohol use No    Drug use: No    Sexual activity: Not on file     Other Topics Concern    Not on file     Social History Narrative    No narrative on file       No Known Allergies      Current Outpatient Prescriptions:     amitriptyline (ELAVIL) 50 mg tablet, Take 50 mg by mouth 2 (two) times a day, Disp: , Rfl:     amLODIPine (NORVASC) 5 mg tablet, Take 1 tablet (5 mg total) by mouth daily, Disp: 90 tablet, Rfl: 0    artificial tear (LUBRIFRESH P M ) 83-15 % ophthalmic ointment, ADMINISTER 1 APPLICATION TO BOTH EYES DAILY AT BEDTIME, Disp: , Rfl: 1    artificial tears (REFRESH) ophthalmic ointment, Administer 1 application to both eyes daily at bedtime, Disp: 1 Tube, Rfl: 1    ergocalciferol (VITAMIN D2) 50,000 units, TAKE 1 CAPSULE EVERY OTHER WEEK , Disp: , Rfl: 1    Multiple Vitamin (MULTIVITAMIN) tablet, Take 1 tablet by mouth daily, Disp: , Rfl:     NUCYNTA 50 MG tablet, TAKE ONE TABLET BY MOUTH EVERY 4 TO 6 HOURS AS NEEDED, DO not exceed THREE TABLETS PER DAY, Disp: , Rfl: 0    omeprazole (PriLOSEC) 20 mg delayed release capsule, Take 20 mg by mouth every morning  , Disp: , Rfl:     TiZANidine (ZANAFLEX) 2 MG capsule, Take 1 capsule (2 mg total) by mouth 3 (three) times a day, Disp: 20 capsule, Rfl: 0    Objective     Physical Exam:    General appearance: alert and oriented, in no acute distress  Obese  Skin: Skin color, texture, turgor normal  No rashes or lesions  Neurologic: Grossly normal  Head: Normocephalic, without obvious abnormality, atraumatic  Eyes: PERRL, EOMI, sclerae nonicteric  Throat: lips, mucosa, and tongue normal; teeth and gums normal  Neck: no adenopathy, no carotid bruit, no JVD, supple, symmetrical, trachea midline and thyroid not enlarged, symmetric, no tenderness/mass/nodules  Back: symmetric, no curvature  ROM normal  No CVA tenderness    Lungs: clear to auscultation bilaterally  Chest wall: no tenderness  Heart: regular rate and rhythm, S1, S2 normal, no murmur, click, rub or gallop  Abdomen: soft, non-tender; bowel sounds normal; no masses,  no organomegaly  Extremities: extremities normal, warm and well-perfused; no cyanosis, clubbing, varicose veins noted and Cluster superficial varicosities noted left mid anterior lateral thigh no evidence of truncal varicosities  No venous ulcerations or bleeding varicosities  Mild hemosiderin staining bilateral lower legs    1+ edema bilateral lower extremities    Pulse exam:  Radial: Right: 2+ Left[de-identified] 2+  DP: Right: 2+ Left: 2+  PT: Right: 1+ Left: 1+

## 2018-09-19 DIAGNOSIS — I10 ESSENTIAL HYPERTENSION: ICD-10-CM

## 2018-09-20 ENCOUNTER — HOSPITAL ENCOUNTER (INPATIENT)
Facility: HOSPITAL | Age: 48
LOS: 2 days | Discharge: HOME | DRG: 421 | End: 2018-09-22
Attending: EMERGENCY MEDICINE | Admitting: INTERNAL MEDICINE
Payer: COMMERCIAL

## 2018-09-20 ENCOUNTER — APPOINTMENT (EMERGENCY)
Dept: CT IMAGING | Facility: HOSPITAL | Age: 48
DRG: 421 | End: 2018-09-20
Payer: COMMERCIAL

## 2018-09-20 DIAGNOSIS — R20.8 DYSESTHESIA OF MULTIPLE SITES: ICD-10-CM

## 2018-09-20 DIAGNOSIS — K91.2 POSTSURGICAL MALABSORPTION: ICD-10-CM

## 2018-09-20 DIAGNOSIS — I63.9 RIGHT-SIDED CEREBROVASCULAR ACCIDENT (CVA) (HCC): ICD-10-CM

## 2018-09-20 DIAGNOSIS — I63.9 STROKE (HCC): Primary | ICD-10-CM

## 2018-09-20 LAB
ALBUMIN SERPL BCP-MCNC: 3.5 G/DL (ref 3.5–5)
ALP SERPL-CCNC: 94 U/L (ref 46–116)
ALT SERPL W P-5'-P-CCNC: 35 U/L (ref 12–78)
ANION GAP SERPL CALCULATED.3IONS-SCNC: 6 MMOL/L (ref 4–13)
AST SERPL W P-5'-P-CCNC: 23 U/L (ref 5–45)
ATRIAL RATE: 69 BPM
BASOPHILS # BLD AUTO: 0.02 THOUSANDS/ΜL (ref 0–0.1)
BASOPHILS NFR BLD AUTO: 0 % (ref 0–1)
BILIRUB SERPL-MCNC: 0.43 MG/DL (ref 0.2–1)
BILIRUB UR QL STRIP: NEGATIVE
BUN SERPL-MCNC: 11 MG/DL (ref 5–25)
CALCIUM SERPL-MCNC: 9.1 MG/DL (ref 8.3–10.1)
CHLORIDE SERPL-SCNC: 101 MMOL/L (ref 100–108)
CLARITY UR: CLEAR
CLARITY, POC: CLEAR
CO2 SERPL-SCNC: 33 MMOL/L (ref 21–32)
COLOR UR: YELLOW
COLOR, POC: YELLOW
CREAT SERPL-MCNC: 0.9 MG/DL (ref 0.6–1.3)
EOSINOPHIL # BLD AUTO: 0.09 THOUSAND/ΜL (ref 0–0.61)
EOSINOPHIL NFR BLD AUTO: 1 % (ref 0–6)
ERYTHROCYTE [DISTWIDTH] IN BLOOD BY AUTOMATED COUNT: 14.7 % (ref 11.6–15.1)
GFR SERPL CREATININE-BSD FRML MDRD: 101 ML/MIN/1.73SQ M
GLUCOSE SERPL-MCNC: 95 MG/DL (ref 65–140)
GLUCOSE UR STRIP-MCNC: NEGATIVE MG/DL
HCT VFR BLD AUTO: 42.6 % (ref 36.5–49.3)
HGB BLD-MCNC: 14.5 G/DL (ref 12–17)
HGB UR QL STRIP.AUTO: NEGATIVE
IMM GRANULOCYTES # BLD AUTO: 0.01 THOUSAND/UL (ref 0–0.2)
IMM GRANULOCYTES NFR BLD AUTO: 0 % (ref 0–2)
KETONES UR STRIP-MCNC: NEGATIVE MG/DL
LEUKOCYTE ESTERASE UR QL STRIP: NEGATIVE
LIPASE SERPL-CCNC: 180 U/L (ref 73–393)
LYMPHOCYTES # BLD AUTO: 2.47 THOUSANDS/ΜL (ref 0.6–4.47)
LYMPHOCYTES NFR BLD AUTO: 39 % (ref 14–44)
MCH RBC QN AUTO: 30.1 PG (ref 26.8–34.3)
MCHC RBC AUTO-ENTMCNC: 34 G/DL (ref 31.4–37.4)
MCV RBC AUTO: 88 FL (ref 82–98)
MONOCYTES # BLD AUTO: 0.52 THOUSAND/ΜL (ref 0.17–1.22)
MONOCYTES NFR BLD AUTO: 8 % (ref 4–12)
NEUTROPHILS # BLD AUTO: 3.18 THOUSANDS/ΜL (ref 1.85–7.62)
NEUTS SEG NFR BLD AUTO: 52 % (ref 43–75)
NITRITE UR QL STRIP: NEGATIVE
NRBC BLD AUTO-RTO: 0 /100 WBCS
P AXIS: 75 DEGREES
PH UR STRIP.AUTO: 6.5 [PH] (ref 4.5–8)
PLATELET # BLD AUTO: 212 THOUSANDS/UL (ref 149–390)
PMV BLD AUTO: 9.9 FL (ref 8.9–12.7)
POTASSIUM SERPL-SCNC: 4.1 MMOL/L (ref 3.5–5.3)
PR INTERVAL: 182 MS
PROT SERPL-MCNC: 7.1 G/DL (ref 6.4–8.2)
PROT UR STRIP-MCNC: NEGATIVE MG/DL
QRS AXIS: 73 DEGREES
QRSD INTERVAL: 88 MS
QT INTERVAL: 378 MS
QTC INTERVAL: 405 MS
RBC # BLD AUTO: 4.82 MILLION/UL (ref 3.88–5.62)
SODIUM SERPL-SCNC: 140 MMOL/L (ref 136–145)
SP GR UR STRIP.AUTO: 1.01 (ref 1–1.03)
T WAVE AXIS: 66 DEGREES
UROBILINOGEN UR QL STRIP.AUTO: 0.2 E.U./DL
VENTRICULAR RATE: 69 BPM
WBC # BLD AUTO: 6.29 THOUSAND/UL (ref 4.31–10.16)

## 2018-09-20 PROCEDURE — 81003 URINALYSIS AUTO W/O SCOPE: CPT

## 2018-09-20 PROCEDURE — 36415 COLL VENOUS BLD VENIPUNCTURE: CPT | Performed by: EMERGENCY MEDICINE

## 2018-09-20 PROCEDURE — 83690 ASSAY OF LIPASE: CPT | Performed by: EMERGENCY MEDICINE

## 2018-09-20 PROCEDURE — 70450 CT HEAD/BRAIN W/O DYE: CPT

## 2018-09-20 PROCEDURE — 93010 ELECTROCARDIOGRAM REPORT: CPT | Performed by: INTERNAL MEDICINE

## 2018-09-20 PROCEDURE — 80053 COMPREHEN METABOLIC PANEL: CPT | Performed by: EMERGENCY MEDICINE

## 2018-09-20 PROCEDURE — 93005 ELECTROCARDIOGRAM TRACING: CPT

## 2018-09-20 PROCEDURE — 85025 COMPLETE CBC W/AUTO DIFF WBC: CPT | Performed by: EMERGENCY MEDICINE

## 2018-09-20 PROCEDURE — 99285 EMERGENCY DEPT VISIT HI MDM: CPT

## 2018-09-20 PROCEDURE — 74177 CT ABD & PELVIS W/CONTRAST: CPT

## 2018-09-20 RX ORDER — OXYCODONE HCL 10 MG/1
10 TABLET, FILM COATED, EXTENDED RELEASE ORAL EVERY 12 HOURS SCHEDULED
Status: ON HOLD | COMMUNITY
End: 2018-09-21 | Stop reason: CLARIF

## 2018-09-20 RX ORDER — LISINOPRIL AND HYDROCHLOROTHIAZIDE 25; 20 MG/1; MG/1
1 TABLET ORAL DAILY
Qty: 30 TABLET | Refills: 3 | Status: SHIPPED | OUTPATIENT
Start: 2018-09-20 | End: 2018-09-20

## 2018-09-20 RX ORDER — LISINOPRIL AND HYDROCHLOROTHIAZIDE 25; 20 MG/1; MG/1
1 TABLET ORAL DAILY
Status: ON HOLD | COMMUNITY
End: 2018-09-21 | Stop reason: ALTCHOICE

## 2018-09-20 RX ADMIN — IOHEXOL 100 ML: 350 INJECTION, SOLUTION INTRAVENOUS at 21:08

## 2018-09-20 RX ADMIN — IOHEXOL 25 ML: 240 INJECTION, SOLUTION INTRATHECAL; INTRAVASCULAR; INTRAVENOUS; ORAL at 21:08

## 2018-09-20 NOTE — ED NOTES
Pt with pain under the incision site since gastric bypass surgery 4 months ago  No redness or swelling at site  Pt denies N/V/D   Pt also states nerve pain starting in left side of hip and radiating down leg and also radiating up into head     Bashir Sevilla RN  09/20/18 [address] RN  09/20/18 [id number]

## 2018-09-21 ENCOUNTER — APPOINTMENT (INPATIENT)
Dept: MRI IMAGING | Facility: HOSPITAL | Age: 48
DRG: 421 | End: 2018-09-21
Payer: COMMERCIAL

## 2018-09-21 ENCOUNTER — APPOINTMENT (INPATIENT)
Dept: CT IMAGING | Facility: HOSPITAL | Age: 48
DRG: 421 | End: 2018-09-21
Payer: COMMERCIAL

## 2018-09-21 ENCOUNTER — APPOINTMENT (INPATIENT)
Dept: NON INVASIVE DIAGNOSTICS | Facility: HOSPITAL | Age: 48
DRG: 421 | End: 2018-09-21
Payer: COMMERCIAL

## 2018-09-21 PROBLEM — I63.9 RIGHT-SIDED CEREBROVASCULAR ACCIDENT (CVA) (HCC): Status: ACTIVE | Noted: 2018-09-21

## 2018-09-21 PROBLEM — R07.89 OTHER CHEST PAIN: Status: ACTIVE | Noted: 2018-09-21

## 2018-09-21 PROBLEM — R19.8 NONSPECIFIC ABDOMINAL SYMPTOM: Status: ACTIVE | Noted: 2018-09-21

## 2018-09-21 PROBLEM — N32.89 BLADDER WALL THICKENING: Status: ACTIVE | Noted: 2018-09-21

## 2018-09-21 LAB
ANION GAP SERPL CALCULATED.3IONS-SCNC: 6 MMOL/L (ref 4–13)
BASOPHILS # BLD AUTO: 0.02 THOUSANDS/ΜL (ref 0–0.1)
BASOPHILS NFR BLD AUTO: 0 % (ref 0–1)
BUN SERPL-MCNC: 10 MG/DL (ref 5–25)
CALCIUM SERPL-MCNC: 8.8 MG/DL (ref 8.3–10.1)
CHLORIDE SERPL-SCNC: 105 MMOL/L (ref 100–108)
CHOLEST SERPL-MCNC: 113 MG/DL (ref 50–200)
CO2 SERPL-SCNC: 29 MMOL/L (ref 21–32)
CREAT SERPL-MCNC: 0.85 MG/DL (ref 0.6–1.3)
EOSINOPHIL # BLD AUTO: 0.12 THOUSAND/ΜL (ref 0–0.61)
EOSINOPHIL NFR BLD AUTO: 2 % (ref 0–6)
ERYTHROCYTE [DISTWIDTH] IN BLOOD BY AUTOMATED COUNT: 14.9 % (ref 11.6–15.1)
EST. AVERAGE GLUCOSE BLD GHB EST-MCNC: 105 MG/DL
FERRITIN SERPL-MCNC: 141 NG/ML (ref 8–388)
FOLATE SERPL-MCNC: >20 NG/ML (ref 3.1–17.5)
GFR SERPL CREATININE-BSD FRML MDRD: 104 ML/MIN/1.73SQ M
GLUCOSE SERPL-MCNC: 91 MG/DL (ref 65–140)
HBA1C MFR BLD: 5.3 % (ref 4.2–6.3)
HCT VFR BLD AUTO: 43.2 % (ref 36.5–49.3)
HDLC SERPL-MCNC: 47 MG/DL (ref 40–60)
HGB BLD-MCNC: 14.7 G/DL (ref 12–17)
IMM GRANULOCYTES # BLD AUTO: 0.01 THOUSAND/UL (ref 0–0.2)
IMM GRANULOCYTES NFR BLD AUTO: 0 % (ref 0–2)
IRON SATN MFR SERPL: 28 %
IRON SERPL-MCNC: 90 UG/DL (ref 65–175)
LDLC SERPL CALC-MCNC: 49 MG/DL (ref 0–100)
LYMPHOCYTES # BLD AUTO: 2.62 THOUSANDS/ΜL (ref 0.6–4.47)
LYMPHOCYTES NFR BLD AUTO: 38 % (ref 14–44)
MAGNESIUM SERPL-MCNC: 2.1 MG/DL (ref 1.6–2.6)
MCH RBC QN AUTO: 30.3 PG (ref 26.8–34.3)
MCHC RBC AUTO-ENTMCNC: 34 G/DL (ref 31.4–37.4)
MCV RBC AUTO: 89 FL (ref 82–98)
MONOCYTES # BLD AUTO: 0.59 THOUSAND/ΜL (ref 0.17–1.22)
MONOCYTES NFR BLD AUTO: 9 % (ref 4–12)
NEUTROPHILS # BLD AUTO: 3.55 THOUSANDS/ΜL (ref 1.85–7.62)
NEUTS SEG NFR BLD AUTO: 51 % (ref 43–75)
NRBC BLD AUTO-RTO: 0 /100 WBCS
PLATELET # BLD AUTO: 204 THOUSANDS/UL (ref 149–390)
PLATELET # BLD AUTO: 230 THOUSANDS/UL (ref 149–390)
PMV BLD AUTO: 10.1 FL (ref 8.9–12.7)
PMV BLD AUTO: 10.2 FL (ref 8.9–12.7)
POTASSIUM SERPL-SCNC: 3.9 MMOL/L (ref 3.5–5.3)
RBC # BLD AUTO: 4.85 MILLION/UL (ref 3.88–5.62)
SODIUM SERPL-SCNC: 140 MMOL/L (ref 136–145)
TIBC SERPL-MCNC: 318 UG/DL (ref 250–450)
TRIGL SERPL-MCNC: 84 MG/DL
TROPONIN I SERPL-MCNC: <0.02 NG/ML
TROPONIN I SERPL-MCNC: <0.02 NG/ML
VIT B12 SERPL-MCNC: 372 PG/ML (ref 100–900)
WBC # BLD AUTO: 6.91 THOUSAND/UL (ref 4.31–10.16)

## 2018-09-21 PROCEDURE — 82652 VIT D 1 25-DIHYDROXY: CPT | Performed by: NURSE PRACTITIONER

## 2018-09-21 PROCEDURE — G8988 SELF CARE GOAL STATUS: HCPCS

## 2018-09-21 PROCEDURE — 94760 N-INVAS EAR/PLS OXIMETRY 1: CPT

## 2018-09-21 PROCEDURE — 83550 IRON BINDING TEST: CPT | Performed by: NURSE PRACTITIONER

## 2018-09-21 PROCEDURE — 99254 IP/OBS CNSLTJ NEW/EST MOD 60: CPT | Performed by: PSYCHIATRY & NEUROLOGY

## 2018-09-21 PROCEDURE — 93306 TTE W/DOPPLER COMPLETE: CPT | Performed by: INTERNAL MEDICINE

## 2018-09-21 PROCEDURE — 82746 ASSAY OF FOLIC ACID SERUM: CPT | Performed by: NURSE PRACTITIONER

## 2018-09-21 PROCEDURE — 93306 TTE W/DOPPLER COMPLETE: CPT

## 2018-09-21 PROCEDURE — 84425 ASSAY OF VITAMIN B-1: CPT | Performed by: NURSE PRACTITIONER

## 2018-09-21 PROCEDURE — 84630 ASSAY OF ZINC: CPT | Performed by: NURSE PRACTITIONER

## 2018-09-21 PROCEDURE — 84207 ASSAY OF VITAMIN B-6: CPT | Performed by: NURSE PRACTITIONER

## 2018-09-21 PROCEDURE — 84484 ASSAY OF TROPONIN QUANT: CPT | Performed by: NURSE PRACTITIONER

## 2018-09-21 PROCEDURE — G8978 MOBILITY CURRENT STATUS: HCPCS

## 2018-09-21 PROCEDURE — G8987 SELF CARE CURRENT STATUS: HCPCS

## 2018-09-21 PROCEDURE — 82180 ASSAY OF ASCORBIC ACID: CPT | Performed by: NURSE PRACTITIONER

## 2018-09-21 PROCEDURE — G8979 MOBILITY GOAL STATUS: HCPCS

## 2018-09-21 PROCEDURE — G8989 SELF CARE D/C STATUS: HCPCS

## 2018-09-21 PROCEDURE — 82728 ASSAY OF FERRITIN: CPT | Performed by: NURSE PRACTITIONER

## 2018-09-21 PROCEDURE — 99223 1ST HOSP IP/OBS HIGH 75: CPT | Performed by: INTERNAL MEDICINE

## 2018-09-21 PROCEDURE — 83036 HEMOGLOBIN GLYCOSYLATED A1C: CPT | Performed by: NURSE PRACTITIONER

## 2018-09-21 PROCEDURE — 80048 BASIC METABOLIC PNL TOTAL CA: CPT | Performed by: NURSE PRACTITIONER

## 2018-09-21 PROCEDURE — 94660 CPAP INITIATION&MGMT: CPT

## 2018-09-21 PROCEDURE — 85025 COMPLETE CBC W/AUTO DIFF WBC: CPT | Performed by: NURSE PRACTITIONER

## 2018-09-21 PROCEDURE — 85049 AUTOMATED PLATELET COUNT: CPT | Performed by: NURSE PRACTITIONER

## 2018-09-21 PROCEDURE — 97165 OT EVAL LOW COMPLEX 30 MIN: CPT

## 2018-09-21 PROCEDURE — 84446 ASSAY OF VITAMIN E: CPT | Performed by: NURSE PRACTITIONER

## 2018-09-21 PROCEDURE — 70496 CT ANGIOGRAPHY HEAD: CPT

## 2018-09-21 PROCEDURE — 83540 ASSAY OF IRON: CPT | Performed by: NURSE PRACTITIONER

## 2018-09-21 PROCEDURE — 82607 VITAMIN B-12: CPT | Performed by: NURSE PRACTITIONER

## 2018-09-21 PROCEDURE — 70551 MRI BRAIN STEM W/O DYE: CPT

## 2018-09-21 PROCEDURE — 82525 ASSAY OF COPPER: CPT | Performed by: NURSE PRACTITIONER

## 2018-09-21 PROCEDURE — 83735 ASSAY OF MAGNESIUM: CPT | Performed by: NURSE PRACTITIONER

## 2018-09-21 PROCEDURE — 97163 PT EVAL HIGH COMPLEX 45 MIN: CPT

## 2018-09-21 PROCEDURE — 70498 CT ANGIOGRAPHY NECK: CPT

## 2018-09-21 PROCEDURE — 84590 ASSAY OF VITAMIN A: CPT | Performed by: NURSE PRACTITIONER

## 2018-09-21 PROCEDURE — 80061 LIPID PANEL: CPT | Performed by: NURSE PRACTITIONER

## 2018-09-21 RX ORDER — AMITRIPTYLINE HYDROCHLORIDE 50 MG/1
50 TABLET, FILM COATED ORAL
Status: DISCONTINUED | OUTPATIENT
Start: 2018-09-21 | End: 2018-09-22 | Stop reason: HOSPADM

## 2018-09-21 RX ORDER — ATORVASTATIN CALCIUM 40 MG/1
40 TABLET, FILM COATED ORAL EVERY EVENING
Status: DISCONTINUED | OUTPATIENT
Start: 2018-09-21 | End: 2018-09-21

## 2018-09-21 RX ORDER — OXYCODONE HCL 10 MG/1
10 TABLET, FILM COATED, EXTENDED RELEASE ORAL 2 TIMES DAILY PRN
Status: DISCONTINUED | OUTPATIENT
Start: 2018-09-21 | End: 2018-09-21

## 2018-09-21 RX ORDER — OXYCODONE HYDROCHLORIDE AND ACETAMINOPHEN 5; 325 MG/1; MG/1
1 TABLET ORAL EVERY 4 HOURS PRN
COMMUNITY
End: 2019-07-24

## 2018-09-21 RX ORDER — CLOPIDOGREL BISULFATE 75 MG/1
75 TABLET ORAL DAILY
Status: DISCONTINUED | OUTPATIENT
Start: 2018-09-21 | End: 2018-09-22 | Stop reason: HOSPADM

## 2018-09-21 RX ORDER — OXYCODONE HCL 10 MG/1
10 TABLET, FILM COATED, EXTENDED RELEASE ORAL DAILY
Status: DISCONTINUED | OUTPATIENT
Start: 2018-09-21 | End: 2018-09-21

## 2018-09-21 RX ORDER — PANTOPRAZOLE SODIUM 20 MG/1
20 TABLET, DELAYED RELEASE ORAL
Status: DISCONTINUED | OUTPATIENT
Start: 2018-09-21 | End: 2018-09-22 | Stop reason: HOSPADM

## 2018-09-21 RX ORDER — CHOLECALCIFEROL (VITAMIN D3) 10 MCG
1 TABLET ORAL
Status: DISCONTINUED | OUTPATIENT
Start: 2018-09-21 | End: 2018-09-22 | Stop reason: HOSPADM

## 2018-09-21 RX ORDER — OXYCODONE HYDROCHLORIDE AND ACETAMINOPHEN 5; 325 MG/1; MG/1
1 TABLET ORAL EVERY 4 HOURS PRN
Status: DISCONTINUED | OUTPATIENT
Start: 2018-09-21 | End: 2018-09-22 | Stop reason: HOSPADM

## 2018-09-21 RX ORDER — MINERAL OIL AND PETROLATUM 150; 830 MG/G; MG/G
OINTMENT OPHTHALMIC AS NEEDED
Status: DISCONTINUED | OUTPATIENT
Start: 2018-09-21 | End: 2018-09-22 | Stop reason: HOSPADM

## 2018-09-21 RX ORDER — ONDANSETRON 2 MG/ML
4 INJECTION INTRAMUSCULAR; INTRAVENOUS EVERY 6 HOURS PRN
Status: DISCONTINUED | OUTPATIENT
Start: 2018-09-21 | End: 2018-09-22 | Stop reason: HOSPADM

## 2018-09-21 RX ADMIN — AMITRIPTYLINE HYDROCHLORIDE 50 MG: 50 TABLET, FILM COATED ORAL at 21:16

## 2018-09-21 RX ADMIN — IOHEXOL 100 ML: 350 INJECTION, SOLUTION INTRAVENOUS at 16:33

## 2018-09-21 RX ADMIN — AMITRIPTYLINE HYDROCHLORIDE 50 MG: 50 TABLET, FILM COATED ORAL at 00:50

## 2018-09-21 RX ADMIN — PANTOPRAZOLE SODIUM 20 MG: 20 TABLET, DELAYED RELEASE ORAL at 06:10

## 2018-09-21 RX ADMIN — OXYCODONE HYDROCHLORIDE AND ACETAMINOPHEN 1 TABLET: 5; 325 TABLET ORAL at 23:38

## 2018-09-21 RX ADMIN — ENOXAPARIN SODIUM 40 MG: 40 INJECTION SUBCUTANEOUS at 08:24

## 2018-09-21 RX ADMIN — CLOPIDOGREL 75 MG: 75 TABLET, FILM COATED ORAL at 08:24

## 2018-09-21 RX ADMIN — ATORVASTATIN CALCIUM 40 MG: 40 TABLET, FILM COATED ORAL at 00:50

## 2018-09-21 RX ADMIN — Medication 1 CAPSULE: at 17:28

## 2018-09-21 RX ADMIN — OXYCODONE HYDROCHLORIDE AND ACETAMINOPHEN 1 TABLET: 5; 325 TABLET ORAL at 19:35

## 2018-09-21 RX ADMIN — OXYCODONE HYDROCHLORIDE 10 MG: 10 TABLET, FILM COATED, EXTENDED RELEASE ORAL at 08:24

## 2018-09-21 RX ADMIN — Medication 1 TABLET: at 08:24

## 2018-09-21 NOTE — OCCUPATIONAL THERAPY NOTE
[address]  Evaluation     Patient Name: Jamila Orellana  IEVUZ'U Date: 9/21/2018  Problem List  Patient Active Problem List   Diagnosis    Morbid obesity with BMI of 50 0-59 9, adult ([facility] )    KAT (obstructive sleep apnea)    Benign essential HTN    GERD (gastroesophageal reflux disease)    Dry eye    [facility]    Benign hypertension    Gastroesophageal reflux disease    Prediabetes    Benign essential hypertension    Sleep apnea    CPAP (continuous positive airway pressure) dependence    Hypertension    Varicose veins of left lower extremity with inflammation    Cystitis    Bariatric surgery status    Postsurgical malabsorption    Muscle pain    Bladder wall thickening    [facility] accident [facility]    Nonspecific abdominal symptom    Other chest pain     Past Medical History  Past Medical History:   Diagnosis Date    Abnormal blood sugar     Abnormal TSH     Back pain     Bilateral anterior knee pain     Chronic pain disorder     back    CPAP (continuous positive airway pressure) dependence     DDD (degenerative disc disease), lumbar     GERD (gastroesophageal reflux disease)     Hematuria     History of Helicobacter pylori infection     Hypertension     Knee pain     Morbid obesity ([facility]     Prediabetes     Sleep apnea     Suspicious nevus     Use of cane as ambulatory aid     Vitamin D deficiency      Past Surgical History  Past Surgical History:   Procedure Laterality Date    KNEE SURGERY Left 07/27/2018    [place] EGD TRANSORAL BIOPSY SINGLE/MULTIPLE N/A 12/13/2017    Procedure: ESOPHAGOGASTRODUODENOSCOPY (EGD), with BIOPSY;  Surgeon: Candy Knox MD;  [facility];   Service: Bariatrics    TN LAP GASTRIC BYPASS/ENRICO-EN-Y N/A 5/8/2018    Procedure: BYPASS GASTRIC  ENRICO-EN-Y LAPAROSCOPIC AND INTRAOPERATIVE EGD;  Surgeon: Candy Knox MD;  [facility];  Service: Annel Lester           09/21/18 1200   Note Type Note type Eval/Treat   Restrictions/Precautions   Weight Bearing Precautions Per Order No   Other Precautions Fall Risk;Pain;Telemetry   Home Living   Type of [address] Multi-level;Stairs to enter with rails   Bathroom Shower/Tub Tub/shower unit   [address]   Prior Function   Level of [place] Independent with ADLs and functional mobility   Lives With Friend(s)   Receives Help From Friend(s)   ADL Assistance Independent   IADLs Independent   Falls in the last 6 months 0   Vocational Workers comp   Comments pt lives w/ friends in [place] and bed on 3rd floor and bath on 2nd floor   Lifestyle   Autonomy per pt independent w/ ADLs, independent w/ functional transfers and mobility w/ crutch, driving   Reciprocal Relationships friends   Service to Others on disability   Intrinsic Gratification fixing cellphones and computers, music   ADL   Where Assessed Chair   Eating Assistance 7  Independent   Grooming Assistance 7  Independent   UB Bathing Assistance 7  Independent   LB Bathing Assistance 7  Independent   UB Dressing Assistance 7  Independent    [address] 6  Modified independent   [address]  6  Modified independent   Bed Mobility   Supine to Sit 5  Supervision   Additional items Increased time required   Additional Comments w/ VCs for positioning and safety   Transfers   Sit to Stand 5  Supervision   Additional items Increased time required   Stand to Sit 5  Supervision   Additional items Increased time required   Functional Mobility   Functional Mobility 5  Supervision   Additional Comments w/ crutch   Balance   Static Sitting Good   Dynamic Sitting Fair +   Static Standing Fair +   Dynamic Standing Fair   Ambulatory Fair   Activity Tolerance   Activity Tolerance Patient tolerated treatment well   Nurse Made Aware appropriate to see per Mary Alice EDUARDO Assessment   SHARYN Assessment WFL  (4/5)   KRISHNA Assessment   LUE Assessment WFL  (4/5)   Hand Function   Gross Motor Coordination Functional   Fine Motor Coordination Functional   Sensation   Light Touch No apparent deficits   Sharp/Dull No apparent deficits   Proprioception   Proprioception No apparent deficits   Vision-Basic Assessment   Current Vision No visual deficits   Patient Visual Report (reports when electric, shooting pain in eyes at times)   Vision - Complex Assessment   Ocular Range of Motion [facility]   Acuity Able to read clock/calendar on wall without difficulty; Able to read employee name badge without difficulty   Perception   Inattention/Neglect Appears intact   Cognition   Overall Cognitive Status [facility]   Arousal/Participation Alert; Cooperative   Attention Within functional limits   Orientation Level Oriented X4   Memory Decreased short term memory   Following Commands Follows one step commands without difficulty   Comments pt reports difficulty word finding, mainly Sudanese speaking only able, able to speak some English,  able to recall 2/3 words   Assessment   Limitation Decreased ADL status; Decreased UE strength;Decreased Safe judgement during ADL;Decreased cognition;Decreased endurance;Decreased self-care trans;Decreased high-level ADLs   Prognosis Good   Assessment Pt is a 53 y o  male seen for OT evaluation s/p admit to [facility] on 9/20/2018 w/ Right-sided cerebrovascular accident (CVA) [facility]) age indeterminate infarct of R caudate  Comorbidities affecting pt's functional performance at time of assessment include: chest pain, KAT, bariatric surgery status, HTN, prediabetes, GERD, h/o knee arthroscopy  Personal factors affecting pt at time of IE include: increased pain  Prior to admission, pt was living w/ friends and reports independent w/ ADLs, independent w/ functional transfers and mobility w/ crutch, independent w/ IADLs, driving    Upon evaluation: Pt requires mod I bed mobility, supervision functional transfers, supervision w/ crutch, MOD I ADLS, MOD I toileting 2* the following deficits impacting occupational performance: increased pain electric shock on L side of body, impaired balance, impaired STM 2/3 words  Pt appears to be at baseline for ADLs, functional transfers and mobility  Pt educated on benefit of outpatient therapy for cognition  D/c OT at this time  From OT standpoint, recommendation at time of d/c would be outpatient OT     Goals   Patient Goals "get stronger"   Plan   OT Frequency Eval only   Recommendation   OT Discharge Recommendation Outpatient OT   OT - OK to Discharge Yes   Barthel Index   Feeding 10   Bathing 5   Grooming Score 5   Dressing Score 10   Bladder Score 10   Bowels Score 10   Toilet Use Score 10   Transfers (Bed/Chair) Score 15   Mobility (Level Surface) Score 10   Stairs Score 5   Barthel Index Score 90   Modified Destini Scale   Modified Garnett Scale 1     Monalisa Ewing MS, OTR/L

## 2018-09-21 NOTE — SOCIAL WORK
CM met with patient to discuss d/c plan and do general open, introduced self and explained role  Patient lives in [place] [facility] [place] [facility] [place] with a friend in a multilevel house  PTA independent with adl's, uses crutches currently, no other dme, still drives  He is on disability since 2016 after work injury  A friend will be able to  at d/c  Rx coverage available, uses [facility] on [address]  PCP is Ji Webb MD    No poa/ad, does not want info at this time  Patient was doing Outpatient PT prior to admission  PT/OT recommend Outpatient PT at d/c, patient states he will continue with outpatient pt at d/c  No CM needs identified at this time by myself or patient, will continue to follow as case progress

## 2018-09-21 NOTE — ASSESSMENT & PLAN NOTE
· Secondary to vitamin deficiency from recent bariatric surgery and noncompliance with supplements  · His symptoms: "Electrical sensation originating from lower abdomen radiating down to both toes, up the spine into the back of the head" do not localized to a single lesion  Straight leg raise is also negative bilaterally  · Will discharge on bariatric multivitamin as well as extra B complex vitamin  · Findings discussed with the patient    Compliance with vitamin supplements emphasized

## 2018-09-21 NOTE — ASSESSMENT & PLAN NOTE
· CT of abdomen shows bladder wall thickening vs underdistention, r/o cystitis  On July,  CT showed same finding; patient was seen in ER with c/o flank pain and hematuria  Was treated for cystitis with rx for keflex and recommended outpt f/u with urology    · Currently denies urinary symptoms, dysuria or hematuria  · UA negative  · Outpatient follow-up with Urology; consider inpatient evaluation

## 2018-09-21 NOTE — SPEECH THERAPY NOTE
Speech Language/Pathology  Speech/Language Pathology  Assessment    Patient Name: Valentina Chatterjee  Today's Date: 9/21/2018     Problem List  Patient Active Problem List   Diagnosis    Morbid obesity with BMI of 50 0-59 9, adult ([facility] )    KAT (obstructive sleep apnea)    Benign essential HTN    GERD (gastroesophageal reflux disease)    Dry eye    Morbid obesity ([facility] )    Benign hypertension    Gastroesophageal reflux disease    Prediabetes    Benign essential hypertension    Sleep apnea    CPAP (continuous positive airway pressure) dependence    Hypertension    Varicose veins of left lower extremity with inflammation    Cystitis    Bariatric surgery status    Postsurgical malabsorption    Muscle pain    Bladder wall thickening    Right-sided cerebrovascular accident (CVA) ([facility] )    Nonspecific abdominal symptom    Other chest pain     Past Medical History  Past Medical History:   Diagnosis Date    Abnormal blood sugar     Abnormal TSH     Back pain     Bilateral anterior knee pain     Chronic pain disorder     back    CPAP (continuous positive airway pressure) dependence     DDD (degenerative disc disease), lumbar     GERD (gastroesophageal reflux disease)     Hematuria     History of Helicobacter pylori infection     Hypertension     Knee pain     Morbid obesity ([facility] )     Prediabetes     Sleep apnea     Suspicious nevus     Use of cane as ambulatory aid     Vitamin D deficiency      Past Surgical History  Past Surgical History:   Procedure Laterality Date    KNEE SURGERY Left 07/27/2018    NM EGD TRANSORAL BIOPSY SINGLE/MULTIPLE N/A 12/13/2017    Procedure: ESOPHAGOGASTRODUODENOSCOPY (EGD), with BIOPSY;  Surgeon: Jessica Anderson MD;  [facility];   Service: Bariatrics    NM LAP GASTRIC BYPASS/ENRICO-EN-Y N/A 5/8/2018    Procedure: BYPASS GASTRIC  ENRICO-EN-Y LAPAROSCOPIC AND INTRAOPERATIVE EGD;  Surgeon: Jessica Anderson MD;  [facility];  [facility] Chief Complaint:   Left sided abdomen sensation of electric current radiating to L leg and head     History of Present Illness:     Bishnu Angel is a 53 y o  male who presents with c/o sensation of electric current in left abdomen radiating to L leg and head x5 days  Reports intermittent dizziness, lightheadedness, headaches, weakness, confusion, sometimes unable to express himself, decreased/blurred vision, left side of his face feels heavy and weak, dropping things from both hands- symptoms are intermittent x5days  Also c/o chest pain associated with slight shortness of breath, no nausea, no diaphoresis but feels hot, not associated with meals, exacerbated when blood pressure is elevated, relieved with relaxation and wetting his head with cold water or with cool air  Denies nausea, emesis, constipation, diarrhea, palpitations, fever, cough, chills, dysuria or hematuria      Ct head:  IMPRESSION:  Age indeterminate infarct in the head of the right cartilage  Further evaluation with MRI is recommended    MRI:IMPRESSION:  Old right caudate head and body lacunar infarction  No acute intracranial pathology or evidence of acute ischemia  Spoke w/ nurse who reported no concerns  Pt observed talking on the phone wnl  Breakfast tray was completed  Noted order was later canceled by neuro

## 2018-09-21 NOTE — ASSESSMENT & PLAN NOTE
· Reports the feeling of an electrical current in the left abdomen radiating to his left leg then up to his head x5 days

## 2018-09-21 NOTE — H&P
H&P- Mayra Deeds 1970, 53 y o  male MRN: [id number]    Unit/Bed#: E4 -01 Encounter: [id number]    Primary Care Provider: Wes Bryant MD   Date and time admitted to hospital: 9/20/2018  7:09 PM      * Right-sided cerebrovascular accident (CVA) [facility])   Assessment & Plan    · Head CT: Age indeterminate infarct in the head of the right caudate  · Stroke pathway  · Add plavix and statin (no ASA d/t recent bariatric surgery)  As per ER physician he discussed with Neurology and was told to start plavix instead  · Brain MRI  · Echo        Other chest pain   Assessment & Plan    · Reports intermittent chest pain for a few days which he relates to high BP  · MIRLANDE: 0  · EKG:  NSR rate 69, no ischemic changes  · Will check troponin  · Telemetry monitoring  · BP control  · Echo ordered for stroke pathway  · Outpatient Cardiology follow-up        Nonspecific abdominal symptom   Assessment & Plan    · Reports the feeling of an electrical current in the left abdomen radiating to his left leg then up to his head x5 days        KAT (obstructive sleep apnea)   Assessment & Plan    · CPAP at hs        Bladder wall thickening   Assessment & Plan    · CT of abdomen shows bladder wall thickening vs underdistention, r/o cystitis  On July,  CT showed same finding; patient was seen in ER with c/o flank pain and hematuria  Was treated for cystitis with rx for keflex and recommended outpt f/u with urology    · Currently denies urinary symptoms, dysuria or hematuria  · UA negative  · Outpatient follow-up with Urology; consider inpatient evaluation        Bariatric surgery status   Assessment & Plan    · S/p BYPASS GASTRIC  ENRICO-EN-Y LAPAROSCOPIC AND INTRAOPERATIVE EGD 5/2018        Hypertension   Assessment & Plan    · Hold to allow for permissive hypertension  · Can likely resume tomorrow, will defer to Neurology        Prediabetes   Assessment & Plan    · A1c 5 9 Oct 2017  · Will check A1c  · ADA diet        GERD (gastroesophageal reflux disease)   Assessment & Plan    · Continue PPI            VTE Prophylaxis: Enoxaparin (Lovenox)  / sequential compression device   Code Status: FC  POLST: POLST is not applicable to this patient  Discussion with family:     Anticipated Length of Stay:  Patient will be admitted on an Inpatient basis with an anticipated length of stay of  > 2 midnights  Justification for Hospital Stay: acute R CVA    Total Time for Visit, including Counseling / Coordination of Care: 1 hour  Greater than 50% of this total time spent on direct patient counseling and coordination of care  Chief Complaint:   Left sided abdomen sensation of electric current radiating to L leg and head    History of Present Illness:    Romina Hayden is a 53 y o  male who presents with c/o sensation of electric current in left abdomen radiating to L leg and head x5 days  Reports intermittent dizziness, lightheadedness, headaches, weakness, confusion, sometimes unable to express himself, decreased/blurred vision, left side of his face feels heavy and weak, dropping things from both hands- symptoms are intermittent x5days  Also c/o chest pain associated with slight shortness of breath, no nausea, no diaphoresis but feels hot, not associated with meals, exacerbated when blood pressure is elevated, relieved with relaxation and wetting his head with cold water or with cool air  Denies nausea, emesis, constipation, diarrhea, palpitations, fever, cough, chills, dysuria or hematuria  Review of Systems:    Review of Systems   Constitutional: Negative for appetite change, chills, diaphoresis, fatigue and fever  HENT: Negative  Eyes: Positive for visual disturbance  Blurred vision   Respiratory: Positive for shortness of breath  Cardiovascular: Positive for chest pain  Gastrointestinal: Negative for constipation, diarrhea, nausea and vomiting  Abdominal sensation of electric current   Genitourinary: Negative  Musculoskeletal:        Knee pain b/l   Neurological: Positive for dizziness, speech difficulty, weakness, light-headedness and headaches  Bilateral arm weakness, difficulty expressing his thoughts, intermittent left face weakness and heaviness   Psychiatric/Behavioral: Positive for confusion  Past Medical and Surgical History:     Past Medical History:   Diagnosis Date    Abnormal blood sugar     Abnormal TSH     Back pain     Bilateral anterior knee pain     Chronic pain disorder     back    CPAP (continuous positive airway pressure) dependence     DDD (degenerative disc disease), lumbar     GERD (gastroesophageal reflux disease)     Hematuria     History of Helicobacter pylori infection     Hypertension     Knee pain     Morbid obesity (HCC)     Prediabetes     Sleep apnea     Suspicious nevus     Use of cane as ambulatory aid     Vitamin D deficiency        Past Surgical History:   Procedure Laterality Date    KNEE SURGERY Left 07/27/2018    [place] EGD TRANSORAL BIOPSY SINGLE/MULTIPLE N/A 12/13/2017    Procedure: ESOPHAGOGASTRODUODENOSCOPY (EGD), with BIOPSY;  Surgeon: Fay Starkey MD;  [facility]; Service: Bariatrics    UT LAP GASTRIC BYPASS/ENRICO-EN-Y N/A 5/8/2018    Procedure: BYPASS GASTRIC  ENRICO-EN-Y LAPAROSCOPIC AND INTRAOPERATIVE EGD;  Surgeon: Fay Starkey MD;  [facility];  Service: Bariatrics       Meds/Allergies:    Prior to Admission medications    Medication Sig Start Date End Date Taking?  Authorizing Provider   amitriptyline (ELAVIL) 50 mg tablet Take 50 mg by mouth 2 (two) times a day   Yes Historical Provider, MD   amLODIPine (NORVASC) 5 mg tablet Take 1 tablet (5 mg total) by mouth daily 7/17/18  Yes Remonia MD Guillermo   artificial tear (LUBRIFRESH P M ) 83-15 % ophthalmic ointment ADMINISTER 1 APPLICATION TO BOTH EYES DAILY AT BEDTIME 4/2/18  Yes Historical Provider, MD   artificial tears (REFRESH) ophthalmic ointment Administer 1 application to both eyes daily at bedtime 3/29/18  Yes Scout Clarke MD   omeprazole (PriLOSEC) 20 mg delayed release capsule Take 20 mg by mouth every morning     Yes Historical Provider, MD   oxyCODONE (OxyCONTIN) 10 mg 12 hr tablet Take 10 mg by mouth every 12 (twelve) hours   Yes Historical Provider, MD   lisinopril-hydrochlorothiazide (PRINZIDE,ZESTORETIC) 20-25 MG per tablet Take 1 tablet by mouth daily    Historical Provider, MD   Multiple Vitamin (MULTIVITAMIN) tablet Take 1 tablet by mouth daily    Historical Provider, MD   TiZANidine (ZANAFLEX) 2 MG capsule Take 1 capsule (2 mg total) by mouth 3 (three) times a day 8/28/18   Santo Shelton PA-C     I have reviewed home medications with patient personally  Allergies: No Known Allergies    Social History:     Marital Status: Single   Occupation: unemployed after work injury   Patient Pre-hospital Living Situation: lives with friend  Patient Pre-hospital Level of Mobility: ambulates with cane  Patient Pre-hospital Diet Restrictions:   Substance Use History:   History   Alcohol Use No     History   Smoking Status    Never Smoker   Smokeless Tobacco    Never Used     History   Drug Use No       Family History:    Family History   Problem Relation Age of Onset    Hypertension Mother     Prostate cancer Father         ALSO PROSTATE ENLARGEMENT    Hypertension Maternal Grandmother        Physical Exam:     Vitals:   Blood Pressure: 118/63 (09/21/18 0300)  Pulse: 80 (09/21/18 0300)  Temperature: (!) 97 3 °F (36 3 °C) (09/21/18 0000)  Temp Source: Tympanic (09/21/18 0000)  Respirations: 18 (09/21/18 0300)  Height: 5' 7" (170 2 cm) (09/20/18 2349)  Weight - Scale: 128 kg (282 lb 3 oz) (09/20/18 2349)  SpO2: 98 % (09/21/18 0300)    Physical Exam   Constitutional: He is oriented to person, place, and time  No distress  Morbid obesity   HENT:   Head: Normocephalic and atraumatic  Eyes: Conjunctivae and EOM are normal  Right eye exhibits no discharge  Left eye exhibits no discharge  No scleral icterus  Neck: Neck supple  Cardiovascular: Normal rate, regular rhythm, normal heart sounds and intact distal pulses  No murmur heard  Pulmonary/Chest: Effort normal and breath sounds normal  No respiratory distress  He has no wheezes  He has no rales  He exhibits no tenderness  Abdominal: Soft  Bowel sounds are normal  He exhibits no distension and no mass  There is no tenderness  There is no rebound and no guarding  Musculoskeletal: He exhibits no edema or tenderness  Neurological: He is alert and oriented to person, place, and time  EOMI, PERRLA, equal strength B/L UE 5/5 B/L LE 5/5, no facial droop or slurred speech   Skin: Skin is warm and dry  No rash noted  He is not diaphoretic  No erythema  No pallor  Psychiatric: He has a normal mood and affect  His behavior is normal  Judgment and thought content normal      Additional Data:     Lab Results: I have personally reviewed pertinent reports  Results from last 7 days  Lab Units 09/21/18  0116 09/20/18 2017   WBC Thousand/uL  --  6 29   HEMOGLOBIN g/dL  --  14 5   HEMATOCRIT %  --  42 6   PLATELETS Thousands/uL 230 212   NEUTROS PCT %  --  52   LYMPHS PCT %  --  39   MONOS PCT %  --  8   EOS PCT %  --  1       Results from last 7 days  Lab Units 09/20/18 2017   SODIUM mmol/L 140   POTASSIUM mmol/L 4 1   CHLORIDE mmol/L 101   CO2 mmol/L 33*   BUN mg/dL 11   CREATININE mg/dL 0 90   CALCIUM mg/dL 9 1   ALK PHOS U/L 94   ALT U/L 35   AST U/L 23                   Imaging: I have personally reviewed pertinent reports  CT head without contrast   Final Result by Farhat Sinclair DO (09/20 2111)   Addendum 1 of 1 by Farhat Sinclair DO (09/20 2156)   ADDENDUM:      Impression should state age indeterminate infarct in the head of the right    caudate  Further evaluation with MRI is recommended      Final      Age indeterminate infarct in the head of the right cartilage    Further evaluation with MRI is recommended  I personally discussed this study with Shiela Espinal on 9/20/2018 at 9:08 PM                      Workstation performed: [id number]         CT abdomen pelvis with contrast   Final Result by Anthony Cassidy DO (09/20 2118)      Thickening versus underdistention of the urinary bladder wall  Clinical correlation for cystitis is recommended  Workstation performed: [id number]         MRI Inpatient Order    (Results Pending)       EKG, Pathology, and Other Studies Reviewed on Admission:   · EKG: CT CXR (7/2018)    Allscripts / Epic Records Reviewed: Yes     ** Please Note: This note has been constructed using a voice recognition system   **

## 2018-09-21 NOTE — ASSESSMENT & PLAN NOTE
· Reports the feeling of an electrical current in the left abdomen radiating to his left leg then up to his head, relates symptoms present shortly after Gastric bypass  · Was seen by bariatric on 08/20/2018 and reported symptom, symptoms felt to be musculoskeletal; given rx for muscle relaxer

## 2018-09-21 NOTE — PLAN OF CARE
Problem: PHYSICAL THERAPY ADULT  Goal: Performs mobility at highest level of function for planned discharge setting  See evaluation for individualized goals  Treatment/Interventions: Functional transfer training, LE strengthening/ROM, Elevations, Therapeutic exercise, Endurance training, Patient/family training, Equipment eval/education, Bed mobility, Gait training, Continued evaluation, Compensatory technique education, Spoke to nursing, OT          See flowsheet documentation for full assessment, interventions and recommendations  Prognosis: Good  Problem List: Decreased strength, Impaired balance, Decreased mobility, Obesity, Orthopedic restrictions, Pain  Assessment: Pt is 52 y/o male admitted to r/o CVA  CT head noted to have age indeterminate infarct  MRI confirms no acute intracranial pathology  PT consulted  Up and OOB as tolerated  Hx of recent R knee scope and ambulation with use of single crutch, receiving OPPT PTA  Presents with mild LE strength and mobility impairments, however S for bed mobiltiy, transfers and ambulation with crutch  Gait antalgic RLE consistent with recent knee arthroscopic surgery  Mild decrease in LE strength  Able to negotiate steps with rail + crutch and S, however cues to improve technique  May benefit from continued IPPT in order to optimize balance, strength and locomotion  Anticipate safe d/c to home with continued OPPT  PT will follow and progress  Barriers to Discharge: Inaccessible home environment  Barriers to Discharge Comments: 3 story home  Recommendation: Home with family support, Outpatient PT     PT - OK to Discharge: Yes    See flowsheet documentation for full assessment

## 2018-09-21 NOTE — PLAN OF CARE
Problem: DISCHARGE PLANNING - CARE MANAGEMENT  Goal: Discharge to post-acute care or home with appropriate resources  INTERVENTIONS:  - Conduct assessment to determine patient/family and health care team treatment goals, and need for post-acute services based on payer coverage, community resources, and patient preferences, and barriers to discharge  - Address psychosocial, clinical, and financial barriers to discharge as identified in assessment in conjunction with the patient/family and health care team  - Arrange appropriate level of post-acute services according to patients   needs and preference and payer coverage in collaboration with the physician and health care team  - Communicate with and update the patient/family, physician, and health care team regarding progress on the discharge plan  - Arrange appropriate transportation to post-acute venues  Outcome: Progressing    D/C home when medically cleared, patient will continue outpatient PT at d/c, friends available to  at d/c

## 2018-09-21 NOTE — ASSESSMENT & PLAN NOTE
· Reports intermittent chest pain for a few days which he relates to high BP  · MIRLANDE: 0  · EKG:  NSR rate 69, no ischemic changes  · Will check troponin  · Telemetry monitoring  · BP control  · Echo ordered for stroke pathway  · Outpatient Cardiology follow-up

## 2018-09-21 NOTE — PLAN OF CARE
Activity Intolerance/Impaired Mobility     Mobility/activity is maintained at optimum level for patient [address] Discharge to home or other facility with appropriate resources Progressing        INFECTION - ADULT     Absence or prevention of progression during hospitalization Progressing     Absence of fever/infection during neutropenic period Progressing        Knowledge Deficit     Patient/family/caregiver demonstrates understanding of disease process, treatment plan, medications, and discharge instructions Progressing        Neurological Deficit     Neurological status is stable or improving Progressing        PAIN - ADULT     Verbalizes/displays adequate comfort level or baseline comfort level Progressing        Potential for Falls     Patient will remain free of falls Progressing        SAFETY ADULT     Patient will remain free of falls Progressing     Maintain or return to baseline ADL function Progressing     Maintain or return mobility status to optimal level Progressing

## 2018-09-21 NOTE — PLAN OF CARE
Activity Intolerance/Impaired Mobility     Mobility/activity is maintained at optimum level for patient [address] Discharge to home or other facility with appropriate resources Progressing        INFECTION - ADULT     Absence or prevention of progression during hospitalization Progressing     Absence of fever/infection during neutropenic period Progressing        Knowledge Deficit     Patient/family/caregiver demonstrates understanding of disease process, treatment plan, medications, and discharge instructions Progressing        Neurological Deficit     Neurological status is stable or improving Progressing        PAIN - ADULT     Verbalizes/displays adequate comfort level or baseline comfort level Progressing        SAFETY ADULT     Patient will remain free of falls Progressing     Maintain or return to baseline ADL function Progressing     Maintain or return mobility status to optimal level Progressing

## 2018-09-21 NOTE — CONSULTS
Consultation - Neurology   [facility] 53 y o  male MRN: [id number]  Unit/Bed#: E4 -01 Encounter: [id number]      Assessment/Plan   Assessment:  1  Intermittent electric shock-like dysesthesias in the left abdomen radiating to the left foot leg, accompanied by isolated right foot and left scalp similar dysesthesias of recent onset  He also notes subacute deterioration in near vision, confusion, sense of being off balance, difficulty with thought and word production, dizziness and lightheadedness  Given his recent bariatric surgery and inability to afford his vitamin supplements, these may represent a nutritional deficiency, albeit atypical   The sensory alterations do not follow a dermatomal pattern  Although unlikely, a lesion of the cutaneous genitofemoral nerve could contribute to some of his abdominal and left anterior thigh complaints, but not the more distal left leg or other sites  No cerebral lesion would cause his deficits  2   Clinical exam and MRI brain are not suggestive of acute stroke  The right caudate head lesion noted on CT head is re-demonstrated as a chronic lesion on MRI, possibly a chronic lacunar infarct given his vascular risk factors (obstructive sleep apnea, hypertension, morbid obesity, prediabetes)    Plan:  1  Check complete nutrition panel:  B12, folate, B1, B6, iron panel, vitamin-E, vitamin-A, vitamin-C, zinc, vitamin-D, copper  2  Nutrition consult  May need social work consult to assist in obtaining nutritional supplements  3  Discontinue statin  4  Discontinue stroke pathway  5  Add daily B complex to multivitamin with minerals  May need to increase to b i d  dosing  6  Defer to attending neurologist to continue or discontinue Plavix, stroke risk factors are being modified as part of his post bariatric surgery regimen  7  Has been on Elavil 50 mg HS for sleep and pain control post back injury 2016    He has intermittently complained of urinary retention, thus we will not increase dosing for neuropathic pain  Consider change to Cymbalta or gabapentin  History of Present Illness     Physician Requesting Consult: Danielle Hernandez MD  Reason for Consult / Principal Problem: R CVA  Hx and PE limited by:  None    HPI: Ambrosio Cummins is a 53y o  year old right-handed male who presented to the emergency department at [facility] in [facility] on 9/20/2018 at [address] hours with 2 complaints:  1  Persistent pain in the left abdomen under his bariatric surgery incision site for 4 months (since 05/2018 surgery) and 2  Intermittent Electric shock dysesthesias radiating from the left abdominal incision site through the left leg to the toes (anterior lateral) accompanied by discontiguous similar dysesthesias in the right foot and in the left mid-vertex scalp  Onset of the dysesthesias is approximately 5 days ago at which time he had 3 jolts of altered sensation accompanied by chest pain  By 9/20 he had at least 20 events  Following HD event he has up to 10 minutes of pressure type pain in the left vertex scalp  He denies lateralizing weakness, but feels fatigue after each episode  He also notes impaired near vision for the past 10 days  He feels off balance, confused, and has difficulty expressing himself  He has been dropping objects from his hands  He underwent Vonnie-en-Y bariatric surgery 05/2018  He reports he could not afford his vitamin supplements and 1st cut them from 3 times a day to once a day, then discontinued completely over 1 week ago  He has had over a 100 lb weight loss  He was recently seen by vascular surgery for symptomatic (burning, numbness,  pruritis, paresthesias) left anterior thigh superficial varicosities bilateral lower extremity heaviness pain and edema when standing for prolonged periods of time    Compression garments were recommended, as well as elevation, continued weight loss    Inpatient consult to Neurology  Consult performed by: Casa Costa  Consult ordered by: Puneet Graf          Review of Systems   Constitutional: Positive for fatigue  HENT: Negative for hearing loss and trouble swallowing  Eyes:        Deteriorating near vision for 10 days   Respiratory: Positive for shortness of breath  Cardiovascular: Positive for chest pain (Left anterior)  Gastrointestinal: Positive for abdominal pain (Localized to the left abdominal surgical incision site)  Negative for nausea and vomiting  Musculoskeletal: Positive for arthralgias and back pain  Neurological: Positive for dizziness (Off-balance), speech difficulty, light-headedness and headaches (Left mid vertex scalp)  Psychiatric/Behavioral: Positive for confusion  All other systems reviewed and are negative  Historical Information   Past Medical History:   Diagnosis Date    Abnormal blood sugar     Abnormal TSH     Back pain     Bilateral anterior knee pain     Chronic pain disorder     back    CPAP (continuous positive airway pressure) dependence     DDD (degenerative disc disease), lumbar     GERD (gastroesophageal reflux disease)     Hematuria     History of Helicobacter pylori infection     Hypertension     Knee pain     Morbid obesity (HCC)     Prediabetes     Sleep apnea     Suspicious nevus     Use of cane as ambulatory aid     Vitamin D deficiency     Denies history of headaches    Past Surgical History:   Procedure Laterality Date    KNEE SURGERY Left 07/27/2018    IL EGD TRANSORAL BIOPSY SINGLE/MULTIPLE N/A 12/13/2017    Procedure: ESOPHAGOGASTRODUODENOSCOPY (EGD), with BIOPSY;  Surgeon: Yariel Velez MD;  Location: [facility];   Service: Bariatrics    IL LAP GASTRIC BYPASS/ENRICO-EN-Y N/A 5/8/2018    Procedure: BYPASS GASTRIC  ENRICO-EN-Y LAPAROSCOPIC AND INTRAOPERATIVE EGD;  Surgeon: Yariel Velez MD;  Location: [facility];  Service: Bariatrics     Social History   History   Alcohol Use No     History   Drug Use No     History Smoking Status    Never Smoker   Smokeless Tobacco    Never Used     Family History:   Family History   Problem Relation Age of Onset    Hypertension Mother     Prostate cancer Father         ALSO PROSTATE ENLARGEMENT    Hypertension Maternal Grandmother        Review of previous medical records was  completed  Meds/Allergies   current meds:   Current Facility-Administered Medications   Medication Dose Route Frequency    amitriptyline (ELAVIL) tablet 50 mg  50 mg Oral HS    artificial tear (LUBRIFRESH P M ) ophthalmic ointment   Both Eyes PRN    atorvastatin (LIPITOR) tablet 40 mg  40 mg Oral QPM    clopidogrel (PLAVIX) tablet 75 mg  75 mg Oral Daily    enoxaparin (LOVENOX) subcutaneous injection 40 mg  40 mg Subcutaneous Daily    multivitamin-minerals (CENTRUM) tablet 1 tablet  1 tablet Oral Daily    ondansetron (ZOFRAN) injection 4 mg  4 mg Intravenous Q6H PRN    oxyCODONE (OxyCONTIN) 12 hr tablet 10 mg  10 mg Oral Daily    oxyCODONE-acetaminophen (PERCOCET) 5-325 mg per tablet 1 tablet  1 tablet Oral Q4H PRN    pantoprazole (PROTONIX) EC tablet 20 mg  20 mg Oral Early Morning    and PTA meds:   Prior to Admission Medications   Prescriptions Last Dose Informant Patient Reported? Taking?    Multiple Vitamin (MULTIVITAMIN) tablet Unknown at Unknown time Self Yes No   Sig: Take 1 tablet by mouth daily   amLODIPine (NORVASC) 5 mg tablet 9/20/2018 at Unknown time  No Yes   Sig: Take 1 tablet (5 mg total) by mouth daily   amitriptyline (ELAVIL) 50 mg tablet 9/19/2018 at Unknown time  Yes Yes   Sig: Take 50 mg by mouth daily at bedtime For insomnia    artificial tear (LUBRIFRESH P M ) 83-15 % ophthalmic ointment 9/19/2018 at Unknown time  Yes Yes   Sig: ADMINISTER 1 APPLICATION TO BOTH EYES DAILY AT BEDTIME   omeprazole (PriLOSEC) 20 mg delayed release capsule 9/20/2018 at Unknown time  Yes Yes   Sig: Take 20 mg by mouth every morning     oxyCODONE-acetaminophen (PERCOCET) 5-325 mg per tablet   Yes Yes   Sig: Take 1 tablet by mouth every 4 (four) hours as needed for moderate pain   tapentadol (NUCYNTA) 50 mg tablet   Yes Yes   Sig: Take 50 mg by mouth 3 (three) times a day as needed      Facility-Administered Medications: None       No Known Allergies    Objective   Vitals:Blood pressure 121/90, pulse 71, temperature 97 5 °F (36 4 °C), temperature source Tympanic, resp  rate 18, height 5' 7" (1 702 m), weight 128 kg (282 lb 3 oz), SpO2 99 %  ,Body mass index is 44 2 kg/m²  No intake or output data in the 24 hours ending 09/21/18 1005  During the time of this exam he was asymptomatic  Physical Exam   Constitutional: He is oriented to person, place, and time  He appears well-developed and well-nourished  No distress  HENT:   Head: Normocephalic and atraumatic  Mouth/Throat: Oropharynx is clear and moist    Tenderness left mid vertex scalp with palpation   Eyes: Conjunctivae and EOM are normal  Pupils are equal, round, and reactive to light  No scleral icterus  Neck: Normal range of motion  Neck supple  No spinous process tenderness and no muscular tenderness present  Carotid bruit is not present  Spurling's negative  No pain radiculopathy with axial load   Cardiovascular: Normal rate, regular rhythm, normal heart sounds and intact distal pulses  No murmur heard  Pulmonary/Chest: Effort normal and breath sounds normal    Abdominal: Soft  Bowel sounds are normal    Musculoskeletal: Normal range of motion  He exhibits no edema or deformity  Neurological: He is oriented to person, place, and time  He has normal strength  He has a normal Finger-Nose-Finger Test and a normal Heel to [employer]  Gait normal    Reflex Scores:       Tricep reflexes are 2+ on the right side and 2+ on the left side  Bicep reflexes are 2+ on the right side and 2+ on the left side  Brachioradialis reflexes are 2+ on the right side and 2+ on the left side         Patellar reflexes are 2+ on the right side and 2+ on the left side  Achilles reflexes are 2+ on the right side and 2+ on the left side  Skin: Skin is warm and dry  He is not diaphoretic  Psychiatric: He has a normal mood and affect  His speech is normal and behavior is normal  Judgment and thought content normal    Vitals reviewed  Neurologic Exam     Mental Status   Oriented to person, place, and time  Attention: normal    Speech: speech is normal   Level of consciousness: alert  Able to repeat  Normal comprehension  Cranial Nerves     CN II   Visual fields full to confrontation  CN III, IV, VI   Pupils are equal, round, and reactive to light  Extraocular motions are normal    Right pupil: Size: 2 mm  Shape: regular  Left pupil: Size: 2 mm  Shape: regular  Nystagmus: none   Conjugate gaze: present    CN V   Right facial sensation deficit: none  Left facial sensation deficit: complete (Decreased pinprick, light touch 30%)    CN VII   Facial expression full, symmetric  Right facial weakness: none  Left facial weakness: none    CN VIII   CN VIII normal    Hearing: intact (Finger rub)    CN IX, X   CN IX normal    CN X normal    Palate: symmetric    CN XI   CN XI normal    Right sternocleidomastoid strength: normal  Left sternocleidomastoid strength: normal  Right trapezius strength: normal  Left trapezius strength: normal    CN XII   CN XII normal    Tongue deviation: none (Intact lateral movements)  Optic discs not visualized     Motor Exam   Muscle bulk: normal  Overall muscle tone: normal  Right arm pronator drift: absent  Left arm pronator drift: absent    Strength   Strength 5/5 throughout       Sensory Exam   Right arm vibration: normal  Left arm vibration: normal  Right leg vibration: normal  Left leg vibration: normal  Right arm pinprick: normal  Left arm pinprick: decreased from wrist (Medial aspect only)  Right leg pinprick: normal  Left leg pinprick: decreased from ankle (Medial aspect only)  Temperature intact throughout  Tinel's negative at both wrists     Gait, Coordination, and Reflexes     Gait  Gait: normal    Coordination   Finger to nose coordination: normal  Heel to shin coordination: normal    Tremor   Resting tremor: absent  Intention tremor: absent  Action tremor: absent    Reflexes   Right brachioradialis: 2+  Left brachioradialis: 2+  Right biceps: 2+  Left biceps: 2+  Right triceps: 2+  Left triceps: 2+  Right patellar: 2+  Left patellar: 2+  Right achilles: 2+  Left achilles: 2+  Right : 2+  Left : 2+  Right plantar: normal  Left plantar reflex: Withdrawal   Right Santos: absent  Left Santos: absent  Right ankle clonus: absent  Left ankle clonus: absentFine motor repetitive movements symmetric, brisk           Lab Results:   CBC:   Results from last 7 days  Lab Units 09/21/18 0443 09/21/18  0116 09/20/18 2017   WBC Thousand/uL 6 91  --  6 29   RBC Million/uL 4 85  --  4 82   HEMOGLOBIN g/dL 14 7  --  14 5   HEMATOCRIT % 43 2  --  42 6   MCV fL 89  --  88   PLATELETS Thousands/uL 204 230 212   , BMP/CMP:   Results from last 7 days  Lab Units 09/21/18 0444 09/20/18 2017   SODIUM mmol/L 140 140   POTASSIUM mmol/L 3 9 4 1   CHLORIDE mmol/L 105 101   CO2 mmol/L 29 33*   BUN mg/dL 10 11   CREATININE mg/dL 0 85 0 90   CALCIUM mg/dL 8 8 9 1   AST U/L  --  23   ALT U/L  --  35   ALK PHOS U/L  --  94   EGFR ml/min/1 73sq m 104 101   , Urinalysis:   Results from last 7 days  Lab Units 09/20/18 1955 09/20/18  1945   COLOR UA  Yellow Yellow   CLARITY UA  Clear Clear   SPEC GRAV UA  1 010  --    PH UA  6 5  --    LEUKOCYTES UA  Negative  --    NITRITE UA  Negative  --    PROTEIN UA mg/dl Negative  --    GLUCOSE UA mg/dl Negative  --    KETONES UA mg/dl Negative  --    BILIRUBIN UA  Negative  --    BLOOD UA  Negative  --     Lab Results   Component Value Date    HGBA1C 5 9 10/30/2017     Results from last 7 days  Lab Units 09/21/18 0444   TRIGLYCERIDES mg/dL 84   LDL CALC mg/dL 49   HDL mg/dL 47     B12 372, folate greater than 20, iron 90  Mag 2 1, Alb 3 5  Lipase 180  Trop <0 02  Glu 95      Imaging Studies: I have personally reviewed pertinent reports  and I have personally reviewed pertinent films in PACS   MRI brain:There is an old right caudate head lacunar infarction  No hemorrhagic transformation  A few punctate white matter hyperintensities are seen on FLAIR imaging within the cerebral hemispheres  Diffusion imaging is unremarkable with no evidence of acute ischemia  Normal corpus callosum and hypothalamus  Normal signal within the brainstem and cerebellum  No mass, mass effect or midline shift  No acute or chronic hemorrhage  The ventricles are normal in size and contour  CT head: No intracranial mass, mass effect or midline shift  No acute parenchymal hemorrhage  There is a age-indeterminate infarct in the head of the right caudate  VENTRICLES AND EXTRA-AXIAL SPACES:  Normal for the patient's age  EKG, Pathology, and Other Studies: I have personally reviewed pertinent reports  CT abd/pelvis: Thickening versus underdistention of the urinary bladder wall        VTE Prophylaxis: Sequential compression device (Venodyne)  and Enoxaparin (Lovenox)

## 2018-09-21 NOTE — ED PROVIDER NOTES
History  Chief Complaint   Patient presents with    Abdominal Pain     Pt reports left sided abd pain that started "months ago"  Denies NVD  History provided by:  Patient  Abdominal Pain   Pain location:  Generalized  Pain quality: aching    Pain radiates to:  Does not radiate  Pain severity:  Mild  Onset quality:  Gradual  Duration:  5 days  Timing:  Intermittent  Progression:  Waxing and waning  Chronicity:  New  Context: previous surgery    Relieved by:  Nothing  Worsened by:  Nothing  Ineffective treatments:  None tried  Associated symptoms: no chest pain, no chills, no cough, no diarrhea, no dysuria, no fever, no nausea, no shortness of breath, no sore throat and no vomiting    Risk factors comment:  Gastric bypass  Medical Problem   Location:  Left side of body  Quality:  'Electric current going through left sided of abdomen and leg'  Severity:  Moderate  Onset quality:  Gradual  Duration:  5 days  Timing:  Intermittent  Progression:  Unchanged  Chronicity:  New  Context:  Patient started witgh sudden electric current going through left side of abdomen to left leg, also c/o similar feeling in his head  Relieved by:  Nothing  Worsened by:  None  Ineffective treatments:  None  Associated symptoms: abdominal pain and headaches    Associated symptoms: no chest pain, no cough, no diarrhea, no fever, no nausea, no rash, no shortness of breath, no sore throat and no vomiting        Prior to Admission Medications   Prescriptions Last Dose Informant Patient Reported? Taking?    Multiple Vitamin (MULTIVITAMIN) tablet Unknown at Unknown time Self Yes No   Sig: Take 1 tablet by mouth daily   amLODIPine (NORVASC) 5 mg tablet 9/20/2018 at Unknown time  No Yes   Sig: Take 1 tablet (5 mg total) by mouth daily   amitriptyline (ELAVIL) 50 mg tablet 9/19/2018 at Unknown time  Yes Yes   Sig: Take 50 mg by mouth daily at bedtime For insomnia    artificial tear (LUBRIFRESH P M ) 83-15 % ophthalmic ointment 9/19/2018 at Unknown time  Yes Yes   Sig: ADMINISTER 1 APPLICATION TO BOTH EYES DAILY AT BEDTIME   omeprazole (PriLOSEC) 20 mg delayed release capsule 9/20/2018 at Unknown time  Yes Yes   Sig: Take 20 mg by mouth every morning     oxyCODONE (OxyCONTIN) 10 mg 12 hr tablet 9/19/2018 at Unknown time Self Yes Yes   Sig: Take 10 mg by mouth every 12 (twelve) hours      Facility-Administered Medications: None       Past Medical History:   Diagnosis Date    Abnormal blood sugar     Abnormal TSH     Back pain     Bilateral anterior knee pain     Chronic pain disorder     back    CPAP (continuous positive airway pressure) dependence     DDD (degenerative disc disease), lumbar     GERD (gastroesophageal reflux disease)     Hematuria     History of Helicobacter pylori infection     Hypertension     Knee pain     Morbid obesity (HCC)     Prediabetes     Sleep apnea     Suspicious nevus     Use of cane as ambulatory aid     Vitamin D deficiency        Past Surgical History:   Procedure Laterality Date    KNEE SURGERY Left 07/27/2018    MO EGD TRANSORAL BIOPSY SINGLE/MULTIPLE N/A 12/13/2017    Procedure: ESOPHAGOGASTRODUODENOSCOPY (EGD), with BIOPSY;  Surgeon: Tanya Baires MD;  Location: [facility]; Service: Bariatrics    MO LAP GASTRIC BYPASS/ENRICO-EN-Y N/A 5/8/2018    Procedure: BYPASS GASTRIC  ENRICO-EN-Y LAPAROSCOPIC AND INTRAOPERATIVE EGD;  Surgeon: Tanya Baires MD;  Location: [facility];  Service: Bariatrics       Family History   Problem Relation Age of Onset    Hypertension Mother     Prostate cancer Father         ALSO PROSTATE ENLARGEMENT    Hypertension Maternal Grandmother      I have reviewed and agree with the history as documented  Social History   Substance Use Topics    Smoking status: Never Smoker    Smokeless tobacco: Never Used    Alcohol use No        Review of Systems   Constitutional: Negative for activity change, chills and fever     HENT: Negative for facial swelling, sore throat and trouble swallowing  Eyes: Negative for pain and visual disturbance  Respiratory: Negative for cough, chest tightness and shortness of breath  Cardiovascular: Negative for chest pain and leg swelling  Gastrointestinal: Positive for abdominal pain  Negative for blood in stool, diarrhea, nausea and vomiting  Genitourinary: Negative for dysuria and flank pain  Musculoskeletal: Negative for back pain, neck pain and neck stiffness  Skin: Negative for pallor and rash  Allergic/Immunologic: Negative for environmental allergies and immunocompromised state  Neurological: Positive for headaches  Negative for dizziness  Hematological: Negative for adenopathy  Does not bruise/bleed easily  Psychiatric/Behavioral: Negative for agitation and behavioral problems  All other systems reviewed and are negative  Physical Exam  Physical Exam   Constitutional: He is oriented to person, place, and time  He appears well-developed and well-nourished  No distress  HENT:   Head: Normocephalic and atraumatic  Eyes: EOM are normal  Pupils are equal, round, and reactive to light  Neck: Normal range of motion  Neck supple  Cardiovascular: Normal rate, regular rhythm, normal heart sounds and intact distal pulses  Pulmonary/Chest: Effort normal and breath sounds normal    Abdominal: Soft  Bowel sounds are normal  There is no tenderness  There is no rebound and no guarding  Musculoskeletal: Normal range of motion  Neurological: He is alert and oriented to person, place, and time  No cranial nerve deficit  Coordination normal    Skin: Skin is warm and dry  Psychiatric: He has a normal mood and affect  Nursing note and vitals reviewed        Vital Signs  ED Triage Vitals   Temperature Pulse Respirations Blood Pressure SpO2   09/20/18 1854 09/20/18 1854 09/20/18 1854 09/20/18 1854 09/20/18 1854   97 8 °F (36 6 °C) 98 20 (!) 153/102 97 %      Temp Source Heart Rate Source Patient Position - Orthostatic VS BP Location FiO2 (%)   09/20/18 1854 09/20/18 1854 09/20/18 2155 09/20/18 1854 --   Temporal Monitor Lying Right arm       Pain Score       09/20/18 1854       5           Vitals:    09/20/18 1854 09/20/18 2155 09/21/18 0000   BP: (!) 153/102 123/73 128/77   Pulse: 98 70 69   Patient Position - Orthostatic VS:  Lying Lying       Visual Acuity  Visual Acuity      Most Recent Value   L Pupil Size (mm)  3   R Pupil Size (mm)  3   L Pupil Shape  Round   R Pupil Shape  Round          ED Medications  Medications   amitriptyline (ELAVIL) tablet 50 mg (not administered)   pantoprazole (PROTONIX) EC tablet 20 mg (not administered)   oxyCODONE (OxyCONTIN) 12 hr tablet 10 mg (not administered)   artificial tear (LUBRIFRESH P M ) ophthalmic ointment (not administered)   multivitamin-minerals (CENTRUM) tablet 1 tablet (not administered)   oxyCODONE (OxyCONTIN) 12 hr tablet 10 mg (not administered)   atorvastatin (LIPITOR) tablet 40 mg (not administered)   clopidogrel (PLAVIX) tablet 75 mg (not administered)   ondansetron (ZOFRAN) injection 4 mg (not administered)   enoxaparin (LOVENOX) subcutaneous injection 40 mg (not administered)   iohexol (OMNIPAQUE) 350 MG/ML injection (MULTI-DOSE) 100 mL (100 mL Intravenous Given 9/20/18 2108)   iohexol (OMNIPAQUE) 240 MG/ML solution 25 mL (25 mL Oral Given 9/20/18 2108)       Diagnostic Studies  Results Reviewed     Procedure Component Value Units Date/Time    Comprehensive metabolic panel [[id number]]  (Abnormal) Collected:  09/20/18 2017    Lab Status:  Final result Specimen:  Blood from Arm, Left Updated:  09/20/18 2042     Sodium 140 mmol/L      Potassium 4 1 mmol/L      Chloride 101 mmol/L      CO2 33 (H) mmol/L      ANION GAP 6 mmol/L      BUN 11 mg/dL      Creatinine 0 90 mg/dL      Glucose 95 mg/dL      Calcium 9 1 mg/dL      AST 23 U/L      ALT 35 U/L      Alkaline Phosphatase 94 U/L      Total Protein 7 1 g/dL      Albumin 3 5 g/dL      Total Bilirubin 0 43 mg/dL      eGFR 101 ml/min/1 73sq m     Narrative:         National Kidney Disease Education Program recommendations are as follows:  GFR calculation is accurate only with a steady state creatinine  Chronic Kidney disease less than 60 ml/min/1 73 sq  meters  Kidney failure less than 15 ml/min/1 73 sq  meters      Lipase [[id number]]  (Normal) Collected:  09/20/18 2017    Lab Status:  Final result Specimen:  Blood from Arm, Left Updated:  09/20/18 2042     Lipase 180 u/L     CBC and differential [[id number]] Collected:  09/20/18 2017    Lab Status:  Final result Specimen:  Blood from Arm, Left Updated:  09/20/18 2022     WBC 6 29 Thousand/uL      RBC 4 82 Million/uL      Hemoglobin 14 5 g/dL      Hematocrit 42 6 %      MCV 88 fL      MCH 30 1 pg      MCHC 34 0 g/dL      RDW 14 7 %      MPV 9 9 fL      Platelets 271 Thousands/uL      nRBC 0 /100 WBCs      Neutrophils Relative 52 %      Immat GRANS % 0 %      Lymphocytes Relative 39 %      Monocytes Relative 8 %      Eosinophils Relative 1 %      Basophils Relative 0 %      Neutrophils Absolute 3 18 Thousands/µL      Immature Grans Absolute 0 01 Thousand/uL      Lymphocytes Absolute 2 47 Thousands/µL      Monocytes Absolute 0 52 Thousand/µL      Eosinophils Absolute 0 09 Thousand/µL      Basophils Absolute 0 02 Thousands/µL     POCT urinalysis dipstick [[id number]]  (Normal) Resulted:  09/20/18 1945    Lab Status:  Final result Specimen:  Urine from Urine, Other Updated:  09/20/18 1948     Color, UA Yellow     Clarity, UA Clear    ED Urine Macroscopic [[id number]] Collected:  09/20/18 1955    Lab Status:  Final result Specimen:  Urine Updated:  09/20/18 1945     Color, UA Yellow     Clarity, UA Clear     pH, UA 6 5     Leukocytes, UA Negative     Nitrite, UA Negative     Protein, UA Negative mg/dl      Glucose, UA Negative mg/dl      Ketones, UA Negative mg/dl      Urobilinogen, UA 0 2 E U /dl      Bilirubin, UA Negative     Blood, UA Negative     Specific Gravity, UA 1 010    Narrative: [facility] RESULT                 CT head without contrast   Final Result by Desmond Nguyễn DO (09/20 2111)   Addendum 1 of 1 by Desmond Nguyễn DO (09/20 2156)   ADDENDUM:      Impression should state age indeterminate infarct in the head of the right    caudate  Further evaluation with MRI is recommended      Final      Age indeterminate infarct in the head of the right cartilage  Further evaluation with MRI is recommended  I personally discussed this study with Eugene Kyle on 9/20/2018 at 9:08 PM                      Workstation performed: [id number]         CT abdomen pelvis with contrast   Final Result by Desmond Nguyễn DO (09/20 2118)      Thickening versus underdistention of the urinary bladder wall  Clinical correlation for cystitis is recommended  Workstation performed: [id number]         MRI Inpatient Order    (Results Pending)              Procedures  ECG 12 Lead Documentation  Date/Time: 9/20/2018 10:44 PM  Performed by: Asmita Rainey  Authorized by: Asmita Rainey     Indications / Diagnosis:  Headche, abd pain  ECG reviewed by me, the ED Provider: yes    Patient location:  ED  Interpretation:     Interpretation: normal    Rate:     ECG rate:  69    ECG rate assessment: normal    Rhythm:     Rhythm: sinus rhythm    Ectopy:     Ectopy: none    QRS:     QRS axis:  Normal  Conduction:     Conduction: normal    ST segments:     ST segments:  Normal  T waves:     T waves: normal             Phone Contacts  ED Phone Contact    ED Course  ED Course as of Sep 21 0048   u Sep 20, 2018   2235 Labs and CT results reviewed, age indeterminate hypodensity in right caudate  Case discussed with Neurology, Dr Edilberto Sy, advised to admit on stroke pathway    Patient remains intact Neurologically, no cranial nerve or focal neuro deficits                                MDM  Number of Diagnoses or Management Options  Stroke [facility]): new and requires workup  Diagnosis management comments: Patient is a 21-year-old male, comes in with complaints of abdominal pain, electric current feeling going down to his left leg, to his head; patient states that this all started about 5 days back, this has never happened before, he is really concerned that something is wrong  Previous surgical history of gastric bypass 4 months back  On exam no acute distress:  Vital signs stable, pupils equal round reactive light, no cranial nerve or focal neuro deficits, abdomen soft nontender; no lower extremity deformity, no calf tenderness or swelling, neurovascular intact distally  Differential diagnosis:  Stroke, intercerebral hemorrhage, bowel obstruction due to gastric bypass history  Will check labs, CT head, CT abdomen pelvis  Amount and/or Complexity of Data Reviewed  Clinical lab tests: ordered and reviewed  Tests in the radiology section of CPT®: ordered and reviewed  Tests in the medicine section of CPT®: reviewed and ordered  Discuss the patient with other providers: yes  Independent visualization of images, tracings, or specimens: yes      CritCare Time    Disposition  Final diagnoses:   Stroke [facility])     Time reflects when diagnosis was documented in both MDM as applicable and the Disposition within this note     Time User Action Codes Description Comment    9/20/2018 10:48 PM Maddy Lenny Add [I63 9] Stroke ([facility] [address] )     9/21/2018 12:37 AM Yasemin Pagan Add [I63 9] Right-sided cerebrovascular accident (CVA) [facility])       ED Disposition     ED Disposition Condition Comment    Admit  Case was discussed with JACKIE and the patient's admission status was agreed to be Admission Status: inpatient status to the service of Dr Hira Campos          Follow-up Information    None         Current Discharge Medication List      CONTINUE these medications which have NOT CHANGED    Details   amitriptyline (ELAVIL) 50 mg tablet Take 50 mg by mouth daily at bedtime For insomnia       amLODIPine (NORVASC) 5 mg tablet Take 1 tablet (5 mg total) by mouth daily  Qty: 90 tablet, Refills: 0    Associated Diagnoses: Essential hypertension      artificial tear (LUBRIFRESH P M ) 83-15 % ophthalmic ointment ADMINISTER 1 APPLICATION TO BOTH EYES DAILY AT BEDTIME  Refills: 1      omeprazole (PriLOSEC) 20 mg delayed release capsule Take 20 mg by mouth every morning        oxyCODONE (OxyCONTIN) 10 mg 12 hr tablet Take 10 mg by mouth every 12 (twelve) hours      Multiple Vitamin (MULTIVITAMIN) tablet Take 1 tablet by mouth daily           No discharge procedures on file      ED Provider  Electronically Signed by           Roro Villela MD  09/21/18 5811

## 2018-09-21 NOTE — PHYSICAL THERAPY NOTE
PT EVALUATION    53 y o     [id number]    Abdominal pain [R10 9]  Stroke ([facility] [address] ) [I63 9]    Past Medical History:   Diagnosis Date    Abnormal blood sugar     Abnormal TSH     Back pain     Bilateral anterior knee pain     Chronic pain disorder     back    CPAP (continuous positive airway pressure) dependence     DDD (degenerative disc disease), lumbar     GERD (gastroesophageal reflux disease)     Hematuria     History of Helicobacter pylori infection     Hypertension     Knee pain     Morbid obesity (HCC)     Prediabetes     Sleep apnea     Suspicious nevus     Use of cane as ambulatory aid     Vitamin D deficiency          Past Surgical History:   Procedure Laterality Date    KNEE SURGERY Left 07/27/2018    SC EGD TRANSORAL BIOPSY SINGLE/MULTIPLE N/A 12/13/2017    Procedure: ESOPHAGOGASTRODUODENOSCOPY (EGD), with BIOPSY;  Surgeon: Christopher Sanchez MD;  Location: [facility];   Service: Bariatrics    SC LAP GASTRIC BYPASS/ENRICO-EN-Y N/A 5/8/2018    Procedure: BYPASS GASTRIC  ENRICO-EN-Y LAPAROSCOPIC AND INTRAOPERATIVE EGD;  Surgeon: Christopher Sanchez MD;  [facility];  Service: Krystina Kiser      09/21/18 1158   Note Type   Note type Eval/Treat   Pain Assessment   Pain Rating: FLACC (Rest) - Face 0   Pain Rating: FLACC (Rest) - Legs 0   Pain Rating: FLACC (Rest) - Activity 0   Pain Rating: FLACC (Rest) - Cry 0   Pain Rating: FLACC (Rest) - Consolability 0   Score: FLACC (Rest) 0   Pain Rating: FLACC (Activity) - Face 0   Pain Rating: FLACC (Activity) - Legs 0   Pain Rating: FLACC (Activity) - Activity 0   Pain Rating: FLACC (Activity) - Cry 1   Pain Rating: FLACC (Activity) - Consolability 0   Score: FLACC (Activity) 1   Home Living   Type of Home House   Home Layout Multi-level;Stairs to enter with rails   Bathroom Shower/Tub Tub/shower unit   Bathroom Toilet Standard   Bathroom Accessibility Accessible   Home Equipment Crutches   Additional Comments was getting OPPT PTA given hx of L knee scope 7/2018, R knee scope 8/2018  Using crutches since   Prior Function   Level of [place] Independent with ADLs and functional mobility   Lives With Friend(s)   Receives Help From Friend(s)  (but reports does not need assist PTA)   ADL Assistance Independent   IADLs Independent   Falls in the last 6 months 0   Vocational On disability  (since work injury 2016)   Comments I PTA residing with friend in 3 story home  His bedroom on 2nd floor  Bath only on 2nd floor  + drives   Restrictions/Precautions   Weight Bearing Precautions Per Order No   Other Precautions Fall Risk;Pain;Telemetry   General   Additional Pertinent History Pt is 54 y/o male admitted with L sided intermittent electrical impulses from abd, down LLE up into head  CT with age indeterminate infarct  PT consulted  Up and OOB as tolerated  Family/Caregiver Present No   Cognition   Overall Cognitive Status WFL   Arousal/Participation Alert   Comments 1* language Marshallese, but able to speak some english   RUE Assessment   RUE Assessment WFL   LUE Assessment   LUE Assessment WFL   RLE Assessment   RLE Assessment WFL  (4/5)   LLE Assessment   LLE Assessment WFL  (4/5)   Coordination   Movements are Fluid and Coordinated 1   Light Touch   RLE Light Touch Grossly intact   LLE Light Touch Grossly intact   Bed Mobility   Supine to Sit 5  Supervision   Additional items Increased time required   Additional Comments dons sneakers at EOB   Transfers   Sit to Stand 5  Supervision   Additional items Increased time required   Stand to Sit 5  Supervision   Additional items Increased time required   Ambulation/Elevation   Gait pattern Antalgic;Decreased R stance  (c/w recent knee scope)   Gait Assistance 5  Supervision   Assistive Device Crutches  (single axillary crutch)   Distance Amb 250'x2 with stair training in between   Stair Management Assistance 5  Supervision   Additional items Verbal cues   Stair Management Technique Step to pattern; One rail R;With crutches  (rail + crutch)   Number of Stairs 4  (cues for technique )   Balance   Static Sitting Good   Dynamic Sitting Fair +   Static Standing Fair +   Dynamic Standing Fair   Ambulatory Fair   Activity Tolerance   Activity Tolerance Patient tolerated treatment well   Medical Staff Made Aware NurseMary Alice   Nurse Made Aware yes   Assessment   Prognosis Good   Problem List Decreased strength; Impaired balance;Decreased mobility;Obesity;Orthopedic restrictions;Pain   Assessment Pt is 52 y/o male admitted to r/o CVA  CT head noted to have age indeterminate infarct  MRI confirms no acute intracranial pathology  PT consulted  Up and OOB as tolerated  Hx of recent R knee scope and ambulation with use of single crutch, receiving OPPT PTA  Presents with mild LE strength and mobility impairments, however S for bed mobiltiy, transfers and ambulation with crutch  Gait antalgic RLE consistent with recent knee arthroscopic surgery  Mild decrease in LE strength  Able to negotiate steps with rail + crutch and S, however cues to improve technique  May benefit from continued IPPT in order to optimize balance, strength and locomotion  Anticipate safe d/c to home with continued OPPT  PT will follow and progress  Barriers to Discharge Inaccessible home environment   Barriers to Discharge Comments 3 story home   Goals   Patient Goals get stronger   STG Expiration Date 09/26/18   Short Term Goal #1 5 days: 1)  Pt will perform bed mobility with Gigi demonstrating appropriate technique 100% of the time in order to improve function  2)  Perform all transfers with Gigi demonstrating safe and appropriate technique 100% of the time in order to improve ability to negotiate safely in home environment  3) Amb with least restrictive AD > 500'x1 with mod I in order to demonstrate ability to negotiate in home environment and in community  4)  Improve overall strength and balance 1/2 grade in order to optimize ability to perform functional tasks and reduce fall risk  5) Increase activity tolerance to 45 minutes in order to improve endurance to functional tasks  6)  Negotiate stairs using most appropriate technique and Gigi in order to be able to negotiate safely in home environment  7) PT for ongoing patient and family/caregiver education, DME needs and d/c planning in order to promote highest level of function in least restrictive environment  Treatment Day 0   Plan   Treatment/Interventions Functional transfer training;LE strengthening/ROM; Elevations; Therapeutic exercise; Endurance training;Patient/family training;Equipment eval/education; Bed mobility;Gait training;Continued evaluation; Compensatory technique education;Spoke to nursing;OT   PT Frequency 2-3x/wk   Recommendation   Recommendation Home with family support; Outpatient PT   PT - OK to Discharge Yes   Modified [place] Scale   Modified [place] Scale 1   Barthel Index   Feeding 10   Bathing 5   Grooming Score 5   Dressing Score 10   Bladder Score 10   Bowels Score 10   Toilet Use Score 10   Transfers (Bed/Chair) Score 10   Mobility (Level Surface) Score 10   Stairs Score 5   Barthel Index Score 85     History: co - morbidities, fall risk, use of assistive device, tele, stairs in home, OPPT PTA  Exam: impairments in locomotion, musculoskeletal, balance,posture, joint integrity ( recent knee scope), barthel 85  Clinical: unstable/unpredictable ( fall risk, more restrictive AD)  Complexity:high      Gerakem Resendiz, PT

## 2018-09-21 NOTE — CASE MANAGEMENT
Initial Clinical Review    Admission: Date/Time/Statement: 9/20/18 @ 2249 Inpatient Written     Orders Placed This Encounter   Procedures    Inpatient Admission (expected length of stay for this patient is greater than two midnights)     Standing Status:   Standing     Number of Occurrences:   1     Order Specific Question:   Admitting Physician     Answer:   Marvin Peña [[id number]]     Order Specific Question:   Level of Care     Answer:   Med Surg [[id number]]     Order Specific Question:   Estimated length of stay     Answer:   More than 2 Midnights     Order Specific Question:   Certification     Answer:   I certify that inpatient services are medically necessary for this patient for a duration of greater than two midnights  See H&P and MD Progress Notes for additional information about the patient's course of treatment  ED: Date/Time/Mode of Arrival:   ED Arrival Information     Expected Arrival Acuity Means of Arrival Escorted By Service Admission Type    - 9/20/2018 18:43 Urgent Walk-In Self General Medicine Urgent    Arrival Complaint    Abdominal Pain          Chief Complaint:   Chief Complaint   Patient presents with    Abdominal Pain     Pt reports left sided abd pain that started "months ago"  Denies NVD  History of Illness: Alex James is a 53 y o  male who presents with c/o sensation of electric current in left abdomen radiating to L leg and head x5 days  Reports intermittent dizziness, lightheadedness, headaches, weakness, confusion, sometimes unable to express himself, decreased/blurred vision, left side of his face feels heavy and weak, dropping things from both hands- symptoms are intermittent x5days  Also c/o chest pain associated with slight shortness of breath, no nausea, no diaphoresis but feels hot, not associated with meals, exacerbated when blood pressure is elevated, relieved with relaxation and wetting his head with cold water or with cool air        ED Vital Signs:   ED Triage Vitals Temperature Pulse Respirations Blood Pressure SpO2   09/20/18 1854 09/20/18 1854 09/20/18 1854 09/20/18 1854 09/20/18 1854   97 8 °F (36 6 °C) 98 20 (!) 153/102 97 %      Temp Source Heart Rate Source Patient Position - Orthostatic VS BP Location FiO2 (%)   09/20/18 1854 09/20/18 1854 09/20/18 2155 09/20/18 1854 --   Temporal Monitor Lying Right arm       Pain Score       09/20/18 1854       5        Wt Readings from Last 1 Encounters:   09/20/18 128 kg (282 lb 3 oz)     Abnormal Labs/Diagnostic Test Results:   CO2 33       CT Head:  Age indeterminate infarct in the head of the right cartilage  Further evaluation with MRI is recommended  CT AP: Thickening versus underdistention of the urinary bladder wall  Clinical correlation for cystitis is recommended     ED Treatment:   Medication Administration from 09/20/2018 1843 to 09/20/2018 2345       Date/Time Order Dose Route Action     09/20/2018 2108 iohexol (OMNIPAQUE) 350 MG/ML injection (MULTI-DOSE) 100 mL 100 mL Intravenous Given     09/20/2018 2108 iohexol (OMNIPAQUE) 240 MG/ML solution 25 mL 25 mL Oral Given          Past Medical/Surgical History:    Active Ambulatory Problems     Diagnosis Date Noted    Morbid obesity with BMI of 50 0-59 9, adult ([facility] ) 03/12/2018    KAT (obstructive sleep apnea) 03/12/2018    Benign essential HTN 03/12/2018    GERD (gastroesophageal reflux disease) 03/12/2018    Dry eye 03/29/2018    Morbid obesity ([facility] ) 04/10/2018    Benign hypertension 04/10/2018    Gastroesophageal reflux disease 04/10/2018    Prediabetes 05/04/2017    Benign essential hypertension 05/04/2018    Sleep apnea 05/04/2018    CPAP (continuous positive airway pressure) dependence     Hypertension     Varicose veins of left lower extremity with inflammation 07/19/2018    Cystitis 07/19/2018    Bariatric surgery status 08/28/2018    Postsurgical malabsorption 08/28/2018    Muscle pain 08/28/2018     Resolved Ambulatory Problems     Diagnosis Date Noted    No Resolved Ambulatory Problems     Past Medical History:   Diagnosis Date    Abnormal blood sugar     Abnormal TSH     Back pain     Bilateral anterior knee pain     Chronic pain disorder     CPAP (continuous positive airway pressure) dependence     DDD (degenerative disc disease), lumbar     GERD (gastroesophageal reflux disease)     Hematuria     History of Helicobacter pylori infection     Hypertension     Knee pain     Morbid obesity (HCC)     Prediabetes     Sleep apnea     Suspicious nevus     Use of cane as ambulatory aid     Vitamin D deficiency        Admitting Diagnosis: Abdominal pain [R10 9]  Stroke ([facility] ) [I63 9]    Age/Sex: 53 y o  male    Assessment/Plan:   * Right-sided cerebrovascular accident (CVA) ([facility] )   Assessment & Plan     · Head CT: Age indeterminate infarct in the head of the right caudate  · Stroke pathway  · Add plavix and statin (no ASA d/t recent bariatric surgery)  As per ER physician he discussed with Neurology and was told to start plavix instead  · Brain MRI  · Echo          Other chest pain   Assessment & Plan     · Reports intermittent chest pain for a few days which he relates to high BP  · MIRLANDE: 0  · EKG:  NSR rate 69, no ischemic changes  · Will check troponin  · Telemetry monitoring  · BP control  · Echo ordered for stroke pathway  · Outpatient Cardiology follow-up          Nonspecific abdominal symptom   Assessment & Plan     · Reports the feeling of an electrical current in the left abdomen radiating to his left leg then up to his head x5 days          KAT (obstructive sleep apnea)   Assessment & Plan     · CPAP at hs          Bladder wall thickening   Assessment & Plan     · CT of abdomen shows bladder wall thickening vs underdistention, r/o cystitis  On July,  CT showed same finding; patient was seen in ER with c/o flank pain and hematuria  Was treated for cystitis with rx for keflex and recommended outpt f/u with urology    · Currently denies urinary symptoms, dysuria or hematuria  · UA negative  · Outpatient follow-up with Urology; consider inpatient evaluation          Bariatric surgery status   Assessment & Plan     · S/p BYPASS GASTRIC  [place]-EN-Y LAPAROSCOPIC AND INTRAOPERATIVE EGD 5/2018          Hypertension   Assessment & Plan     · Hold to allow for permissive hypertension  · Can likely resume tomorrow, will defer to Neurology          Prediabetes   Assessment & Plan     · A1c 5 9 Oct 2017  · Will check A1c  · ADA diet          GERD (gastroesophageal reflux disease)   Assessment & Plan     · Continue PPI                VTE Prophylaxis: Enoxaparin (Lovenox)  / sequential compression device     Anticipated Length of Stay:  Patient will be admitted on an Inpatient basis with an anticipated length of stay of  > 2 midnights  Justification for Hospital Stay: acute R CVA    Admission Orders:  Telemetry, trop q3h  OOB with assist  Incentive spirometry   Consult Neurology, nutr ser, cm, phys med rehab  Baseline NIH stroke scale  Neuro checks and VS qh  Dysphagia assessment prior to starting diet  MRI brain  PT, OT, Speech  Stroke education  Sequential compression device   Cpap    Scheduled Meds:   Current Facility-Administered Medications:  amitriptyline 50 mg Oral HS   artificial tear  Both Eyes PRN   atorvastatin 40 mg Oral QPM   clopidogrel 75 mg Oral Daily   enoxaparin 40 mg Subcutaneous Daily   multivitamin-minerals 1 tablet Oral Daily   ondansetron 4 mg Intravenous Q6H PRN   oxyCODONE 10 mg Oral Daily   oxyCODONE-acetaminophen 1 tablet Oral Q4H PRN   pantoprazole 20 mg Oral Early Morning     Continuous Infusions:    PRN Meds: artificial tear    ondansetron    oxyCODONE-acetaminophen    Thank you,  Padmini Benitez [facility] Review Department  Phone: [phone];  Fax [phone]  ATTENTION: Please call with any questions or concerns to [phone]  and carefully follow the prompts so that you are directed to the right person  Send all requests for admission clinical reviews, approved or denied determinations and any other requests to fax [phone]   All voicemails are confidential

## 2018-09-21 NOTE — ASSESSMENT & PLAN NOTE
· Head CT: Age indeterminate infarct in the head of the right caudate  · Stroke pathway  · Add plavix and statin (no ASA d/t recent bariatric surgery)   As per ER physician he discussed with Neurology and was told to start plavix instead  · Brain MRI  · Echo

## 2018-09-22 VITALS
SYSTOLIC BLOOD PRESSURE: 136 MMHG | HEIGHT: 67 IN | DIASTOLIC BLOOD PRESSURE: 82 MMHG | WEIGHT: 282.19 LBS | BODY MASS INDEX: 44.29 KG/M2 | TEMPERATURE: 97.5 F | HEART RATE: 72 BPM | OXYGEN SATURATION: 97 % | RESPIRATION RATE: 17 BRPM

## 2018-09-22 PROBLEM — N32.89 BLADDER WALL THICKENING: Status: RESOLVED | Noted: 2018-09-21 | Resolved: 2018-09-22

## 2018-09-22 PROBLEM — R20.8 DYSESTHESIA OF MULTIPLE SITES: Status: ACTIVE | Noted: 2018-09-21

## 2018-09-22 PROBLEM — I67.9 CEREBROVASCULAR DISEASE: Status: ACTIVE | Noted: 2018-09-22

## 2018-09-22 PROCEDURE — 94660 CPAP INITIATION&MGMT: CPT

## 2018-09-22 PROCEDURE — 99239 HOSP IP/OBS DSCHRG MGMT >30: CPT | Performed by: INTERNAL MEDICINE

## 2018-09-22 RX ORDER — VITAMIN B COMPLEX
1 CAPSULE ORAL DAILY
Qty: 30 CAPSULE | Refills: 2 | Status: SHIPPED | OUTPATIENT
Start: 2018-09-22 | End: 2018-10-19 | Stop reason: SDUPTHER

## 2018-09-22 RX ORDER — CLOPIDOGREL BISULFATE 75 MG/1
75 TABLET ORAL DAILY
Qty: 30 TABLET | Refills: 2 | Status: SHIPPED | OUTPATIENT
Start: 2018-09-23 | End: 2018-10-19 | Stop reason: SDUPTHER

## 2018-09-22 RX ADMIN — ENOXAPARIN SODIUM 40 MG: 40 INJECTION SUBCUTANEOUS at 08:49

## 2018-09-22 RX ADMIN — CLOPIDOGREL 75 MG: 75 TABLET, FILM COATED ORAL at 08:49

## 2018-09-22 RX ADMIN — Medication 1 TABLET: at 08:49

## 2018-09-22 RX ADMIN — PANTOPRAZOLE SODIUM 20 MG: 20 TABLET, DELAYED RELEASE ORAL at 05:32

## 2018-09-22 RX ADMIN — OXYCODONE HYDROCHLORIDE AND ACETAMINOPHEN 1 TABLET: 5; 325 TABLET ORAL at 08:49

## 2018-09-22 NOTE — INCIDENTAL FINDINGS
The following findings require follow up:  Radiographic finding   Findin 1 cm vascular ectasia in the right clinoid   Follow up required: none

## 2018-09-22 NOTE — PLAN OF CARE
Activity Intolerance/Impaired Mobility     Mobility/activity is maintained at optimum level for patient [place]     Discharge to home or other facility with appropriate resources Progressing        DISCHARGE PLANNING - CARE MANAGEMENT     Discharge to post-acute care or home with appropriate resources Progressing        INFECTION - ADULT     Absence or prevention of progression during hospitalization Progressing     Absence of fever/infection during neutropenic period Progressing        Knowledge Deficit     Patient/family/caregiver demonstrates understanding of disease process, treatment plan, medications, and discharge instructions Progressing        PAIN - ADULT     Verbalizes/displays adequate comfort level or baseline comfort level Progressing        Potential for Falls     Patient will remain free of falls Progressing        SAFETY ADULT     Patient will remain free of falls Progressing     Maintain or return to baseline ADL function Progressing     Maintain or return mobility status to optimal level Progressing

## 2018-09-22 NOTE — ASSESSMENT & PLAN NOTE
He has an old lacunar infarct in the right caudate  Will discharge on Plavix 75 mg daily  LDL is at goal   He also has an incidental finding of a vascular ectasia of the clinoid process  Discussed with Dr Babak Gerber   No need for additional work up/follow up imaging

## 2018-09-22 NOTE — DISCHARGE SUMMARY
Discharge- Romina Hayden 1970, 53 y o  male MRN: [id number]    Unit/Bed#: E4 -01 Encounter: [id number]    Primary Care Provider: Arya Branch MD   Date and time admitted to hospital: 9/20/2018  7:09 PM        * Dysesthesia of multiple sites   Assessment & Plan    · Secondary to vitamin deficiency from recent bariatric surgery and noncompliance with supplements  · His symptoms: "Electrical sensation originating from lower abdomen radiating down to both toes, up the spine into the back of the head" do not localized to a single lesion  Straight leg raise is also negative bilaterally  · Will discharge on bariatric multivitamin as well as extra B complex vitamin  · Findings discussed with the patient  Compliance with vitamin supplements emphasized        Cerebrovascular disease   Assessment & Plan    He has an old lacunar infarct in the right caudate  Will discharge on Plavix 75 mg daily  LDL is at goal   He also has an incidental finding of a vascular ectasia of the clinoid process  Discussed with Dr Anupam Hernandez   No need for additional work up/follow up imaging        Bariatric surgery status   Assessment & Plan    · S/p BYPASS GASTRIC  ENIR[place]-EN-Y LAPAROSCOPIC AND INTRAOPERATIVE EGD 5/2018        Hypertension   Assessment & Plan    Resume Norvasc        Prediabetes   Assessment & Plan    A1c much improved from 5 9 to 5 3 status post bariatric surgery        GERD (gastroesophageal reflux disease)   Assessment & Plan    · Continue omeprazole daily        KAT (obstructive sleep apnea)   Assessment & Plan    · CPAP at hs        Bladder wall thickening-resolved as of 9/22/2018   Assessment & Plan    Due to under distention  Clinically no UTI  Urinalysis unremarkable            Discharging Physician / Practitioner: Steve Gil MD  PCP: Arya Branch MD  Admission Date:   Admission Orders     Ordered        09/20/18 2249  Inpatient Admission (expected length of stay for this patient is greater than two midnights)  Once             Discharge Date: 09/22/18    Resolved Problems  Date Reviewed: 9/22/2018          Resolved    Bladder wall thickening 9/22/2018     Resolved by  Rena Soriano MD          Consultations During Hospital Stay:  · Neurology    Procedures Performed:     · None    Significant Findings / Test Results:     · MRI brain-old lacunar infarct right caudate  · CTA head-old caudate infarct describe on MRI  No evidence of hemodynamic significant stenosis  Incidental Findings:   · 1 1 cm tubular contrast filled structure along the posterior aspect of the right glenoid process most likely representing venous ectasia rather than aneurysm  No need for follow-up per discussion with Neurology    Test Results Pending at Discharge (will require follow up): · Vitamin levels and copper     Outpatient Tests Requested:  · None    Complications:  None    Reason for Admission:  Abnormal sensation    Hospital Course:     Marycarmen Jimenes is a 53 y o  male patient who originally presented to the hospital on 9/20/2018 due to electrical shock-like sensation originating from his left lower abdomen radiating down both thighs legs up to the toes and up his spine up to the back of his head  He has known history of obesity status post bariatric surgery a few months ago  He admitted not being compliant with his bariatric vitamins  He underwent a stroke workup and was found to have an old lacunar infarct in the right caudate  He was evaluated by Neurology and his symptomatology was not due to this old infarct  His symptoms were felt to be due to nutritional deficiency/vitamin deficiency due to his gastric bypass status and noncompliance with medication  Multiple vitamin levels were sent and are still pending  The patient is stable for discharge on Plavix for his old lacunar infarct and secondary prevention and B complex vitamins for his dysesthesia  Patient was advised compliance        Please see above list of diagnoses and related plan for additional information  Condition at Discharge: good     Discharge Day Visit / Exam:     Subjective:  Electrical shocks and station has lessened  No new weakness or numbness  Vitals: Blood Pressure: 136/82 (09/22/18 0705)  Pulse: 72 (09/22/18 0705)  Temperature: 97 5 °F (36 4 °C) (09/22/18 0705)  Temp Source: Temporal (09/22/18 0705)  Respirations: 17 (09/22/18 0705)  Height: 5' 7" (170 2 cm) (09/20/18 2349)  Weight - Scale: 128 kg (282 lb 3 oz) (09/20/18 2349)  SpO2: 97 % (09/22/18 0705)  Exam:   Physical Exam   Constitutional: He is oriented to person, place, and time  He appears well-developed and well-nourished  HENT:   Head: Normocephalic and atraumatic  Eyes: No scleral icterus  Neck: No JVD present  Cardiovascular: Regular rhythm  Exam reveals no friction rub  No murmur heard  Pulmonary/Chest: Effort normal  No respiratory distress  He has no wheezes  He has no rales  Abdominal: Soft  He exhibits no distension  There is no tenderness  Musculoskeletal: He exhibits no edema or deformity  Neurological: He is alert and oriented to person, place, and time  Skin: Skin is warm and dry  Psychiatric: He has a normal mood and affect  Discharge instructions/Information to patient and family:   See after visit summary for information provided to patient and family  Provisions for Follow-Up Care:  See after visit summary for information related to follow-up care and any pertinent home health orders  Disposition:     Home      Planned Readmission: none     Discharge Statement:  I spent >3 minutes discharging the patient  This time was spent on the day of discharge  I had direct contact with the patient on the day of discharge  Greater than 50% of the total time was spent examining patient, answering all patient questions, arranging and discussing plan of care with patient as well as directly providing post-discharge instructions    Additional time then spent on discharge activities  Discharge Medications:  See after visit summary for reconciled discharge medications provided to patient and family        ** Please Note: This note has been constructed using a voice recognition system **

## 2018-09-22 NOTE — INCIDENTAL FINDINGS
The following findings require follow up:  Radiographic finding   Finding: vascular ectasia of right clinoid process     Follow up required: CT venogram   Follow up should be done within 1 month(s)    Please notify the following clinician to assist with the follow up:   Dr Justin Wayne

## 2018-09-22 NOTE — PLAN OF CARE
Activity Intolerance/Impaired Mobility     Mobility/activity is maintained at optimum level for patient Adequate for Discharge        DISCHARGE PLANNING     Discharge to home or other facility with appropriate resources Adequate for Discharge        DISCHARGE PLANNING - CARE MANAGEMENT     Discharge to post-acute care or home with appropriate resources Adequate for Discharge        INFECTION - ADULT     Absence or prevention of progression during hospitalization Adequate for Discharge     Absence of fever/infection during neutropenic period Adequate for Discharge        Knowledge Deficit     Patient/family/caregiver demonstrates understanding of disease process, treatment plan, medications, and discharge instructions Adequate for Discharge        PAIN - ADULT     Verbalizes/displays adequate comfort level or baseline comfort level Adequate for Discharge        Potential for Falls     Patient will remain free of falls Adequate for Discharge        SAFETY ADULT     Patient will remain free of falls Adequate for Discharge     Maintain or return to baseline ADL function Adequate for Discharge     Maintain or return mobility status to optimal level Adequate for Discharge

## 2018-09-22 NOTE — CONSULTS
Spoke with pt prior to d/c  Will contact Bariatric program RD's concerning eligibility for at home nutritional supplements and MVI d/t financial issues  Will request for them to contact pt at home

## 2018-09-22 NOTE — NURSING NOTE
Patient DCd home no needs  All AVS instructions given by RN Chantelle Manner  All questions answered

## 2018-09-23 LAB
A-TOCOPHEROL VIT E SERPL-MCNC: 8.9 MG/L (ref 7–25.1)
COPPER SERPL-MCNC: 121 UG/DL (ref 72–166)
GAMMA TOCOPHEROL SERPL-MCNC: 0.5 MG/L (ref 0.5–5.5)
VIT A SERPL-MCNC: 28.3 UG/DL (ref 33.1–100)
ZINC SERPL-MCNC: 85 UG/DL (ref 56–134)

## 2018-09-24 ENCOUNTER — DOCUMENTATION (OUTPATIENT)
Dept: BARIATRICS | Facility: CLINIC | Age: 48
End: 2018-09-24

## 2018-09-24 NOTE — PROGRESS NOTES
Received email from inpatient RD Iban Vaughn RD,  that patient was admitted for dysesthesia due to non compliance with vitamins/minerals  Inpatient RD was consulted for assistance with bariatric vitamins/minerals  I forwarded information concerning the [address] program sponsored by [employer] and the [facility]  The form needs to be filled out by the patient and financial documentation is required  I emailed information to [address] [place] [address] , Huber Swann so she can get the information to the patient as he is still inpatient at the hospital and is primarily [place] speaking  Also forwarded information to inpatient RDs  I  filled out form for a once in a lifetime 90 day supply of free vitamins/minerals from [employer] through the [employer] Program with the [facility]  Form scanned under media  Form faxed to the [employer] Program    Provided suggestions to inpatient RDs of more economical options for the patient if he does not qualify for assistance, at a cost of approximately $15 00 per month

## 2018-09-25 LAB
1,25(OH)2D3 SERPL-MCNC: 62.1 PG/ML (ref 19.9–79.3)
VIT B1 BLD-SCNC: 111.6 NMOL/L (ref 66.5–200)
VIT B6 SERPL-MCNC: 19.5 UG/L (ref 5.3–46.7)
VIT C SERPL-MCNC: 1.3 MG/DL (ref 0.2–2)

## 2018-09-29 ENCOUNTER — APPOINTMENT (EMERGENCY)
Dept: RADIOLOGY | Facility: HOSPITAL | Age: 48
End: 2018-09-29
Payer: COMMERCIAL

## 2018-09-29 ENCOUNTER — HOSPITAL ENCOUNTER (EMERGENCY)
Facility: HOSPITAL | Age: 48
Discharge: HOME | End: 2018-09-29
Attending: EMERGENCY MEDICINE | Admitting: EMERGENCY MEDICINE
Payer: COMMERCIAL

## 2018-09-29 VITALS
DIASTOLIC BLOOD PRESSURE: 66 MMHG | RESPIRATION RATE: 18 BRPM | WEIGHT: 282.19 LBS | TEMPERATURE: 98.7 F | OXYGEN SATURATION: 98 % | BODY MASS INDEX: 44.2 KG/M2 | HEART RATE: 81 BPM | SYSTOLIC BLOOD PRESSURE: 125 MMHG

## 2018-09-29 DIAGNOSIS — R07.89 ATYPICAL CHEST PAIN: ICD-10-CM

## 2018-09-29 DIAGNOSIS — G89.29 CHRONIC BACK PAIN: Primary | ICD-10-CM

## 2018-09-29 DIAGNOSIS — M54.9 CHRONIC BACK PAIN: Primary | ICD-10-CM

## 2018-09-29 LAB
ALBUMIN SERPL BCP-MCNC: 3.4 G/DL (ref 3.5–5)
ALP SERPL-CCNC: 105 U/L (ref 46–116)
ALT SERPL W P-5'-P-CCNC: 40 U/L (ref 12–78)
ANION GAP SERPL CALCULATED.3IONS-SCNC: 6 MMOL/L (ref 4–13)
AST SERPL W P-5'-P-CCNC: 33 U/L (ref 5–45)
ATRIAL RATE: 75 BPM
BACTERIA UR QL AUTO: ABNORMAL /HPF
BASOPHILS # BLD AUTO: 0.03 THOUSANDS/ΜL (ref 0–0.1)
BASOPHILS NFR BLD AUTO: 0 % (ref 0–1)
BILIRUB SERPL-MCNC: 0.47 MG/DL (ref 0.2–1)
BILIRUB UR QL STRIP: NEGATIVE
BUN SERPL-MCNC: 13 MG/DL (ref 5–25)
CALCIUM SERPL-MCNC: 8.8 MG/DL (ref 8.3–10.1)
CAOX CRY URNS QL MICRO: ABNORMAL /HPF
CHLORIDE SERPL-SCNC: 103 MMOL/L (ref 100–108)
CLARITY UR: ABNORMAL
CO2 SERPL-SCNC: 31 MMOL/L (ref 21–32)
COLOR UR: YELLOW
CREAT SERPL-MCNC: 0.82 MG/DL (ref 0.6–1.3)
EOSINOPHIL # BLD AUTO: 0.15 THOUSAND/ΜL (ref 0–0.61)
EOSINOPHIL NFR BLD AUTO: 2 % (ref 0–6)
ERYTHROCYTE [DISTWIDTH] IN BLOOD BY AUTOMATED COUNT: 14.6 % (ref 11.6–15.1)
GFR SERPL CREATININE-BSD FRML MDRD: 105 ML/MIN/1.73SQ M
GLUCOSE SERPL-MCNC: 77 MG/DL (ref 65–140)
GLUCOSE UR STRIP-MCNC: NEGATIVE MG/DL
HCT VFR BLD AUTO: 41.3 % (ref 36.5–49.3)
HGB BLD-MCNC: 13.7 G/DL (ref 12–17)
HGB UR QL STRIP.AUTO: ABNORMAL
IMM GRANULOCYTES # BLD AUTO: 0.01 THOUSAND/UL (ref 0–0.2)
IMM GRANULOCYTES NFR BLD AUTO: 0 % (ref 0–2)
KETONES UR STRIP-MCNC: ABNORMAL MG/DL
LEUKOCYTE ESTERASE UR QL STRIP: ABNORMAL
LYMPHOCYTES # BLD AUTO: 3.17 THOUSANDS/ΜL (ref 0.6–4.47)
LYMPHOCYTES NFR BLD AUTO: 42 % (ref 14–44)
MCH RBC QN AUTO: 29.7 PG (ref 26.8–34.3)
MCHC RBC AUTO-ENTMCNC: 33.2 G/DL (ref 31.4–37.4)
MCV RBC AUTO: 89 FL (ref 82–98)
MONOCYTES # BLD AUTO: 0.66 THOUSAND/ΜL (ref 0.17–1.22)
MONOCYTES NFR BLD AUTO: 9 % (ref 4–12)
NEUTROPHILS # BLD AUTO: 3.56 THOUSANDS/ΜL (ref 1.85–7.62)
NEUTS SEG NFR BLD AUTO: 47 % (ref 43–75)
NITRITE UR QL STRIP: NEGATIVE
NON-SQ EPI CELLS URNS QL MICRO: ABNORMAL /HPF
NRBC BLD AUTO-RTO: 0 /100 WBCS
P AXIS: 78 DEGREES
PH UR STRIP.AUTO: 6.5 [PH] (ref 4.5–8)
PLATELET # BLD AUTO: 213 THOUSANDS/UL (ref 149–390)
PMV BLD AUTO: 9.9 FL (ref 8.9–12.7)
POTASSIUM SERPL-SCNC: 4 MMOL/L (ref 3.5–5.3)
PR INTERVAL: 182 MS
PROT SERPL-MCNC: 6.8 G/DL (ref 6.4–8.2)
PROT UR STRIP-MCNC: ABNORMAL MG/DL
QRS AXIS: 71 DEGREES
QRSD INTERVAL: 92 MS
QT INTERVAL: 366 MS
QTC INTERVAL: 408 MS
RBC # BLD AUTO: 4.62 MILLION/UL (ref 3.88–5.62)
RBC #/AREA URNS AUTO: ABNORMAL /HPF
SODIUM SERPL-SCNC: 140 MMOL/L (ref 136–145)
SP GR UR STRIP.AUTO: 1.02 (ref 1–1.03)
T WAVE AXIS: 66 DEGREES
TROPONIN I SERPL-MCNC: <0.02 NG/ML
UROBILINOGEN UR QL STRIP.AUTO: 1 E.U./DL
VENTRICULAR RATE: 75 BPM
WBC # BLD AUTO: 7.58 THOUSAND/UL (ref 4.31–10.16)
WBC #/AREA URNS AUTO: ABNORMAL /HPF

## 2018-09-29 PROCEDURE — 36415 COLL VENOUS BLD VENIPUNCTURE: CPT | Performed by: STUDENT IN AN ORGANIZED HEALTH CARE EDUCATION/TRAINING PROGRAM

## 2018-09-29 PROCEDURE — 85025 COMPLETE CBC W/AUTO DIFF WBC: CPT | Performed by: STUDENT IN AN ORGANIZED HEALTH CARE EDUCATION/TRAINING PROGRAM

## 2018-09-29 PROCEDURE — 80053 COMPREHEN METABOLIC PANEL: CPT | Performed by: STUDENT IN AN ORGANIZED HEALTH CARE EDUCATION/TRAINING PROGRAM

## 2018-09-29 PROCEDURE — 81001 URINALYSIS AUTO W/SCOPE: CPT

## 2018-09-29 PROCEDURE — 99284 EMERGENCY DEPT VISIT MOD MDM: CPT

## 2018-09-29 PROCEDURE — 71046 X-RAY EXAM CHEST 2 VIEWS: CPT

## 2018-09-29 PROCEDURE — 93005 ELECTROCARDIOGRAM TRACING: CPT

## 2018-09-29 PROCEDURE — 84484 ASSAY OF TROPONIN QUANT: CPT | Performed by: STUDENT IN AN ORGANIZED HEALTH CARE EDUCATION/TRAINING PROGRAM

## 2018-09-29 PROCEDURE — 93010 ELECTROCARDIOGRAM REPORT: CPT | Performed by: INTERNAL MEDICINE

## 2018-09-29 NOTE — ED ATTENDING ATTESTATION
Valery Zepeda MD, saw and evaluated the patient  I have discussed the patient with the resident/non-physician practitioner and agree with the resident's/non-physician practitioner's findings, Plan of Care, and MDM as documented in the resident's/non-physician practitioner's note, except where noted  All available labs and Radiology studies were reviewed  At this point I agree with the current assessment done in the Emergency Department  I have conducted an independent evaluation of this patient a history and physical is as follows:  54 yo male with gastric bypass, c/o electrical sensation radiation from toes to head, originating in the LLQ area, for which he was admitted for earlier this month, worked up for neurologic causes, found to have remote lacunar infarct, without acute ischemia  In ROS he described 10 min of chest pain, earlier today, and resolved for hours at this time  Exam is significant for BLE edema, normal VS, and he is PERC negative  Will confirm normal or unchanged EKG, electrolytes, CXR, and he can be discharged          Critical Care Time  CritCare Time    Procedures

## 2018-09-29 NOTE — DISCHARGE INSTRUCTIONS
Dolor de espalda   LO QUE NECESITA SABER:   ¿Qué misha saber sobre el dolor de espalda? El dolor de espalda es común  Usted puede estar adolorido o sentir rigidez en ophelia o ambos lados de la espalda  El dolor se puede propagar a gabbie glúteos o muslos  ¿Qué causa o aumenta mi riesgo de tener dolor de espalda? Las condiciones que afectan la columna, las articulaciones, o los músculos pueden causar el dolor de espalda  Estos pueden incluir la artritis, estenosis de la martin dorsal (estrechamiento de la columna vertebral), tensión muscular o descomposición de los discos de la columna vertebral  Los siguientes aumentan tatum riesgo de presentar dolor de espalda:  · Inclinarse o levantar de hilda forma repetitiva o girar o levantar artículos pesados    · Lesión debido a hilda caída o accidente    · Falta de actividad física regular     · Obesidad, embarazo     · Fumar    · Envejecimiento    · Manejar, estar sentado o de pie por mucho tiempo    · Coretta postura mientras está sentado o de pie  ¿Cómo se diagnostica tatum dolor de espalda? Tatum médico le preguntará si tiene [employer]  El proveedor le puede preguntar cuál es tatum historial del dolor de espalda y cómo comenzó  Le revisará la forma soy se pone de pie y camina, y el rango de [place]  Muéstrele donde siente el dolor y que empeora o mejora el dolor  Explique el dolor, que tan kayley es y el tiempo que le dura  Coméntele si el dolor empeora en la noche o cuando se [place]  ¿Cuál es el tratamiento para el dolor de espalda? · AINEs (Analgésicos antiinflamatorios no esteroides)  ayudan a disminuir la inflamación y el dolor  Manda medicamento esta disponible con o sin hilda receta médica  Los AINEs pueden causar sangrado estomacal o problemas renales en ciertas personas  Si usted janelle un medicamento anticoagulante, siempre pregúntele a tatum médico si los ARELIS son seguros para usted   Siempre salazar la etiqueta de manda medicamento y Dutta Fiorella instrucciones  · El acetaminofén  [employer]  Está disponible sin receta médica  Pregunte la cantidad y la frecuencia con que debe tomarlos  [address]  El acetaminofén puede causar daño en el hígado cuando no se janelle de forma correcta  · Un medicamento con receta para el dolor  podrían ser Larayne Amour  Pregunte al médico cómo debe luz marina manda medicamento de forma arechiga  ¿Cómo puedo manejar mi dolor de espalda? · Aplique hielo  en tatum espalda o en el área afectada de 15 a 20 minutos cada hora o según le indicaron  Use un paquete de hielo o ponga hielo molido dentro de [employer]  Cúbrala con hilda toalla  El hielo disminuye el dolor y ayuda a evitar el daño en los tejidos  · La aplicación de calor  en tatum espalda o en el área afectada de 20 a 30 minutos cada 2 horas marylin los días que le indicaron  El calor ayuda a disminuir el dolor y los espasmos musculares  · Manténgase activo  lo más que pueda sin causar [employer]  El reposo en cama puede empeorar tatum dolor de espalda  Evite levantar objetos hasta que ya no tenga dolor  ¿Cuándo misha comunicarme con mi médico?   · Usted tiene dolor de espalda que no mejora con el reposo, ni con el medicamento para el dolor  · Usted tiene fiebre  · Usted tiene un dolor que empeora cuando está acostado boca Jesu Grounds o al descansar  · Usted tiene dolor que empeora cuando tose o estornuda  · Usted pierde peso sin proponérselo  · Usted tiene preguntas o inquietudes acerca de tatum condición o cuidado  ¿Cuándo misha buscar atención inmediata o llamar al 911? · Usted tiene dolor, entumecimiento o debilidad en hilda o en ambas piernas  · El dolor se vuelve tan intenso que lo incapacita para caminar  · Usted no puede controlar tatum orina ni gabbie deposiciones intestinales  · Usted tiene dolor de espalda intenso con dolor en el pecho  · Usted tiene dolor de espalda intenso, náuseas y vómito      · Usted tiene un dolor de espalda intenso que se propaga a un costado o al área genital   ACUERDOS SOBRE GUTIERREZ CUIDADO:   Usted tiene el derecho de ayudar a planear gutierrez cuidado  Aprenda todo lo que pueda sobre gutierrez condición y soy darle tratamiento  Discuta gabbie opciones de tratamiento con gabbie médicos para decidir el cuidado que usted desea recibir  Usted siempre tiene el derecho de rechazar el tratamiento  Esta información es sólo para uso en educación  Gutierrez intención no es darle un consejo médico sobre enfermedades o tratamientos  Colsulte con gutierrez Shelia Benito farmacéutico antes de seguir cualquier régimen médico para saber si es seguro y efectivo para usted  © 2017 [address] [employer] Information is for End User's use only and may not be sold, redistributed or otherwise used for commercial purposes  All illustrations and images included in CareNotes® are the copyrighted property of A D A M , Inc  or Mike Johnson

## 2018-09-29 NOTE — ED PROVIDER NOTES
History  Chief Complaint   Patient presents with    Abdominal Pain     LLQ pain started thursday with "electricity" feeling and "electricity" radiates down the left leg and up to the patients head  patient reports gastric bypass hx  This is a 79-year-old male with past medical history of hypertension, chronic back pain, and recent gastric bypass surgery who presents to the emergency department this morning with electricity traveling from his left hip to his toes bilaterally and up to his head  Patient was recently seen for the same complaint about eight days ago and he states that these current symptoms are the same as when he was seen then  He states that this feeling occurred about 10 times today he is having trouble sleeping so he came to the emergency department for further evaluation  He states that he has been taking his medications and vitamins as prescribed and he denies any new trauma  Patient denies any fevers, chills, nausea, vomiting, diarrhea, abdominal pain, dysuria, hematuria, or any numbness, weakness, or tingling  Patient does, however, state that he had an episode of chest pain this morning that lasted approximately 10 minutes and was associated with diaphoresis and shortness of breath  The chest pain was located in the left side of his chest and he did not take any medications to make it go away  Prior to Admission Medications   Prescriptions Last Dose Informant Patient Reported? Taking?    Multiple Vitamin (MULTIVITAMIN) tablet  Self Yes No   Sig: Take 1 tablet by mouth daily   amLODIPine (NORVASC) 5 mg tablet   No No   Sig: Take 1 tablet (5 mg total) by mouth daily   amitriptyline (ELAVIL) 50 mg tablet   Yes No   Sig: Take 50 mg by mouth daily at bedtime For insomnia    artificial tear (LUBRIFRESH P M ) 83-15 % ophthalmic ointment   Yes No   Sig: ADMINISTER 1 APPLICATION TO BOTH EYES DAILY AT BEDTIME   b complex vitamins capsule   No No   Sig: Take 1 capsule by mouth daily clopidogrel (PLAVIX) 75 mg tablet   No No   Sig: Take 1 tablet (75 mg total) by mouth daily   omeprazole (PriLOSEC) 20 mg delayed release capsule   Yes No   Sig: Take 20 mg by mouth every morning     oxyCODONE-acetaminophen (PERCOCET) 5-325 mg per tablet   Yes No   Sig: Take 1 tablet by mouth every 4 (four) hours as needed for moderate pain      Facility-Administered Medications: None       Past Medical History:   Diagnosis Date    Abnormal blood sugar     Abnormal TSH     Back pain     Bilateral anterior knee pain     Chronic pain disorder     back    CPAP (continuous positive airway pressure) dependence     DDD (degenerative disc disease), lumbar     GERD (gastroesophageal reflux disease)     Hematuria     History of Helicobacter pylori infection     Hypertension     Knee pain     Morbid obesity (HCC)     Prediabetes     Sleep apnea     Suspicious nevus     Use of cane as ambulatory aid     Vitamin D deficiency        Past Surgical History:   Procedure Laterality Date    KNEE SURGERY Left 07/27/2018    RI EGD TRANSORAL BIOPSY SINGLE/MULTIPLE N/A 12/13/2017    Procedure: ESOPHAGOGASTRODUODENOSCOPY (EGD), with BIOPSY;  Surgeon: Concepción Turpin MD;  Location: [facility]; Service: Bariatrics    RI LAP GASTRIC BYPASS/ENRICO-EN-Y N/A 5/8/2018    Procedure: BYPASS GASTRIC  ENRICO-EN-Y LAPAROSCOPIC AND INTRAOPERATIVE EGD;  Surgeon: Concepción Turpin MD;  Location: [facility];  Service: Bariatrics       Family History   Problem Relation Age of Onset    Hypertension Mother     Prostate cancer Father         ALSO PROSTATE ENLARGEMENT    Hypertension Maternal Grandmother      I have reviewed and agree with the history as documented  Social History   Substance Use Topics    Smoking status: Never Smoker    Smokeless tobacco: Never Used    Alcohol use No        Review of Systems   Constitutional: Positive for diaphoresis  Negative for chills and fever     HENT: Negative for congestion, rhinorrhea, sinus pressure and sore throat  Eyes: Negative for visual disturbance  Respiratory: Positive for shortness of breath  Negative for cough and chest tightness  Cardiovascular: Positive for chest pain  Gastrointestinal: Negative for abdominal pain, constipation, diarrhea, nausea and vomiting  Genitourinary: Negative for dysuria, frequency, hematuria and urgency  Musculoskeletal: Positive for back pain  Negative for arthralgias and myalgias  Skin: Negative for color change and rash  Neurological: Negative for dizziness, numbness and headaches  Physical Exam  ED Triage Vitals [09/29/18 0047]   Temperature Pulse Respirations Blood Pressure SpO2   98 7 °F (37 1 °C) 87 20 126/69 98 %      Temp src Heart Rate Source Patient Position - Orthostatic VS BP Location FiO2 (%)   -- Monitor Lying Right arm --      Pain Score       3           Orthostatic Vital Signs  Vitals:    09/29/18 0047   BP: 126/69   Pulse: 87   Patient Position - Orthostatic VS: Lying       Physical Exam   Constitutional: He is oriented to person, place, and time  He appears well-developed and well-nourished  No distress  HENT:   Head: Normocephalic and atraumatic  Eyes: Pupils are equal, round, and reactive to light  Conjunctivae are normal    Neck: Normal range of motion  Neck supple  No JVD present  Cardiovascular: Normal rate, regular rhythm and normal heart sounds  Exam reveals no gallop and no friction rub  No murmur heard  Pulmonary/Chest: Effort normal and breath sounds normal  No stridor  No respiratory distress  He has no wheezes  He has no rales  Abdominal: Soft  Bowel sounds are normal  He exhibits no distension  There is tenderness (LLQ TTP that patient states has been there since his bypass)  There is no guarding  Musculoskeletal: Normal range of motion  He exhibits edema (Bilateral 1+ pitting edema to the bilateral lower extremities to the knee  )  He exhibits no tenderness or deformity     Neurological: He is alert and oriented to person, place, and time  No cranial nerve deficit or sensory deficit  He exhibits normal muscle tone  Skin: Skin is warm and dry  No rash noted  He is not diaphoretic  No erythema  No pallor  Psychiatric: He has a normal mood and affect  His behavior is normal    Nursing note and vitals reviewed  ED Medications  Medications - No data to display    Diagnostic Studies  Results Reviewed     Procedure Component Value Units Date/Time    Urine Microscopic [[id number]]  (Abnormal) Collected:  09/29/18 0139    Lab Status:  Final result Specimen:  Urine from Urine, Clean Catch Updated:  09/29/18 0143     RBC, UA 2-4 (A) /hpf      WBC, UA 2-4 (A) /hpf      Epithelial Cells Occasional /hpf      Bacteria, UA None Seen /hpf      Ca Oxalate Maryuri, UA Occasional (A) /hpf     Troponin I [[id number]]  (Normal) Collected:  09/29/18 0120    Lab Status:  Final result Specimen:  Blood from Arm, Right Updated:  09/29/18 0141     Troponin I <0 02 ng/mL     Comprehensive metabolic panel [[id number]]  (Abnormal) Collected:  09/29/18 0120    Lab Status:  Final result Specimen:  Blood from Arm, Right Updated:  09/29/18 0138     Sodium 140 mmol/L      Potassium 4 0 mmol/L      Chloride 103 mmol/L      CO2 31 mmol/L      ANION GAP 6 mmol/L      BUN 13 mg/dL      Creatinine 0 82 mg/dL      Glucose 77 mg/dL      Calcium 8 8 mg/dL      AST 33 U/L      ALT 40 U/L      Alkaline Phosphatase 105 U/L      Total Protein 6 8 g/dL      Albumin 3 4 (L) g/dL      Total Bilirubin 0 47 mg/dL      eGFR 105 ml/min/1 73sq m     Narrative:         National Kidney Disease Education Program recommendations are as follows:  GFR calculation is accurate only with a steady state creatinine  Chronic Kidney disease less than 60 ml/min/1 73 sq  meters  Kidney failure less than 15 ml/min/1 73 sq  meters      POCT urinalysis dipstick [[id number]]  (Abnormal) Resulted:  09/29/18 0131    Lab Status:  Final result Updated:  09/29/18 0131    ED Urine Macroscopic [[id number]]  (Abnormal) Collected:  09/29/18 0139    Lab Status:  Final result Specimen:  Urine Updated:  09/29/18 0128     Color, UA Yellow     Clarity, UA Slightly Cloudy     pH, UA 6 5     Leukocytes, UA Trace (A)     Nitrite, UA Negative     Protein, UA Trace (A) mg/dl      Glucose, UA Negative mg/dl      Ketones, UA 15 (1+) (A) mg/dl      Urobilinogen, UA 1 0 E U /dl      Bilirubin, UA Negative     Blood, UA Trace (A)     Specific [place], UA 1 020    Narrative:       CLINITEK RESULT    CBC and differential [[id number]] Collected:  09/29/18 0120    Lab Status:  Final result Specimen:  Blood from Arm, Right Updated:  09/29/18 0124     WBC 7 58 Thousand/uL      RBC 4 62 Million/uL      Hemoglobin 13 7 g/dL      Hematocrit 41 3 %      MCV 89 fL      MCH 29 7 pg      MCHC 33 2 g/dL      RDW 14 6 %      MPV 9 9 fL      Platelets 635 Thousands/uL      nRBC 0 /100 WBCs      Neutrophils Relative 47 %      Immat GRANS % 0 %      Lymphocytes Relative 42 %      Monocytes Relative 9 %      Eosinophils Relative 2 %      Basophils Relative 0 %      Neutrophils Absolute 3 56 Thousands/µL      Immature Grans Absolute 0 01 Thousand/uL      Lymphocytes Absolute 3 17 Thousands/µL      Monocytes Absolute 0 66 Thousand/µL      Eosinophils Absolute 0 15 Thousand/µL      Basophils Absolute 0 03 Thousands/µL                  XR chest 2 views    (Results Pending)         Procedures  Procedures      Phone Consults  ED Phone Contact    ED Course                               MDM  Number of Diagnoses or Management Options  Diagnosis management comments: Patient's labwork and CXR were unremarkable  He was discharged home in good condition and given instructions to follow up with his PCP should symptoms continue or return to the ED should he develop any new or concerning symptoms       CritCare Time    Disposition  Final diagnoses:   Chronic back pain   Atypical chest pain     Time reflects when diagnosis was documented in both MDM as applicable and the Disposition within this note     Time User Action Codes Description Comment    9/29/2018  1:50 AM Rosana Rosenthal Add [M54 9,  G89 29] Chronic back pain     9/29/2018  1:50 AM Rosana Rosenthal Add [R07 89] Atypical chest pain       ED Disposition     ED Disposition Condition Comment    Discharge  [facility] discharge to home/self care  Condition at discharge: Good        Follow-up Information     Follow up With Specialties Details Why [address] [place], [place] [facility]   [address]       [address] Emergency Medicine  If symptoms worsen [address] [facility]  [phone] [facility], [address], [place], [address] [facility] Orthopedic Surgery   [address] [place] [address]  [phone]             Patient's Medications   Discharge Prescriptions    No medications on file     No discharge procedures on file  ED Provider  Attending physically available and evaluated [facility]  I managed the patient along with the ED Attending      Electronically Signed by         Atiya Larose MD  09/29/18 9086

## 2018-10-18 DIAGNOSIS — I10 ESSENTIAL HYPERTENSION: ICD-10-CM

## 2018-10-18 RX ORDER — AMLODIPINE BESYLATE 5 MG/1
5 TABLET ORAL DAILY
Qty: 30 TABLET | Refills: 0 | Status: SHIPPED | OUTPATIENT
Start: 2018-10-18 | End: 2018-10-19 | Stop reason: SDUPTHER

## 2018-10-19 ENCOUNTER — TELEPHONE (OUTPATIENT)
Dept: FAMILY MEDICINE CLINIC | Facility: CLINIC | Age: 48
End: 2018-10-19

## 2018-10-19 ENCOUNTER — OFFICE VISIT (OUTPATIENT)
Dept: FAMILY MEDICINE CLINIC | Facility: CLINIC | Age: 48
End: 2018-10-19
Payer: COMMERCIAL

## 2018-10-19 VITALS
BODY MASS INDEX: 45.83 KG/M2 | OXYGEN SATURATION: 99 % | SYSTOLIC BLOOD PRESSURE: 122 MMHG | HEIGHT: 67 IN | WEIGHT: 292 LBS | HEART RATE: 83 BPM | RESPIRATION RATE: 18 BRPM | DIASTOLIC BLOOD PRESSURE: 86 MMHG | TEMPERATURE: 97.5 F

## 2018-10-19 DIAGNOSIS — I10 BENIGN HYPERTENSION: ICD-10-CM

## 2018-10-19 DIAGNOSIS — I67.9 CEREBROVASCULAR DISEASE: ICD-10-CM

## 2018-10-19 DIAGNOSIS — R20.8 DYSESTHESIA OF MULTIPLE SITES: ICD-10-CM

## 2018-10-19 DIAGNOSIS — F51.01 PRIMARY INSOMNIA: Primary | ICD-10-CM

## 2018-10-19 DIAGNOSIS — F11.90 CHRONIC NARCOTIC USE: ICD-10-CM

## 2018-10-19 DIAGNOSIS — I10 ESSENTIAL HYPERTENSION: ICD-10-CM

## 2018-10-19 DIAGNOSIS — I63.9 RIGHT-SIDED CEREBROVASCULAR ACCIDENT (CVA) (HCC): ICD-10-CM

## 2018-10-19 PROCEDURE — 80307 DRUG TEST PRSMV CHEM ANLYZR: CPT | Performed by: FAMILY MEDICINE

## 2018-10-19 PROCEDURE — 3008F BODY MASS INDEX DOCD: CPT | Performed by: FAMILY MEDICINE

## 2018-10-19 PROCEDURE — 99213 OFFICE O/P EST LOW 20 MIN: CPT | Performed by: FAMILY MEDICINE

## 2018-10-19 PROCEDURE — 3079F DIAST BP 80-89 MM HG: CPT | Performed by: FAMILY MEDICINE

## 2018-10-19 PROCEDURE — 1111F DSCHRG MED/CURRENT MED MERGE: CPT | Performed by: FAMILY MEDICINE

## 2018-10-19 PROCEDURE — 1036F TOBACCO NON-USER: CPT | Performed by: FAMILY MEDICINE

## 2018-10-19 PROCEDURE — 3074F SYST BP LT 130 MM HG: CPT | Performed by: FAMILY MEDICINE

## 2018-10-19 RX ORDER — TRAMADOL HYDROCHLORIDE 50 MG/1
50 TABLET ORAL EVERY 6 HOURS PRN
Qty: 30 TABLET | Refills: 0 | Status: SHIPPED | OUTPATIENT
Start: 2018-10-19 | End: 2019-07-24

## 2018-10-19 RX ORDER — CLOPIDOGREL BISULFATE 75 MG/1
75 TABLET ORAL DAILY
Qty: 90 TABLET | Refills: 1 | Status: SHIPPED | OUTPATIENT
Start: 2018-10-19 | End: 2019-03-28 | Stop reason: SDUPTHER

## 2018-10-19 RX ORDER — AMITRIPTYLINE HYDROCHLORIDE 50 MG/1
50 TABLET, FILM COATED ORAL
Qty: 90 TABLET | Refills: 1 | Status: SHIPPED | OUTPATIENT
Start: 2018-10-19 | End: 2019-03-12 | Stop reason: SDUPTHER

## 2018-10-19 RX ORDER — VITAMIN B COMPLEX
1 CAPSULE ORAL DAILY
Qty: 90 CAPSULE | Refills: 1 | Status: SHIPPED | OUTPATIENT
Start: 2018-10-19 | End: 2019-03-28 | Stop reason: SDUPTHER

## 2018-10-19 RX ORDER — MULTIVITAMIN
1 TABLET ORAL DAILY
Qty: 90 TABLET | Refills: 1 | Status: SHIPPED | OUTPATIENT
Start: 2018-10-19 | End: 2019-03-28 | Stop reason: SDUPTHER

## 2018-10-19 RX ORDER — AMLODIPINE BESYLATE 5 MG/1
5 TABLET ORAL DAILY
Qty: 90 TABLET | Refills: 1 | Status: SHIPPED | OUTPATIENT
Start: 2018-10-19 | End: 2019-03-28 | Stop reason: SDUPTHER

## 2018-10-19 NOTE — TELEPHONE ENCOUNTER
Pt called stating that he is traveling and had an Office Visit today with LINA Waterman  Pt is traveling tomorrow morning at 6 am   She prescribed medications today 10/19/18 and quantity ordered (except for Tramadol) 90 tablets  [facility] will not allow 90 tablets unless the provider contacts them and authorizes that he needs the 90 tablets because he is traveling  YAMINI MILLS- Can you contact [facility]?   Pt stated to stress the fact that he is leaving TOMORROW MORNING AT 6 AM    Clerical FYI  (NOT HIGH PRIORITY FOR CLERICAL)

## 2018-10-19 NOTE — PROGRESS NOTES
Assessment/Plan:    No problem-specific Assessment & Plan notes found for this encounter  Problem List Items Addressed This Visit        Cardiovascular and Mediastinum    Benign hypertension    Relevant Medications    amLODIPine (NORVASC) 5 mg tablet    Hypertension    Relevant Medications    amLODIPine (NORVASC) 5 mg tablet    Multiple Vitamin (MULTIVITAMIN) tablet    Cerebrovascular disease       Other    Dysesthesia of multiple sites    Relevant Medications    clopidogrel (PLAVIX) 75 mg tablet    b complex vitamins capsule      Other Visit Diagnoses     Primary insomnia    -  Primary    Relevant Medications    amitriptyline (ELAVIL) 50 mg tablet    Right-sided cerebrovascular accident (CVA) (HCC)        Relevant Medications    clopidogrel (PLAVIX) 75 mg tablet    Chronic narcotic use        Relevant Medications    traMADol (ULTRAM) 50 mg tablet    Other Relevant Orders    Toxicology screen, urine            Subjective:      Patient ID: Lemuel Lopez is a 53 y o  male  52 yo  male s/p bariatric surgery here today to follow up on abdominal pain and sensation of pins and needles all over his body for about 10 days  Sensation has improved  States was worse before when he had stopped taking his vitamin supplements  Still has abdominal pain at the site of surgery  Pain is not radiated, self limited, lasts for several minutes  Chronic LBP unchanged            The following portions of the patient's history were reviewed and updated as appropriate:   He  has a past medical history of Abnormal blood sugar; Abnormal TSH; Back pain; Bilateral anterior knee pain; Chronic pain disorder; CPAP (continuous positive airway pressure) dependence; DDD (degenerative disc disease), lumbar; GERD (gastroesophageal reflux disease); Hematuria; History of Helicobacter pylori infection; Hypertension; Knee pain; Morbid obesity ([address] ); Prediabetes; Sleep apnea;  Suspicious nevus; Use of cane as ambulatory aid; and Vitamin D deficiency  He   Patient Active Problem List    Diagnosis Date Noted    Cerebrovascular disease 09/22/2018    Dysesthesia of multiple sites 09/21/2018    Bariatric surgery status 08/28/2018    Postsurgical malabsorption 08/28/2018    Muscle pain 08/28/2018    Varicose veins of left lower extremity with inflammation 07/19/2018    Cystitis 07/19/2018    CPAP (continuous positive airway pressure) dependence     Hypertension     Benign essential hypertension 05/04/2018    Sleep apnea 05/04/2018    Morbid obesity ([facility] ) 04/10/2018    Benign hypertension 04/10/2018    Gastroesophageal reflux disease 04/10/2018    Dry eye 03/29/2018    Morbid obesity with BMI of 50 0-59 9, adult ([facility] ) 03/12/2018    KAT (obstructive sleep apnea) 03/12/2018    Benign essential HTN 03/12/2018    GERD (gastroesophageal reflux disease) 03/12/2018    Prediabetes 05/04/2017     He  has a past surgical history that includes pr egd transoral biopsy single/multiple (N/A, 12/13/2017); pr lap gastric bypass/dulce-en-y (N/A, 5/8/2018); and Knee surgery (Left, 07/27/2018)  His family history includes Hypertension in his maternal grandmother and mother; Prostate cancer in his father  He  reports that he has never smoked  He has never used smokeless tobacco  He reports that he does not drink alcohol or use drugs    Current Outpatient Prescriptions   Medication Sig Dispense Refill    amitriptyline (ELAVIL) 50 mg tablet Take 1 tablet (50 mg total) by mouth daily at bedtime For insomnia 90 tablet 1    amLODIPine (NORVASC) 5 mg tablet Take 1 tablet (5 mg total) by mouth daily for 30 days 90 tablet 1    artificial tear (LUBRIFRESH P M ) 83-15 % ophthalmic ointment ADMINISTER 1 APPLICATION TO BOTH EYES DAILY AT BEDTIME  1    b complex vitamins capsule Take 1 capsule by mouth daily 90 capsule 1    clopidogrel (PLAVIX) 75 mg tablet Take 1 tablet (75 mg total) by mouth daily 90 tablet 1    Multiple Vitamin (MULTIVITAMIN) tablet Take 1 tablet by mouth daily 90 tablet 1    omeprazole (PriLOSEC) 20 mg delayed release capsule Take 20 mg by mouth every morning        oxyCODONE-acetaminophen (PERCOCET) 5-325 mg per tablet Take 1 tablet by mouth every 4 (four) hours as needed for moderate pain      traMADol (ULTRAM) 50 mg tablet Take 1 tablet (50 mg total) by mouth every 6 (six) hours as needed for moderate pain 30 tablet 0     No current facility-administered medications for this visit  Current Outpatient Prescriptions on File Prior to Visit   Medication Sig    artificial tear (LUBRIFRESH P M ) 83-15 % ophthalmic ointment ADMINISTER 1 APPLICATION TO BOTH EYES DAILY AT BEDTIME    omeprazole (PriLOSEC) 20 mg delayed release capsule Take 20 mg by mouth every morning      oxyCODONE-acetaminophen (PERCOCET) 5-325 mg per tablet Take 1 tablet by mouth every 4 (four) hours as needed for moderate pain    [DISCONTINUED] amitriptyline (ELAVIL) 50 mg tablet Take 50 mg by mouth daily at bedtime For insomnia     [DISCONTINUED] amLODIPine (NORVASC) 5 mg tablet Take 1 tablet (5 mg total) by mouth daily for 30 days    [DISCONTINUED] b complex vitamins capsule Take 1 capsule by mouth daily    [DISCONTINUED] clopidogrel (PLAVIX) 75 mg tablet Take 1 tablet (75 mg total) by mouth daily    [DISCONTINUED] Multiple Vitamin (MULTIVITAMIN) tablet Take 1 tablet by mouth daily     No current facility-administered medications on file prior to visit       Review of Systems   Gastrointestinal: Positive for abdominal pain  Musculoskeletal: Positive for arthralgias and back pain  All other systems reviewed and are negative  Objective:      /86 (BP Location: Right arm, Patient Position: Sitting, Cuff Size: Large)   Pulse 83   Temp 97 5 °F (36 4 °C) (Tympanic)   Resp 18   Ht 5' 7" (1 702 m)   Wt 132 kg (292 lb)   SpO2 99%   BMI 45 73 kg/m²          Physical Exam   Constitutional: He is oriented to person, place, and time   He appears well-developed  HENT:   Head: Normocephalic  Right Ear: External ear normal    Left Ear: External ear normal    Nose: Nose normal    Mouth/Throat: Oropharynx is clear and moist    Eyes: Pupils are equal, round, and reactive to light  Conjunctivae and EOM are normal    Neck: Normal range of motion  Neck supple  No thyromegaly present  Cardiovascular: Normal rate, regular rhythm and normal heart sounds  Pulmonary/Chest: Effort normal and breath sounds normal    Abdominal: Soft  There is no tenderness  There is no rebound and no guarding  Musculoskeletal: Normal range of motion  Neurological: He is alert and oriented to person, place, and time  He has normal reflexes  Skin: Skin is dry  Psychiatric: He has a normal mood and affect  Nursing note and vitals reviewed

## 2018-10-19 NOTE — TELEPHONE ENCOUNTER
I called [place] [phone] explained the pt is leaving the Country,traveling to [facility], Karla Alvarez representative explained just Health maintenance meds are allowed 90 days supply like Amlodipine and Clopidrogel ,for the rest needs a prior authorization "Override", form is ready for Dr Geovanni Trimble to sign will require 24 hrs to be process  Pt is aware that Dr Geovanni Trimble left for the day, so Monday 10/22 form will be fax

## 2018-10-20 LAB
AMPHETAMINES UR QL SCN: NEGATIVE NG/ML
BARBITURATES UR QL SCN: NEGATIVE NG/ML
BENZODIAZ UR QL SCN: NEGATIVE NG/ML
BZE UR QL: NEGATIVE NG/ML
CANNABINOIDS UR QL SCN: NEGATIVE NG/ML
METHADONE UR QL SCN: NEGATIVE NG/ML
OPIATES UR QL: NEGATIVE NG/ML
PCP UR QL: NEGATIVE NG/ML
PROPOXYPH UR QL: NEGATIVE NG/ML

## 2018-10-23 NOTE — TELEPHONE ENCOUNTER
[employer] we just got the approval for Clopidogrel x 90 days supply  I am calling [facility] because we didn't get approval for the other ones, now representative states they are approved as well rest of the meds: Amitriptyline,Amlodipine,B complex,Multivitamins  [facility] if faxing the approvals

## 2018-10-25 NOTE — TELEPHONE ENCOUNTER
I called Pharmacy [facility] yesterday (10/24) and pharmacist states pt got last Rx renewal on 10/17 30 tabs of his meds  Then the pharmacist is trying to process the 90 days supply,can't be process,pharmacy states can get the remaining 60 tabs when pt is due,get the remaining 60 tabs

## 2018-11-08 ENCOUNTER — TELEPHONE (OUTPATIENT)
Dept: FAMILY MEDICINE CLINIC | Facility: CLINIC | Age: 48
End: 2018-11-08

## 2018-11-15 NOTE — TELEPHONE ENCOUNTER
PA Form was approved by [place] since 11/15/18 until 02/15/2019  I am faxing approval to [facility] pharmacy

## 2019-03-12 DIAGNOSIS — F51.01 PRIMARY INSOMNIA: ICD-10-CM

## 2019-03-12 RX ORDER — AMITRIPTYLINE HYDROCHLORIDE 50 MG/1
50 TABLET, FILM COATED ORAL
Qty: 90 TABLET | Refills: 1 | Status: SHIPPED | OUTPATIENT
Start: 2019-03-12 | End: 2019-07-24

## 2019-03-28 ENCOUNTER — OFFICE VISIT (OUTPATIENT)
Dept: FAMILY MEDICINE CLINIC | Facility: CLINIC | Age: 49
End: 2019-03-28

## 2019-03-28 VITALS
BODY MASS INDEX: 45.99 KG/M2 | WEIGHT: 293 LBS | HEIGHT: 67 IN | RESPIRATION RATE: 18 BRPM | HEART RATE: 92 BPM | TEMPERATURE: 98.2 F | OXYGEN SATURATION: 99 % | SYSTOLIC BLOOD PRESSURE: 138 MMHG | DIASTOLIC BLOOD PRESSURE: 76 MMHG

## 2019-03-28 DIAGNOSIS — I10 ESSENTIAL HYPERTENSION: ICD-10-CM

## 2019-03-28 DIAGNOSIS — R31.0 GROSS HEMATURIA: ICD-10-CM

## 2019-03-28 DIAGNOSIS — G47.33 OSA (OBSTRUCTIVE SLEEP APNEA): Primary | ICD-10-CM

## 2019-03-28 DIAGNOSIS — F51.01 PRIMARY INSOMNIA: ICD-10-CM

## 2019-03-28 DIAGNOSIS — R20.8 DYSESTHESIA OF MULTIPLE SITES: ICD-10-CM

## 2019-03-28 DIAGNOSIS — I63.9 RIGHT-SIDED CEREBROVASCULAR ACCIDENT (CVA) (HCC): ICD-10-CM

## 2019-03-28 DIAGNOSIS — I83.12 VARICOSE VEINS OF LEFT LOWER EXTREMITY WITH INFLAMMATION: ICD-10-CM

## 2019-03-28 PROCEDURE — 99215 OFFICE O/P EST HI 40 MIN: CPT | Performed by: FAMILY MEDICINE

## 2019-03-28 RX ORDER — CLOPIDOGREL BISULFATE 75 MG/1
75 TABLET ORAL DAILY
Qty: 90 TABLET | Refills: 1 | Status: SHIPPED | OUTPATIENT
Start: 2019-03-28 | End: 2020-03-19 | Stop reason: SDUPTHER

## 2019-03-28 RX ORDER — VITAMIN B COMPLEX
1 CAPSULE ORAL DAILY
Qty: 90 CAPSULE | Refills: 1 | Status: SHIPPED | OUTPATIENT
Start: 2019-03-28 | End: 2019-07-24

## 2019-03-28 RX ORDER — AMLODIPINE BESYLATE 5 MG/1
5 TABLET ORAL DAILY
Qty: 90 TABLET | Refills: 1 | Status: SHIPPED | OUTPATIENT
Start: 2019-03-28 | End: 2020-03-16

## 2019-03-28 RX ORDER — QUETIAPINE FUMARATE 50 MG/1
50 TABLET, FILM COATED ORAL
Qty: 90 TABLET | Refills: 0 | Status: SHIPPED | OUTPATIENT
Start: 2019-03-28 | End: 2019-07-11 | Stop reason: SDUPTHER

## 2019-03-28 RX ORDER — MULTIVITAMIN
1 TABLET ORAL DAILY
Qty: 90 TABLET | Refills: 1 | Status: SHIPPED | OUTPATIENT
Start: 2019-03-28 | End: 2020-06-16 | Stop reason: SDUPTHER

## 2019-03-28 NOTE — PROGRESS NOTES
Assessment/Plan:    KAT (obstructive sleep apnea)  Insisted on importance of using CPAP     Gross hematuria  UA and bladder US ordered  Will consider urology referral     Primary insomnia  Counseled sleep hygiene  D/C Amitriptyline  Start Seroquel      Dysesthesia of multiple sites  Counseled on importance of taking vitamins a and supplements      Greater than 50% of 40 minute encounter was spent counseling/coordinating care of the patient regarding the diagnosis and understanding pathogenesis of disease process, discussion of differential diagnoses, review of laboratory data and imaging, discussion of medications to include side effect profile, precautions, and plan of care         Problem List Items Addressed This Visit        Respiratory    KAT (obstructive sleep apnea) - Primary     Insisted on importance of using CPAP          Relevant Medications    amLODIPine (NORVASC) 5 mg tablet    clopidogrel (PLAVIX) 75 mg tablet    Multiple Vitamin (MULTIVITAMIN) tablet    b complex vitamins capsule    QUEtiapine (SEROquel) 50 mg tablet    Other Relevant Orders    CBC and differential    Comprehensive metabolic panel    Lipid panel    Vitamin D 25 hydroxy    Microalbumin / creatinine urine ratio    TSH, 3rd generation with Free T4 reflex    UA w Reflex to Microscopic w Reflex to Culture -Lab Collect       Cardiovascular and Mediastinum    Varicose veins of left lower extremity with inflammation    Relevant Orders    Ambulatory referral to Vascular Surgery    Hypertension    Relevant Medications    amLODIPine (NORVASC) 5 mg tablet    clopidogrel (PLAVIX) 75 mg tablet    Multiple Vitamin (MULTIVITAMIN) tablet    b complex vitamins capsule    QUEtiapine (SEROquel) 50 mg tablet    Other Relevant Orders    CBC and differential    Comprehensive metabolic panel    Lipid panel    Vitamin D 25 hydroxy    Microalbumin / creatinine urine ratio    TSH, 3rd generation with Free T4 reflex    UA w Reflex to Microscopic w Reflex to Culture -Lab Collect       Genitourinary    Gross hematuria     UA and bladder US ordered  Will consider urology referral          Relevant Orders    US kidney and bladder       Other    Dysesthesia of multiple sites     Counseled on importance of taking vitamins a and supplements           Relevant Medications    clopidogrel (PLAVIX) 75 mg tablet    b complex vitamins capsule    Primary insomnia     Counseled sleep hygiene  D/C Amitriptyline  Start Seroquel           Relevant Medications    QUEtiapine (SEROquel) 50 mg tablet      Other Visit Diagnoses     Right-sided cerebrovascular accident (CVA) ([address] )        Relevant Medications    clopidogrel (PLAVIX) 75 mg tablet            Subjective:      Patient ID: Gemini Rodriguez is a 53 y o  male  52 yo morbidly obese  male, s/p weight loss surgery, here today with multiple complains:  1- Blood in urine: states since the gastric bypass surgery in May 2018 he has had 3 episodes of marky red blood in his urine  Denies dysuria, bladder spasm, increased frequency  He reports multiple clots in his urine which resolves on its own after urinating 5 to 6 times  2- Difficulty sleeping: Both falling and staying asleep  States Amtryptilene is no longer working for him  He admits he has not been using his CPAP machine for the last 4 months  States he had excess luggage when he traveled to [facility], so he decided to mail his CPAP supplies and never received them, so he was unable to use his CPAP for the 4 months he was there, nor has he been able to use it in the last 2 weeks he has been back given he mailed all his masks and the box was lost in the mail     Has not had BW done   Admits he has not been taking his supplements during his trip       The following portions of the patient's history were reviewed and updated as appropriate: He  has a past medical history of Abnormal blood sugar, Abnormal TSH, Back pain, Bariatric surgery status, Bilateral anterior knee pain, Chronic pain disorder, CPAP (continuous positive airway pressure) dependence, DDD (degenerative disc disease), lumbar, GERD (gastroesophageal reflux disease), Hematuria, History of Helicobacter pylori infection, History of transfusion, Hypertension, Knee pain, Morbid obesity ([facility] ), Postgastrectomy malabsorption, Prediabetes, Sleep apnea, Suspicious nevus, Use of cane as ambulatory aid, and Vitamin D deficiency  He   Patient Active Problem List    Diagnosis Date Noted    Primary insomnia 03/28/2019    Gross hematuria 03/28/2019    Cerebrovascular disease 09/22/2018    Dysesthesia of multiple sites 09/21/2018    Bariatric surgery status 08/28/2018    Postsurgical malabsorption 08/28/2018    Muscle pain 08/28/2018    Varicose veins of left lower extremity with inflammation 07/19/2018    Cystitis 07/19/2018    CPAP (continuous positive airway pressure) dependence     Hypertension     Benign essential hypertension 05/04/2018    Sleep apnea 05/04/2018    Morbid obesity ([facility] ) 04/10/2018    Benign hypertension 04/10/2018    Gastroesophageal reflux disease 04/10/2018    Dry eye 03/29/2018    Morbid obesity with BMI of 50 0-59 9, adult ([facility] ) 03/12/2018    KAT (obstructive sleep apnea) 03/12/2018    Benign essential HTN 03/12/2018    GERD (gastroesophageal reflux disease) 03/12/2018    Prediabetes 05/04/2017     He  has a past surgical history that includes pr egd transoral biopsy single/multiple (N/A, 12/13/2017); pr lap gastric bypass/dulce-en-y (N/A, 5/8/2018); and Knee surgery (Left, 07/27/2018)  His family history includes Hypertension in his maternal grandmother and mother; Prostate cancer in his father  He  reports that he has never smoked  He has never used smokeless tobacco  He reports that he does not drink alcohol or use drugs    Current Outpatient Medications   Medication Sig Dispense Refill    amitriptyline (ELAVIL) 50 mg tablet Take 1 tablet (50 mg total) by mouth daily at bedtime For insomnia 90 tablet 1    amLODIPine (NORVASC) 5 mg tablet Take 1 tablet (5 mg total) by mouth daily for 30 days 90 tablet 1    artificial tear (LUBRIFRESH P M ) 83-15 % ophthalmic ointment ADMINISTER 1 APPLICATION TO BOTH EYES DAILY AT BEDTIME  1    b complex vitamins capsule Take 1 capsule by mouth daily 90 capsule 1    clopidogrel (PLAVIX) 75 mg tablet Take 1 tablet (75 mg total) by mouth daily 90 tablet 1    Multiple Vitamin (MULTIVITAMIN) tablet Take 1 tablet by mouth daily 90 tablet 1    omeprazole (PriLOSEC) 20 mg delayed release capsule Take 20 mg by mouth every morning        oxyCODONE-acetaminophen (PERCOCET) 5-325 mg per tablet Take 1 tablet by mouth every 4 (four) hours as needed for moderate pain      QUEtiapine (SEROquel) 50 mg tablet Take 1 tablet (50 mg total) by mouth daily at bedtime 90 tablet 0    traMADol (ULTRAM) 50 mg tablet Take 1 tablet (50 mg total) by mouth every 6 (six) hours as needed for moderate pain 30 tablet 0     No current facility-administered medications for this visit       Review of Systems   Constitutional: Positive for fatigue  Cardiovascular: Positive for leg swelling  Musculoskeletal: Positive for arthralgias and back pain  Psychiatric/Behavioral: Positive for sleep disturbance  All other systems reviewed and are negative  Objective:      /76 (BP Location: Left arm, Patient Position: Sitting, Cuff Size: Extra-Large)   Pulse 92   Temp 98 2 °F (36 8 °C) (Temporal)   Resp 18   Ht 5' 7" (1 702 m)   Wt 133 kg (293 lb)   SpO2 99%   BMI 45 89 kg/m²          Physical Exam   Constitutional: He is oriented to person, place, and time  He appears well-developed  HENT:   Head: Normocephalic  Right Ear: External ear normal    Left Ear: External ear normal    Nose: Nose normal    Mouth/Throat: Oropharynx is clear and moist    Eyes: Pupils are equal, round, and reactive to light  Conjunctivae and EOM are normal    Neck: Normal range of motion   Neck supple  No thyromegaly present  Cardiovascular: Normal rate, regular rhythm and normal heart sounds  Pulmonary/Chest: Effort normal and breath sounds normal    Abdominal: Soft  There is no tenderness  There is no rebound and no guarding  Musculoskeletal: He exhibits tenderness  Lumbar back: He exhibits tenderness and spasm  Neurological: He is alert and oriented to person, place, and time  He has normal reflexes  Skin: Skin is dry  Psychiatric: He has a normal mood and affect  Nursing note and vitals reviewed

## 2019-07-11 DIAGNOSIS — I10 ESSENTIAL HYPERTENSION: ICD-10-CM

## 2019-07-11 DIAGNOSIS — F51.01 PRIMARY INSOMNIA: ICD-10-CM

## 2019-07-11 DIAGNOSIS — G47.33 OSA (OBSTRUCTIVE SLEEP APNEA): ICD-10-CM

## 2019-07-11 RX ORDER — QUETIAPINE FUMARATE 50 MG/1
50 TABLET, FILM COATED ORAL
Qty: 90 TABLET | Refills: 0 | Status: SHIPPED | OUTPATIENT
Start: 2019-07-11 | End: 2019-10-22 | Stop reason: SDUPTHER

## 2019-07-24 ENCOUNTER — HOSPITAL ENCOUNTER (EMERGENCY)
Facility: HOSPITAL | Age: 49
Discharge: HOME | End: 2019-07-25
Attending: EMERGENCY MEDICINE | Admitting: EMERGENCY MEDICINE
Payer: COMMERCIAL

## 2019-07-24 DIAGNOSIS — R10.9 NONSPECIFIC ABDOMINAL PAIN: Primary | ICD-10-CM

## 2019-07-24 LAB
BASOPHILS # BLD AUTO: 0.03 THOUSANDS/ΜL (ref 0–0.1)
BASOPHILS NFR BLD AUTO: 1 % (ref 0–1)
BILIRUB UR QL STRIP: NEGATIVE
CLARITY UR: CLEAR
COLOR UR: YELLOW
EOSINOPHIL # BLD AUTO: 0.08 THOUSAND/ΜL (ref 0–0.61)
EOSINOPHIL NFR BLD AUTO: 1 % (ref 0–6)
ERYTHROCYTE [DISTWIDTH] IN BLOOD BY AUTOMATED COUNT: 13.9 % (ref 11.6–15.1)
GLUCOSE UR STRIP-MCNC: NEGATIVE MG/DL
HCT VFR BLD AUTO: 45.1 % (ref 36.5–49.3)
HGB BLD-MCNC: 14.8 G/DL (ref 12–17)
HGB UR QL STRIP.AUTO: NEGATIVE
IMM GRANULOCYTES # BLD AUTO: 0.01 THOUSAND/UL (ref 0–0.2)
IMM GRANULOCYTES NFR BLD AUTO: 0 % (ref 0–2)
KETONES UR STRIP-MCNC: NEGATIVE MG/DL
LEUKOCYTE ESTERASE UR QL STRIP: NEGATIVE
LYMPHOCYTES # BLD AUTO: 2.93 THOUSANDS/ΜL (ref 0.6–4.47)
LYMPHOCYTES NFR BLD AUTO: 49 % (ref 14–44)
MCH RBC QN AUTO: 30.5 PG (ref 26.8–34.3)
MCHC RBC AUTO-ENTMCNC: 32.8 G/DL (ref 31.4–37.4)
MCV RBC AUTO: 93 FL (ref 82–98)
MONOCYTES # BLD AUTO: 0.39 THOUSAND/ΜL (ref 0.17–1.22)
MONOCYTES NFR BLD AUTO: 6 % (ref 4–12)
NEUTROPHILS # BLD AUTO: 2.64 THOUSANDS/ΜL (ref 1.85–7.62)
NEUTS SEG NFR BLD AUTO: 43 % (ref 43–75)
NITRITE UR QL STRIP: NEGATIVE
NRBC BLD AUTO-RTO: 0 /100 WBCS
PH UR STRIP.AUTO: 7 [PH]
PLATELET # BLD AUTO: 191 THOUSANDS/UL (ref 149–390)
PMV BLD AUTO: 10.7 FL (ref 8.9–12.7)
PROT UR STRIP-MCNC: NEGATIVE MG/DL
RBC # BLD AUTO: 4.86 MILLION/UL (ref 3.88–5.62)
SP GR UR STRIP.AUTO: 1.02 (ref 1–1.03)
UROBILINOGEN UR QL STRIP.AUTO: 0.2 E.U./DL
WBC # BLD AUTO: 6.08 THOUSAND/UL (ref 4.31–10.16)

## 2019-07-24 PROCEDURE — 83690 ASSAY OF LIPASE: CPT | Performed by: EMERGENCY MEDICINE

## 2019-07-24 PROCEDURE — 93005 ELECTROCARDIOGRAM TRACING: CPT

## 2019-07-24 PROCEDURE — 96361 HYDRATE IV INFUSION ADD-ON: CPT

## 2019-07-24 PROCEDURE — 99285 EMERGENCY DEPT VISIT HI MDM: CPT | Performed by: EMERGENCY MEDICINE

## 2019-07-24 PROCEDURE — 81003 URINALYSIS AUTO W/O SCOPE: CPT | Performed by: EMERGENCY MEDICINE

## 2019-07-24 PROCEDURE — 84484 ASSAY OF TROPONIN QUANT: CPT | Performed by: EMERGENCY MEDICINE

## 2019-07-24 PROCEDURE — 99285 EMERGENCY DEPT VISIT HI MDM: CPT

## 2019-07-24 PROCEDURE — 85025 COMPLETE CBC W/AUTO DIFF WBC: CPT | Performed by: EMERGENCY MEDICINE

## 2019-07-24 PROCEDURE — 96374 THER/PROPH/DIAG INJ IV PUSH: CPT

## 2019-07-24 PROCEDURE — 36415 COLL VENOUS BLD VENIPUNCTURE: CPT | Performed by: EMERGENCY MEDICINE

## 2019-07-24 PROCEDURE — 80053 COMPREHEN METABOLIC PANEL: CPT | Performed by: EMERGENCY MEDICINE

## 2019-07-24 RX ORDER — MORPHINE SULFATE 4 MG/ML
4 INJECTION, SOLUTION INTRAMUSCULAR; INTRAVENOUS ONCE
Status: COMPLETED | OUTPATIENT
Start: 2019-07-24 | End: 2019-07-24

## 2019-07-24 RX ORDER — SODIUM CHLORIDE 9 MG/ML
125 INJECTION, SOLUTION INTRAVENOUS CONTINUOUS
Status: DISCONTINUED | OUTPATIENT
Start: 2019-07-24 | End: 2019-07-25 | Stop reason: HOSPADM

## 2019-07-24 RX ADMIN — MORPHINE SULFATE 4 MG: 4 INJECTION INTRAVENOUS at 23:30

## 2019-07-24 RX ADMIN — SODIUM CHLORIDE 125 ML/HR: 0.9 INJECTION, SOLUTION INTRAVENOUS at 23:28

## 2019-07-25 ENCOUNTER — APPOINTMENT (EMERGENCY)
Dept: CT IMAGING | Facility: HOSPITAL | Age: 49
End: 2019-07-25
Payer: COMMERCIAL

## 2019-07-25 VITALS
HEART RATE: 54 BPM | TEMPERATURE: 98.1 F | WEIGHT: 268.96 LBS | SYSTOLIC BLOOD PRESSURE: 154 MMHG | OXYGEN SATURATION: 96 % | DIASTOLIC BLOOD PRESSURE: 70 MMHG | BODY MASS INDEX: 42.13 KG/M2 | RESPIRATION RATE: 14 BRPM

## 2019-07-25 LAB
ALBUMIN SERPL BCP-MCNC: 3.5 G/DL (ref 3.5–5)
ALP SERPL-CCNC: 121 U/L (ref 46–116)
ALT SERPL W P-5'-P-CCNC: 24 U/L (ref 12–78)
ANION GAP SERPL CALCULATED.3IONS-SCNC: 5 MMOL/L (ref 4–13)
AST SERPL W P-5'-P-CCNC: 27 U/L (ref 5–45)
ATRIAL RATE: 56 BPM
BILIRUB SERPL-MCNC: 0.46 MG/DL (ref 0.2–1)
BUN SERPL-MCNC: 13 MG/DL (ref 5–25)
CALCIUM SERPL-MCNC: 8.7 MG/DL (ref 8.3–10.1)
CHLORIDE SERPL-SCNC: 105 MMOL/L (ref 100–108)
CO2 SERPL-SCNC: 31 MMOL/L (ref 21–32)
CREAT SERPL-MCNC: 0.9 MG/DL (ref 0.6–1.3)
GFR SERPL CREATININE-BSD FRML MDRD: 101 ML/MIN/1.73SQ M
GLUCOSE SERPL-MCNC: 87 MG/DL (ref 65–140)
LIPASE SERPL-CCNC: 266 U/L (ref 73–393)
P AXIS: 66 DEGREES
POTASSIUM SERPL-SCNC: 4.3 MMOL/L (ref 3.5–5.3)
PR INTERVAL: 192 MS
PROT SERPL-MCNC: 6.6 G/DL (ref 6.4–8.2)
QRS AXIS: 71 DEGREES
QRSD INTERVAL: 84 MS
QT INTERVAL: 424 MS
QTC INTERVAL: 409 MS
SODIUM SERPL-SCNC: 141 MMOL/L (ref 136–145)
T WAVE AXIS: 67 DEGREES
TROPONIN I SERPL-MCNC: <0.02 NG/ML
VENTRICULAR RATE: 56 BPM

## 2019-07-25 PROCEDURE — 96376 TX/PRO/DX INJ SAME DRUG ADON: CPT

## 2019-07-25 PROCEDURE — 70450 CT HEAD/BRAIN W/O DYE: CPT

## 2019-07-25 PROCEDURE — 74177 CT ABD & PELVIS W/CONTRAST: CPT

## 2019-07-25 PROCEDURE — 96361 HYDRATE IV INFUSION ADD-ON: CPT

## 2019-07-25 PROCEDURE — 93010 ELECTROCARDIOGRAM REPORT: CPT | Performed by: INTERNAL MEDICINE

## 2019-07-25 RX ORDER — MORPHINE SULFATE 4 MG/ML
INJECTION, SOLUTION INTRAMUSCULAR; INTRAVENOUS
Status: COMPLETED
Start: 2019-07-25 | End: 2019-07-25

## 2019-07-25 RX ORDER — MORPHINE SULFATE 4 MG/ML
4 INJECTION, SOLUTION INTRAMUSCULAR; INTRAVENOUS ONCE
Status: COMPLETED | OUTPATIENT
Start: 2019-07-25 | End: 2019-07-25

## 2019-07-25 RX ADMIN — IOHEXOL 50 ML: 240 INJECTION, SOLUTION INTRATHECAL; INTRAVASCULAR; INTRAVENOUS; ORAL at 00:20

## 2019-07-25 RX ADMIN — MORPHINE SULFATE 4 MG: 4 INJECTION, SOLUTION INTRAMUSCULAR; INTRAVENOUS at 00:48

## 2019-07-25 RX ADMIN — IOHEXOL 100 ML: 350 INJECTION, SOLUTION INTRAVENOUS at 01:20

## 2019-07-25 RX ADMIN — MORPHINE SULFATE 4 MG: 4 INJECTION INTRAVENOUS at 00:48

## 2019-07-25 NOTE — ED PROVIDER NOTES
History  Chief Complaint   Patient presents with    Abdominal Pain     LLQ abdominal pain today and hx of gastric bypass pt reports pain moves down left leg    Headache     head pressure for 20 days, denies NV, denies sensitivity to light,     Patient is a 67-year-old male  He is [place]-speaking  History obtained through a   Old records reviewed  Patient is 8 months status post Vonnie-en-Y laparoscopic gastric bypass  He has had several ED visits for intermittent left-sided abdominal pain  At 1 point he was diagnosed with cystitis  At another, he was diagnosed with chronic abdominal pain  It also presented with headache before  Presents to the emergency room today because his left side of his abdomen and his bladder have been hurting for about 20 days  He does report some dysuria  No frequency or hematuria  He does have some diarrhea  No constipation  No hematemesis, hematochezia or melena  No nausea or vomiting  No fever or chills  The headache started about 3 hours ago  It was gradual in onset  It is a crampy type pain  Occasionally he experience intermittent numbness in the right arm  Otherwise no neurologic symptoms  It was not sudden onset of maximal headache  No neck pain or photophobia  No mental status changes  He does have a history of a CVA  No speech problems  No visual problems  Prior to Admission Medications   Prescriptions Last Dose Informant Patient Reported? Taking?    Multiple Vitamin (MULTIVITAMIN) tablet   No No   Sig: Take 1 tablet by mouth daily   QUEtiapine (SEROquel) 50 mg tablet   No No   Sig: Take 1 tablet (50 mg total) by mouth daily at bedtime   amLODIPine (NORVASC) 5 mg tablet   No No   Sig: Take 1 tablet (5 mg total) by mouth daily for 30 days   artificial tear (LUBRIFRESH P M ) 83-15 % ophthalmic ointment  Self Yes No   Sig: ADMINISTER 1 APPLICATION TO BOTH EYES DAILY AT BEDTIME   clopidogrel (PLAVIX) 75 mg tablet   No No   Sig: Take 1 tablet (75 mg total) by mouth daily      Facility-Administered Medications: None       Past Medical History:   Diagnosis Date    Abnormal blood sugar     Abnormal TSH     Back pain     Bariatric surgery status     Bilateral anterior knee pain     Chronic pain disorder     back    CPAP (continuous positive airway pressure) dependence     DDD (degenerative disc disease), lumbar     GERD (gastroesophageal reflux disease)     Hematuria     History of Helicobacter pylori infection     History of transfusion     Hypertension     Knee pain     Morbid obesity (HCC)     Postgastrectomy malabsorption     Prediabetes     Sleep apnea     Suspicious nevus     Use of cane as ambulatory aid     Vitamin D deficiency        Past Surgical History:   Procedure Laterality Date    KNEE SURGERY Left 07/27/2018    HI EGD TRANSORAL BIOPSY SINGLE/MULTIPLE N/A 12/13/2017    Procedure: ESOPHAGOGASTRODUODENOSCOPY (EGD), with BIOPSY;  Surgeon: Virgil Bonner MD;  Location: [facility]; Service: Bariatrics    HI LAP GASTRIC BYPASS/ENRICO-EN-Y N/A 5/8/2018    Procedure: BYPASS GASTRIC  ENRICO-EN-Y LAPAROSCOPIC AND INTRAOPERATIVE EGD;  Surgeon: Virgil Bonner MD;  Location: [facility];  Service: Bariatrics       Family History   Problem Relation Age of Onset    Hypertension Mother     Prostate cancer Father         ALSO PROSTATE ENLARGEMENT    Hypertension Maternal Grandmother      I have reviewed and agree with the history as documented  Social History     Tobacco Use    Smoking status: Never Smoker    Smokeless tobacco: Never Used   Substance Use Topics    Alcohol use: No    Drug use: No        Review of Systems   Constitutional: Negative for chills and fever  HENT: Negative for rhinorrhea and sore throat  Eyes: Negative for pain, redness and visual disturbance  Respiratory: Negative for cough and shortness of breath  Cardiovascular: Negative for chest pain and leg swelling     Gastrointestinal: Positive for abdominal pain and diarrhea  Negative for vomiting  Endocrine: Negative for polydipsia and polyuria  Genitourinary: Positive for dysuria  Negative for frequency and hematuria  Musculoskeletal: Negative for back pain and neck pain  Skin: Negative for rash and wound  Allergic/Immunologic: Negative for immunocompromised state  Neurological: Positive for headaches  Negative for weakness and numbness  Psychiatric/Behavioral: Negative for hallucinations and suicidal ideas  All other systems reviewed and are negative  Physical Exam  Physical Exam   Constitutional: He is oriented to person, place, and time  He appears well-developed and well-nourished  No distress  HENT:   Head: Normocephalic and atraumatic  Eyes: Right eye exhibits no discharge  Left eye exhibits no discharge  No scleral icterus  Neck: Normal range of motion  Neck supple  Cardiovascular: Normal rate, regular rhythm, normal heart sounds and intact distal pulses  Exam reveals no gallop and no friction rub  No murmur heard  Pulmonary/Chest: Effort normal and breath sounds normal  No respiratory distress  He has no wheezes  He has no rales  Abdominal: Soft  Bowel sounds are normal  He exhibits no distension  There is no tenderness  There is no rebound and no guarding  Musculoskeletal: Normal range of motion  He exhibits no edema, tenderness or deformity  Neurological: He is alert and oriented to person, place, and time  He has normal strength  No cranial nerve deficit or sensory deficit  GCS eye subscore is 4  GCS verbal subscore is 5  GCS motor subscore is 6  Skin: Skin is warm and dry  No rash noted  He is not diaphoretic  Psychiatric: He has a normal mood and affect  His behavior is normal    Vitals reviewed        Vital Signs  ED Triage Vitals [07/24/19 2216]   Temperature Pulse Respirations Blood Pressure SpO2   98 1 °F (36 7 °C) 70 20 150/77 99 %      Temp src Heart Rate Source Patient Position - Orthostatic VS BP Location FiO2 (%)   -- Monitor Sitting Right arm --      Pain Score       5           Vitals:    07/24/19 2216 07/24/19 2345 07/25/19 0057 07/25/19 0230   BP: 150/77 133/67 137/78 154/70   Pulse: 70 58 56 (!) 54   Patient Position - Orthostatic VS: Sitting Lying Lying          Visual Acuity      ED Medications  Medications   sodium chloride 0 9 % infusion (0 mL/hr Intravenous Stopped 7/25/19 0335)   morphine (PF) 4 mg/mL injection 4 mg (4 mg Intravenous Given 7/24/19 2330)   morphine (PF) 4 mg/mL injection 4 mg (4 mg Intravenous Given 7/25/19 0048)       Diagnostic Studies  Results Reviewed     Procedure Component Value Units Date/Time    Troponin I [[id number]]  (Normal) Collected:  07/24/19 2325    Lab Status:  Final result Specimen:  Blood from Arm, Left Updated:  07/25/19 0004     Troponin I <0 02 ng/mL     Comprehensive metabolic panel [[id number]]  (Abnormal) Collected:  07/24/19 2325    Lab Status:  Final result Specimen:  Blood from Arm, Left Updated:  07/25/19 0003     Sodium 141 mmol/L      Potassium 4 3 mmol/L      Chloride 105 mmol/L      CO2 31 mmol/L      ANION GAP 5 mmol/L      BUN 13 mg/dL      Creatinine 0 90 mg/dL      Glucose 87 mg/dL      Calcium 8 7 mg/dL      AST 27 U/L      ALT 24 U/L      Alkaline Phosphatase 121 U/L      Total Protein 6 6 g/dL      Albumin 3 5 g/dL      Total Bilirubin 0 46 mg/dL      eGFR 101 ml/min/1 73sq m     Narrative:       Jessica guidelines for Chronic Kidney Disease (CKD):     Stage 1 with normal or high GFR (GFR > 90 mL/min/1 73 square meters)    Stage 2 Mild CKD (GFR = 60-89 mL/min/1 73 square meters)    Stage 3A Moderate CKD (GFR = 45-59 mL/min/1 73 square meters)    Stage 3B Moderate CKD (GFR = 30-44 mL/min/1 73 square meters)    Stage 4 Severe CKD (GFR = 15-29 mL/min/1 73 square meters)    Stage 5 End Stage CKD (GFR <15 mL/min/1 73 square meters)  Note: GFR calculation is accurate only with a steady state creatinine Lipase [[id number]]  (Normal) Collected:  07/24/19 2325    Lab Status:  Final result Specimen:  Blood from Arm, Left Updated:  07/25/19 0003     Lipase 266 u/L     UA w Reflex to Microscopic w Reflex to Culture [[id number]] Collected:  07/24/19 2325    Lab Status:  Final result Specimen:  Urine, Clean Catch Updated:  07/24/19 2357     Color, UA Yellow     Clarity, UA Clear     Specific [place], UA 1 020     pH, UA 7 0     Leukocytes, UA Negative     Nitrite, UA Negative     Protein, UA Negative mg/dl      Glucose, UA Negative mg/dl      Ketones, UA Negative mg/dl      Urobilinogen, UA 0 2 E U /dl      Bilirubin, UA Negative     Blood, UA Negative    CBC and differential [[id number]]  (Abnormal) Collected:  07/24/19 2325    Lab Status:  Final result Specimen:  Blood from Arm, Left Updated:  07/24/19 2342     WBC 6 08 Thousand/uL      RBC 4 86 Million/uL      Hemoglobin 14 8 g/dL      Hematocrit 45 1 %      MCV 93 fL      MCH 30 5 pg      MCHC 32 8 g/dL      RDW 13 9 %      MPV 10 7 fL      Platelets 709 Thousands/uL      nRBC 0 /100 WBCs      Neutrophils Relative 43 %      Immat GRANS % 0 %      Lymphocytes Relative 49 %      Monocytes Relative 6 %      Eosinophils Relative 1 %      Basophils Relative 1 %      Neutrophils Absolute 2 64 Thousands/µL      Immature Grans Absolute 0 01 Thousand/uL      Lymphocytes Absolute 2 93 Thousands/µL      Monocytes Absolute 0 39 Thousand/µL      Eosinophils Absolute 0 08 Thousand/µL      Basophils Absolute 0 03 Thousands/µL                  CT abdomen pelvis with contrast   Final Result by Tom Vivar MD (07/25 0221)      No evidence of acute intra-abdominal or pelvic pathology            Workstation performed: [id number]         CT head without contrast   Final Result by Tom Vivar MD (07/25 3966)      No acute intracranial abnormality  Old right caudate head lacunar infarct                    Workstation performed: [id number]                    Procedures  ECG 12 Lead Documentation Only  Date/Time: 7/24/2019 11:53 PM  Performed by: Aparna Gonzalez MD  Authorized by: Aparna Gonzalez MD     ECG reviewed by me, the ED Provider: yes    Patient location:  ED  Comments:      Sinus bradycardia at 56 beats per minute  No acute ischemic ST or T-wave changes  No ectopy  Normal axis  Similar to old EKG  ED Course             Stroke Assessment     Row Name 07/24/19 2311             NIH Stroke Scale    Interval  Baseline      Level of Consciousness (1a )  0      LOC Questions (1b )  0      LOC Commands (1c )  0      Best Gaze (2 )  0      Visual (3 )  0      Facial Palsy (4 )  0      Motor Arm, Left (5a )  0      Motor Arm, Right (5b )  0      Motor Leg, Left (6a )  0      Motor Leg, Right (6b )  0      Limb Ataxia (7 )  0      Sensory (8 )  0      Best Language (9 )  0      Dysarthria (10 )  0      Extinction and Inattention (11 ) (Formerly Neglect)  0      Total  0          First Filed Value   TPA Decision  Patient not a TPA candidate  Patient is not a candidate options  -- [Not CVA ]                        MDM  Number of Diagnoses or Management Options  Diagnosis management comments: CT scan of head was unremarkable  This was not sudden onset of maximal headache  Plus CT scan was done within 6 hours of headache onset  Subarachnoid hemorrhage has effectively been ruled out  No neck pain or fever to suggest meningitis  Review of old charts and history obtained from patient suggest that this pain is somewhat chronic  Today's CT scan of the abdomen and pelvis was negative  Laboratory evaluation and urinalysis was nonspecific  There is no acute surgical pathology  Patient does not have an acute abdomen  Patient is appropriate for discharge and outpatient management  Recommended follow up with his bariatric surgeon later today  Return to emergency room if abdominal pain worsens        Disposition  Final diagnoses:   Nonspecific abdominal pain     Time reflects when diagnosis was documented in both MDM as applicable and the Disposition within this note     Time User Action Codes Description Comment    7/25/2019  4:12 AM [place] [facility] Add [R10 9] Nonspecific abdominal pain       ED Disposition     ED Disposition Condition Date/Time Comment    Discharge Stable u Jul 25, 2019  [address] [facility] discharge to home/self care  Follow-up Information     Follow up With Specialties Details Why Delroy Krt  57 , MD General Surgery, Rey Morales In 1 day  [address] [facility] [address] [facility]  [phone]            Patient's Medications   Discharge Prescriptions    No medications on file     No discharge procedures on file      ED Provider  Electronically Signed by           Eric Reilly MD  07/25/19 0133

## 2019-08-14 ENCOUNTER — TRANSCRIBE ORDERS (OUTPATIENT)
Dept: SLEEP CENTER | Facility: CLINIC | Age: 49
End: 2019-08-14

## 2019-08-14 DIAGNOSIS — G47.33 OBSTRUCTIVE SLEEP APNEA: Primary | ICD-10-CM

## 2019-08-14 DIAGNOSIS — E66.09 OTHER OBESITY DUE TO EXCESS CALORIES: ICD-10-CM

## 2019-08-21 ENCOUNTER — OFFICE VISIT (OUTPATIENT)
Dept: SLEEP CENTER | Facility: CLINIC | Age: 49
End: 2019-08-21
Payer: COMMERCIAL

## 2019-08-21 VITALS
HEART RATE: 80 BPM | SYSTOLIC BLOOD PRESSURE: 120 MMHG | BODY MASS INDEX: 39.4 KG/M2 | DIASTOLIC BLOOD PRESSURE: 70 MMHG | WEIGHT: 260 LBS | HEIGHT: 68 IN

## 2019-08-21 DIAGNOSIS — G47.33 OBSTRUCTIVE SLEEP APNEA: Primary | ICD-10-CM

## 2019-08-21 DIAGNOSIS — G47.00 INSOMNIA, UNSPECIFIED TYPE: ICD-10-CM

## 2019-08-21 DIAGNOSIS — E66.09 OTHER OBESITY DUE TO EXCESS CALORIES: ICD-10-CM

## 2019-08-21 DIAGNOSIS — E66.01 MORBID OBESITY (HCC): ICD-10-CM

## 2019-08-21 PROCEDURE — 99214 OFFICE O/P EST MOD 30 MIN: CPT | Performed by: INTERNAL MEDICINE

## 2019-08-21 NOTE — PROGRESS NOTES
Assessment/Plan:   Diagnoses and all orders for this visit:    Obstructive sleep apnea  -     Ambulatory referral to Sleep Medicine    Insomnia, unspecified type    Other obesity due to excess calories  -     Ambulatory referral to Sleep Medicine    Morbid obesity ([facility] [address] )      insomnia sleep onset insomnia, currently on Seroquel 50 mg at bedtime is helping,  Obstructive sleep apnea moderate obstructive sleep apnea on CPAP currently set at 15 cm of water  Discussed with respiratory therapist regarding getting the new mask and the hose, and he will start using his CPAP machine again  Discussed the need for compliance with the CPAP machine understands and verbalizes  Recommend weight loss  Follow-up in 6 months or p r n  Earlier as needed  Return in about 6 months (around 2/21/2020)  All questions are answered to the patient's satisfaction and understanding  He verbalizes understanding  He is encouraged to call with any further questions or concerns  Portions of the record may have been created with voice recognition software  Occasional wrong word or "sound a like" substitutions may have occurred due to the inherent limitations of voice recognition software  Read the chart carefully and recognize, using context, where substitutions have occurred      Electronically Signed by Rohan Lopes MD    ______________________________________________________________________    Chief Complaint:   Chief Complaint   Patient presents with    Follow-up       Patient ID: Bryanna Pratt is a 51 y o  y o  male has a past medical history of Abnormal blood sugar, Abnormal TSH, Back pain, Bariatric surgery status, Bilateral anterior knee pain, Chronic pain disorder, CPAP (continuous positive airway pressure) dependence, DDD (degenerative disc disease), lumbar, GERD (gastroesophageal reflux disease), Hematuria, History of Helicobacter pylori infection, History of transfusion, Hypertension, Knee pain, Morbid obesity ([facility] [address] ), Postgastrectomy malabsorption, Prediabetes, Sleep apnea, Suspicious nevus, Use of cane as ambulatory aid, and Vitamin D deficiency  8/21/2019  Patient presents today for follow-up visit  Patient is a very pleasant 31-year-old gentleman, diagnosed with moderate obstructive sleep apnea increasing in severity during the REM stage, and currently on CPAP consistently using it until recently, he states he went to [facility] for 3 months, he took the machine, on his return he states he forgot to get the mask and a hose, so for the past few months has not use the CPAP machine  He also states currently on Seroquel helping him for his sleep onset insomnia  Goes to bed at around 1:00 a m  Falls asleep right away in less than 10-15 minutes  And is out of bed at 8:00 a m  Has 1 nocturnal awakening for nocturia  Feels well rested when he is up in the morning  When he is using the CPAP, but for the past few months did not have the CPAP machine he states  Review of Systems   Constitutional: Positive for fatigue and unexpected weight change  Negative for appetite change, chills, diaphoresis and fever  HENT: Negative for congestion, ear discharge, ear pain, nosebleeds, postnasal drip, rhinorrhea, sinus pain, sore throat and voice change  Eyes: Negative for pain, discharge and visual disturbance  Respiratory: Negative for apnea, cough, choking, chest tightness, shortness of breath, wheezing and stridor  Cardiovascular: Negative for chest pain, palpitations and leg swelling  Gastrointestinal: Negative for abdominal pain, blood in stool, constipation, diarrhea and vomiting  Endocrine: Negative for cold intolerance, heat intolerance, polydipsia, polyphagia and polyuria  Genitourinary: Negative for difficulty urinating and dysuria  Musculoskeletal: Positive for back pain  Negative for arthralgias and neck pain  Skin: Negative for pallor and rash     Allergic/Immunologic: Negative for environmental allergies and food allergies  Neurological: Negative for dizziness, speech difficulty, weakness and light-headedness  Hematological: Negative for adenopathy  Does not bruise/bleed easily  Psychiatric/Behavioral: Negative for agitation, confusion and sleep disturbance  The patient is nervous/anxious  Smoking history: He reports that he has never smoked  He has never used smokeless tobacco     The following portions of the patient's history were reviewed and updated as appropriate: allergies, current medications, past family history, past medical history, past social history, past surgical history and problem list     Immunization History   Administered Date(s) Administered    Influenza Quadrivalent Preservative Free 3 years and older IM 10/20/2017     Current Outpatient Medications   Medication Sig Dispense Refill    artificial tear (LUBRIFRESH P M ) 83-15 % ophthalmic ointment ADMINISTER 1 APPLICATION TO BOTH EYES DAILY AT BEDTIME  1    clopidogrel (PLAVIX) 75 mg tablet Take 1 tablet (75 mg total) by mouth daily 90 tablet 1    Multiple Vitamin (MULTIVITAMIN) tablet Take 1 tablet by mouth daily 90 tablet 1    QUEtiapine (SEROquel) 50 mg tablet Take 1 tablet (50 mg total) by mouth daily at bedtime 90 tablet 0    amLODIPine (NORVASC) 5 mg tablet Take 1 tablet (5 mg total) by mouth daily for 30 days 90 tablet 1     No current facility-administered medications for this visit  Allergies: Patient has no known allergies  Objective:  Vitals:    08/21/19 1000   BP: 120/70   Pulse: 80   Weight: 118 kg (260 lb)   Height: 5' 7 5" (1 715 m)        Wt Readings from Last 3 Encounters:   08/21/19 118 kg (260 lb)   07/24/19 122 kg (268 lb 15 4 oz)   03/28/19 133 kg (293 lb)     Body mass index is 40 12 kg/m²  Physical Exam   Constitutional: He is oriented to person, place, and time  He appears well-developed and well-nourished  HENT:   Head: Normocephalic and atraumatic     Crowded oropharyngeal airways, Mallampati score 3   Eyes: Pupils are equal, round, and reactive to light  EOM are normal    Neck: Normal range of motion  Neck supple  Short and wide neck   Cardiovascular: Normal rate, regular rhythm and normal heart sounds  Pulmonary/Chest: Effort normal and breath sounds normal    Musculoskeletal: Normal range of motion  Neurological: He is alert and oriented to person, place, and time  Skin: Skin is warm and dry  Psychiatric: He has a normal mood and affect   His behavior is normal           ESS: Total score: 0

## 2019-08-22 ENCOUNTER — OFFICE VISIT (OUTPATIENT)
Dept: BARIATRICS | Facility: CLINIC | Age: 49
End: 2019-08-22
Payer: COMMERCIAL

## 2019-08-22 VITALS
DIASTOLIC BLOOD PRESSURE: 80 MMHG | BODY MASS INDEX: 39.63 KG/M2 | WEIGHT: 261.5 LBS | HEIGHT: 68 IN | HEART RATE: 82 BPM | SYSTOLIC BLOOD PRESSURE: 130 MMHG | TEMPERATURE: 98.6 F | RESPIRATION RATE: 18 BRPM

## 2019-08-22 DIAGNOSIS — N39.0 RECURRENT UTI: ICD-10-CM

## 2019-08-22 DIAGNOSIS — N30.00 ACUTE CYSTITIS WITHOUT HEMATURIA: Primary | ICD-10-CM

## 2019-08-22 DIAGNOSIS — Z98.84 BARIATRIC SURGERY STATUS: Primary | ICD-10-CM

## 2019-08-22 DIAGNOSIS — Z98.84 BARIATRIC SURGERY STATUS: ICD-10-CM

## 2019-08-22 PROCEDURE — 99213 OFFICE O/P EST LOW 20 MIN: CPT | Performed by: SURGERY

## 2019-08-22 RX ORDER — OMEPRAZOLE 20 MG/1
20 CAPSULE, DELAYED RELEASE ORAL DAILY
Qty: 30 CAPSULE | Refills: 3 | Status: SHIPPED | OUTPATIENT
Start: 2019-08-22 | End: 2021-03-29 | Stop reason: HOSPADM

## 2019-08-22 RX ORDER — IBUPROFEN 200 MG
1 CAPSULE ORAL DAILY
COMMUNITY

## 2019-08-22 NOTE — PROGRESS NOTES
POST OP UP VISIT - BARIATRIC SURGERY  Herbie Damico 51 y o  male MRN: [id number]  Unit/Bed#:  Encounter: [id number]      HPI:  Herbie Damico is a 51 y o  male status post RYGB on 5/8/18  Subjective     The patient reports doing well  He continues to c/o LLQ stabbing pain associated w/urination, as well as recurrent UTIs  He has been extensively w/u for this complaints; with the most recent CT scan not revealing any anatomical findings to explain the pain  The patient reports taking Tylenol intermittently for pain  He denies taking NSAIDS, smoking or excess coffee  He reports the pain started immediately after his RYGB, since he had an episode of urinary retention that required repeat catheterizations w/subsequent bloody urine  He is currently on a regular diet  No taking PPIs  No issues w/bowel habit  Review of Systems   Constitutional: Negative  HENT: Negative  Eyes: Negative  Respiratory: Negative  Cardiovascular: Negative  Gastrointestinal: Negative  Endocrine: Negative  Genitourinary: Negative  Musculoskeletal: Negative  Skin: Negative  Allergic/Immunologic: Negative  Neurological: Negative  Hematological: Negative  Psychiatric/Behavioral: Negative  All other systems reviewed and are negative        Historical Information   Past Medical History:   Diagnosis Date    Abnormal blood sugar     Abnormal TSH     Back pain     Bariatric surgery status     Bilateral anterior knee pain     Chronic pain disorder     back    CPAP (continuous positive airway pressure) dependence     DDD (degenerative disc disease), lumbar     GERD (gastroesophageal reflux disease)     Hematuria     History of Helicobacter pylori infection     History of transfusion     Hypertension     Knee pain     Morbid obesity (HCC)     Postgastrectomy malabsorption     Prediabetes     Sleep apnea     Suspicious nevus     Use of cane as ambulatory aid     Vitamin D deficiency Past Surgical History:   Procedure Laterality Date    KNEE SURGERY Left 07/27/2018    GA EGD TRANSORAL BIOPSY SINGLE/MULTIPLE N/A 12/13/2017    Procedure: ESOPHAGOGASTRODUODENOSCOPY (EGD), with BIOPSY;  Surgeon: Yariel Velez MD;  Location: [facility]; Service: Bariatrics    GA LAP GASTRIC BYPASS/ENRICO-EN-Y N/A 5/8/2018    Procedure: BYPASS GASTRIC  ENRICO-EN-Y LAPAROSCOPIC AND INTRAOPERATIVE EGD;  Surgeon: Yariel Velez MD;  Location: [facility];  Service: Bariatrics     Social History   Social History     Substance and Sexual Activity   Alcohol Use No     Social History     Substance and Sexual Activity   Drug Use No     Social History     Tobacco Use   Smoking Status Never Smoker   Smokeless Tobacco Never Used     Family History: non-contributory    No Known Allergies    Objective       Current Vitals:   Blood Pressure: 130/80 (08/22/19 1006)  Pulse: 82 (08/22/19 1006)  Temperature: 98 6 °F (37 °C) (08/22/19 1006)  Temp Source: Tympanic (08/22/19 1006)  Respirations: 18 (08/22/19 1006)  Height: 5' 7 5" (171 5 cm) (08/22/19 1006)  Weight - Scale: 119 kg (261 lb 8 oz) (08/22/19 1006)    Invasive Devices     None                 Physical Exam   Constitutional: He is oriented to person, place, and time  He appears well-developed and well-nourished  HENT:   Head: Normocephalic and atraumatic  Nose: Nose normal    Mouth/Throat: Oropharynx is clear and moist    Eyes: Conjunctivae and EOM are normal    Neck: Normal range of motion  No tracheal deviation present  Cardiovascular: Normal rate and regular rhythm  Pulmonary/Chest: Effort normal and breath sounds normal  No respiratory distress  Abdominal: Soft  Bowel sounds are normal  He exhibits no distension  There is no rebound and no guarding  No hernia  Well healed abdominal incisions  Benign abdomen w/no peritoneal findings  Musculoskeletal: Normal range of motion  He exhibits no edema     Neurological: He is alert and oriented to person, place, and time  Skin: Skin is warm and dry  Psychiatric: He has a normal mood and affect  His behavior is normal  Judgment and thought content normal    Vitals reviewed  Pathology, and Other Studies: I have personally reviewed pertinent reports  Assessment/PLAN:    51 y o  male status post RYGB  Doing well post op  No major issues  Continues to complain of ab wall pain (neuropathy? referred pain? urologic in origin?)  Patient has lost 91 lbs since last seen  He is currently 261 lbs and lost EWL is 132 5 lbs  She has lost 57% EWL, and 34% TBW  At highest patient weighed 370 lbs, and lowest was 261 lbs  Will attempt a trial of PPIs x1 month and will refer to urology for recurrent UTIs and L groin pain  Follow up in 1 month after seen by urology                Fabián Crow MD  Bariatric Surgery Fellow  8/22/2019  11:00 AM

## 2019-08-29 ENCOUNTER — OFFICE VISIT (OUTPATIENT)
Dept: FAMILY MEDICINE CLINIC | Facility: CLINIC | Age: 49
End: 2019-08-29

## 2019-08-29 VITALS
RESPIRATION RATE: 17 BRPM | DIASTOLIC BLOOD PRESSURE: 76 MMHG | BODY MASS INDEX: 40.58 KG/M2 | HEART RATE: 82 BPM | WEIGHT: 263 LBS | TEMPERATURE: 97.5 F | SYSTOLIC BLOOD PRESSURE: 122 MMHG | OXYGEN SATURATION: 98 %

## 2019-08-29 DIAGNOSIS — I83.12 VARICOSE VEINS OF BOTH LOWER EXTREMITIES WITH INFLAMMATION: ICD-10-CM

## 2019-08-29 DIAGNOSIS — K21.9 GASTROESOPHAGEAL REFLUX DISEASE WITHOUT ESOPHAGITIS: Primary | ICD-10-CM

## 2019-08-29 DIAGNOSIS — I83.11 VARICOSE VEINS OF BOTH LOWER EXTREMITIES WITH INFLAMMATION: ICD-10-CM

## 2019-08-29 DIAGNOSIS — R73.03 PREDIABETES: ICD-10-CM

## 2019-08-29 DIAGNOSIS — I10 BENIGN ESSENTIAL HYPERTENSION: ICD-10-CM

## 2019-08-29 PROBLEM — E66.813 CLASS 3 SEVERE OBESITY DUE TO EXCESS CALORIES WITHOUT SERIOUS COMORBIDITY WITH BODY MASS INDEX (BMI) OF 40.0 TO 44.9 IN ADULT (HCC): Status: ACTIVE | Noted: 2018-04-10

## 2019-08-29 PROCEDURE — 3725F SCREEN DEPRESSION PERFORMED: CPT | Performed by: FAMILY MEDICINE

## 2019-08-29 PROCEDURE — 3074F SYST BP LT 130 MM HG: CPT | Performed by: FAMILY MEDICINE

## 2019-08-29 PROCEDURE — 99214 OFFICE O/P EST MOD 30 MIN: CPT | Performed by: FAMILY MEDICINE

## 2019-08-29 PROCEDURE — 3078F DIAST BP <80 MM HG: CPT | Performed by: FAMILY MEDICINE

## 2019-08-29 NOTE — PROGRESS NOTES
Assessment/Plan:    Benign essential hypertension  Blood pressure well controlled on Amlodipine 5 mg  Discussed symptoms of hypotension, if continues to have controlled BP, will try off Amlodipine at next visit     Varicose veins of both lower extremities with inflammation  Patient has not been using compression stockings as suggested by Vascular last year  Counseled on importance of compression stockings, specially since he is on his feet all day   Elevate legs while seated  Counseled on importance of low sodium diet  Continue with weight loss  Compression stockings sent to [employer] [facility]     Class 3 severe obesity due to excess calories without serious comorbidity with body mass index (BMI) of 40 0 to 44 9 in [facility])  Continue follow up with Bariatrics  BMI Counseling: Body mass index is 40 58 kg/m²  Discussed the patient's BMI with him  The BMI is above average  BMI counseling and education was provided to the patient  Exercise recommendations include moderate aerobic physical activity for 150 minutes/week, exercising 3-5 times per week, joining a gym and strength training exercises  Problem List Items Addressed This Visit        Digestive    Gastroesophageal reflux disease - Primary       Cardiovascular and Mediastinum    Benign essential hypertension     Blood pressure well controlled on Amlodipine 5 mg  Discussed symptoms of hypotension, if continues to have controlled BP, will try off Amlodipine at next visit          Varicose veins of both lower extremities with inflammation     Patient has not been using compression stockings as suggested by Vascular last year    Counseled on importance of compression stockings, specially since he is on his feet all day   Elevate legs while seated  Counseled on importance of low sodium diet  Continue with weight loss  Compression stockings sent to [employer] [facility]          Relevant Orders    Compression Stocking       Other    Prediabetes Subjective:      Patient ID: Rene Diaz is a 51 y o  male  52 yo  male s/p gastric bypass surgery, still complains of LLQ pain, started on Omeprazole by Dr Irma Oakes, has used it for 2 days only states has some relief  Also complains of painful varicose veins  Otherwise doing well      The following portions of the patient's history were reviewed and updated as appropriate: He  has a past medical history of Abnormal blood sugar, Abnormal TSH, Back pain, Bariatric surgery status, Bilateral anterior knee pain, Chronic pain disorder, CPAP (continuous positive airway pressure) dependence, DDD (degenerative disc disease), lumbar, GERD (gastroesophageal reflux disease), Hematuria, History of Helicobacter pylori infection, History of transfusion, Hypertension, Knee pain, Morbid obesity ([facility] [address] ), Postgastrectomy malabsorption, Prediabetes, Sleep apnea, Suspicious nevus, Use of cane as ambulatory aid, and Vitamin D deficiency    He   Patient Active Problem List    Diagnosis Date Noted    Primary insomnia 03/28/2019    Gross hematuria 03/28/2019    Cerebrovascular disease 09/22/2018    Dysesthesia of multiple sites 09/21/2018    Bariatric surgery status 08/28/2018    Postsurgical malabsorption 08/28/2018    Muscle pain 08/28/2018    Varicose veins of both lower extremities with inflammation 07/19/2018    Cystitis 07/19/2018    CPAP (continuous positive airway pressure) dependence     Hypertension     Benign essential hypertension 05/04/2018    Sleep apnea 05/04/2018    Class 3 severe obesity due to excess calories without serious comorbidity with body mass index (BMI) of 40 0 to 44 9 in adult [facility]) 04/10/2018    Benign hypertension 04/10/2018    Gastroesophageal reflux disease 04/10/2018    Dry eye 03/29/2018    Morbid obesity with BMI of 50 0-59 9, adult ([facility] [address] ) 03/12/2018    KAT (obstructive sleep apnea) 03/12/2018    Benign essential HTN 03/12/2018    GERD (gastroesophageal reflux disease) 03/12/2018    Prediabetes 05/04/2017     He  has a past surgical history that includes pr egd transoral biopsy single/multiple (N/A, 12/13/2017); pr lap gastric bypass/dulce-en-y (N/A, 5/8/2018); and Knee surgery (Left, 07/27/2018)  His family history includes Hypertension in his maternal grandmother and mother; Prostate cancer in his father       Review of Systems   Gastrointestinal: Positive for abdominal pain (as per HPI)  Musculoskeletal: Positive for back pain  All other systems reviewed and are negative  Objective:      /76 (BP Location: Left arm, Patient Position: Sitting, Cuff Size: Adult)   Pulse 82   Temp 97 5 °F (36 4 °C) (Temporal)   Resp 17   Wt 119 kg (263 lb)   SpO2 98%   BMI 40 58 kg/m²          Physical Exam   Constitutional: He is oriented to person, place, and time  He appears well-developed  HENT:   Head: Normocephalic  Right Ear: External ear normal    Left Ear: External ear normal    Nose: Nose normal    Mouth/Throat: Oropharynx is clear and moist    Eyes: Pupils are equal, round, and reactive to light  Conjunctivae and EOM are normal    Neck: Normal range of motion  Neck supple  No thyromegaly present  Cardiovascular: Normal rate, regular rhythm and normal heart sounds  B/l lower extremity varicose veins     Pulmonary/Chest: Effort normal and breath sounds normal    Abdominal: Soft  There is no tenderness  There is no rebound and no guarding  Musculoskeletal: Normal range of motion  Neurological: He is alert and oriented to person, place, and time  He has normal reflexes  Skin: Skin is dry  Psychiatric: He has a normal mood and affect  Nursing note and vitals reviewed

## 2019-08-29 NOTE — ASSESSMENT & PLAN NOTE
Patient has not been using compression stockings as suggested by Vascular last year    Counseled on importance of compression stockings, specially since he is on his feet all day   Elevate legs while seated  Counseled on importance of low sodium diet  Continue with weight loss  Compression stockings sent to medical supply store

## 2019-08-29 NOTE — ASSESSMENT & PLAN NOTE
Blood pressure well controlled on Amlodipine 5 mg  Discussed symptoms of hypotension, if continues to have controlled BP, will try off Amlodipine at next visit

## 2019-08-29 NOTE — ASSESSMENT & PLAN NOTE
Continue follow up with Bariatrics  BMI Counseling: Body mass index is 40 58 kg/m²  Discussed the patient's BMI with him  The BMI is above average  BMI counseling and education was provided to the patient  Exercise recommendations include moderate aerobic physical activity for 150 minutes/week, exercising 3-5 times per week, joining a gym and strength training exercises

## 2019-09-02 ENCOUNTER — HOSPITAL ENCOUNTER (EMERGENCY)
Facility: HOSPITAL | Age: 49
Discharge: HOME | End: 2019-09-02
Attending: EMERGENCY MEDICINE | Admitting: EMERGENCY MEDICINE
Payer: COMMERCIAL

## 2019-09-02 VITALS
DIASTOLIC BLOOD PRESSURE: 90 MMHG | RESPIRATION RATE: 16 BRPM | TEMPERATURE: 98.6 F | BODY MASS INDEX: 40.99 KG/M2 | OXYGEN SATURATION: 100 % | WEIGHT: 265.65 LBS | HEART RATE: 78 BPM | SYSTOLIC BLOOD PRESSURE: 155 MMHG

## 2019-09-02 DIAGNOSIS — M54.50 LEFT LOW BACK PAIN: Primary | ICD-10-CM

## 2019-09-02 DIAGNOSIS — G89.29 ABDOMINAL PAIN, CHRONIC, LEFT LOWER QUADRANT: ICD-10-CM

## 2019-09-02 DIAGNOSIS — R31.9 HEMATURIA: ICD-10-CM

## 2019-09-02 DIAGNOSIS — R10.32 ABDOMINAL PAIN, CHRONIC, LEFT LOWER QUADRANT: ICD-10-CM

## 2019-09-02 LAB
BACTERIA UR QL AUTO: ABNORMAL /HPF
BILIRUB UR QL STRIP: NEGATIVE
CLARITY UR: CLEAR
COLOR UR: YELLOW
GLUCOSE UR STRIP-MCNC: NEGATIVE MG/DL
HGB UR QL STRIP.AUTO: ABNORMAL
KETONES UR STRIP-MCNC: NEGATIVE MG/DL
LEUKOCYTE ESTERASE UR QL STRIP: NEGATIVE
NITRITE UR QL STRIP: NEGATIVE
NON-SQ EPI CELLS URNS QL MICRO: ABNORMAL /HPF
PH UR STRIP.AUTO: 7 [PH] (ref 4.5–8)
PROT UR STRIP-MCNC: NEGATIVE MG/DL
RBC #/AREA URNS AUTO: ABNORMAL /HPF
SP GR UR STRIP.AUTO: 1.02 (ref 1–1.03)
UROBILINOGEN UR QL STRIP.AUTO: 0.2 E.U./DL
WBC #/AREA URNS AUTO: ABNORMAL /HPF

## 2019-09-02 PROCEDURE — 81001 URINALYSIS AUTO W/SCOPE: CPT

## 2019-09-02 PROCEDURE — 99283 EMERGENCY DEPT VISIT LOW MDM: CPT

## 2019-09-02 PROCEDURE — 99284 EMERGENCY DEPT VISIT MOD MDM: CPT | Performed by: PHYSICIAN ASSISTANT

## 2019-09-02 PROCEDURE — 51798 US URINE CAPACITY MEASURE: CPT

## 2019-09-02 RX ORDER — METHOCARBAMOL 500 MG/1
500 TABLET, FILM COATED ORAL 3 TIMES DAILY
Qty: 12 TABLET | Refills: 0 | Status: SHIPPED | OUTPATIENT
Start: 2019-09-02 | End: 2020-03-19 | Stop reason: SDUPTHER

## 2019-09-02 NOTE — ED PROVIDER NOTES
History  Chief Complaint   Patient presents with    Back Pain     c/o lower back which began last night  no injury  no radiation of pain  no loss of bowel or bladder function  Patient is a 56-year-old male with past medical history of hypertension, GERD, degenerative disc disease, chronic back pain, chronic LLQ pain, KAT who presents for evaluation of left lower back pain  S/P Vonnie-en-Y gastric bypass May 2018  He states that left lower back pain started yesterday  He denies any injury or trauma  He states that he had similar in the past  He states that he had an injury at work in 2016- at that time when some boxes fell on him  He states that he had an MRI performed  He states he had something wrong with the discs at "L3 and L5 "  He states he never followed up for this  He states that he has tight spasming pain to the left lower back  No radiation of pain  Pain is worse with any kind of movement  He tried a Lidoderm patch which she had from previous back pain without relief  He states that he has been following up with his family doctor and gastric bypass doctor regularly  He just saw them both within the last week or two  His gastric bypass doctor wants him to see a urologist for chronic left lower quadrant abdominal pain  He states that after his gastric bypass he had an episode of urinary retention and cystitis and has been getting intermittent hematuria and left lower quadrant pain since  This has not changed or worsened at all  He has been evaluated for the abdominal pain multiple times over the last 1 5 years with negative workup  He denies any fever, nausea vomiting diarrhea, chest pain, shortness of breath, new or different abdominal pain, dysuria, hematuria, frequency, testicular pain, scrotal swelling or redness, numbness or weakness in the extremities, saddle anesthesia, bladder or bowel dysfunction            Prior to Admission Medications   Prescriptions Last Dose Informant Patient Reported? Taking? Multiple Vitamin (MULTIVITAMIN) tablet   No Yes   Sig: Take 1 tablet by mouth daily   QUEtiapine (SEROquel) 50 mg tablet   No Yes   Sig: Take 1 tablet (50 mg total) by mouth daily at bedtime   amLODIPine (NORVASC) 5 mg tablet   No Yes   Sig: Take 1 tablet (5 mg total) by mouth daily for 30 days   artificial tear (LUBRIFRESH P M ) 83-15 % ophthalmic ointment  Self Yes Yes   Sig: ADMINISTER 1 APPLICATION TO BOTH EYES DAILY AT BEDTIME   calcium carbonate (OS-SYLVIA) 1250 (500 Ca) MG tablet  Self Yes Yes   Sig: Take 1 tablet by mouth daily   clopidogrel (PLAVIX) 75 mg tablet   No Yes   Sig: Take 1 tablet (75 mg total) by mouth daily   omeprazole (PriLOSEC) 20 mg delayed release capsule   No Yes   Sig: Take 1 capsule (20 mg total) by mouth daily for 90 days      Facility-Administered Medications: None       Past Medical History:   Diagnosis Date    Abnormal blood sugar     Abnormal TSH     Back pain     Bariatric surgery status     Bilateral anterior knee pain     Chronic pain disorder     back    CPAP (continuous positive airway pressure) dependence     DDD (degenerative disc disease), lumbar     GERD (gastroesophageal reflux disease)     Hematuria     History of Helicobacter pylori infection     History of transfusion     Hypertension     Knee pain     Morbid obesity (HCC)     Postgastrectomy malabsorption     Prediabetes     Sleep apnea     Suspicious nevus     Use of cane as ambulatory aid     Vitamin D deficiency        Past Surgical History:   Procedure Laterality Date    KNEE SURGERY Left 07/27/2018    RI EGD TRANSORAL BIOPSY SINGLE/MULTIPLE N/A 12/13/2017    Procedure: ESOPHAGOGASTRODUODENOSCOPY (EGD), with BIOPSY;  Surgeon: Fay Starkey MD;  Location: [facility];   Service: Bariatrics    RI LAP GASTRIC BYPASS/ENRICO-EN-Y N/A 5/8/2018    Procedure: BYPASS GASTRIC  ENRICO-EN-Y LAPAROSCOPIC AND INTRAOPERATIVE EGD;  Surgeon: Fay Starkey MD;  Location: [facility];  Service: Bariatrics       Family History   Problem Relation Age of Onset    Hypertension Mother     Prostate cancer Father         ALSO PROSTATE ENLARGEMENT    Hypertension Maternal Grandmother      I have reviewed and agree with the history as documented  Social History     Tobacco Use    Smoking status: Never Smoker    Smokeless tobacco: Never Used   Substance Use Topics    Alcohol use: No    Drug use: No        Review of Systems   Constitutional: Negative for chills and fever  Respiratory: Negative for shortness of breath  Cardiovascular: Negative for chest pain  Gastrointestinal: Positive for abdominal pain  Negative for diarrhea, nausea and vomiting  Genitourinary: Negative for difficulty urinating, discharge, dysuria, frequency, hematuria, scrotal swelling and testicular pain  Musculoskeletal: Positive for back pain  Skin: Negative for rash  Neurological: Negative for weakness and numbness  All other systems reviewed and are negative  Physical Exam  Physical Exam   Constitutional: He is oriented to person, place, and time  He appears well-developed and well-nourished  No distress  HENT:   Head: Normocephalic and atraumatic  Right Ear: External ear normal    Left Ear: External ear normal    Nose: Nose normal    Eyes: EOM are normal    Neck: Normal range of motion  Cardiovascular: Normal rate, regular rhythm and normal heart sounds  Exam reveals no gallop and no friction rub  No murmur heard  Pulmonary/Chest: Effort normal and breath sounds normal  No stridor  No respiratory distress  He has no wheezes  Abdominal: Soft  Bowel sounds are normal  He exhibits no distension  There is no tenderness  There is no guarding  Musculoskeletal:        Arms:  Reproducible tenderness palpation over the left paravertebral muscles of lumbar spine and left low back muscles  No significant midline spinous process tenderness palpation  No deformity or step-off    Patellar deep tendon reflexes intact bilaterally  Extensor hallucis longus intact bilaterally  Neurovascular intact distally  Strength 5/5 bilaterally  Pedal pulses intact  Negative straight leg raise bilaterally  Neurological: He is alert and oriented to person, place, and time  Skin: Skin is warm and dry  He is not diaphoretic  Psychiatric: He has a normal mood and affect  His behavior is normal    Nursing note and vitals reviewed        Vital Signs  ED Triage Vitals [09/02/19 1329]   Temperature Pulse Respirations Blood Pressure SpO2   98 6 °F (37 °C) 78 16 155/90 100 %      Temp Source Heart Rate Source Patient Position - Orthostatic VS BP Location FiO2 (%)   Temporal Monitor Sitting Right arm --      Pain Score       Worst Possible Pain           Vitals:    09/02/19 1329   BP: 155/90   Pulse: 78   Patient Position - Orthostatic VS: Sitting         Visual Acuity      ED Medications  Medications - No data to display    Diagnostic Studies  Results Reviewed     Procedure Component Value Units Date/Time    Urine Microscopic [[id number]]  (Abnormal) Collected:  09/02/19 1447    Lab Status:  Final result Specimen:  Urine, Clean Catch Updated:  09/02/19 1523     RBC, UA 1-2 /hpf      WBC, UA None Seen /hpf      Epithelial Cells Occasional /hpf      Bacteria, UA Occasional /hpf     POCT urinalysis dipstick [[id number]]  (Abnormal) Resulted:  09/02/19 1448    Lab Status:  Final result Updated:  09/02/19 1458    POCT urinalysis dipstick [[id number]]  (Abnormal) Resulted:  09/02/19 1451    Lab Status:  Final result Specimen:  Urine Updated:  09/02/19 1451    ED Urine Macroscopic [[id number]]  (Abnormal) Collected:  09/02/19 1447    Lab Status:  Final result Specimen:  Urine Updated:  09/02/19 1448     Color, UA Yellow     Clarity, UA Clear     pH, UA 7 0     Leukocytes, UA Negative     Nitrite, UA Negative     Protein, UA Negative mg/dl      Glucose, UA Negative mg/dl      Ketones, UA Negative mg/dl      Urobilinogen, UA 0 2 E U /dl Bilirubin, UA Negative     Blood, UA Trace     Specific [place], UA 1 020    Narrative:       CLINITEK RESULT                 No orders to display              Procedures  Procedures       ED Course                               MDM  Number of Diagnoses or Management Options  Abdominal pain, chronic, left lower quadrant:   Hematuria:   Left low back pain:   Diagnosis management comments: Patient with left lower back pain x 1 day  No urinary complaints  No fever  No neurologic complaints, bladder or bowel dysfunction, saddle anesthesia, or numbness or weakness  No recent injury/fall  No recent illness  Reproducible tenderness to palpation over the area  NVI distally  Patellar DTR intact  No foot drop  Ambulates without difficulty  Will check urine dip to r/o infection  Post void bladder scan with 30ml  No retention  Urine dip with trace blood  Patient does have a hx of hematuria and denies other urinary complaints at this time  He is already referred to urology for his chronic LLQ abdominal pain and intermittent hematuria but again stressed the importance of scheduling an appointment to be seen  Given contact information for [address] [place] [address] urology to call in the morning for an appointment  Discussed likely musculoskeletal back pain  Has hx of "disc problems at L3 and L5" and states he's had an MRI years ago  Instructed will refer him to comprehensive spine program and he should also follow up with his PCP as he may need repeat MRI of lumbar spine to further evaluate his back pain as no emergent symptoms/findings at this time  Will give short course of robaxin for pain/spasm  Advised may cause drowsiness and not drive or work while taking this medication  Patient states he already has Lidoderm patches at home  Instructed to return to the ED if symptoms worsen or new symptoms arise  Patient states understanding and agrees with plan         Amount and/or Complexity of Data Reviewed  Clinical lab tests: ordered and reviewed  Review and summarize past medical records: yes    Patient Progress  Patient progress: stable      Disposition  Final diagnoses:   Left low back pain   Hematuria   Abdominal pain, chronic, left lower quadrant     Time reflects when diagnosis was documented in both MDM as applicable and the Disposition within this note     Time User Action Codes Description Comment    9/2/2019  3:25 PM Kayy Honour Add [M54 5] Left low back pain     9/2/2019  3:25 PM Kayy Honour Add [R31 9] Hematuria     9/2/2019  3:25 PM Kayy Honour Add [R10 32,  G89 29] Abdominal pain, chronic, left lower quadrant       ED Disposition     ED Disposition Condition Date/Time Comment    Discharge Stable Mon Sep 2, 2019  3:25 PM [employer] [place] discharge to home/self care              Follow-up Information     Follow up With Specialties Details Why Contact Info Additional Information    Saloni Llamas MD Family Medicine Schedule an appointment as soon as possible for a visit in 1 day  [address] [facility] [address]        [facility] Schedule an appointment as soon as possible for a visit in 1 day Follow up with urology as previously directed for further evaluation of your chronic left lower quadrant abdominal pain and blood in urine [facility] [id number]  7  [facility], [address] [place] [address], [phone]          Discharge Medication List as of 9/2/2019  3:29 PM      START taking these medications    Details   methocarbamol (ROBAXIN) 500 mg tablet Take 1 tablet (500 mg total) by mouth 3 (three) times a day, Starting Mon 9/2/2019, Print         CONTINUE these medications which have NOT CHANGED    Details   amLODIPine (NORVASC) 5 mg tablet Take 1 tablet (5 mg total) by mouth daily for 30 days, Starting Thu 3/28/2019, Until Mon 9/2/2019, Normal      artificial tear (LUBRIFRESH P M ) 83-15 % ophthalmic ointment ADMINISTER 1 APPLICATION TO BOTH EYES DAILY AT BEDTIME, Historical Med      calcium carbonate (OS-SYLVIA) 1250 (500 Ca) MG tablet Take 1 tablet by mouth daily, Historical Med      clopidogrel (PLAVIX) 75 mg tablet Take 1 tablet (75 mg total) by mouth daily, Starting Th 3/28/2019, Normal      Multiple Vitamin (MULTIVITAMIN) tablet Take 1 tablet by mouth daily, Starting Th 3/28/2019, Normal      omeprazole (PriLOSEC) 20 mg delayed release capsule Take 1 capsule (20 mg total) by mouth daily for 90 days, Starting Thu 8/22/2019, Until Wed 11/20/2019, Normal      QUEtiapine (SEROquel) 50 mg tablet Take 1 tablet (50 mg total) by mouth daily at bedtime, Starting u 7/11/2019, Normal               ED Provider  Electronically Signed by           Italia Albert PA-C  09/02/19 2050

## 2019-09-03 NOTE — PROGRESS NOTES
9/4/2019      Chief Complaint   Patient presents with    LLQ pain       Assessment and Plan    51 y o  male managed by Dr Eliezer Alcaraz    1  LLQ pain and history of microscopic hematuria  - patient unable to void in the office, microscopic urinalysis 9/2/2019 revealed 1-2 red blood cells and occasional bacteria, no culture obtained  - no UTI symptoms and no gross hematuria  - bladder scan revealed 14mL  - CT 7/2019 with no  abnormalities  - discussed with the patient that I am uncertain what his left lower quadrant pain is from, recommend he sees PCP for this  - also discussed his most recent microscopic urinalysis was negative for hematuria, we recheck this in 3 months    2  Phimosis  - no complaints, patient uncomfortable with examination, no genital examination performed    Re-check urine in 3 months       History of Present Illness  Alex James is a 51 y o  male here for follow up evaluation of left lower quadrant pain and hematuria  The patient denies any gross hematuria  His most recent microscopic urinalysis revealed 1-2 red blood cells  He does have a history of phimosis  Has no complaints of this at his visit today  Underwent a CT scan 07/2019 with contrast   This revealed absolutely no genitourinary abnormalities  A bladder scan office today is 14 mL  The patient was not able to void prior  Denies any signs or symptoms of urinary tract infection  Review of Systems   Constitutional: Negative for activity change, chills and fever  Gastrointestinal: Negative for abdominal distention and abdominal pain  Musculoskeletal: Negative for back pain and gait problem  Psychiatric/Behavioral: Negative for behavioral problems and confusion  Urinary Incontinence Screening      Most Recent Value   Urinary Incontinence   Urinary Incontinence? No   Incomplete emptying? Yes   Urinary frequency? Yes   Urinary urgency? No   Urinary hesitancy? No   Dysuria (painful difficult urination)?   Yes [back pain and LLQ pain]   Nocturia (waking up to use the bathroom)? No   Straining (having to push to go)? No   Weak stream?  No   Intermittent stream?  No   Post void dribbling? No          Past Medical History  Past Medical History:   Diagnosis Date    Abnormal blood sugar     Abnormal TSH     Back pain     Bariatric surgery status     Bilateral anterior knee pain     Chronic pain disorder     back    CPAP (continuous positive airway pressure) dependence     DDD (degenerative disc disease), lumbar     GERD (gastroesophageal reflux disease)     Hematuria     History of Helicobacter pylori infection     History of transfusion     Hypertension     Knee pain     Morbid obesity (HCC)     Postgastrectomy malabsorption     Prediabetes     Sleep apnea     Suspicious nevus     Use of cane as ambulatory aid     Vitamin D deficiency        Past Social History  Past Surgical History:   Procedure Laterality Date    KNEE SURGERY Left 07/27/2018    SD EGD TRANSORAL BIOPSY SINGLE/MULTIPLE N/A 12/13/2017    Procedure: ESOPHAGOGASTRODUODENOSCOPY (EGD), with BIOPSY;  Surgeon: Maricruz Gonzales MD;  Location: [facility];   Service: Bariatrics    SD LAP GASTRIC BYPASS/ENRICO-EN-Y N/A 5/8/2018    Procedure: BYPASS GASTRIC  ENRICO-EN-Y LAPAROSCOPIC AND INTRAOPERATIVE EGD;  Surgeon: Maricruz Gonzales MD;  [facility];  Service: Bariatrics     Social History     Tobacco Use   Smoking Status Never Smoker   Smokeless Tobacco Never Used       Past Family History  Family History   Problem Relation Age of Onset    Hypertension Mother     Prostate cancer Father         ALSO PROSTATE ENLARGEMENT    Hypertension Maternal Grandmother        Past Social history  Social History     Socioeconomic History    Marital status: Single     Spouse name: Not on file    Number of children: Not on file    Years of education: Not on file    Highest education level: Not on file   Occupational History    Not on file   Social Needs    Financial resource strain: Not on file    Food insecurity:     Worry: Not on file     Inability: Not on file    Transportation needs:     Medical: Not on file     Non-medical: Not on file   Tobacco Use    Smoking status: Never Smoker    Smokeless tobacco: Never Used   Substance and Sexual Activity    Alcohol use: No    Drug use: No    Sexual activity: Not on file   Lifestyle    Physical activity:     Days per week: Not on file     Minutes per session: Not on file    Stress: Not on file   Relationships    Social connections:     Talks on phone: Not on file     Gets together: Not on file     Attends Sikhism service: Not on file     Active member of club or organization: Not on file     Attends meetings of clubs or organizations: Not on file     Relationship status: Not on file    Intimate partner violence:     Fear of current or ex partner: Not on file     Emotionally abused: Not on file     Physically abused: Not on file     Forced sexual activity: Not on file   Other Topics Concern    Not on file   Social History Narrative    Not on file       Current Medications  Current Outpatient Medications   Medication Sig Dispense Refill    amLODIPine (NORVASC) 5 mg tablet Take 1 tablet (5 mg total) by mouth daily for 30 days 90 tablet 1    artificial tear (LUBRIFRESH P M ) 83-15 % ophthalmic ointment ADMINISTER 1 APPLICATION TO BOTH EYES DAILY AT BEDTIME  1    calcium carbonate (OS-SYLVIA) 1250 (500 Ca) MG tablet Take 1 tablet by mouth daily      clopidogrel (PLAVIX) 75 mg tablet Take 1 tablet (75 mg total) by mouth daily 90 tablet 1    methocarbamol (ROBAXIN) 500 mg tablet Take 1 tablet (500 mg total) by mouth 3 (three) times a day 12 tablet 0    Multiple Vitamin (MULTIVITAMIN) tablet Take 1 tablet by mouth daily 90 tablet 1    omeprazole (PriLOSEC) 20 mg delayed release capsule Take 1 capsule (20 mg total) by mouth daily for 90 days 30 capsule 3    QUEtiapine (SEROquel) 50 mg tablet Take 1 tablet (50 mg total) by mouth daily at bedtime 90 tablet 0     No current facility-administered medications for this visit  Allergies  No Known Allergies      The following portions of the patient's history were reviewed and updated as appropriate: allergies, current medications, past medical history, past social history, past surgical history and problem list       Vitals  Vitals:    09/04/19 1013   BP: 110/70   Pulse: 74   Weight: 122 kg (270 lb)   Height: 5' 7 5" (1 715 m)           Physical Exam  Constitutional   General appearance: Patient is seated and in no acute distress, well appearing and well nourished  Head and Face   Head and face: Normal     Eyes   Conjunctiva and lids: No erythema, swelling or discharge  Ears, Nose, Mouth, and Throat   Hearing: Normal     Pulmonary   Respiratory effort: No increased work of breathing or signs of respiratory distress  Cardiovascular   Examination of extremities for edema and/or varicosities: Normal     Abdomen   Abdomen: Non-tender, no masses  LLQ tenderness  Genitourinary   Examination deferred  Musculoskeletal   Gait and station: normal     Skin   Skin and subcutaneous tissue: Warm, dry, and intact  No visible lesions or rashes    Psychiatric   Judgment and insight: Normal  Recent and remote memory:  Normal  Mood and affect: Normal      Results  Recent Results (from the past 1 hour(s))   POCT Measure PVR    Collection Time: 09/04/19 10:23 AM   Result Value Ref Range    POST-VOID RESIDUAL VOLUME, ML POC 14 mL   ]  Lab Results   Component Value Date    PSA 0 4 04/20/2017     Lab Results   Component Value Date    CALCIUM 8 7 07/24/2019    K 4 3 07/24/2019    CO2 31 07/24/2019     07/24/2019    BUN 13 07/24/2019    CREATININE 0 90 07/24/2019     Lab Results   Component Value Date    WBC 6 08 07/24/2019    HGB 14 8 07/24/2019    HCT 45 1 07/24/2019    MCV 93 07/24/2019     07/24/2019       Orders  Orders Placed This Encounter   Procedures    Urine culture     Standing Status:   Future     Standing Expiration Date:   9/4/2020    Urinalysis with microscopic     Standing Status:   Future     Standing Expiration Date:   9/4/2020    POCT Measure PVR

## 2019-09-04 ENCOUNTER — OFFICE VISIT (OUTPATIENT)
Dept: UROLOGY | Facility: MEDICAL CENTER | Age: 49
End: 2019-09-04
Payer: COMMERCIAL

## 2019-09-04 VITALS
HEART RATE: 74 BPM | DIASTOLIC BLOOD PRESSURE: 70 MMHG | HEIGHT: 68 IN | WEIGHT: 270 LBS | SYSTOLIC BLOOD PRESSURE: 110 MMHG | BODY MASS INDEX: 40.92 KG/M2

## 2019-09-04 DIAGNOSIS — R31.29 MICROSCOPIC HEMATURIA: Primary | ICD-10-CM

## 2019-09-04 DIAGNOSIS — R31.0 GROSS HEMATURIA: ICD-10-CM

## 2019-09-04 LAB — POST-VOID RESIDUAL VOLUME, ML POC: 14 ML

## 2019-09-04 PROCEDURE — 99213 OFFICE O/P EST LOW 20 MIN: CPT | Performed by: PHYSICIAN ASSISTANT

## 2019-09-04 PROCEDURE — 51798 US URINE CAPACITY MEASURE: CPT | Performed by: PHYSICIAN ASSISTANT

## 2019-09-05 ENCOUNTER — TELEPHONE (OUTPATIENT)
Dept: PHYSICAL THERAPY | Facility: OTHER | Age: 49
End: 2019-09-05

## 2019-09-05 NOTE — TELEPHONE ENCOUNTER
Voice mail/message left for patient to return call to [place] [address] program including our hours of business and phone number  Acute on chronic complaint noted in EMR  Need to discuss patients current status as back injury was noted to be worked related at one point  Requested return call      Referral closed

## 2019-10-03 ENCOUNTER — TELEPHONE (OUTPATIENT)
Dept: BARIATRICS | Facility: CLINIC | Age: 49
End: 2019-10-03

## 2019-10-22 DIAGNOSIS — F51.01 PRIMARY INSOMNIA: ICD-10-CM

## 2019-10-22 DIAGNOSIS — I10 ESSENTIAL HYPERTENSION: ICD-10-CM

## 2019-10-22 DIAGNOSIS — G47.33 OSA (OBSTRUCTIVE SLEEP APNEA): ICD-10-CM

## 2019-10-22 RX ORDER — QUETIAPINE FUMARATE 50 MG/1
50 TABLET, FILM COATED ORAL
Qty: 90 TABLET | Refills: 0 | Status: SHIPPED | OUTPATIENT
Start: 2019-10-22 | End: 2020-03-16

## 2019-11-15 RX ORDER — AMLODIPINE BESYLATE 5 MG/1
TABLET ORAL
Refills: 1 | COMMUNITY
Start: 2019-09-04 | End: 2021-03-30 | Stop reason: SDUPTHER

## 2020-02-08 DIAGNOSIS — G47.33 OSA (OBSTRUCTIVE SLEEP APNEA): ICD-10-CM

## 2020-02-08 DIAGNOSIS — I10 ESSENTIAL HYPERTENSION: ICD-10-CM

## 2020-02-10 RX ORDER — AMLODIPINE BESYLATE 5 MG/1
TABLET ORAL
Qty: 90 TABLET | Refills: 1 | OUTPATIENT
Start: 2020-02-10

## 2020-03-08 DIAGNOSIS — F51.01 PRIMARY INSOMNIA: ICD-10-CM

## 2020-03-08 DIAGNOSIS — G47.33 OSA (OBSTRUCTIVE SLEEP APNEA): ICD-10-CM

## 2020-03-08 DIAGNOSIS — I10 ESSENTIAL HYPERTENSION: ICD-10-CM

## 2020-03-09 RX ORDER — QUETIAPINE FUMARATE 50 MG/1
TABLET, FILM COATED ORAL
Qty: 30 TABLET | Refills: 2 | OUTPATIENT
Start: 2020-03-09

## 2020-03-15 DIAGNOSIS — G47.33 OSA (OBSTRUCTIVE SLEEP APNEA): ICD-10-CM

## 2020-03-15 DIAGNOSIS — F51.01 PRIMARY INSOMNIA: ICD-10-CM

## 2020-03-15 DIAGNOSIS — I10 ESSENTIAL HYPERTENSION: ICD-10-CM

## 2020-03-16 ENCOUNTER — TELEPHONE (OUTPATIENT)
Dept: FAMILY MEDICINE CLINIC | Facility: CLINIC | Age: 50
End: 2020-03-16

## 2020-03-16 DIAGNOSIS — G47.33 OSA (OBSTRUCTIVE SLEEP APNEA): ICD-10-CM

## 2020-03-16 DIAGNOSIS — I10 ESSENTIAL HYPERTENSION: ICD-10-CM

## 2020-03-16 RX ORDER — AMLODIPINE BESYLATE 5 MG/1
TABLET ORAL
Qty: 90 TABLET | Refills: 1 | Status: SHIPPED | OUTPATIENT
Start: 2020-03-16 | End: 2020-03-19 | Stop reason: SDUPTHER

## 2020-03-16 RX ORDER — QUETIAPINE FUMARATE 50 MG/1
TABLET, FILM COATED ORAL
Qty: 30 TABLET | Refills: 2 | Status: SHIPPED | OUTPATIENT
Start: 2020-03-16 | End: 2020-03-19 | Stop reason: SDUPTHER

## 2020-03-16 NOTE — TELEPHONE ENCOUNTER
Pts refills were denied earlier this month PCP feels pt needs an appt in order to have his medications refilled  Pt already scheduled for an appt

## 2020-03-16 NOTE — TELEPHONE ENCOUNTER
Pt called and stated that he really needs his refills on the following meds:    QUEtiapine (SEROquel) 50 mg tablet     amLODIPine (NORVASC) 5 mg tablet

## 2020-03-18 ENCOUNTER — OFFICE VISIT (OUTPATIENT)
Dept: SLEEP CENTER | Facility: CLINIC | Age: 50
End: 2020-03-18
Payer: COMMERCIAL

## 2020-03-18 VITALS
DIASTOLIC BLOOD PRESSURE: 70 MMHG | WEIGHT: 289 LBS | HEIGHT: 67 IN | SYSTOLIC BLOOD PRESSURE: 110 MMHG | HEART RATE: 74 BPM | BODY MASS INDEX: 45.36 KG/M2

## 2020-03-18 DIAGNOSIS — G47.00 INSOMNIA, UNSPECIFIED TYPE: ICD-10-CM

## 2020-03-18 DIAGNOSIS — E66.01 MORBID OBESITY (HCC): ICD-10-CM

## 2020-03-18 DIAGNOSIS — G47.33 OBSTRUCTIVE SLEEP APNEA: Primary | ICD-10-CM

## 2020-03-18 PROCEDURE — 3008F BODY MASS INDEX DOCD: CPT | Performed by: INTERNAL MEDICINE

## 2020-03-18 PROCEDURE — 3078F DIAST BP <80 MM HG: CPT | Performed by: INTERNAL MEDICINE

## 2020-03-18 PROCEDURE — 99214 OFFICE O/P EST MOD 30 MIN: CPT | Performed by: INTERNAL MEDICINE

## 2020-03-18 PROCEDURE — 3074F SYST BP LT 130 MM HG: CPT | Performed by: INTERNAL MEDICINE

## 2020-03-18 PROCEDURE — 1036F TOBACCO NON-USER: CPT | Performed by: INTERNAL MEDICINE

## 2020-03-18 NOTE — PROGRESS NOTES
Assessment/Plan:   Diagnoses and all orders for this visit:    Obstructive sleep apnea    Insomnia, unspecified type    Morbid obesity ([address] )      insomnia sleep onset insomnia, currently on Seroquel 50 mg at bedtime is helping,  Obstructive sleep apnea moderate obstructive sleep apnea on CPAP currently set at 15 cm of water  Discussed with respiratory therapist regarding getting the the cable setting he will bring in the CPAP machine next week and he will get it set up with the [employer] company  Discussed the need for compliance with the CPAP machine understands and verbalizes  Recommend weight loss  Follow-up in 6 months or p r n  Earlier as needed  No follow-ups on file  All questions are answered to the patient's satisfaction and understanding  He verbalizes understanding  He is encouraged to call with any further questions or concerns  Portions of the record may have been created with voice recognition software  Occasional wrong word or "sound a like" substitutions may have occurred due to the inherent limitations of voice recognition software  Read the chart carefully and recognize, using context, where substitutions have occurred      Electronically Signed by Joseph Mcclellan MD    ______________________________________________________________________    Chief Complaint:   Chief Complaint   Patient presents with    Follow-up       Patient ID: India Mcintosh is a 51 y o  y o  male has a past medical history of Abnormal blood sugar, Abnormal TSH, Back pain, Bariatric surgery status, Bilateral anterior knee pain, Chronic pain disorder, CPAP (continuous positive airway pressure) dependence, DDD (degenerative disc disease), lumbar, GERD (gastroesophageal reflux disease), Hematuria, History of Helicobacter pylori infection, History of transfusion, Hypertension, Knee pain, Morbid obesity ([address] ), Postgastrectomy malabsorption, Prediabetes, Sleep apnea, Suspicious nevus, Use of cane as ambulatory aid, and Vitamin D deficiency  3/18/2020  Patient presents today for follow-up visit  Patient is a very pleasant 20-year-old gentleman, diagnosed with moderate obstructive sleep apnea increasing in severity during the REM stage, and currently on CPAP consistently using it until recently he had misplaced the cable from the CPAP machine to the outlet, in the past week and has not been able to use his CPAP he states  He states his insomnia is better now he is able to fall asleep in less than half an hour with the use of the Seroquel  Review of Systems   Constitutional: Negative  HENT: Negative  Eyes: Negative  Respiratory: Negative  Cardiovascular: Negative  Gastrointestinal: Negative  Endocrine: Negative  Genitourinary: Negative  Musculoskeletal: Positive for back pain  Allergic/Immunologic: Negative  Neurological: Negative  Difficulty with memory   Hematological: Negative  Psychiatric/Behavioral: Positive for sleep disturbance  The patient is nervous/anxious  Smoking history: He reports that he has never smoked  He has never used smokeless tobacco     The following portions of the patient's history were reviewed and updated as appropriate: allergies, current medications, past family history, past medical history, past social history, past surgical history and problem list     Immunization History   Administered Date(s) Administered    Influenza Quadrivalent Preservative Free 3 years and older IM 10/20/2017     Current Outpatient Medications   Medication Sig Dispense Refill    amLODIPine (NORVASC) 5 mg tablet TOME ALOK TABLETA TODOS LOS D? AS  1    amLODIPine (NORVASC) 5 mg tablet TOME ALOK TABLETA TODOS LOS ORELLANA 90 tablet 1    artificial tear (LUBRIFRESH P M ) 83-15 % ophthalmic ointment ADMINISTER 1 APPLICATION TO BOTH EYES DAILY AT BEDTIME  1    calcium carbonate (OS-SYLVIA) 1250 (500 Ca) MG tablet Take 1 tablet by mouth daily      clopidogrel (PLAVIX) 75 mg tablet Take 1 tablet (75 mg total) by mouth daily 90 tablet 1    methocarbamol (ROBAXIN) 500 mg tablet Take 1 tablet (500 mg total) by mouth 3 (three) times a day 12 tablet 0    Multiple Vitamin (MULTIVITAMIN) tablet Take 1 tablet by mouth daily 90 tablet 1    omeprazole (PriLOSEC) 20 mg delayed release capsule Take 1 capsule (20 mg total) by mouth daily for 90 days 30 capsule 3    QUEtiapine (SEROquel) 50 mg tablet TOME ALOK TABLETA POR VIA ORAL A DIARIO AL ACOSTARSE 30 tablet 2     No current facility-administered medications for this visit  Allergies: Patient has no known allergies  Objective:  Vitals:    03/18/20 0912   BP: 110/70   Pulse: 74   Weight: 131 kg (289 lb)   Height: 5' 7" (1 702 m)        Wt Readings from Last 3 Encounters:   03/18/20 131 kg (289 lb)   09/04/19 122 kg (270 lb)   09/02/19 121 kg (265 lb 10 5 oz)     Body mass index is 45 26 kg/m²  Physical Exam   Constitutional: He is oriented to person, place, and time  He appears well-developed and well-nourished  HENT:   Head: Normocephalic and atraumatic  Crowded oropharyngeal airways, Mallampati score 3   Eyes: Pupils are equal, round, and reactive to light  EOM are normal    Neck: Normal range of motion  Neck supple  Short and wide neck   Cardiovascular: Normal rate, regular rhythm and normal heart sounds  Pulmonary/Chest: Effort normal and breath sounds normal    Musculoskeletal: Normal range of motion  Neurological: He is alert and oriented to person, place, and time  Skin: Skin is warm and dry  Psychiatric: He has a normal mood and affect   His behavior is normal                 ESS: Total score: 0

## 2020-03-19 ENCOUNTER — OFFICE VISIT (OUTPATIENT)
Dept: FAMILY MEDICINE CLINIC | Facility: CLINIC | Age: 50
End: 2020-03-19

## 2020-03-19 VITALS
WEIGHT: 289 LBS | TEMPERATURE: 97.6 F | SYSTOLIC BLOOD PRESSURE: 140 MMHG | DIASTOLIC BLOOD PRESSURE: 80 MMHG | HEART RATE: 91 BPM | HEIGHT: 67 IN | OXYGEN SATURATION: 96 % | BODY MASS INDEX: 45.36 KG/M2 | RESPIRATION RATE: 18 BRPM

## 2020-03-19 DIAGNOSIS — R73.03 PRE-DIABETES: ICD-10-CM

## 2020-03-19 DIAGNOSIS — M25.50 ARTHRALGIA, UNSPECIFIED JOINT: ICD-10-CM

## 2020-03-19 DIAGNOSIS — Z11.4 ENCOUNTER FOR SCREENING FOR HIV: Primary | ICD-10-CM

## 2020-03-19 DIAGNOSIS — F51.01 PRIMARY INSOMNIA: ICD-10-CM

## 2020-03-19 DIAGNOSIS — I63.9 RIGHT-SIDED CEREBROVASCULAR ACCIDENT (CVA) (HCC): ICD-10-CM

## 2020-03-19 DIAGNOSIS — M54.50 LEFT LOW BACK PAIN: ICD-10-CM

## 2020-03-19 DIAGNOSIS — Z23 NEED FOR INFLUENZA VACCINATION: ICD-10-CM

## 2020-03-19 DIAGNOSIS — I10 ESSENTIAL HYPERTENSION: ICD-10-CM

## 2020-03-19 DIAGNOSIS — G47.33 OSA (OBSTRUCTIVE SLEEP APNEA): ICD-10-CM

## 2020-03-19 LAB — SL AMB POCT HEMOGLOBIN AIC: 5.7 (ref ?–6.5)

## 2020-03-19 PROCEDURE — 90686 IIV4 VACC NO PRSV 0.5 ML IM: CPT | Performed by: FAMILY MEDICINE

## 2020-03-19 PROCEDURE — 1036F TOBACCO NON-USER: CPT | Performed by: FAMILY MEDICINE

## 2020-03-19 PROCEDURE — 3077F SYST BP >= 140 MM HG: CPT | Performed by: FAMILY MEDICINE

## 2020-03-19 PROCEDURE — 99213 OFFICE O/P EST LOW 20 MIN: CPT | Performed by: FAMILY MEDICINE

## 2020-03-19 PROCEDURE — 3008F BODY MASS INDEX DOCD: CPT | Performed by: FAMILY MEDICINE

## 2020-03-19 PROCEDURE — 83036 HEMOGLOBIN GLYCOSYLATED A1C: CPT | Performed by: FAMILY MEDICINE

## 2020-03-19 PROCEDURE — 3079F DIAST BP 80-89 MM HG: CPT | Performed by: FAMILY MEDICINE

## 2020-03-19 PROCEDURE — 90471 IMMUNIZATION ADMIN: CPT | Performed by: FAMILY MEDICINE

## 2020-03-19 RX ORDER — CLOPIDOGREL BISULFATE 75 MG/1
75 TABLET ORAL DAILY
Qty: 90 TABLET | Refills: 1 | Status: SHIPPED | OUTPATIENT
Start: 2020-03-19 | End: 2021-02-05

## 2020-03-19 RX ORDER — AMLODIPINE BESYLATE 5 MG/1
5 TABLET ORAL DAILY
Qty: 90 TABLET | Refills: 1 | Status: ON HOLD | OUTPATIENT
Start: 2020-03-19 | End: 2021-03-29 | Stop reason: CLARIF

## 2020-03-19 RX ORDER — QUETIAPINE FUMARATE 50 MG/1
50 TABLET, FILM COATED ORAL
Qty: 90 TABLET | Refills: 1 | Status: SHIPPED | OUTPATIENT
Start: 2020-03-19 | End: 2020-09-10

## 2020-03-19 RX ORDER — METHOCARBAMOL 500 MG/1
500 TABLET, FILM COATED ORAL 3 TIMES DAILY
Qty: 90 TABLET | Refills: 1 | Status: SHIPPED | OUTPATIENT
Start: 2020-03-19 | End: 2020-05-19 | Stop reason: SDUPTHER

## 2020-03-19 NOTE — PROGRESS NOTES
Assessment/Plan:    Hypertension  Continue Amlodipine 5 mg  Have BW completed       Diagnoses and all orders for this visit:    Encounter for screening for HIV  -     HIV 1/2 Antigen/Antibody (4th Generation) w Reflex [facility]; Future    Left low back pain  -     methocarbamol (ROBAXIN) 500 mg tablet; Take 1 tablet (500 mg total) by mouth 3 (three) times a day    KAT (obstructive sleep apnea)  -     QUEtiapine (SEROquel) 50 mg tablet; Take 1 tablet (50 mg total) by mouth daily at bedtime  -     clopidogrel (PLAVIX) 75 mg tablet; Take 1 tablet (75 mg total) by mouth daily  -     amLODIPine (NORVASC) 5 mg tablet; Take 1 tablet (5 mg total) by mouth daily    Essential hypertension  -     QUEtiapine (SEROquel) 50 mg tablet; Take 1 tablet (50 mg total) by mouth daily at bedtime  -     clopidogrel (PLAVIX) 75 mg tablet; Take 1 tablet (75 mg total) by mouth daily  -     amLODIPine (NORVASC) 5 mg tablet; Take 1 tablet (5 mg total) by mouth daily  -     CBC and differential; Future  -     Comprehensive metabolic panel; Future  -     Lipid panel; Future  -     Microalbumin / creatinine urine ratio; Future  -     TSH, 3rd generation with Free T4 reflex; Future    Primary insomnia  -     QUEtiapine (SEROquel) 50 mg tablet; Take 1 tablet (50 mg total) by mouth daily at bedtime    Right-sided cerebrovascular accident (CVA) (HCC)  -     clopidogrel (PLAVIX) 75 mg tablet; Take 1 tablet (75 mg total) by mouth daily    Arthralgia, unspecified joint  -     RF Screen w/ Reflex to Titer; Future  -     Lyme Antibody Profile with reflex to WB; Future    Need for influenza vaccination  -     influenza vaccine, [id number], quadrivalent, 0 5 mL, preservative-free, for adult and pediatric patients 6 mos+ (AFLURIA, FLUARIX, FLULAVAL, FLUZONE)    Pre-diabetes  -     POCT hemoglobin A1c          Subjective:      Patient ID: Deisi Kate is a 51 y o  male  50 yo obese,  male with HTN, here today for follow up   Currently complains of joint pains, chronic LBP for which he is followed by Pain Management  Patient states he is doing well except for his back pain  Back Pain   This is a chronic problem  The current episode started more than 1 year ago  The problem occurs constantly  The problem is unchanged  The pain is present in the lumbar spine  The quality of the pain is described as aching  The pain is at a severity of 8/10  The pain is the same all the time  The symptoms are aggravated by bending and lying down  Stiffness is present all day  Pertinent negatives include no bladder incontinence, bowel incontinence, chest pain, dysuria, fever, leg pain, numbness, paresis, paresthesias, perianal numbness, tingling or weakness  He has tried analgesics and muscle relaxant for the symptoms  The treatment provided moderate relief  The following portions of the patient's history were reviewed and updated as appropriate: He  has a past medical history of Abnormal blood sugar, Abnormal TSH, Back pain, Bariatric surgery status, Bilateral anterior knee pain, Chronic pain disorder, CPAP (continuous positive airway pressure) dependence, DDD (degenerative disc disease), lumbar, GERD (gastroesophageal reflux disease), Hematuria, History of Helicobacter pylori infection, History of transfusion, Hypertension, Knee pain, Morbid obesity ([address] ), Postgastrectomy malabsorption, Prediabetes, Sleep apnea, Suspicious nevus, Use of cane as ambulatory aid, and Vitamin D deficiency    He   Patient Active Problem List    Diagnosis Date Noted    Primary insomnia 03/28/2019    Gross hematuria 03/28/2019    Cerebrovascular disease 09/22/2018    Dysesthesia of multiple sites 09/21/2018    Bariatric surgery status 08/28/2018    Postsurgical malabsorption 08/28/2018    Muscle pain 08/28/2018    Varicose veins of both lower extremities with inflammation 07/19/2018    Cystitis 07/19/2018    CPAP (continuous positive airway pressure) dependence     Hypertension     Benign essential hypertension 05/04/2018    Sleep apnea 05/04/2018    Class 3 severe obesity due to excess calories without serious comorbidity with body mass index (BMI) of 40 0 to 44 9 in adult [facility]) 04/10/2018    Benign hypertension 04/10/2018    Gastroesophageal reflux disease 04/10/2018    Dry eye 03/29/2018    Morbid obesity with BMI of 50 0-59 9, adult ([facility] [address] ) 03/12/2018    KAT (obstructive sleep apnea) 03/12/2018    Benign essential HTN 03/12/2018    GERD (gastroesophageal reflux disease) 03/12/2018    Prediabetes 05/04/2017     He  has a past surgical history that includes pr egd transoral biopsy single/multiple (N/A, 12/13/2017); pr lap gastric bypass/dulce-en-y (N/A, 5/8/2018); and Knee surgery (Left, 07/27/2018)  His family history includes Hypertension in his maternal grandmother and mother; Prostate cancer in his father  He  reports that he has never smoked  He has never used smokeless tobacco  He reports that he does not drink alcohol or use drugs  Current Outpatient Medications   Medication Sig Dispense Refill    amLODIPine (NORVASC) 5 mg tablet TOME ALOK TABLETA TODOS LOS D? AS  1    amLODIPine (NORVASC) 5 mg tablet Take 1 tablet (5 mg total) by mouth daily 90 tablet 1    clopidogrel (PLAVIX) 75 mg tablet Take 1 tablet (75 mg total) by mouth daily 90 tablet 1    Multiple Vitamin (MULTIVITAMIN) tablet Take 1 tablet by mouth daily 90 tablet 1    QUEtiapine (SEROquel) 50 mg tablet Take 1 tablet (50 mg total) by mouth daily at bedtime 90 tablet 1    artificial tear (LUBRIFRESH P M ) 83-15 % ophthalmic ointment ADMINISTER 1 APPLICATION TO BOTH EYES DAILY AT BEDTIME  1    calcium carbonate (OS-SYLVIA) 1250 (500 Ca) MG tablet Take 1 tablet by mouth daily      methocarbamol (ROBAXIN) 500 mg tablet Take 1 tablet (500 mg total) by mouth 3 (three) times a day 90 tablet 1    omeprazole (PriLOSEC) 20 mg delayed release capsule Take 1 capsule (20 mg total) by mouth daily for 90 days 30 capsule 3 No current facility-administered medications for this visit  Current Outpatient Medications on File Prior to Visit   Medication Sig    amLODIPine (NORVASC) 5 mg tablet TOME ALOK TABLETA TODOS LOS D? AS    Multiple Vitamin (MULTIVITAMIN) tablet Take 1 tablet by mouth daily    [DISCONTINUED] amLODIPine (NORVASC) 5 mg tablet TOME ALOK TABLETA TODOS LOS ORELLANA    [DISCONTINUED] clopidogrel (PLAVIX) 75 mg tablet Take 1 tablet (75 mg total) by mouth daily    [DISCONTINUED] QUEtiapine (SEROquel) 50 mg tablet TOME ALOK TABLETA POR VIA ORAL A DIARIO AL ACOSTARSE    artificial tear (LUBRIFRESH P M ) 83-15 % ophthalmic ointment ADMINISTER 1 APPLICATION TO BOTH EYES DAILY AT BEDTIME    calcium carbonate (OS-YSLVIA) 1250 (500 Ca) MG tablet Take 1 tablet by mouth daily    omeprazole (PriLOSEC) 20 mg delayed release capsule Take 1 capsule (20 mg total) by mouth daily for 90 days    [DISCONTINUED] methocarbamol (ROBAXIN) 500 mg tablet Take 1 tablet (500 mg total) by mouth 3 (three) times a day (Patient not taking: Reported on 3/19/2020)     No current facility-administered medications on file prior to visit       Review of Systems   Constitutional: Negative for fever  Cardiovascular: Negative for chest pain  Gastrointestinal: Negative for bowel incontinence  Genitourinary: Negative for bladder incontinence and dysuria  Musculoskeletal: Positive for arthralgias and back pain  Neurological: Negative for tingling, weakness, numbness and paresthesias  All other systems reviewed and are negative  Objective:      /80   Pulse 91   Temp 97 6 °F (36 4 °C) (Temporal)   Resp 18   Ht 5' 7" (1 702 m)   Wt 131 kg (289 lb)   SpO2 96%   BMI 45 26 kg/m²          Physical Exam   Constitutional: He is oriented to person, place, and time  He appears well-developed  HENT:   Head: Normocephalic     Right Ear: External ear normal    Left Ear: External ear normal    Nose: Nose normal    Mouth/Throat: Oropharynx is clear and moist    Eyes: Pupils are equal, round, and reactive to light  Conjunctivae and EOM are normal    Neck: Normal range of motion  Neck supple  No thyromegaly present  Cardiovascular: Normal rate, regular rhythm and normal heart sounds  Pulmonary/Chest: Effort normal and breath sounds normal    Abdominal: Soft  There is no tenderness  There is no rebound and no guarding  Musculoskeletal: Normal range of motion  He exhibits tenderness  Neurological: He is alert and oriented to person, place, and time  He has normal reflexes  Skin: Skin is dry  Psychiatric: He has a normal mood and affect  Nursing note and vitals reviewed

## 2020-05-19 ENCOUNTER — OFFICE VISIT (OUTPATIENT)
Dept: FAMILY MEDICINE CLINIC | Facility: CLINIC | Age: 50
End: 2020-05-19

## 2020-05-19 ENCOUNTER — APPOINTMENT (OUTPATIENT)
Dept: LAB | Facility: HOSPITAL | Age: 50
End: 2020-05-19
Payer: COMMERCIAL

## 2020-05-19 VITALS
HEART RATE: 73 BPM | DIASTOLIC BLOOD PRESSURE: 90 MMHG | BODY MASS INDEX: 45.39 KG/M2 | OXYGEN SATURATION: 99 % | WEIGHT: 289.2 LBS | RESPIRATION RATE: 20 BRPM | TEMPERATURE: 96 F | SYSTOLIC BLOOD PRESSURE: 130 MMHG | HEIGHT: 67 IN

## 2020-05-19 DIAGNOSIS — Z11.4 ENCOUNTER FOR SCREENING FOR HIV: ICD-10-CM

## 2020-05-19 DIAGNOSIS — I10 ESSENTIAL HYPERTENSION: ICD-10-CM

## 2020-05-19 DIAGNOSIS — N50.812 LEFT TESTICULAR PAIN: Primary | ICD-10-CM

## 2020-05-19 DIAGNOSIS — M25.50 ARTHRALGIA, UNSPECIFIED JOINT: ICD-10-CM

## 2020-05-19 DIAGNOSIS — R31.9 HEMATURIA, UNSPECIFIED TYPE: ICD-10-CM

## 2020-05-19 DIAGNOSIS — M54.50 LEFT LOW BACK PAIN: ICD-10-CM

## 2020-05-19 LAB
ALBUMIN SERPL BCP-MCNC: 3.4 G/DL (ref 3.5–5)
ALP SERPL-CCNC: 94 U/L (ref 46–116)
ALT SERPL W P-5'-P-CCNC: 30 U/L (ref 12–78)
ANION GAP SERPL CALCULATED.3IONS-SCNC: 6 MMOL/L (ref 4–13)
AST SERPL W P-5'-P-CCNC: 31 U/L (ref 5–45)
BASOPHILS # BLD AUTO: 0.04 THOUSANDS/ΜL (ref 0–0.1)
BASOPHILS NFR BLD AUTO: 1 % (ref 0–1)
BILIRUB SERPL-MCNC: 0.56 MG/DL (ref 0.2–1)
BILIRUB UR QL STRIP: NEGATIVE
BUN SERPL-MCNC: 14 MG/DL (ref 5–25)
CALCIUM SERPL-MCNC: 8.4 MG/DL (ref 8.3–10.1)
CHLORIDE SERPL-SCNC: 104 MMOL/L (ref 100–108)
CHOLEST SERPL-MCNC: 136 MG/DL (ref 50–200)
CLARITY UR: CLEAR
CO2 SERPL-SCNC: 32 MMOL/L (ref 21–32)
COLOR UR: YELLOW
CREAT SERPL-MCNC: 0.78 MG/DL (ref 0.6–1.3)
CREAT UR-MCNC: 239 MG/DL
EOSINOPHIL # BLD AUTO: 0.12 THOUSAND/ΜL (ref 0–0.61)
EOSINOPHIL NFR BLD AUTO: 2 % (ref 0–6)
ERYTHROCYTE [DISTWIDTH] IN BLOOD BY AUTOMATED COUNT: 13.4 % (ref 11.6–15.1)
GFR SERPL CREATININE-BSD FRML MDRD: 107 ML/MIN/1.73SQ M
GLUCOSE P FAST SERPL-MCNC: 88 MG/DL (ref 65–99)
GLUCOSE UR STRIP-MCNC: NEGATIVE MG/DL
HCT VFR BLD AUTO: 45.7 % (ref 36.5–49.3)
HDLC SERPL-MCNC: 63 MG/DL
HGB BLD-MCNC: 14.7 G/DL (ref 12–17)
HGB UR QL STRIP.AUTO: NEGATIVE
IMM GRANULOCYTES # BLD AUTO: 0.01 THOUSAND/UL (ref 0–0.2)
IMM GRANULOCYTES NFR BLD AUTO: 0 % (ref 0–2)
KETONES UR STRIP-MCNC: NEGATIVE MG/DL
LDLC SERPL CALC-MCNC: 55 MG/DL (ref 0–100)
LEUKOCYTE ESTERASE UR QL STRIP: NEGATIVE
LYMPHOCYTES # BLD AUTO: 2.55 THOUSANDS/ΜL (ref 0.6–4.47)
LYMPHOCYTES NFR BLD AUTO: 40 % (ref 14–44)
MCH RBC QN AUTO: 30.5 PG (ref 26.8–34.3)
MCHC RBC AUTO-ENTMCNC: 32.2 G/DL (ref 31.4–37.4)
MCV RBC AUTO: 95 FL (ref 82–98)
MICROALBUMIN UR-MCNC: 6.7 MG/L (ref 0–20)
MICROALBUMIN/CREAT 24H UR: 3 MG/G CREATININE (ref 0–30)
MONOCYTES # BLD AUTO: 0.52 THOUSAND/ΜL (ref 0.17–1.22)
MONOCYTES NFR BLD AUTO: 8 % (ref 4–12)
NEUTROPHILS # BLD AUTO: 3.07 THOUSANDS/ΜL (ref 1.85–7.62)
NEUTS SEG NFR BLD AUTO: 49 % (ref 43–75)
NITRITE UR QL STRIP: NEGATIVE
NONHDLC SERPL-MCNC: 73 MG/DL
NRBC BLD AUTO-RTO: 0 /100 WBCS
PH UR STRIP.AUTO: 6.5 [PH]
PLATELET # BLD AUTO: 182 THOUSANDS/UL (ref 149–390)
PMV BLD AUTO: 10.3 FL (ref 8.9–12.7)
POTASSIUM SERPL-SCNC: 4.2 MMOL/L (ref 3.5–5.3)
PROT SERPL-MCNC: 6.9 G/DL (ref 6.4–8.2)
PROT UR STRIP-MCNC: NEGATIVE MG/DL
RBC # BLD AUTO: 4.82 MILLION/UL (ref 3.88–5.62)
RHEUMATOID FACT SER QL LA: NEGATIVE
SODIUM SERPL-SCNC: 142 MMOL/L (ref 136–145)
SP GR UR STRIP.AUTO: 1.02 (ref 1–1.03)
T4 FREE SERPL-MCNC: 0.86 NG/DL (ref 0.76–1.46)
TRIGL SERPL-MCNC: 91 MG/DL
TSH SERPL DL<=0.05 MIU/L-ACNC: 3.85 UIU/ML (ref 0.36–3.74)
UROBILINOGEN UR QL STRIP.AUTO: 0.2 E.U./DL
WBC # BLD AUTO: 6.31 THOUSAND/UL (ref 4.31–10.16)

## 2020-05-19 PROCEDURE — 87389 HIV-1 AG W/HIV-1&-2 AB AG IA: CPT

## 2020-05-19 PROCEDURE — 80053 COMPREHEN METABOLIC PANEL: CPT

## 2020-05-19 PROCEDURE — 84439 ASSAY OF FREE THYROXINE: CPT

## 2020-05-19 PROCEDURE — 80061 LIPID PANEL: CPT

## 2020-05-19 PROCEDURE — 82043 UR ALBUMIN QUANTITATIVE: CPT

## 2020-05-19 PROCEDURE — 99214 OFFICE O/P EST MOD 30 MIN: CPT | Performed by: FAMILY MEDICINE

## 2020-05-19 PROCEDURE — 82570 ASSAY OF URINE CREATININE: CPT

## 2020-05-19 PROCEDURE — 84443 ASSAY THYROID STIM HORMONE: CPT

## 2020-05-19 PROCEDURE — 86618 LYME DISEASE ANTIBODY: CPT

## 2020-05-19 PROCEDURE — 3008F BODY MASS INDEX DOCD: CPT | Performed by: INTERNAL MEDICINE

## 2020-05-19 PROCEDURE — 81003 URINALYSIS AUTO W/O SCOPE: CPT | Performed by: FAMILY MEDICINE

## 2020-05-19 PROCEDURE — 85025 COMPLETE CBC W/AUTO DIFF WBC: CPT

## 2020-05-19 PROCEDURE — 3008F BODY MASS INDEX DOCD: CPT | Performed by: FAMILY MEDICINE

## 2020-05-19 PROCEDURE — 86430 RHEUMATOID FACTOR TEST QUAL: CPT

## 2020-05-19 PROCEDURE — 3080F DIAST BP >= 90 MM HG: CPT | Performed by: FAMILY MEDICINE

## 2020-05-19 PROCEDURE — 36415 COLL VENOUS BLD VENIPUNCTURE: CPT

## 2020-05-19 PROCEDURE — 3075F SYST BP GE 130 - 139MM HG: CPT | Performed by: FAMILY MEDICINE

## 2020-05-19 PROCEDURE — 1036F TOBACCO NON-USER: CPT | Performed by: FAMILY MEDICINE

## 2020-05-19 RX ORDER — METHOCARBAMOL 500 MG/1
500 TABLET, FILM COATED ORAL 3 TIMES DAILY
Qty: 90 TABLET | Refills: 1 | Status: ON HOLD | OUTPATIENT
Start: 2020-05-19 | End: 2021-03-29 | Stop reason: CLARIF

## 2020-05-20 LAB
B BURGDOR IGG+IGM SER-ACNC: <0.91 ISR (ref 0–0.9)
HIV 1+2 AB+HIV1 P24 AG SERPL QL IA: NORMAL

## 2020-06-11 ENCOUNTER — HOSPITAL ENCOUNTER (OUTPATIENT)
Dept: ULTRASOUND IMAGING | Facility: HOSPITAL | Age: 50
Discharge: HOME | End: 2020-06-11
Payer: COMMERCIAL

## 2020-06-11 DIAGNOSIS — N50.812 LEFT TESTICULAR PAIN: ICD-10-CM

## 2020-06-11 DIAGNOSIS — R31.9 HEMATURIA, UNSPECIFIED TYPE: ICD-10-CM

## 2020-06-11 PROCEDURE — 76857 US EXAM PELVIC LIMITED: CPT

## 2020-06-11 PROCEDURE — 76870 US EXAM SCROTUM: CPT

## 2020-06-16 ENCOUNTER — TELEMEDICINE (OUTPATIENT)
Dept: FAMILY MEDICINE CLINIC | Facility: CLINIC | Age: 50
End: 2020-06-16

## 2020-06-16 DIAGNOSIS — I83.11 VARICOSE VEINS OF BOTH LOWER EXTREMITIES WITH INFLAMMATION: ICD-10-CM

## 2020-06-16 DIAGNOSIS — G47.33 OSA (OBSTRUCTIVE SLEEP APNEA): ICD-10-CM

## 2020-06-16 DIAGNOSIS — K21.9 GASTROESOPHAGEAL REFLUX DISEASE WITHOUT ESOPHAGITIS: Primary | ICD-10-CM

## 2020-06-16 DIAGNOSIS — I10 ESSENTIAL HYPERTENSION: ICD-10-CM

## 2020-06-16 DIAGNOSIS — I10 BENIGN ESSENTIAL HYPERTENSION: ICD-10-CM

## 2020-06-16 DIAGNOSIS — I83.12 VARICOSE VEINS OF BOTH LOWER EXTREMITIES WITH INFLAMMATION: ICD-10-CM

## 2020-06-16 PROCEDURE — 99213 OFFICE O/P EST LOW 20 MIN: CPT | Performed by: FAMILY MEDICINE

## 2020-06-16 RX ORDER — MULTIVITAMIN
1 TABLET ORAL DAILY
Qty: 90 TABLET | Refills: 1 | Status: SHIPPED | OUTPATIENT
Start: 2020-06-16

## 2020-08-11 NOTE — DISCHARGE INSTRUCTIONS
Gastritis   WHAT YOU NEED TO KNOW:   Gastritis is inflammation or irritation of the lining of your stomach  DISCHARGE INSTRUCTIONS:   Call 911 for any of the following:   · You develop chest pain or shortness of breath  Seek care immediately if:   · You vomit blood  · You have black or bloody bowel movements  · You have severe stomach or back pain  Contact your healthcare provider if:   · You have a fever  · You have new or worsening symptoms, even after treatment  · You have questions or concerns about your condition or care  Medicines:   · Medicines  may be given to help treat a bacterial infection or decrease stomach acid  · Take your medicine as directed  Contact your healthcare provider if you think your medicine is not helping or if you have side effects  Tell him or her if you are allergic to any medicine  Keep a list of the medicines, vitamins, and herbs you take  Include the amounts, and when and why you take them  Bring the list or the pill bottles to follow-up visits  Carry your medicine list with you in case of an emergency  Manage or prevent gastritis:   · Do not smoke  Nicotine and other chemicals in cigarettes and cigars can make your symptoms worse and cause lung damage  Ask your healthcare provider for information if you currently smoke and need help to quit  E-cigarettes or smokeless tobacco still contain nicotine  Talk to your healthcare provider before you use these products  · Do not drink alcohol  Alcohol can prevent healing and make your gastritis worse  Talk to your healthcare provider if you need help to stop drinking  · Do not take NSAIDs or aspirin unless directed  These and similar medicines can cause irritation  If your healthcare provider says it is okay to take NSAIDs, take them with food  · Do not eat foods that cause irritation  Foods such as oranges and salsa can cause burning or pain  Eat a variety of healthy foods   Examples include fruits (not citrus), vegetables, low-fat dairy products, beans, whole-grain breads, and lean meats and fish  Try to eat small meals, and drink water with your meals  Do not eat for at least 3 hours before you go to bed  · Find ways to relax and decrease stress  Stress can increase stomach acid and make gastritis worse  Activities such as yoga, meditation, or listening to music can help you relax  Spend time with friends, or do things you enjoy  Follow up with your healthcare provider as directed: You may need ongoing tests or treatment, or referral to a gastroenterologist  Write down your questions so you remember to ask them during your visits  © 2017 [employer] Information is for End User's use only and may not be sold, redistributed or otherwise used for commercial purposes  All illustrations and images included in CareNotes® are the copyrighted property of [employer]  or [employer]  The above information is an  only  It is not intended as medical advice for individual conditions or treatments  Talk to your doctor, nurse or pharmacist before following any medical regimen to see if it is safe and effective for you  : Yes

## 2020-09-10 DIAGNOSIS — G47.33 OSA (OBSTRUCTIVE SLEEP APNEA): ICD-10-CM

## 2020-09-10 DIAGNOSIS — I10 ESSENTIAL HYPERTENSION: ICD-10-CM

## 2020-09-10 DIAGNOSIS — F51.01 PRIMARY INSOMNIA: ICD-10-CM

## 2020-09-10 RX ORDER — QUETIAPINE FUMARATE 50 MG/1
TABLET, FILM COATED ORAL
Qty: 90 TABLET | Refills: 1 | Status: SHIPPED | OUTPATIENT
Start: 2020-09-10 | End: 2021-03-30 | Stop reason: SDUPTHER

## 2021-02-03 DIAGNOSIS — I10 ESSENTIAL HYPERTENSION: Primary | ICD-10-CM

## 2021-02-03 RX ORDER — AMLODIPINE BESYLATE 5 MG/1
TABLET ORAL
Qty: 90 TABLET | Refills: 1 | Status: ON HOLD | OUTPATIENT
Start: 2021-02-03 | End: 2021-03-29 | Stop reason: CLARIF

## 2021-02-05 DIAGNOSIS — I10 ESSENTIAL HYPERTENSION: ICD-10-CM

## 2021-02-05 DIAGNOSIS — G47.33 OSA (OBSTRUCTIVE SLEEP APNEA): ICD-10-CM

## 2021-02-05 DIAGNOSIS — I63.9 RIGHT-SIDED CEREBROVASCULAR ACCIDENT (CVA) (HCC): ICD-10-CM

## 2021-02-05 RX ORDER — CLOPIDOGREL BISULFATE 75 MG/1
TABLET ORAL
Qty: 90 TABLET | Refills: 1 | Status: SHIPPED | OUTPATIENT
Start: 2021-02-05 | End: 2021-03-30 | Stop reason: SDUPTHER

## 2021-03-19 NOTE — ASSESSMENT & PLAN NOTE
· Hold to allow for permissive hypertension  · Can likely resume tomorrow, will defer to Neurology 36.7

## 2021-03-28 ENCOUNTER — HOSPITAL ENCOUNTER (INPATIENT)
Facility: HOSPITAL | Age: 51
LOS: 1 days | Discharge: HOME | DRG: 243 | End: 2021-03-29
Attending: EMERGENCY MEDICINE | Admitting: INTERNAL MEDICINE
Payer: COMMERCIAL

## 2021-03-28 DIAGNOSIS — R10.13 EPIGASTRIC PAIN: ICD-10-CM

## 2021-03-28 DIAGNOSIS — R07.0 BURNING SENSATION OF THROAT: ICD-10-CM

## 2021-03-28 DIAGNOSIS — T65.91XA ACCIDENTAL INGESTION OF TOXIC SUBSTANCE, INITIAL ENCOUNTER: Primary | ICD-10-CM

## 2021-03-28 DIAGNOSIS — T65.91XA INGESTION OF UNKNOWN SUBSTANCE, ACCIDENTAL OR UNINTENTIONAL, INITIAL ENCOUNTER: ICD-10-CM

## 2021-03-28 DIAGNOSIS — K21.9 GASTROESOPHAGEAL REFLUX DISEASE WITHOUT ESOPHAGITIS: ICD-10-CM

## 2021-03-28 LAB
ANION GAP SERPL CALCULATED.3IONS-SCNC: 7 MMOL/L (ref 4–13)
ATRIAL RATE: 67 BPM
BASOPHILS # BLD AUTO: 0.02 THOUSANDS/ΜL (ref 0–0.1)
BASOPHILS NFR BLD AUTO: 0 % (ref 0–1)
BUN SERPL-MCNC: 16 MG/DL (ref 5–25)
CALCIUM SERPL-MCNC: 8.8 MG/DL (ref 8.3–10.1)
CHLORIDE SERPL-SCNC: 105 MMOL/L (ref 100–108)
CO2 SERPL-SCNC: 32 MMOL/L (ref 21–32)
CREAT SERPL-MCNC: 0.99 MG/DL (ref 0.6–1.3)
EOSINOPHIL # BLD AUTO: 0.2 THOUSAND/ΜL (ref 0–0.61)
EOSINOPHIL NFR BLD AUTO: 3 % (ref 0–6)
ERYTHROCYTE [DISTWIDTH] IN BLOOD BY AUTOMATED COUNT: 13 % (ref 11.6–15.1)
GFR SERPL CREATININE-BSD FRML MDRD: 89 ML/MIN/1.73SQ M
GLUCOSE SERPL-MCNC: 91 MG/DL (ref 65–140)
HCT VFR BLD AUTO: 45.3 % (ref 36.5–49.3)
HGB BLD-MCNC: 15.1 G/DL (ref 12–17)
IMM GRANULOCYTES # BLD AUTO: 0.02 THOUSAND/UL (ref 0–0.2)
IMM GRANULOCYTES NFR BLD AUTO: 0 % (ref 0–2)
LYMPHOCYTES # BLD AUTO: 2.69 THOUSANDS/ΜL (ref 0.6–4.47)
LYMPHOCYTES NFR BLD AUTO: 36 % (ref 14–44)
MCH RBC QN AUTO: 30.8 PG (ref 26.8–34.3)
MCHC RBC AUTO-ENTMCNC: 33.3 G/DL (ref 31.4–37.4)
MCV RBC AUTO: 92 FL (ref 82–98)
MONOCYTES # BLD AUTO: 0.54 THOUSAND/ΜL (ref 0.17–1.22)
MONOCYTES NFR BLD AUTO: 7 % (ref 4–12)
NEUTROPHILS # BLD AUTO: 3.93 THOUSANDS/ΜL (ref 1.85–7.62)
NEUTS SEG NFR BLD AUTO: 54 % (ref 43–75)
NRBC BLD AUTO-RTO: 0 /100 WBCS
P AXIS: 83 DEGREES
PLATELET # BLD AUTO: 185 THOUSANDS/UL (ref 149–390)
PMV BLD AUTO: 10.2 FL (ref 8.9–12.7)
POTASSIUM SERPL-SCNC: 4.1 MMOL/L (ref 3.5–5.3)
PR INTERVAL: 162 MS
QRS AXIS: 79 DEGREES
QRSD INTERVAL: 92 MS
QT INTERVAL: 382 MS
QTC INTERVAL: 403 MS
RBC # BLD AUTO: 4.91 MILLION/UL (ref 3.88–5.62)
SODIUM SERPL-SCNC: 144 MMOL/L (ref 136–145)
T WAVE AXIS: 59 DEGREES
VENTRICULAR RATE: 67 BPM
WBC # BLD AUTO: 7.4 THOUSAND/UL (ref 4.31–10.16)

## 2021-03-28 PROCEDURE — 99223 1ST HOSP IP/OBS HIGH 75: CPT | Performed by: PHYSICIAN ASSISTANT

## 2021-03-28 PROCEDURE — 85025 COMPLETE CBC W/AUTO DIFF WBC: CPT | Performed by: EMERGENCY MEDICINE

## 2021-03-28 PROCEDURE — 93005 ELECTROCARDIOGRAM TRACING: CPT

## 2021-03-28 PROCEDURE — 36415 COLL VENOUS BLD VENIPUNCTURE: CPT | Performed by: EMERGENCY MEDICINE

## 2021-03-28 PROCEDURE — 99284 EMERGENCY DEPT VISIT MOD MDM: CPT | Performed by: EMERGENCY MEDICINE

## 2021-03-28 PROCEDURE — 99285 EMERGENCY DEPT VISIT HI MDM: CPT

## 2021-03-28 PROCEDURE — 93010 ELECTROCARDIOGRAM REPORT: CPT

## 2021-03-28 PROCEDURE — 80048 BASIC METABOLIC PNL TOTAL CA: CPT | Performed by: EMERGENCY MEDICINE

## 2021-03-28 PROCEDURE — 99448 NTRPROF PH1/NTRNET/EHR 21-30: CPT | Performed by: EMERGENCY MEDICINE

## 2021-03-28 PROCEDURE — 0241U HB NFCT DS VIR RESP RNA 4 TRGT: CPT | Performed by: PHYSICIAN ASSISTANT

## 2021-03-28 RX ORDER — QUETIAPINE FUMARATE 25 MG/1
50 TABLET, FILM COATED ORAL
Status: DISCONTINUED | OUTPATIENT
Start: 2021-03-28 | End: 2021-03-29 | Stop reason: HOSPADM

## 2021-03-28 RX ORDER — CLOPIDOGREL BISULFATE 75 MG/1
75 TABLET ORAL DAILY
Status: DISCONTINUED | OUTPATIENT
Start: 2021-03-29 | End: 2021-03-29 | Stop reason: HOSPADM

## 2021-03-28 RX ORDER — ACETAMINOPHEN 325 MG/1
650 TABLET ORAL EVERY 6 HOURS PRN
Status: DISCONTINUED | OUTPATIENT
Start: 2021-03-28 | End: 2021-03-29 | Stop reason: HOSPADM

## 2021-03-28 RX ORDER — ONDANSETRON 2 MG/ML
4 INJECTION INTRAMUSCULAR; INTRAVENOUS EVERY 6 HOURS PRN
Status: DISCONTINUED | OUTPATIENT
Start: 2021-03-28 | End: 2021-03-29 | Stop reason: HOSPADM

## 2021-03-28 RX ORDER — AMLODIPINE BESYLATE 5 MG/1
5 TABLET ORAL DAILY
Status: DISCONTINUED | OUTPATIENT
Start: 2021-03-29 | End: 2021-03-29 | Stop reason: HOSPADM

## 2021-03-28 RX ORDER — SODIUM CHLORIDE 9 MG/ML
75 INJECTION, SOLUTION INTRAVENOUS CONTINUOUS
Status: DISPENSED | OUTPATIENT
Start: 2021-03-28 | End: 2021-03-29

## 2021-03-28 RX ORDER — MAGNESIUM HYDROXIDE/ALUMINUM HYDROXICE/SIMETHICONE 120; 1200; 1200 MG/30ML; MG/30ML; MG/30ML
30 SUSPENSION ORAL EVERY 6 HOURS PRN
Status: DISCONTINUED | OUTPATIENT
Start: 2021-03-28 | End: 2021-03-29 | Stop reason: HOSPADM

## 2021-03-28 RX ORDER — PANTOPRAZOLE SODIUM 40 MG/1
40 INJECTION, POWDER, FOR SOLUTION INTRAVENOUS
Status: DISCONTINUED | OUTPATIENT
Start: 2021-03-29 | End: 2021-03-29 | Stop reason: HOSPADM

## 2021-03-28 RX ADMIN — SODIUM CHLORIDE 75 ML/HR: 0.9 INJECTION, SOLUTION INTRAVENOUS at 22:59

## 2021-03-28 RX ADMIN — ALUMINUM HYDROXIDE, MAGNESIUM HYDROXIDE, AND SIMETHICONE 30 ML: 200; 200; 20 SUSPENSION ORAL at 22:59

## 2021-03-28 RX ADMIN — QUETIAPINE FUMARATE 50 MG: 25 TABLET ORAL at 22:59

## 2021-03-28 NOTE — ED PROVIDER NOTES
History  Chief Complaint   Patient presents with    Abdominal Pain     patient states about 1 hour ago patient drank a clear substance thinking that it was water but states, "i dont know what it was but it tasted like clorox"  now c/o burning in stomach  denies n/v  patient states he just arrived from [facility] yesterday     Shila Beaver is an 53y o  year old male with PMHx significant for HTN, hx of gastric bypass, who presents to the ED today with abdominal pain  Pt states that he drank a clear liquid that he thought was water but tasted like clorox and then he developed abdominal pain  Abdominal pain is exacerbated by nothing and relieved by nothing  The pain is burning in quality, does not radiate, and currently rated moderate in severity  Has tried nothing yet for pain  Patient also endorses a burning sensation from his throat, through his chest and to his abdomen since this happened  The patient denies fevers, chills, headaches, voice change, difficulty swallowing, lightheadedness or syncope, nausea, vomiting, chest pain, shortness of breath, cough, changes in usual bowel movements, changes with urination, back pain, numbness or tingling, pain anywhere else in body  The patient has no sick contacts, recent travel history, new or changing medications  History provided by:  Medical records and patient  Abdominal Pain  Associated symptoms: no chest pain, no chills, no cough, no diarrhea, no fever, no nausea, no shortness of breath, no sore throat and no vomiting        Prior to Admission Medications   Prescriptions Last Dose Informant Patient Reported? Taking? Multiple Vitamin (MULTIVITAMIN) tablet   No No   Sig: Take 1 tablet by mouth daily   QUEtiapine (SEROquel) 50 mg tablet   No No   Sig: TAKE 1 TABLET BY MOUTH AT BEDTIME   amLODIPine (NORVASC) 5 mg tablet   Yes No   Sig: TOME ALOK TABLETA TODOS LOS D? AS   amLODIPine (NORVASC) 5 mg tablet   No No   Sig: Take 1 tablet (5 mg total) by mouth daily   Patient not taking: Reported on 5/19/2020   amLODIPine (NORVASC) 5 mg tablet   No No   Sig: TAKE 1 TABLET BY MOUTH DAILY   artificial tear (LUBRIFRESH P M ) 83-15 % ophthalmic ointment  Self Yes No   Sig: ADMINISTER 1 APPLICATION TO BOTH EYES DAILY AT BEDTIME   calcium carbonate (OS-SYLVIA) 1250 (500 Ca) MG tablet  Self Yes No   Sig: Take 1 tablet by mouth daily   clopidogrel (PLAVIX) 75 mg tablet   No No   Sig: TAKE 1 TABLET BY MOUTH DAILY   methocarbamol (ROBAXIN) 500 mg tablet   No No   Sig: Take 1 tablet (500 mg total) by mouth 3 (three) times a day   omeprazole (PriLOSEC) 20 mg delayed release capsule   No No   Sig: Take 1 capsule (20 mg total) by mouth daily for 90 days      Facility-Administered Medications: None       Past Medical History:   Diagnosis Date    Abnormal blood sugar     Abnormal TSH     Back pain     Bariatric surgery status     Bilateral anterior knee pain     Chronic pain disorder     back    CPAP (continuous positive airway pressure) dependence     DDD (degenerative disc disease), lumbar     GERD (gastroesophageal reflux disease)     Hematuria     History of Helicobacter pylori infection     History of transfusion     Hypertension     Knee pain     Morbid obesity (HCC)     Postgastrectomy malabsorption     Prediabetes     Sleep apnea     Suspicious nevus     Use of cane as ambulatory aid     Vitamin D deficiency        Past Surgical History:   Procedure Laterality Date    KNEE SURGERY Left 07/27/2018    [place] EGD TRANSORAL BIOPSY SINGLE/MULTIPLE N/A 12/13/2017    Procedure: ESOPHAGOGASTRODUODENOSCOPY (EGD), with BIOPSY;  Surgeon: Lamberto Francis MD;  Location: [facility];   Service: Bariatrics    CO LAP GASTRIC BYPASS/ENRICO-EN-Y N/A 5/8/2018    Procedure: BYPASS GASTRIC  ENRICO-EN-Y LAPAROSCOPIC AND INTRAOPERATIVE EGD;  Surgeon: Lamberto Francis MD;  Location: [facility];  Service: Bariatrics       Family History   Problem Relation Age of Onset    Hypertension Mother     Prostate cancer Father         ALSO PROSTATE ENLARGEMENT    Hypertension Maternal Grandmother      I have reviewed and agree with the history as documented  E-Cigarette/Vaping     E-Cigarette/Vaping Substances     Social History     Tobacco Use    Smoking status: Never Smoker    Smokeless tobacco: Never Used   Substance Use Topics    Alcohol use: No    Drug use: No        Review of Systems   Constitutional: Negative for chills and fever  HENT: Negative for sore throat, trouble swallowing and voice change  Respiratory: Negative for cough and shortness of breath  Cardiovascular: Negative for chest pain  Gastrointestinal: Positive for abdominal pain  Negative for diarrhea, nausea and vomiting  Genitourinary: Negative for difficulty urinating  Musculoskeletal: Negative for arthralgias and myalgias  Skin: Negative for wound  Neurological: Negative for dizziness, light-headedness and headaches  All other systems reviewed and are negative  Physical Exam  ED Triage Vitals [03/28/21 1812]   Temperature Pulse Respirations Blood Pressure SpO2   98 1 °F (36 7 °C) 76 18 160/81 97 %      Temp Source Heart Rate Source Patient Position - Orthostatic VS BP Location FiO2 (%)   Oral Monitor Sitting Right arm --      Pain Score       --             Orthostatic Vital Signs  Vitals:    03/28/21 1812 03/28/21 2018   BP: 160/81 140/90   Pulse: 76 86   Patient Position - Orthostatic VS: Sitting Sitting       Physical Exam  Vitals signs and nursing note reviewed  Constitutional:       General: He is not in acute distress  Appearance: He is well-developed  He is not diaphoretic  HENT:      Head: Normocephalic and atraumatic  Comments: No oropharyngeal burns or other lesions  No significant secretions  Eyes:      General: No scleral icterus  Conjunctiva/sclera: Conjunctivae normal    Neck:      Musculoskeletal: Neck supple  Vascular: No JVD        Trachea: No tracheal deviation  Cardiovascular:      Rate and Rhythm: Normal rate and regular rhythm  Pulmonary:      Effort: Pulmonary effort is normal  No respiratory distress  Breath sounds: Normal breath sounds  Abdominal:      Palpations: Abdomen is soft  Tenderness: There is no abdominal tenderness  There is no guarding  Musculoskeletal:         General: No deformity  Skin:     General: Skin is warm and dry  Findings: No rash  Neurological:      Mental Status: He is alert        Comments: Moves all extremities   Psychiatric:         Behavior: Behavior normal          ED Medications  Medications - No data to display    Diagnostic Studies  Results Reviewed     Procedure Component Value Units Date/Time    Basic metabolic panel [[id number]] Collected: 03/28/21 1857    Lab Status: Final result Specimen: Blood from Arm, Right Updated: 03/28/21 1918     Sodium 144 mmol/L      Potassium 4 1 mmol/L      Chloride 105 mmol/L      CO2 32 mmol/L      ANION GAP 7 mmol/L      BUN 16 mg/dL      Creatinine 0 99 mg/dL      Glucose 91 mg/dL      Calcium 8 8 mg/dL      eGFR 89 ml/min/1 73sq m     Narrative:      [place] guidelines for Chronic Kidney Disease (CKD):     Stage 1 with normal or high GFR (GFR > 90 mL/min/1 73 square meters)    Stage 2 Mild CKD (GFR = 60-89 mL/min/1 73 square meters)    Stage 3A Moderate CKD (GFR = 45-59 mL/min/1 73 square meters)    Stage 3B Moderate CKD (GFR = 30-44 mL/min/1 73 square meters)    Stage 4 Severe CKD (GFR = 15-29 mL/min/1 73 square meters)    Stage 5 End Stage CKD (GFR <15 mL/min/1 73 square meters)  Note: GFR calculation is accurate only with a steady state creatinine    CBC and differential [[id number]] Collected: 03/28/21 1857    Lab Status: Final result Specimen: Blood from Arm, Right Updated: 03/28/21 1902     WBC 7 40 Thousand/uL      RBC 4 91 Million/uL      Hemoglobin 15 1 g/dL      Hematocrit 45 3 %      MCV 92 fL      MCH 30 8 pg      MCHC 33 3 g/dL      RDW 13 0 %      MPV 10 2 fL      Platelets 899 Thousands/uL      nRBC 0 /100 WBCs      Neutrophils Relative 54 %      Immat GRANS % 0 %      Lymphocytes Relative 36 %      Monocytes Relative 7 %      Eosinophils Relative 3 %      Basophils Relative 0 %      Neutrophils Absolute 3 93 Thousands/µL      Immature Grans Absolute 0 02 Thousand/uL      Lymphocytes Absolute 2 69 Thousands/µL      Monocytes Absolute 0 54 Thousand/µL      Eosinophils Absolute 0 20 Thousand/µL      Basophils Absolute 0 02 Thousands/µL                  No orders to display         Procedures  Procedures      ED Course  ED Course as of Mar 28 2038   Layla Mitchell Mar 28, 2021   1921 Procedure Note: EKG  Date/Time: 03/28/21 7:22 PM   Interpreted by: Joseph Sheikh DO  Indications / Diagnosis: burning in chest  ECG reviewed by me, the ED Provider: yes   The EKG demonstrates:  Rhythm: sinus, rate 67  Intervals: normal intervals  Axis: normal axis  QRS: normal QRS  ST Changes: No acute ST Changes, no STD/SANDY                                               MDM  Number of Diagnoses or Management Options  Diagnosis management comments: Spoke with  on-call, Dr Alcon Perez, who recommends CBC, BMP, admission for observation and endoscopy  Pt HD stable with no airway compromise, tenderness to exam, pain out of proportion to exam, or any other associated symptoms          Amount and/or Complexity of Data Reviewed  Clinical lab tests: ordered and reviewed  Tests in the medicine section of CPT®: ordered and reviewed  Review and summarize past medical records: yes  Discuss the patient with other providers: yes    Risk of Complications, Morbidity, and/or Mortality  Presenting problems: moderate  Diagnostic procedures: low  Management options: low    Patient Progress  Patient progress: stable      Disposition  Final diagnoses:   Epigastric pain   Accidental ingestion of toxic substance, initial encounter   Burning sensation of throat     Time reflects when diagnosis was documented in both MDM as applicable and the Disposition within this note     Time User Action Codes Description Comment    3/28/2021  6:47 PM Aleena Flow Add [R10 13] Epigastric pain     3/28/2021  6:47 PM [place], [address] Accidental ingestion of toxic substance, initial encounter     3/28/2021  6:47 PM Xavi Plaster [R10 13] Epigastric pain     3/28/2021  6:47 PM Aleena Flow Modify [T65 91XA] Accidental ingestion of toxic substance, initial encounter     3/28/2021  6:47 PM Aleena Flow Add [R07 0] Burning sensation of throat       ED Disposition     ED Disposition Condition Date/Time Comment    Admit Stable Sun Mar 28, 2021  7:45 PM Case was discussed with Dr Sabas Javier and the patient's admission status was agreed to be Admission Status: observation status to the service of Dr Sabas Javier  Follow-up Information    None         Patient's Medications   Discharge Prescriptions    No medications on file     No discharge procedures on file  PDMP Review     None           ED Provider  Attending physically available and evaluated Nima Burton  I managed the patient along with the ED Attending      Electronically Signed by         Alejandro Garcia DO  03/28/21 2038

## 2021-03-28 NOTE — ED ATTENDING ATTESTATION
3/28/2021  I, Pamella River DO, saw and evaluated the patient  I have discussed the patient with the resident/non-physician practitioner and agree with the resident's/non-physician practitioner's findings, Plan of Care, and MDM as documented in the resident's/non-physician practitioner's note, except where noted  All available labs and Radiology studies were reviewed  I was present for key portions of any procedure(s) performed by the resident/non-physician practitioner and I was immediately available to provide assistance  At this point I agree with the current assessment done in the Emergency Department  I have conducted an independent evaluation of this patient a history and physical is as follows:    52 yo M presenting with esophageal/stomach burning sensation/pain after unknown ingestion  About 1 hour prior to arrival, pt took a "gulp" from a cup thinking it was water, however immediately noted that it tasted abnormal like a cleaning product  He does not know what it possibly could have been  Unable to list products in th house    H/o bariatric surgery    MDM: 52 yo M with esophageal/stomach pain after possible ingestion- discussed with tox who recommended labs and admitting for observation and possible scope tomorrow    ED Course         Critical Care Time  Procedures

## 2021-03-28 NOTE — CONSULTS
PHONE [facility] 1979 Toxicology  Senia Alston 53 y o  male MRN: [id number]  Unit/Bed#: ED 32 Encounter: [id number]      Reason for Consult / Principal Problem: Caustic ingestion    Inpatient consult to Toxicology  Consult performed by: Eben Hummel MD  Consult ordered by: Carmelita Marie DO        03/28/21      ASSESSMENT:  1) Caustic ingestion    RECOMMENDATIONS:  The patient presented after ingestion of unknown caustic substance  Vitals signs stable, labs WNL  However, given ongoing symptoms (throat, chest, abdominal pain) and unknown substance would recommend GI consultation in the morning for endoscopy to assess for high grade esophageal/ gastric injury  Treatment remains otherwise supportive  If patient develops severe symptoms or becomes unstable, would then recommend immediate CT with IV contrast to assess for perforation, as well as immediate surgical consultation  For further questions, please call [facility]  Service or Patient Access Center to reach the medical  on call  Hx and PE limited by the dynamics of a phone consultation  I have not personally interviewed or evaluated the patient, but only advised based on the information provided to me  Primary provider is responsible for all clinical decisions  Pertinent history, physical exam and clinical findings and course discussed: Senia Alston is a 53y o  year old male who presents after caustic ingestion  Patient ingested small amount of unknown clear liquid this evening with subsequent onset of burning throat, chest, and abdominal pain which has persisted  VSS, no reported abd tenderness/ peritoneal signs on exam     Review of systems and physical exam not performed by me      Historical Information   Past Medical History:   Diagnosis Date    Abnormal blood sugar     Abnormal TSH     Back pain     Bariatric surgery status     Bilateral anterior knee pain     Chronic pain disorder     back    CPAP (continuous positive airway pressure) dependence     DDD (degenerative disc disease), lumbar     GERD (gastroesophageal reflux disease)     Hematuria     History of Helicobacter pylori infection     History of transfusion     Hypertension     Knee pain     Morbid obesity (HCC)     Postgastrectomy malabsorption     Prediabetes     Sleep apnea     Suspicious nevus     Use of cane as ambulatory aid     Vitamin D deficiency      Past Surgical History:   Procedure Laterality Date    KNEE SURGERY Left 07/27/2018    [place] EGD TRANSORAL BIOPSY SINGLE/MULTIPLE N/A 12/13/2017    Procedure: ESOPHAGOGASTRODUODENOSCOPY (EGD), with BIOPSY;  Surgeon: Tahmina Peña MD;  Location: [facility]; Service: Bariatrics    [place] LAP GASTRIC BYPASS/ENRICO-EN-Y N/A 5/8/2018    Procedure: BYPASS GASTRIC  ENRICO-EN-Y LAPAROSCOPIC AND INTRAOPERATIVE EGD;  Surgeon: Tahmina Peña MD;  [facility];  Service: Bariatrics     Social History   Social History     Substance and Sexual Activity   Alcohol Use No     Social History     Substance and Sexual Activity   Drug Use No     Social History     Tobacco Use   Smoking Status Never Smoker   Smokeless Tobacco Never Used     Family History   Problem Relation Age of Onset    Hypertension Mother     Prostate cancer Father         ALSO PROSTATE ENLARGEMENT    Hypertension Maternal Grandmother         Prior to Admission medications    Medication Sig Start Date End Date Taking? Authorizing Provider   amLODIPine (NORVASC) 5 mg tablet TOME ALOK TABLETA TODOS LOS D? AS 9/4/19   Historical Provider, MD   amLODIPine (NORVASC) 5 mg tablet Take 1 tablet (5 mg total) by mouth daily  Patient not taking: Reported on 5/19/2020 3/19/20   Grabiel King MD   amLODIPine (NORVASC) 5 mg tablet TAKE 1 TABLET BY MOUTH DAILY 2/3/21   Grabiel King MD   artificial tear (LUBRIFRESH P M ) 83-15 % ophthalmic ointment ADMINISTER 1 APPLICATION TO BOTH EYES DAILY AT BEDTIME 4/2/18   Historical Provider, MD calcium carbonate (OS-SYLVIA) 1250 (500 Ca) MG tablet Take 1 tablet by mouth daily    Historical Provider, MD   clopidogrel (PLAVIX) 75 mg tablet TAKE 1 TABLET BY MOUTH DAILY 2/5/21   Eloise Jones MD   methocarbamol (ROBAXIN) 500 mg tablet Take 1 tablet (500 mg total) by mouth 3 (three) times a day 5/19/20   Eloise Jones MD   Multiple Vitamin (MULTIVITAMIN) tablet Take 1 tablet by mouth daily 6/16/20   Eloise Jones MD   omeprazole (PriLOSEC) 20 mg delayed release capsule Take 1 capsule (20 mg total) by mouth daily for 90 days 8/22/19 11/20/19  Van Amaral MD   QUEtiapine (SEROquel) 50 mg tablet TAKE 1 TABLET BY MOUTH AT BEDTIME 9/10/20   Eloise Jones MD       No current facility-administered medications for this encounter  No Known Allergies    Objective     No intake or output data in the 24 hours ending 03/28/21 1954    Invasive Devices:   Peripheral IV 03/28/21 Right Antecubital (Active)   Site Assessment Clean;Dry; Intact 03/28/21 1857   Dressing Type Transparent;Securing device 03/28/21 1857   Line Status Blood return noted; Flushed 03/28/21 1857   Dressing Status Clean;Dry; Intact 03/28/21 1857       Vitals   Vitals:    03/28/21 1812   BP: 160/81   TempSrc: Oral   Pulse: 76   Resp: 18   Patient Position - Orthostatic VS: Sitting   Temp: 98 1 °F (36 7 °C)         Lab Results: I have personally reviewed pertinent reports        Labs:  Results from last 7 days   Lab Units 03/28/21  1857   WBC Thousand/uL 7 40   HEMOGLOBIN g/dL 15 1   HEMATOCRIT % 45 3   PLATELETS Thousands/uL 185   NEUTROS PCT % 54   LYMPHS PCT % 36   MONOS PCT % 7      Results from last 7 days   Lab Units 03/28/21  1857   POTASSIUM mmol/L 4 1   CHLORIDE mmol/L 105   CO2 mmol/L 32   BUN mg/dL 16   CREATININE mg/dL 0 99   CALCIUM mg/dL 8 8              0   Lab Value Date/Time    TROPONINI <0 02 07/24/2019 2325    TROPONINI <0 02 09/29/2018 0120    TROPONINI <0 02 09/21/2018 0442    TROPONINI <0 02 09/21/2018 0116 Invalid input(s): EXTPREGUR    Imaging Studies: I have personally reviewed pertinent reports  Counseling / Coordination of Care  Total time spent today 21 minutes   This was a phone consultation

## 2021-03-29 ENCOUNTER — ANESTHESIA EVENT (INPATIENT)
Dept: GASTROENTEROLOGY | Facility: HOSPITAL | Age: 51
DRG: 243 | End: 2021-03-29
Payer: COMMERCIAL

## 2021-03-29 ENCOUNTER — ANESTHESIA (INPATIENT)
Dept: GASTROENTEROLOGY | Facility: HOSPITAL | Age: 51
DRG: 243 | End: 2021-03-29
Payer: COMMERCIAL

## 2021-03-29 ENCOUNTER — APPOINTMENT (INPATIENT)
Dept: GASTROENTEROLOGY | Facility: HOSPITAL | Age: 51
DRG: 243 | End: 2021-03-29
Payer: COMMERCIAL

## 2021-03-29 VITALS
BODY MASS INDEX: 47.06 KG/M2 | HEART RATE: 70 BPM | WEIGHT: 299.83 LBS | TEMPERATURE: 97.6 F | RESPIRATION RATE: 16 BRPM | OXYGEN SATURATION: 98 % | SYSTOLIC BLOOD PRESSURE: 121 MMHG | DIASTOLIC BLOOD PRESSURE: 72 MMHG | HEIGHT: 67 IN

## 2021-03-29 PROBLEM — I25.10 CARDIOVASCULAR DISEASE: Status: ACTIVE | Noted: 2021-03-29

## 2021-03-29 LAB
ALBUMIN SERPL BCP-MCNC: 2.8 G/DL (ref 3.5–5)
ALP SERPL-CCNC: 92 U/L (ref 46–116)
ALT SERPL W P-5'-P-CCNC: 21 U/L (ref 12–78)
ANION GAP SERPL CALCULATED.3IONS-SCNC: 8 MMOL/L (ref 4–13)
AST SERPL W P-5'-P-CCNC: 27 U/L (ref 5–45)
BASOPHILS # BLD AUTO: 0.04 THOUSANDS/ΜL (ref 0–0.1)
BASOPHILS NFR BLD AUTO: 1 % (ref 0–1)
BILIRUB DIRECT SERPL-MCNC: 0.15 MG/DL (ref 0–0.2)
BILIRUB SERPL-MCNC: 0.6 MG/DL (ref 0.2–1)
BUN SERPL-MCNC: 14 MG/DL (ref 5–25)
CALCIUM SERPL-MCNC: 8.2 MG/DL (ref 8.3–10.1)
CHLORIDE SERPL-SCNC: 106 MMOL/L (ref 100–108)
CO2 SERPL-SCNC: 27 MMOL/L (ref 21–32)
CREAT SERPL-MCNC: 0.75 MG/DL (ref 0.6–1.3)
EOSINOPHIL # BLD AUTO: 0.17 THOUSAND/ΜL (ref 0–0.61)
EOSINOPHIL NFR BLD AUTO: 3 % (ref 0–6)
ERYTHROCYTE [DISTWIDTH] IN BLOOD BY AUTOMATED COUNT: 13 % (ref 11.6–15.1)
FLUAV RNA RESP QL NAA+PROBE: NEGATIVE
FLUBV RNA RESP QL NAA+PROBE: NEGATIVE
GFR SERPL CREATININE-BSD FRML MDRD: 108 ML/MIN/1.73SQ M
GLUCOSE SERPL-MCNC: 87 MG/DL (ref 65–140)
HCT VFR BLD AUTO: 41.2 % (ref 36.5–49.3)
HGB BLD-MCNC: 13.8 G/DL (ref 12–17)
IMM GRANULOCYTES # BLD AUTO: 0.01 THOUSAND/UL (ref 0–0.2)
IMM GRANULOCYTES NFR BLD AUTO: 0 % (ref 0–2)
LYMPHOCYTES # BLD AUTO: 2.92 THOUSANDS/ΜL (ref 0.6–4.47)
LYMPHOCYTES NFR BLD AUTO: 46 % (ref 14–44)
MCH RBC QN AUTO: 30.7 PG (ref 26.8–34.3)
MCHC RBC AUTO-ENTMCNC: 33.5 G/DL (ref 31.4–37.4)
MCV RBC AUTO: 92 FL (ref 82–98)
MONOCYTES # BLD AUTO: 0.41 THOUSAND/ΜL (ref 0.17–1.22)
MONOCYTES NFR BLD AUTO: 7 % (ref 4–12)
NEUTROPHILS # BLD AUTO: 2.71 THOUSANDS/ΜL (ref 1.85–7.62)
NEUTS SEG NFR BLD AUTO: 43 % (ref 43–75)
NRBC BLD AUTO-RTO: 0 /100 WBCS
PLATELET # BLD AUTO: 163 THOUSANDS/UL (ref 149–390)
PMV BLD AUTO: 10.5 FL (ref 8.9–12.7)
POTASSIUM SERPL-SCNC: 3.5 MMOL/L (ref 3.5–5.3)
PROT SERPL-MCNC: 5.8 G/DL (ref 6.4–8.2)
RBC # BLD AUTO: 4.49 MILLION/UL (ref 3.88–5.62)
RSV RNA RESP QL NAA+PROBE: NEGATIVE
SARS-COV-2 RNA RESP QL NAA+PROBE: NEGATIVE
SODIUM SERPL-SCNC: 141 MMOL/L (ref 136–145)
WBC # BLD AUTO: 6.26 THOUSAND/UL (ref 4.31–10.16)

## 2021-03-29 PROCEDURE — 85025 COMPLETE CBC W/AUTO DIFF WBC: CPT | Performed by: PHYSICIAN ASSISTANT

## 2021-03-29 PROCEDURE — 43235 EGD DIAGNOSTIC BRUSH WASH: CPT | Performed by: INTERNAL MEDICINE

## 2021-03-29 PROCEDURE — 99239 HOSP IP/OBS DSCHRG MGMT >30: CPT | Performed by: INTERNAL MEDICINE

## 2021-03-29 PROCEDURE — 0DJ08ZZ INSPECTION OF UPPER INTESTINAL TRACT, VIA NATURAL OR ARTIFICIAL OPENING ENDOSCOPIC: ICD-10-PCS | Performed by: INTERNAL MEDICINE

## 2021-03-29 PROCEDURE — 80048 BASIC METABOLIC PNL TOTAL CA: CPT | Performed by: PHYSICIAN ASSISTANT

## 2021-03-29 PROCEDURE — 99253 IP/OBS CNSLTJ NEW/EST LOW 45: CPT | Performed by: INTERNAL MEDICINE

## 2021-03-29 PROCEDURE — C9113 INJ PANTOPRAZOLE SODIUM, VIA: HCPCS | Performed by: PHYSICIAN ASSISTANT

## 2021-03-29 PROCEDURE — 80076 HEPATIC FUNCTION PANEL: CPT | Performed by: PHYSICIAN ASSISTANT

## 2021-03-29 RX ORDER — PANTOPRAZOLE SODIUM 40 MG/1
40 TABLET, DELAYED RELEASE ORAL DAILY
Qty: 30 TABLET | Refills: 0 | Status: SHIPPED | OUTPATIENT
Start: 2021-03-29 | End: 2021-04-23

## 2021-03-29 RX ORDER — PROPOFOL 10 MG/ML
INJECTION, EMULSION INTRAVENOUS AS NEEDED
Status: DISCONTINUED | OUTPATIENT
Start: 2021-03-29 | End: 2021-03-29

## 2021-03-29 RX ORDER — SODIUM CHLORIDE 9 MG/ML
125 INJECTION, SOLUTION INTRAVENOUS CONTINUOUS
Status: DISCONTINUED | OUTPATIENT
Start: 2021-03-29 | End: 2021-03-29 | Stop reason: HOSPADM

## 2021-03-29 RX ORDER — LIDOCAINE HYDROCHLORIDE 10 MG/ML
INJECTION, SOLUTION EPIDURAL; INFILTRATION; INTRACAUDAL; PERINEURAL AS NEEDED
Status: DISCONTINUED | OUTPATIENT
Start: 2021-03-29 | End: 2021-03-29

## 2021-03-29 RX ADMIN — AMLODIPINE BESYLATE 5 MG: 5 TABLET ORAL at 08:27

## 2021-03-29 RX ADMIN — CLOPIDOGREL BISULFATE 75 MG: 75 TABLET ORAL at 08:27

## 2021-03-29 RX ADMIN — PROPOFOL 150 MG: 10 INJECTION, EMULSION INTRAVENOUS at 14:25

## 2021-03-29 RX ADMIN — PANTOPRAZOLE SODIUM 40 MG: 40 INJECTION, POWDER, FOR SOLUTION INTRAVENOUS at 08:26

## 2021-03-29 RX ADMIN — PROPOFOL 50 MG: 10 INJECTION, EMULSION INTRAVENOUS at 14:26

## 2021-03-29 RX ADMIN — SODIUM CHLORIDE 125 ML/HR: 0.9 INJECTION, SOLUTION INTRAVENOUS at 13:45

## 2021-03-29 RX ADMIN — LIDOCAINE HYDROCHLORIDE 50 MG: 10 INJECTION, SOLUTION EPIDURAL; INFILTRATION; INTRACAUDAL; PERINEURAL at 14:25

## 2021-03-29 NOTE — ASSESSMENT & PLAN NOTE
Patient was admitted in 2018 and noted to have evidence of a prior infarct on his imaging  Continue Plavix 75 mg daily  Patient is not on a statin  No acute neurologic symptoms  Outpatient follow-up

## 2021-03-29 NOTE — PLAN OF CARE
Problem: Potential for Falls  Goal: Patient will remain free of falls  Description: INTERVENTIONS:  - Assess patient frequently for physical needs  -  Identify cognitive and physical deficits and behaviors that affect risk of falls  -  [place] fall precautions as indicated by assessment   - Educate patient/family on patient safety including physical limitations  - Instruct patient to call for assistance with activity based on assessment  - Modify environment to reduce risk of injury  Outcome: Progressing     Problem: PAIN - ADULT  Goal: Verbalizes/displays adequate comfort level or baseline comfort level  Description: Interventions:  - Encourage patient to monitor pain and request assistance  - Assess pain using appropriate pain scale  - Administer analgesics based on type and severity of pain and evaluate response  - Implement non-pharmacological measures as appropriate and evaluate response  - Consider cultural and social influences on pain and pain management  - Notify physician/advanced practitioner if interventions unsuccessful or patient reports new pain  Outcome: Progressing     Problem: DISCHARGE PLANNING  Goal: Discharge to home or other facility with appropriate resources  Description: INTERVENTIONS:  - Identify barriers to discharge w/patient   - Arrange for needed discharge resources and transportation as appropriate  - Identify discharge learning needs  - Arrange for interpretive services ([facility]) to assist at discharge as needed  - Refer to Case Management Department for coordinating discharge planning if the patient needs post-hospital services based on physician/advanced practitioner order   Outcome: Progressing     Problem: Knowledge Deficit  Goal: Patient/family/caregiver demonstrates understanding of disease process, treatment plan, medications, and discharge instructions  Description: Complete learning assessment and assess knowledge base    Interventions:  - Provide teaching at level of understanding  - Provide teaching via preferred learning methods  Outcome: Progressing     Problem: RESPIRATORY - ADULT  Goal: Achieves optimal ventilation and oxygenation  Description: INTERVENTIONS:  - Assess for changes in respiratory status, mentation and behavior  - Position to facilitate oxygenation and minimize respiratory effort  - Oxygen administered by appropriate delivery if ordered  - Encourage broncho-pulmonary hygiene including cough, deep breathe, Incentive Spirometry  - Encourage use of CPAP when pt is sleeping  - Assess and instruct to report SOB or any respiratory difficulty  - Respiratory Therapy support as indicated  Outcome: Progressing     Problem: Nutrition/Hydration-ADULT  Goal: Nutrient/Hydration intake appropriate for improving, restoring or maintaining nutritional needs  Description:   INTERVENTIONS:  - Assess hydration status and recommend course of action  - Assess need for intravenous fluids  - Provide specific nutrition/hydration education as appropriate  Outcome: Progressing     Problem: SAFETY ADULT  Goal: Maintain or return mobility status to optimal level  Description: INTERVENTIONS:  - Assess patient's baseline mobility status (ambulation, transfers, stairs, etc )    - Identify cognitive and physical deficits and behaviors that affect mobility  - Identify mobility aids required to assist with ambulation (walker, cane, etc )  - [place] fall precautions as indicated by assessment  - Record patient progress and toleration of activity level on Mobility SBAR; progress patient to next Phase/Stage  - Instruct patient to call for assistance with activity based on assessment  Outcome: Progressing

## 2021-03-29 NOTE — DISCHARGE SUMMARY
Dorys 49  Discharge- Brie Layton 1970, 53 y o  male MRN: [id number]  Unit/Bed#: E2 -01 Encounter: [id number]  Primary Care Provider: Tammy Schwartz MD   Date and time admitted to hospital: 3/28/2021  6:18 PM          Admission Date: 3/28/2021       Discharge Date:  03/29/2021    Primary Diagnoses  Principal Problem:    Ingestion of unknown substance  Active Problems: Morbid obesity with BMI of 50 0-59 9, adult (HCC)    KAT (obstructive sleep apnea)    Hypertension    Cerebrovascular disease    Primary insomnia  Resolved Problems:    * No resolved hospital problems  [facility] AND [facility] Course by problem:  * Ingestion of unknown substance  Assessment & Plan  -Patient presented to the ER reporting accidentally taking a sip of an unknown liquid with his amlodipine this morning    -Unknown liquid was in a water bottle, and patient believed it was water until he drank it  No suicidal ideation    -He states the liquid had no odor, was clear, and similar viscosity to water  Immediately upon ingesting, he felt burning in his mouth, esophagus, and stomach  In the ER he was still noting symptoms of of irritation/dryness/soreness    -No lesions visualized in the oropharynx on exam  No abdominal tenderness on palpation   -in the ER patient was evaluated by the Toxicology team who recommended admission and GI evaluation for upper endoscopy  -he was started on a proton pump inhibitor  -patient underwent EGD that was within normal limits  -patient notes his symptoms have resolved  He is currently swallowing without any difficulty  He tolerated a solid dinner without any discomfort  He notes he feels back to his baseline  -patient denies any chest pain or chest discomfort  Denies any back pain  No pleuritic pain    Denies any shortness of breath or dyspnea on exertion  -the mouth/esophagus/chest burning is not likely secondary to anginal equivalent:  EKG without any acute ischemic changes  -patient did have a prior stress test in 2017:  Discussed with patient he will follow up with his primary care provider for outpatient repeat stress test for completeness sake  -continue Plavix 75 mg daily    Primary insomnia  Assessment & Plan  Continue Seroquel 50 mg nightly    Cerebrovascular disease  Assessment & Plan  Patient was admitted in 2018 and noted to have evidence of a prior infarct on his imaging  Continue Plavix 75 mg daily  Patient is not on a statin  No acute neurologic symptoms  Outpatient follow-up    Hypertension  Assessment & Plan  Patient has a history of essential hypertension  Continue home regimen with amlodipine 5 mg daily  Blood pressure adequately controlled  Continue outpatient follow-up    KAT (obstructive sleep apnea)  Assessment & Plan  Continue CPAP at night    Morbid obesity with BMI of 50 0-59 9, adult [facility])  Assessment & Plan  Patient has a history of previous gastric bypass  He notes he has regained weight and wishes to follow-up again with the bariatric surgery team regarding any additional interventions he would be eligible for  Outpatient follow-up  Service:  [facility] Internal Medicine, [facility]      Consulting Providers   GI:  Dr Kat Riuz  Toxicology:  Dr Sushma Larkin    Procedures Performed   3/29 EGD:  Small sliding hiatal hernia  Normal esophagus, jejunum, and gastrojejunal anastomosis   No endoscopic evidence of esophageal caustic injury    Hospital Studies:    Results from last 7 days   Lab Units 03/29/21  0436 03/28/21  1857   WBC Thousand/uL 6 26 7 40   HEMOGLOBIN g/dL 13 8 15 1   HEMATOCRIT % 41 2 45 3   PLATELETS Thousands/uL 163 185     Results from last 7 days   Lab Units 03/29/21  0436 03/28/21  1857   POTASSIUM mmol/L 3 5 4 1   CHLORIDE mmol/L 106 105   CO2 mmol/L 27 32   BUN mg/dL 14 16   CREATININE mg/dL 0 75 0 99   CALCIUM mg/dL 8 2* 8 8       History and Physical Exam:  Please refer to the Admission H&P note    Discharge Condition: Improved  Discharge Disposition: Home/Self Care    Discharge Note and Physical Exam:   Patient is examined and interviewed by me in Korean at the bedside  He notes his previous symptoms have completely resolved  He denies any pain in his mouth  He denies any sore throat  He notes he is swallowing without any difficulties or burning  He has eaten a solid dinner without any difficulties  He denies any burning in his chest or stomach  He denies any abdominal pain or discomfort  He notes he is ambulating and denies any shortness of breath, pleuritic chest pain or back pain  Vitals:    03/29/21 1600   BP: 121/72   Pulse: 70   Resp: 16   Temp: 97 6 °F (36 4 °C)   SpO2: 98%     General:  Pleasant, non-tachypnic, non-dyspnic  Conversant  Heart: Regular rate and rhythm, S1S2 present  No murmur, rub or gallop  Lungs: Clear to auscultation bilaterally, no wheezing, rhonchi, or crackles  Good air movement  No accessory muscle use or respiratory distress  Abdomen: soft, non-tender, non-distended, NABS  No rebound or guarding  No mass or peritoneal signs  Extremities: no clubbing, cyanosis or edema  2+ pedal pulses bilaterally  Neurologic:  Awake and alert  Fluent and goal directed speech  Skin: warm and dry  No petechiae, purpura or rash  Discharge Medications   Please see Medical Reconciliation Discharge Form    Discharge Follow Up Appointments:   Eduin Dumont MD: 1 week for check up and to schedule stress test    Discharge  Statement   Total Time Spent today including physical exam, discussion with patient, and discharge arrangements/care = 40 minutes  Reviewed previous discharge summary from 2018  Reviewed nonischemic stress test from 2017    Discussed with patient's nurse  Discussed with   Discussed with GI Dr Bennett Whatley:  From a GI standpoint patient may be discharged home    EGD normal  Message sent to Dr Viet hSook regarding outpt stress test     This note has been constructed using a voice recognition system

## 2021-03-29 NOTE — ASSESSMENT & PLAN NOTE
-Patient presented to the ER reporting accidentally taking a sip of an unknown liquid with his amlodipine this morning    -Unknown liquid was in a water bottle, and patient believed it was water until he drank it  No suicidal ideation    -He states the liquid had no odor, was clear, and similar viscosity to water  Immediately upon ingesting, he felt burning in his mouth, esophagus, and stomach  In the ER he was still noting symptoms of of irritation/dryness/soreness    -No lesions visualized in the oropharynx on exam  No abdominal tenderness on palpation   -in the ER patient was evaluated by the Toxicology team who recommended admission and GI evaluation for upper endoscopy  -he was started on a proton pump inhibitor  -patient underwent EGD that was within normal limits  -patient notes his symptoms have resolved  He is currently swallowing without any difficulty  He tolerated a solid dinner without any discomfort  He notes he feels back to his baseline  -patient denies any chest pain or chest discomfort  Denies any back pain  No pleuritic pain    Denies any shortness of breath or dyspnea on exertion  -the mouth/esophagus/chest burning is not likely secondary to anginal equivalent:  EKG without any acute ischemic changes  -patient did have a prior stress test in 2017:  Discussed with patient he will follow up with his primary care provider for outpatient repeat stress test for completeness sake  -continue Plavix 75 mg daily

## 2021-03-29 NOTE — DISCHARGE INSTRUCTIONS
por favor:     janelle tatum Felix Samir Jerry- necessita hilda prueba de esfuerzos en el futuro    Regresa a la percy de emergencia con dolor de tatum pecho, or problemas para respirar o otros problemas

## 2021-03-29 NOTE — UTILIZATION REVIEW
Notification of Inpatient Admission/Inpatient Authorization Request   This is a Notification of Inpatient Admission for [address]  Be advised that this patient was admitted to our facility under Inpatient Status  Contact Peg Hopper at [phone] for additional admission information  [facility] DEPT  DEDICATED [phone]  Patient Name:   Wilmar Lees   YOB: 1970       [address]   [address]:   [address] [facility]  Tax ID: [id number]  NPI: [id number] Attending Provider/NPI:  Phone:  Address: Mahesh Rosales, [address] [place] [address] Road  [phone]  Same as the facility   Place of Service Code: 24 [facility]   Start Date: 3/28/21 1945 Discharge Date & Time: No discharge date for patient encounter  Type of Admission: Inpatient Status Discharge Disposition   (if discharged): Home/Self Care   Patient Diagnoses: Epigastric pain [R10 13]  Abdominal pain [R10 9]  Burning sensation of throat [R07 0]  Accidental ingestion of toxic substance, initial encounter Malachi Reynolds     Orders: Admission Orders (From admission, onward)     Ordered        03/28/21 1945  Inpatient Admission  Once                    Assigned Utilization Review Contact: [address] [place] [address] Utilization Review Department  Phone: [phone]; Fax [phone]  Email: [email or web address]   ATTENTION PAYERS: Please call the assigned Utilization  directly with any questions or concerns ALL voicemails in the department are confidential  Send all requests for admission clinical reviews, approved or denied determinations and any other requests to dedicated fax number belonging to the campus where the patient is receiving treatment

## 2021-03-29 NOTE — ASSESSMENT & PLAN NOTE
Patient has a history of essential hypertension  Continue home regimen with amlodipine 5 mg daily  Blood pressure adequately controlled  Continue outpatient follow-up

## 2021-03-29 NOTE — RESPIRATORY THERAPY NOTE
Pt brought home CPAP unit into hospital, but was missing power cord, offered respiratory unit  Pt stated he does take nights off his CPAP and stated his throat was dry, and did not want to wear CPAP tonight    Asked patient to call nursing, if he wished to use  hospital unit

## 2021-03-29 NOTE — ASSESSMENT & PLAN NOTE
Patient has a history of previous gastric bypass  He notes he has regained weight and wishes to follow-up again with the bariatric surgery team regarding any additional interventions he would be eligible for  Outpatient follow-up

## 2021-03-29 NOTE — CONSULTS
Consultation - [address] [facility]  Rea Escalona 53 y o  male MRN: [id number]  Unit/Bed#: E2 -01 Encounter: [id number]        Inpatient consult to gastroenterology  Consult performed by: Teresa Rodriguez PA-C  Consult ordered by: Dee Awad PA-C          Reason for Consult / Principal Problem: Ingestion of unknown liquid      ASSESSMENT AND PLAN:      1  Ingestion of unknown substance  -patient states that yesterday,  he drank a clear liquid that looked and smelled like water when taking his amlodipine but felt a burning in his throat and upper abdomen immediately after  He says he no longer feels the burning but feels like he has heartburn, which is something he rarely gets  Patient denies any: n/v, hematemesis,  Dysphagia, odynophagia    -continue protonix  -patient on plavix and was given today, but can proceed with diagnostic EGD today as he has been NPO since yesterday  -added hepatic function panel to monitor for any liver injury as substance is still unknown  -will follow-up with patient after results from EGD    ______________________________________________________________________    HPI:  Patient is Yakut speaking; visit done via Yakut-speaking Agnieszka JACOBO  Kristen Pelayo is a 52 y/o male with pmh of HTN, KAT, obesity s/p gastric bypass 2018 who presented at the hospital yesterday for ingestion of unknown substance  Patient states that he ingested a "clear liquid that tasted like clorox" while taking his amlodipine and felt immediate "burnig" in his throat and upper-abdominal area  He denies any suicidal ideation and says that the liquid look and smelled like water  He says this burning only lasted for 2-5 minutes, and says he has not felt any pain since but wanted to come to the hospital to "figure out what happened " He denies any n/v, coughing or spitting up blood after the incident   He says he has not eaten since before the incident, but he has been able to drink water when he takes his pills without any nausea,vomitting, dysphagia, odynophagia  However patient does state that he feels like he has heartburn since the incident happened, and states that he usually only feels heartburn rarely  He also explains that he usually moves his bowels daily without issues and denies bloody or blacks stools  Toxicology following and recommending EGD to determine if any caustic ingestion occurred    Last EGD 12/13/2017 prior to bypass: gastritis         REVIEW OF SYSTEMS:    CONSTITUTIONAL: Denies any fever, chills, rigors, and weight loss  HEENT: No earache or tinnitus  Denies hearing loss or visual disturbances  CARDIOVASCULAR: No chest pain or palpitations  RESPIRATORY: Denies any cough, hemoptysis, shortness of breath or dyspnea on exertion  GASTROINTESTINAL: As noted in the History of Present Illness  GENITOURINARY: No problems with urination  Denies any hematuria or dysuria  NEUROLOGIC: No dizziness or vertigo, denies headaches  MUSCULOSKELETAL: Denies any muscle or joint pain  SKIN: Denies skin rashes or itching  ENDOCRINE: Denies excessive thirst  Denies intolerance to heat or cold  PSYCHOSOCIAL: Denies depression or anxiety  Denies any recent memory loss         Historical Information   Past Medical History:   Diagnosis Date    Abnormal blood sugar     Abnormal TSH     Back pain     Bariatric surgery status     Bilateral anterior knee pain     Chronic pain disorder     back    CPAP (continuous positive airway pressure) dependence     DDD (degenerative disc disease), lumbar     GERD (gastroesophageal reflux disease)     Hematuria     History of Helicobacter pylori infection     History of transfusion     Hypertension     Knee pain     Morbid obesity (HCC)     Postgastrectomy malabsorption     Prediabetes     Sleep apnea     Suspicious nevus     Use of cane as ambulatory aid     Vitamin D deficiency      Past Surgical History:   Procedure Laterality Date    KNEE SURGERY Left 07/27/2018    NH EGD TRANSORAL BIOPSY SINGLE/MULTIPLE N/A 12/13/2017    Procedure: ESOPHAGOGASTRODUODENOSCOPY (EGD), with BIOPSY;  Surgeon: Reina Blood MD;  Location: [facility];   Service: Bariatrics    NH LAP GASTRIC BYPASS/ENRICO-EN-Y N/A 5/8/2018    Procedure: BYPASS GASTRIC  ENRICO-EN-Y LAPAROSCOPIC AND INTRAOPERATIVE EGD;  Surgeon: Reina Blood MD;  Location: [facility];  Service: Bariatrics     Social History   Social History     Substance and Sexual Activity   Alcohol Use Never    Frequency: Never     Social History     Substance and Sexual Activity   Drug Use No     Social History     Tobacco Use   Smoking Status Never Smoker   Smokeless Tobacco Never Used     Family History   Problem Relation Age of Onset    Hypertension Mother     Prostate cancer Father         ALSO PROSTATE ENLARGEMENT    Hypertension Maternal Grandmother        Meds/Allergies     Medications Prior to Admission   Medication    amLODIPine (NORVASC) 5 mg tablet    artificial tear (LUBRIFRESH P M ) 83-15 % ophthalmic ointment    clopidogrel (PLAVIX) 75 mg tablet    Multiple Vitamin (MULTIVITAMIN) tablet    QUEtiapine (SEROquel) 50 mg tablet    amLODIPine (NORVASC) 5 mg tablet    amLODIPine (NORVASC) 5 mg tablet    calcium carbonate (OS-SYLVIA) 1250 (500 Ca) MG tablet    methocarbamol (ROBAXIN) 500 mg tablet    omeprazole (PriLOSEC) 20 mg delayed release capsule     Current Facility-Administered Medications   Medication Dose Route Frequency    acetaminophen (TYLENOL) tablet 650 mg  650 mg Oral Q6H PRN    aluminum-magnesium hydroxide-simethicone (MYLANTA) oral suspension 30 mL  30 mL Oral Q6H PRN    amLODIPine (NORVASC) tablet 5 mg  5 mg Oral Daily    clopidogrel (PLAVIX) tablet 75 mg  75 mg Oral Daily    enoxaparin (LOVENOX) subcutaneous injection 40 mg  40 mg Subcutaneous Daily    ondansetron (ZOFRAN) injection 4 mg  4 mg Intravenous Q6H PRN    pantoprazole (PROTONIX) injection 40 mg  40 mg Intravenous Q24H [facility]    QUEtiapine (SEROquel) tablet 50 mg  50 mg Oral HS    sodium chloride 0 9 % infusion  75 mL/hr Intravenous Continuous       No Known Allergies        Objective     Blood pressure 140/90, pulse 55, temperature (!) 96 5 °F (35 8 °C), temperature source Oral, resp  rate 16, height 5' 7" (1 702 m), weight 136 kg (299 lb 13 2 oz), SpO2 98 %  Body mass index is 46 96 kg/m²  Intake/Output Summary (Last 24 hours) at 3/29/2021 1013  Last data filed at 3/29/2021 0600  Gross per 24 hour   Intake 526 25 ml   Output --   Net 526 25 ml         PHYSICAL EXAM:      General Appearance:   Alert, cooperative, no distress   HEENT:   Normocephalic, atraumatic, anicteric      Neck:  Supple, symmetrical, trachea midline   Lungs:   Clear to auscultation bilaterally; no rales, rhonchi or wheezing; respirations unlabored    Heart[de-identified]   Regular rate and rhythm; no murmur, rub, or gallop     Abdomen:   Soft, non-tender, non-distended; normal bowel sounds; no masses, no organomegaly    Genitalia:   Deferred    Rectal:   Deferred    Extremities:  No cyanosis, clubbing or edema    Pulses:  2+ and symmetric all extremities    Skin:  No jaundice, rashes, or lesions    Lymph nodes:  No palpable cervical lymphadenopathy        Lab Results:   Admission on 03/28/2021   Component Date Value    WBC 03/28/2021 7 40     RBC 03/28/2021 4 91     Hemoglobin 03/28/2021 15 1     Hematocrit 03/28/2021 45 3     MCV 03/28/2021 92     MCH 03/28/2021 30 8     MCHC 03/28/2021 33 3     RDW 03/28/2021 13 0     MPV 03/28/2021 10 2     Platelets 55/07/4780 185     nRBC 03/28/2021 0     Neutrophils Relative 03/28/2021 54     Immat GRANS % 03/28/2021 0     Lymphocytes Relative 03/28/2021 36     Monocytes Relative 03/28/2021 7     Eosinophils Relative 03/28/2021 3     Basophils Relative 03/28/2021 0     Neutrophils Absolute 03/28/2021 3 93     Immature Grans Absolute 03/28/2021 0 02     Lymphocytes Absolute 03/28/2021 2 69     Monocytes Absolute 03/28/2021 0 54     Eosinophils Absolute 03/28/2021 0 20     Basophils Absolute 03/28/2021 0 02     Sodium 03/28/2021 144     Potassium 03/28/2021 4 1     Chloride 03/28/2021 105     CO2 03/28/2021 32     ANION GAP 03/28/2021 7     BUN 03/28/2021 16     Creatinine 03/28/2021 0 99     Glucose 03/28/2021 91     Calcium 03/28/2021 8 8     eGFR 03/28/2021 89     SARS-CoV-2 03/28/2021 Negative     INFLUENZA A PCR 03/28/2021 Negative     INFLUENZA B PCR 03/28/2021 Negative     RSV PCR 03/28/2021 Negative     Ventricular Rate 03/28/2021 67     Atrial Rate 03/28/2021 67     VA Interval 03/28/2021 162     QRSD Interval 03/28/2021 92     QT Interval 03/28/2021 382     QTC Interval 03/28/2021 403     P Axis 03/28/2021 83     QRS Axis 03/28/2021 79     T Wave Axis 03/28/2021 59     Sodium 03/29/2021 141     Potassium 03/29/2021 3 5     Chloride 03/29/2021 106     CO2 03/29/2021 27     ANION GAP 03/29/2021 8     BUN 03/29/2021 14     Creatinine 03/29/2021 0 75     Glucose 03/29/2021 87     Calcium 03/29/2021 8 2*    eGFR 03/29/2021 108     WBC 03/29/2021 6 26     RBC 03/29/2021 4 49     Hemoglobin 03/29/2021 13 8     Hematocrit 03/29/2021 41 2     MCV 03/29/2021 92     MCH 03/29/2021 30 7     MCHC 03/29/2021 33 5     RDW 03/29/2021 13 0     MPV 03/29/2021 10 5     Platelets 78/02/1274 163     nRBC 03/29/2021 0     Neutrophils Relative 03/29/2021 43     Immat GRANS % 03/29/2021 0     Lymphocytes Relative 03/29/2021 46*    Monocytes Relative 03/29/2021 7     Eosinophils Relative 03/29/2021 3     Basophils Relative 03/29/2021 1     Neutrophils Absolute 03/29/2021 2 71     Immature Grans Absolute 03/29/2021 0 01     Lymphocytes Absolute 03/29/2021 2 92     Monocytes Absolute 03/29/2021 0 41     Eosinophils Absolute 03/29/2021 0 17     Basophils Absolute 03/29/2021 0 04        Imaging Studies: I have personally reviewed pertinent imaging studies

## 2021-03-29 NOTE — PROGRESS NOTES
[employer]  [id number] used to do admission process with patient  RN instructed patient that he can have sips of water until midnight and then nothing to eat or drink after midnight due to possible procedure  Reviewed plan of care and answered patient's questions  Patient brought CPAP from home  Notified physician and order obtained for Covid 19 swab  Respiratory therapist notified

## 2021-03-29 NOTE — ASSESSMENT & PLAN NOTE
Reports accidentally taking a sip of an unknown liquid with his amlodipine this morning  Unknown liquid was in a water bottle, and patient believed it was water until he drank it  No suicidal ideation  He states the liquid had no odor, was clear, and similar viscosity to water  Immediately upon ingesting, he felt burning in his mouth, esophagus, and stomach  Currently, still complaining of irritation/dryness/soreness  No lesions visualized in the oropharynx on exam  No abdominal tenderness on palpation  Labs are unremarkable  No anion gap      Plan:   - Given patient is stable and substance ingested is unknown, no treatment at this time   - IV Protonix 40mg daily   - Toxicology following - recommend EGD in the a m for prognostication of caustic ingestion   - GI consulted for EGD   - NPO

## 2021-03-29 NOTE — H&P
Tal [address] 1970, 53 y o  male MRN: [id number]  Unit/Bed#: E2 -01 Encounter: [id number]  Primary Care Provider: Breanna Miranda MD   Date and time admitted to hospital: 3/28/2021  6:18 PM    * Ingestion of unknown substance  Assessment & Plan  Reports accidentally taking a sip of an unknown liquid with his amlodipine this morning  Unknown liquid was in a water bottle, and patient believed it was water until he drank it  No suicidal ideation  He states the liquid had no odor, was clear, and similar viscosity to water  Immediately upon ingesting, he felt burning in his mouth, esophagus, and stomach  Currently, still complaining of irritation/dryness/soreness  No lesions visualized in the oropharynx on exam  No abdominal tenderness on palpation  Labs are unremarkable  No anion gap  Plan:   - Given patient is stable and substance ingested is unknown, no treatment at this time   - IV Protonix 40mg daily   - Toxicology following - recommend EGD in the a m for prognostication of caustic ingestion   - GI consulted for EGD   - NPO    Primary insomnia  Assessment & Plan  Continue Seroquel 50 mg nightly    Hypertension  Assessment & Plan  Current /70  Continue amlodipine 5mg daily    KAT (obstructive sleep apnea)  Assessment & Plan  CPAP at night    Morbid obesity with BMI of 50 0-59 9, adult (HCC)  Assessment & Plan  S/p gastric bypass  Could benefit from further weight loss    VTE Prophylaxis: Enoxaparin (Lovenox)  / sequential compression device   Code Status: Full code  Discussion with family:  Discussed patient    Anticipated Length of Stay:  Patient will be admitted on an Inpatient basis with an anticipated length of stay of  greater that 2 midnights  Justification for Hospital Stay:  Ingestion of unknown caustic agent    Total Time for Visit, including Counseling / Coordination of Care: 30 minutes    Greater than 50% of this total time spent on direct patient counseling and coordination of care  Chief Complaint:   Ingestion of unknown caustic agent    History of Present Illness:    Fátima Rivera is a 53 y o  male with PMH HTN, KAT, insomnia, morbid obesity s/p gastric bypass who presents after ingesting an unknown caustic agent  He states there was a clear liquid in a water bottle at home which he use to swallow his amlodipine this morning  He states the liquid had no odor, was clear, had similar consistency/viscosity as water  Immediately upon ingesting, he fell as mouth, throat, stomach burning  Burning lasted several hours and now feels irritated/dry  He states he only ingested 1 small sip  In the ED, lab work is unremarkable  There is no anion gap  Toxicology was consulted and recommend EGD in the morning for prognostication  He denies fevers, chills, chest pain, shortness of breath, nausea, vomiting, diarrhea, constipation, hematochezia, melena, lightheadedness  Review of Systems:    Review of Systems   Constitutional: Negative for appetite change, chills, fatigue and fever  HENT: Positive for sore throat  Negative for ear pain and trouble swallowing  Eyes: Negative for visual disturbance  Respiratory: Negative for cough, chest tightness, shortness of breath and wheezing  Cardiovascular: Negative for chest pain, palpitations and leg swelling  Gastrointestinal: Positive for abdominal pain  Negative for abdominal distention, diarrhea, nausea and vomiting  Endocrine: Negative  Genitourinary: Negative for dysuria  Musculoskeletal: Negative for gait problem and myalgias  Skin: Negative for pallor  Allergic/Immunologic: Negative for immunocompromised state  Neurological: Negative for dizziness, syncope, light-headedness, numbness and headaches         Past Medical and Surgical History:     Past Medical History:   Diagnosis Date    Abnormal blood sugar     Abnormal TSH     Back pain     Bariatric surgery status     Bilateral anterior knee pain     Chronic pain disorder     back    CPAP (continuous positive airway pressure) dependence     DDD (degenerative disc disease), lumbar     GERD (gastroesophageal reflux disease)     Hematuria     History of Helicobacter pylori infection     History of transfusion     Hypertension     Knee pain     Morbid obesity (HCC)     Postgastrectomy malabsorption     Prediabetes     Sleep apnea     Suspicious nevus     Use of cane as ambulatory aid     Vitamin D deficiency        Past Surgical History:   Procedure Laterality Date    KNEE SURGERY Left 07/27/2018    [place] EGD TRANSORAL BIOPSY SINGLE/MULTIPLE N/A 12/13/2017    Procedure: ESOPHAGOGASTRODUODENOSCOPY (EGD), with BIOPSY;  Surgeon: Layton Espinoza MD;  Location: [facility]; Service: Bariatrics    WY LAP GASTRIC BYPASS/ENRICO-EN-Y N/A 5/8/2018    Procedure: BYPASS GASTRIC  ENRICO-EN-Y LAPAROSCOPIC AND INTRAOPERATIVE EGD;  Surgeon: Layton Espinoza MD;  Location: [facility];  Service: Bariatrics       Meds/Allergies:    Prior to Admission medications    Medication Sig Start Date End Date Taking? Authorizing Provider   amLODIPine (NORVASC) 5 mg tablet TOME ALOK TABLETA TODOS LOS D? AS 9/4/19   Historical Provider, MD   amLODIPine (NORVASC) 5 mg tablet Take 1 tablet (5 mg total) by mouth daily  Patient not taking: Reported on 5/19/2020 3/19/20   Duglas Baugh MD   amLODIPine (NORVASC) 5 mg tablet TAKE 1 TABLET BY MOUTH DAILY 2/3/21   Duglas Baugh MD   artificial tear (LUBRIFRESH P M ) 83-15 % ophthalmic ointment ADMINISTER 1 APPLICATION TO BOTH EYES DAILY AT BEDTIME 4/2/18   Historical Provider, MD   calcium carbonate (OS-SYLVIA) 1250 (500 Ca) MG tablet Take 1 tablet by mouth daily    Historical Provider, MD   clopidogrel (PLAVIX) 75 mg tablet TAKE 1 TABLET BY MOUTH DAILY 2/5/21   Duglas Baugh MD   methocarbamol (ROBAXIN) 500 mg tablet Take 1 tablet (500 mg total) by mouth 3 (three) times a day 5/19/20   Duglas Baugh MD Multiple Vitamin (MULTIVITAMIN) tablet Take 1 tablet by mouth daily 6/16/20   Thompson Persaud MD   omeprazole (PriLOSEC) 20 mg delayed release capsule Take 1 capsule (20 mg total) by mouth daily for 90 days 8/22/19 11/20/19  Dontae Escoto MD   QUEtiapine (SEROquel) 50 mg tablet TAKE 1 TABLET BY MOUTH AT BEDTIME 9/10/20   Thompson Persaud MD     I have reviewed home medications with patient personally  Allergies: No Known Allergies    Social History:     Marital Status: Single   Occupation:  Noncontributory  Patient Pre-hospital Living Situation:  Home  Patient Pre-hospital Level of Mobility:  Independent  Patient Pre-hospital Diet Restrictions:  None  Substance Use History:   Social History     Substance and Sexual Activity   Alcohol Use No     Social History     Tobacco Use   Smoking Status Never Smoker   Smokeless Tobacco Never Used     Social History     Substance and Sexual Activity   Drug Use No       Family History:    non-contributory    Physical Exam:     Vitals:   Blood Pressure: 140/90 (03/28/21 2018)  Pulse: 86 (03/28/21 2018)  Temperature: 98 1 °F (36 7 °C) (03/28/21 1812)  Temp Source: Oral (03/28/21 1812)  Respirations: 18 (03/28/21 2018)  Weight - Scale: 136 kg (299 lb 13 2 oz) (03/28/21 1810)  SpO2: 98 % (03/28/21 2018)    Physical Exam  Vitals signs and nursing note reviewed  Constitutional:       Appearance: Normal appearance  HENT:      Head: Normocephalic and atraumatic  Mouth/Throat:      Mouth: Mucous membranes are moist       Pharynx: Oropharynx is clear  No oropharyngeal exudate  Eyes:      Extraocular Movements: Extraocular movements intact  Cardiovascular:      Rate and Rhythm: Normal rate and regular rhythm  Pulses: Normal pulses  Heart sounds: Normal heart sounds  No murmur  No friction rub  No gallop  Pulmonary:      Effort: Pulmonary effort is normal  No respiratory distress  Breath sounds: Normal breath sounds  No stridor   No wheezing or rales    Abdominal:      General: Abdomen is flat  Bowel sounds are normal  There is no distension  Palpations: Abdomen is soft  Tenderness: There is no abdominal tenderness  Musculoskeletal:      Right lower leg: No edema  Left lower leg: No edema  Skin:     General: Skin is warm and dry  Neurological:      General: No focal deficit present  Mental Status: He is alert and oriented to person, place, and time  Additional Data:     Lab Results: I have personally reviewed pertinent reports  Results from last 7 days   Lab Units 03/28/21  1857   WBC Thousand/uL 7 40   HEMOGLOBIN g/dL 15 1   HEMATOCRIT % 45 3   PLATELETS Thousands/uL 185   NEUTROS PCT % 54   LYMPHS PCT % 36   MONOS PCT % 7   EOS PCT % 3     Results from last 7 days   Lab Units 03/28/21  1857   SODIUM mmol/L 144   POTASSIUM mmol/L 4 1   CHLORIDE mmol/L 105   CO2 mmol/L 32   BUN mg/dL 16   CREATININE mg/dL 0 99   ANION GAP mmol/L 7   CALCIUM mg/dL 8 8   GLUCOSE RANDOM mg/dL 91                       Imaging: I have personally reviewed pertinent reports  No orders to display       EKG, Pathology, and Other Studies Reviewed on Admission:     [facility] Records Reviewed: Yes     ** Please Note: This note has been constructed using a voice recognition system   **

## 2021-03-29 NOTE — UTILIZATION REVIEW
Initial Clinical Review    Admission: Date/Time/Statement:   Admission Orders (From admission, onward)     Ordered        03/28/21 1945  Inpatient Admission  Once                   Orders Placed This Encounter   Procedures    Inpatient Admission     Standing Status:   Standing     Number of Occurrences:   1     Order Specific Question:   Level of Care     Answer:   Med Surg [16]     Order Specific Question:   Estimated length of stay     Answer:   More than 2 Midnights     Order Specific Question:   Certification     Answer:   I certify that inpatient services are medically necessary for this patient for a duration of greater than two midnights  See H&P and MD Progress Notes for additional information about the patient's course of treatment  ED Arrival Information     Expected Arrival Acuity Means of Arrival Escorted By Service Admission Type    - 3/28/2021 18:06 Urgent Walk-In Self Hospitalist Urgent    Arrival Complaint    abdominal pain        Chief Complaint   Patient presents with    Abdominal Pain     patient states about 1 hour ago patient drank a clear substance thinking that it was water but states, "i dont know what it was but it tasted like clorox"  now c/o burning in stomach  denies n/v  patient states he just arrived [facility] [place] yesterday     Assessment/Plan:    53 Y O male presents to ED from home  After ingesting an  Unknown caustic agent, clear liquid that he  Thought was water to take his meds with the am of admission  Immediately  On ingesting,  He felt  His mouth, throat and stomach burning, lasted several hours, feels  Dry, irritated  States he only  Ingested 1  sm sip  Labs in ED  Unremarkable  No anion gap  EGD recommended  By toxicology  PMH  Is  KAT, HTN and insomnia  Ingestion  Purely  Accidental   Admit  Ip with  Ingestion of unknown substance and plan is  GI consult, NPO, monitor labs, IV protonix and continue home meds        GI consult  ( 3/29)     States  Now feels  Pain similar to heart burn, something he rarely  Gets  Proceeding with EGD  Wait  Hepatic panel for any liver injury   As substance is still unknown         3/29  EGD - no evidence of esophageal injury  Hiatal hernia      ED Triage Vitals   Temperature Pulse Respirations Blood Pressure SpO2   03/28/21 1812 03/28/21 1812 03/28/21 1812 03/28/21 1812 03/28/21 1812   98 1 °F (36 7 °C) 76 18 160/81 97 %      Temp Source Heart Rate Source Patient Position - Orthostatic VS BP Location FiO2 (%)   03/28/21 1812 03/28/21 1812 03/28/21 1812 03/28/21 1812 --   Oral Monitor Sitting Right arm       Pain Score       03/29/21 0914       No Pain          Wt Readings from Last 1 Encounters:   03/29/21 136 kg (299 lb 13 2 oz)     Additional Vital Signs:   /29/21 0756  96 5 °F (35 8 °C)Abnormal   55  16  140/90  98 %  --  Lying   03/28/21 2300  97 °F (36 1 °C)Abnormal   58  20  140/73  97 %  None (Room air)  Lying   03/28/21 2018  97 6 °F (36 4 °C)  86  18  140/90  98 %  None (Room air)  Sitting   03/28/21 1832  --  --  --  --  --  None (Room air)  --   03/28/21 1812  98 1 °F (36 7 °C)  76  18  160/81  97 %  None (Room air)  Sitting         Pertinent Labs/Diagnostic Test Results:   Results from last 7 days   Lab Units 03/28/21  2306   SARS-COV-2  Negative     Results from last 7 days   Lab Units 03/29/21  0436 03/28/21  1857   WBC Thousand/uL 6 26 7 40   HEMOGLOBIN g/dL 13 8 15 1   HEMATOCRIT % 41 2 45 3   PLATELETS Thousands/uL 163 185   NEUTROS ABS Thousands/µL 2 71 3 93         Results from last 7 days   Lab Units 03/29/21  0436 03/28/21  1857   SODIUM mmol/L 141 144   POTASSIUM mmol/L 3 5 4 1   CHLORIDE mmol/L 106 105   CO2 mmol/L 27 32   ANION GAP mmol/L 8 7   BUN mg/dL 14 16   CREATININE mg/dL 0 75 0 99   EGFR ml/min/1 73sq m 108 89   CALCIUM mg/dL 8 2* 8 8     Results from last 7 days   Lab Units 03/29/21  0436   AST U/L 27   ALT U/L 21   ALK PHOS U/L 92   TOTAL PROTEIN g/dL 5 8*   ALBUMIN g/dL 2 8*   TOTAL BILIRUBIN mg/dL 0 60   BILIRUBIN DIRECT mg/dL 0 15         Results from last 7 days   Lab Units 03/29/21  0436 03/28/21  1857   GLUCOSE RANDOM mg/dL 87 91                   Results from last 7 days   Lab Units 03/28/21  2306   INFLUENZA A PCR  Negative   INFLUENZA B PCR  Negative   RSV PCR  Negative           Present on Admission:   Primary insomnia   Hypertension   KAT (obstructive sleep apnea)      Admitting Diagnosis: Epigastric pain [R10 13]  Abdominal pain [R10 9]  Burning sensation of throat [R07 0]  Accidental ingestion of toxic substance, initial encounter [T65 91XA]  Age/Sex: 53 y o  male  Admission Orders:  Scheduled Medications:  amLODIPine, 5 mg, Oral, Daily  clopidogrel, 75 mg, Oral, Daily  enoxaparin, 40 mg, Subcutaneous, Daily  pantoprazole, 40 mg, Intravenous, Q24H [facility]  QUEtiapine, 50 mg, Oral, HS      Continuous IV Infusions:     PRN Meds:  acetaminophen, 650 mg, Oral, Q6H PRN  aluminum-magnesium hydroxide-simethicone, 30 mL, Oral, Q6H PRN  ondansetron, 4 mg, Intravenous, Q6H PRN        IP CONSULT TO TOXICOLOGY  IP CONSULT TO GASTROENTEROLOGY     NPO/sips    Network Utilization Review Department  ATTENTION: Please call with any questions or concerns to [phone] and carefully listen to the prompts so that you are directed to the right person  All voicemails are confidential   Fer Massey all requests for admission clinical reviews, approved or denied determinations and any other requests to dedicated fax number below belonging to the campus where the patient is receiving treatment   List of dedicated fax numbers for the Facilities:  [address] DENIALS (Administrative/Medical Necessity) [phone]   [address] (Maternity/NICU/Pediatrics) 261 [facility] [address] [place] [facility] [address] [address] ([facility], [facility]) [address] [place] [address] Sammi Torres [address]81 [phone]   [place] [address] [phone]

## 2021-03-29 NOTE — ANESTHESIA PREPROCEDURE EVALUATION
Review of Systems/Medical History  Patient summary reviewed  Chart reviewed  No history of anesthetic complications     Cardiovascular  Hypertension on > 1 medication,    Pulmonary  Sleep apnea CPAP,        GI/Hepatic    GERD well controlled,        Negative  ROS        Endo/Other  Diabetes (PRE DIABETIC) well controlled type 2 Oral agent,   Obesity  super morbid obesity   GYN       Hematology  Negative hematology ROS      Musculoskeletal  Back pain , lumbar pain,   Arthritis     Neurology  Negative neurology ROS      Psychology   Depression , being treated for depression,   Chronic opioid dependence            Physical Exam    Airway    Mallampati score: III  TM Distance: >3 FB  Neck ROM: full     Dental   No notable dental hx     Cardiovascular  Rhythm: regular, Rate: normal, Cardiovascular exam normal    Pulmonary  Pulmonary exam normal Breath sounds clear to auscultation,     Other Findings        Anesthesia Plan  ASA Score- 3 Emergent    Anesthesia Type- general and IV sedation with anesthesia with ASA Monitors  Additional Monitors:   Airway Plan:           Plan Factors-    Chart reviewed  Existing labs reviewed  Patient summary reviewed  Patient is not a current smoker  Patient instructed to abstain from smoking on day of procedure  Patient did not smoke on day of surgery  Induction- intravenous  Postoperative Plan-     Informed Consent- Anesthetic plan and risks discussed with patient  I personally reviewed this patient with the CRNA  Discussed and agreed on the Anesthesia Plan with the CRNA  Heather Peralta

## 2021-03-30 ENCOUNTER — TRANSITIONAL CARE MANAGEMENT (OUTPATIENT)
Dept: FAMILY MEDICINE CLINIC | Facility: CLINIC | Age: 51
End: 2021-03-30

## 2021-03-30 DIAGNOSIS — I10 ESSENTIAL HYPERTENSION: ICD-10-CM

## 2021-03-30 DIAGNOSIS — F51.01 PRIMARY INSOMNIA: ICD-10-CM

## 2021-03-30 DIAGNOSIS — I63.9 RIGHT-SIDED CEREBROVASCULAR ACCIDENT (CVA) (HCC): ICD-10-CM

## 2021-03-30 DIAGNOSIS — G47.33 OSA (OBSTRUCTIVE SLEEP APNEA): ICD-10-CM

## 2021-03-30 NOTE — UTILIZATION REVIEW
Notification of Discharge  This is a Notification of Discharge from our facility [address]  Please be advised that this patient has been discharge from our facility  Below you will find the admission and discharge date and time including the patients disposition  PRESENTATION DATE: 3/28/2021  6:18 PM  OBS ADMISSION DATE:   IP ADMISSION DATE: 3/28/21 1945   DISCHARGE DATE: 3/29/2021  6:37 PM  DISPOSITION: Home/Self Care Home/Self Care   Admission Orders listed below:  Admission Orders (From admission, onward)     Ordered        03/28/21 1945  Inpatient Admission  Once                   Please contact the UR Department if additional information is required to close this patient's authorization/case  Nellie Dean  [facility] Utilization Review Department  Main: [phone] x carefully listen to the prompts  All voicemails are confidential   [email or web address]  Send all requests for admission clinical reviews, approved or denied determinations and any other requests to dedicated fax number below belonging to the campus where the patient is receiving treatment   List of dedicated fax numbers:  [address] DENIALS (Administrative/Medical Necessity) [phone]   [address] (Maternity/NICU/Pediatrics) [phone]   [facility] [phone]   [facility] [phone]   Mitra Carrillo [phone]   [facility] [address] [facility] [phone]   [facility]  [phone]   [address] [facility]  [address] [place] [address] [address] [facility] [phone]

## 2021-03-31 RX ORDER — AMLODIPINE BESYLATE 5 MG/1
5 TABLET ORAL DAILY
Qty: 90 TABLET | Refills: 1 | Status: SHIPPED | OUTPATIENT
Start: 2021-03-31 | End: 2021-08-24 | Stop reason: SDUPTHER

## 2021-03-31 RX ORDER — QUETIAPINE FUMARATE 50 MG/1
50 TABLET, FILM COATED ORAL
Qty: 90 TABLET | Refills: 1 | Status: SHIPPED | OUTPATIENT
Start: 2021-03-31 | End: 2021-06-21 | Stop reason: SDUPTHER

## 2021-03-31 RX ORDER — CLOPIDOGREL BISULFATE 75 MG/1
75 TABLET ORAL DAILY
Qty: 90 TABLET | Refills: 1 | Status: SHIPPED | OUTPATIENT
Start: 2021-03-31 | End: 2021-10-05 | Stop reason: SDUPTHER

## 2021-04-08 ENCOUNTER — OFFICE VISIT (OUTPATIENT)
Dept: FAMILY MEDICINE CLINIC | Facility: CLINIC | Age: 51
End: 2021-04-08

## 2021-04-08 VITALS
WEIGHT: 294 LBS | HEART RATE: 71 BPM | OXYGEN SATURATION: 96 % | SYSTOLIC BLOOD PRESSURE: 136 MMHG | RESPIRATION RATE: 18 BRPM | DIASTOLIC BLOOD PRESSURE: 92 MMHG | BODY MASS INDEX: 46.05 KG/M2 | TEMPERATURE: 97 F

## 2021-04-08 DIAGNOSIS — E66.01 CLASS 3 SEVERE OBESITY DUE TO EXCESS CALORIES WITHOUT SERIOUS COMORBIDITY WITH BODY MASS INDEX (BMI) OF 40.0 TO 44.9 IN ADULT (HCC): ICD-10-CM

## 2021-04-08 DIAGNOSIS — G89.29 CHRONIC LEFT-SIDED LOW BACK PAIN WITHOUT SCIATICA: ICD-10-CM

## 2021-04-08 DIAGNOSIS — I10 BENIGN ESSENTIAL HYPERTENSION: ICD-10-CM

## 2021-04-08 DIAGNOSIS — E66.01 CLASS 3 SEVERE OBESITY DUE TO EXCESS CALORIES WITH SERIOUS COMORBIDITY AND BODY MASS INDEX (BMI) OF 45.0 TO 49.9 IN ADULT (HCC): Primary | ICD-10-CM

## 2021-04-08 DIAGNOSIS — M54.50 CHRONIC LEFT-SIDED LOW BACK PAIN WITHOUT SCIATICA: ICD-10-CM

## 2021-04-08 PROCEDURE — 3725F SCREEN DEPRESSION PERFORMED: CPT | Performed by: FAMILY MEDICINE

## 2021-04-08 PROCEDURE — 3080F DIAST BP >= 90 MM HG: CPT | Performed by: FAMILY MEDICINE

## 2021-04-08 PROCEDURE — 99214 OFFICE O/P EST MOD 30 MIN: CPT | Performed by: FAMILY MEDICINE

## 2021-04-08 PROCEDURE — 3075F SYST BP GE 130 - 139MM HG: CPT | Performed by: FAMILY MEDICINE

## 2021-04-08 PROCEDURE — 1036F TOBACCO NON-USER: CPT | Performed by: FAMILY MEDICINE

## 2021-04-08 NOTE — ASSESSMENT & PLAN NOTE
BMI Counseling: Body mass index is 46 05 kg/m²  The BMI is above normal  Nutrition recommendations include reducing portion sizes, decreasing overall calorie intake, 3-5 servings of fruits/vegetables daily, reducing fast food intake, consuming healthier snacks and decreasing soda and/or juice intake  Exercise recommendations include moderate aerobic physical activity for 150 minutes/week and strength training exercises  Patient referred to bariatric surgery due to patient being morbidly obese

## 2021-04-08 NOTE — PROGRESS NOTES
Assessment/Plan:    Benign essential hypertension  Ran out of Amlodipine 1 week ago    Class 3 severe obesity due to excess calories without serious comorbidity with body mass index (BMI) of 40 0 to 44 9 in adult ([facility] )  BMI Counseling: Body mass index is 46 05 kg/m²  The BMI is above normal  Nutrition recommendations include reducing portion sizes, decreasing overall calorie intake, 3-5 servings of fruits/vegetables daily, reducing fast food intake, consuming healthier snacks and decreasing soda and/or juice intake  Exercise recommendations include moderate aerobic physical activity for 150 minutes/week and strength training exercises  Patient referred to bariatric surgery due to patient being morbidly obese  Diagnoses and all orders for this visit:    Class 3 severe obesity due to excess calories with serious comorbidity and body mass index (BMI) of 45 0 to 49 9 in adult [facility])  -     Ambulatory referral to Bariatric Surgery; Future    Chronic left-sided low back pain without sciatica  -     Diclofenac Sodium (VOLTAREN) 1 %; Apply 2 g topically 2 (two) times a day as needed (pain)    Benign essential hypertension    Class 3 severe obesity due to excess calories without serious comorbidity with body mass index (BMI) of 40 0 to 44 9 in adult [facility])          Subjective:      Patient ID: Rea Escalona is a 53 y o  male  54 yo morbidly obese male s/p Gastric bypass surgery in 2018, states has been unable to continue loosing weight  He feels upset because he has lost a lot of weight and has extra skin sagging on his arms, abdomen and trunk but is unable to have plastic surgery unless he looses more weight and he states he has hit a plateau and has actually gained some weight back, does not feel he changed his diet   Has not been exercising   Complains of LBP worse with activity      The following portions of the patient's history were reviewed and updated as appropriate:   He  has a past medical history of Abnormal blood sugar, Abnormal TSH, Back pain, Bariatric surgery status, Bilateral anterior knee pain, Chronic pain disorder, CPAP (continuous positive airway pressure) dependence, DDD (degenerative disc disease), lumbar, GERD (gastroesophageal reflux disease), Hematuria, History of Helicobacter pylori infection, History of transfusion, Hypertension, Knee pain, Morbid obesity ([facility] ), Postgastrectomy malabsorption, Prediabetes, Sleep apnea, Suspicious nevus, Use of cane as ambulatory aid, and Vitamin D deficiency  He   Patient Active Problem List    Diagnosis Date Noted    Ingestion of unknown substance 03/28/2021    Primary insomnia 03/28/2019    Gross hematuria 03/28/2019    Cerebrovascular disease 09/22/2018    Dysesthesia of multiple sites 09/21/2018    Bariatric surgery status 08/28/2018    Postsurgical malabsorption 08/28/2018    Muscle pain 08/28/2018    Varicose veins of both lower extremities with inflammation 07/19/2018    Cystitis 07/19/2018    CPAP (continuous positive airway pressure) dependence     Hypertension     Benign essential hypertension 05/04/2018    Sleep apnea 05/04/2018    Class 3 severe obesity due to excess calories without serious comorbidity with body mass index (BMI) of 40 0 to 44 9 in adult [facility]) 04/10/2018    Benign hypertension 04/10/2018    Gastroesophageal reflux disease 04/10/2018    Dry eye 03/29/2018    Morbid obesity with BMI of 50 0-59 9, adult ([facility] ) 03/12/2018    KAT (obstructive sleep apnea) 03/12/2018    Benign essential HTN 03/12/2018    GERD (gastroesophageal reflux disease) 03/12/2018    Prediabetes 05/04/2017     He  has a past surgical history that includes pr egd transoral biopsy single/multiple (N/A, 12/13/2017); pr lap gastric bypass/dulce-en-y (N/A, 5/8/2018); and Knee surgery (Left, 07/27/2018)  His family history includes Hypertension in his maternal grandmother and mother; Prostate cancer in his father    He  reports that he has never smoked  He has never used smokeless tobacco  He reports that he does not drink alcohol or use drugs  Current Outpatient Medications   Medication Sig Dispense Refill    amLODIPine (NORVASC) 5 mg tablet Take 1 tablet (5 mg total) by mouth daily 90 tablet 1    artificial tear (LUBRIFRESH P M ) 83-15 % ophthalmic ointment ADMINISTER 1 APPLICATION TO BOTH EYES DAILY AT BEDTIME  1    calcium carbonate (OS-SYLVIA) 1250 (500 Ca) MG tablet Take 1 tablet by mouth daily      clopidogrel (PLAVIX) 75 mg tablet Take 1 tablet (75 mg total) by mouth daily 90 tablet 1    Multiple Vitamin (MULTIVITAMIN) tablet Take 1 tablet by mouth daily 90 tablet 1    pantoprazole (PROTONIX) 40 mg tablet Take 1 tablet (40 mg total) by mouth daily 30 tablet 0    QUEtiapine (SEROquel) 50 mg tablet Take 1 tablet (50 mg total) by mouth daily at bedtime 90 tablet 1    Diclofenac Sodium (VOLTAREN) 1 % Apply 2 g topically 2 (two) times a day as needed (pain) 350 g 0     No current facility-administered medications for this visit  Current Outpatient Medications on File Prior to Visit   Medication Sig    amLODIPine (NORVASC) 5 mg tablet Take 1 tablet (5 mg total) by mouth daily    artificial tear (LUBRIFRESH P M ) 83-15 % ophthalmic ointment ADMINISTER 1 APPLICATION TO BOTH EYES DAILY AT BEDTIME    calcium carbonate (OS-SYLVIA) 1250 (500 Ca) MG tablet Take 1 tablet by mouth daily    clopidogrel (PLAVIX) 75 mg tablet Take 1 tablet (75 mg total) by mouth daily    Multiple Vitamin (MULTIVITAMIN) tablet Take 1 tablet by mouth daily    pantoprazole (PROTONIX) 40 mg tablet Take 1 tablet (40 mg total) by mouth daily    QUEtiapine (SEROquel) 50 mg tablet Take 1 tablet (50 mg total) by mouth daily at bedtime     No current facility-administered medications on file prior to visit       Review of Systems   Constitutional: Negative for chills and fever  HENT: Negative for ear pain and sore throat  Eyes: Negative for pain and visual disturbance  Respiratory: Negative for cough and shortness of breath  Cardiovascular: Negative for chest pain and palpitations  Gastrointestinal: Negative for abdominal pain and vomiting  Genitourinary: Negative for dysuria and hematuria  Musculoskeletal: Negative for arthralgias and back pain  Skin: Negative for color change and rash  Neurological: Negative for seizures and syncope  All other systems reviewed and are negative  Objective:      /92 (BP Location: Left arm, Patient Position: Sitting, Cuff Size: Adult)   Pulse 71   Temp (!) 97 °F (36 1 °C) (Temporal)   Resp 18   Wt 133 kg (294 lb)   SpO2 96%   BMI 46 05 kg/m²          Physical Exam  Vitals signs and nursing note reviewed  Constitutional:       Appearance: He is well-developed  HENT:      Head: Normocephalic  Right Ear: External ear normal       Left Ear: External ear normal       Nose: Nose normal    Eyes:      Conjunctiva/sclera: Conjunctivae normal       Pupils: Pupils are equal, round, and reactive to light  Neck:      Musculoskeletal: Normal range of motion and neck supple  Thyroid: No thyromegaly  Cardiovascular:      Rate and Rhythm: Normal rate and regular rhythm  Heart sounds: Normal heart sounds  Pulmonary:      Effort: Pulmonary effort is normal       Breath sounds: Normal breath sounds  Abdominal:      Palpations: Abdomen is soft  Tenderness: There is no abdominal tenderness  There is no guarding or rebound  Musculoskeletal: Normal range of motion  Skin:     General: Skin is dry  Neurological:      Mental Status: He is alert and oriented to person, place, and time  Deep Tendon Reflexes: Reflexes are normal and symmetric

## 2021-04-22 DIAGNOSIS — K21.9 GASTROESOPHAGEAL REFLUX DISEASE WITHOUT ESOPHAGITIS: ICD-10-CM

## 2021-04-23 RX ORDER — PANTOPRAZOLE SODIUM 40 MG/1
TABLET, DELAYED RELEASE ORAL
Qty: 30 TABLET | Refills: 0 | Status: SHIPPED | OUTPATIENT
Start: 2021-04-23 | End: 2021-05-17

## 2021-05-16 DIAGNOSIS — K21.9 GASTROESOPHAGEAL REFLUX DISEASE WITHOUT ESOPHAGITIS: ICD-10-CM

## 2021-05-17 RX ORDER — PANTOPRAZOLE SODIUM 40 MG/1
TABLET, DELAYED RELEASE ORAL
Qty: 30 TABLET | Refills: 0 | Status: SHIPPED | OUTPATIENT
Start: 2021-05-17 | End: 2021-08-24 | Stop reason: SDUPTHER

## 2021-06-21 DIAGNOSIS — I10 ESSENTIAL HYPERTENSION: ICD-10-CM

## 2021-06-21 DIAGNOSIS — F51.01 PRIMARY INSOMNIA: ICD-10-CM

## 2021-06-21 DIAGNOSIS — G47.33 OSA (OBSTRUCTIVE SLEEP APNEA): ICD-10-CM

## 2021-06-21 RX ORDER — QUETIAPINE FUMARATE 50 MG/1
50 TABLET, FILM COATED ORAL
Qty: 30 TABLET | Refills: 0 | Status: SHIPPED | OUTPATIENT
Start: 2021-06-21 | End: 2021-08-24 | Stop reason: SDUPTHER

## 2021-06-21 NOTE — TELEPHONE ENCOUNTER
Pt left a voicemail on the nurse line complaining about   the office for bariatric surgery nobody called at this time  I called back and I provided the central scheduling number

## 2021-07-13 ENCOUNTER — OFFICE VISIT (OUTPATIENT)
Dept: SLEEP CENTER | Facility: CLINIC | Age: 51
End: 2021-07-13
Payer: COMMERCIAL

## 2021-07-13 VITALS — OXYGEN SATURATION: 95 % | WEIGHT: 293 LBS | HEART RATE: 99 BPM | HEIGHT: 67 IN | BODY MASS INDEX: 45.99 KG/M2

## 2021-07-13 DIAGNOSIS — G47.33 OSA (OBSTRUCTIVE SLEEP APNEA): Primary | ICD-10-CM

## 2021-07-13 PROCEDURE — 1036F TOBACCO NON-USER: CPT | Performed by: PHYSICIAN ASSISTANT

## 2021-07-13 PROCEDURE — 99213 OFFICE O/P EST LOW 20 MIN: CPT | Performed by: PHYSICIAN ASSISTANT

## 2021-07-13 PROCEDURE — 3008F BODY MASS INDEX DOCD: CPT | Performed by: PHYSICIAN ASSISTANT

## 2021-07-13 NOTE — ASSESSMENT & PLAN NOTE
1  Patient has no compliance data today as he has not been wearing his CPAP Machine   2  He reports that he only wears the machine occasionally   3  Will transition from CPAP to BiPAP  4  Will order auto BiPAP study  5  Will follow-up in 2-3 months to review compliance  6  Patient was educated with the use of a  today during our visit on the importance of compliance with his CPAP machine  He was made aware of the risks of heart attack, stroke, cardiac arrest, cardiac arrhythmias and death should he be having untreated sleep apnea  He verbalized understanding  7  Call with questions or concerns  8   Follow-up sooner if needed

## 2021-07-13 NOTE — PROGRESS NOTES
Review of Systems      Genitourinary none   Cardiology none   Gastrointestinal none   Neurology muscle weakness, difficulty with memory and balance problems   Constitutional none   Integumentary none   Psychiatry anxiety   Musculoskeletal back pain and legs twitching/jerking   Pulmonary none   ENT none   Endocrine none   Hematological none

## 2021-07-13 NOTE — PROGRESS NOTES
Progress Note - Sleep Medicine  Marycarmen Jimenes 49 y o  male MRN: [id number]       Impression & Plan:     KAT (obstructive sleep apnea)  1  Patient has no compliance data today as he has not been wearing his CPAP Machine   2  He reports that he only wears the machine occasionally   3  Will transition from CPAP to BiPAP  4  Will order auto BiPAP study  5  Will follow-up in 2-3 months to review compliance  6  Patient was educated with the use of a  today during our visit on the importance of compliance with his CPAP machine  He was made aware of the risks of heart attack, stroke, cardiac arrest, cardiac arrhythmias and death should he be having untreated sleep apnea  He verbalized understanding  7  Call with questions or concerns  8  Follow-up sooner if needed      Problem List Items Addressed This Visit        Respiratory    KAT (obstructive sleep apnea) - Primary     9  Patient has no compliance data today as he has not been wearing his CPAP Machine   10  He reports that he only wears the machine occasionally   11  Will transition from CPAP to BiPAP  12  Will order auto BiPAP study  13  Will follow-up in 2-3 months to review compliance  14  Patient was educated with the use of a  today during our visit on the importance of compliance with his CPAP machine  He was made aware of the risks of heart attack, stroke, cardiac arrest, cardiac arrhythmias and death should he be having untreated sleep apnea  He verbalized understanding  15  Call with questions or concerns  16  Follow-up sooner if needed         Relevant Orders    PAP DME Resupply/Reorder    PAP DME Pressure Change    BIPAP Study            ______________________________________________________________________    HPI:    Marycarmen Jimenes presents today for follow-up of his KAT  Patient unfortunately has not been wearing his CPAP    He does take Seroquel at night and does feel that this helps him fall asleep and stay asleep  He does report that he sleeps for approximately 5-6 hours a night  He still has daytime fatigue and does report that he has snoring  His CPAP machine is broken today upon evaluation and is not turned on  He has not worn in many nights  He had a sleep study back in 2017 which did reveal a CPAP titration study needing 20 cm of water pressure  Patient has been noncompliant and does feel that he is suffocating and being choked by the mask because of the increased pressures  I do feel that this is at the max of the CPAP treatment so I would like to switch him to BiPAP  I believe that he would likely benefit from further KAT treatment with BiPAP therapy and this will help improve his daytime fatigue and help improve his AHI  I have ordered an auto BiPAP today  I would like him to follow-up in 3-4 months for recheck  He is to call with questions or concerns  He was educated on the importance of treating his sleep apnea     [id number]              Review of Systems:  Review of Systems   All other systems reviewed and are negative          Social history updates:  Social History     Tobacco Use   Smoking Status Never Smoker   Smokeless Tobacco Never Used     Social History     Socioeconomic History    Marital status: Single     Spouse name: Not on file    Number of children: Not on file    Years of education: Not on file    Highest education level: Not on file   Occupational History    Not on file   Tobacco Use    Smoking status: Never Smoker    Smokeless tobacco: Never Used   Vaping Use    Vaping Use: Never used   Substance and Sexual Activity    Alcohol use: Never    Drug use: No    Sexual activity: Not on file   Other Topics Concern    Not on file   Social History Narrative    Not on file     Social Determinants of Health     Financial Resource Strain:     Difficulty of Paying Living Expenses:    Food Insecurity:     Worried About Running Out of Food in the Last Year:     Ran Out of Food in the Last Year:    Transportation Needs:     Lack of Transportation (Medical):  Lack of Transportation (Non-Medical):    Physical Activity:     Days of Exercise per Week:     Minutes of Exercise per Session:    Stress:     Feeling of Stress :    Social Connections:     Frequency of Communication with Friends and Family:     Frequency of Social Gatherings with Friends and Family:     Attends Jainism Services:     Active Member of Clubs or Organizations:     Attends Club or Organization Meetings:     Marital Status:    Intimate Partner Violence:     Fear of Current or Ex-Partner:     Emotionally Abused:     Physically Abused:     Sexually Abused:        PhysicalExamination:  Vitals:   Pulse 99   Ht 5' 7" (1 702 m)   Wt 133 kg (293 lb)   SpO2 95%   BMI 45 89 kg/m²     Vital signs reviewed, nurse interviewed, all  as a testing, imaging results, and laboratory tests were personally reviewed  General:  Patient is awake alert oriented x3, he is in no acute distress upon assessment today  HEENT:  Mucous members moist, no erythema, no edema, no exudate  Cardiovascular:  Regular rate and rhythm, S1, single S2, no murmurs, rubs, clicks, gallops  Respiratory:  Breath sounds clear to auscultation bilaterally throughout all lung fields without any wheezes, rales, rhonchi  Abdomen:  Obese, soft, nontender, nondistended, normoactive bowel sounds throughout all 4 quadrants, no rebound, no guarding  Extremities:  No edema, no cyanosis, no clubbing  Neuro:  Nonfocal  :  Deferred    Diagnostic Data:  Labs: I personally reviewed the most recent laboratory data pertinent to today's visit  No visits with results within 2 Month(s) from this visit     Latest known visit with results is:   Admission on 2021, Discharged on 2021   Component Date Value    WBC 2021 7 40     RBC 2021 4 91     Hemoglobin 2021 15 1     Hematocrit 2021 45 3     MCV 2021 [address] [place] [address] 03/28/2021 30 8     MCHC 03/28/2021 33 3     RDW 03/28/2021 13 0     MPV 03/28/2021 10 2     Platelets 95/46/5436 185     nRBC 03/28/2021 0     Neutrophils Relative 03/28/2021 54     Immat GRANS % 03/28/2021 0     Lymphocytes Relative 03/28/2021 36     Monocytes Relative 03/28/2021 7     Eosinophils Relative 03/28/2021 3     Basophils Relative 03/28/2021 0     Neutrophils Absolute 03/28/2021 3 93     Immature Grans Absolute 03/28/2021 0 02     Lymphocytes Absolute 03/28/2021 2 69     Monocytes Absolute 03/28/2021 0 54     Eosinophils Absolute 03/28/2021 0 20     Basophils Absolute 03/28/2021 0 02     Sodium 03/28/2021 144     Potassium 03/28/2021 4 1     Chloride 03/28/2021 105     CO2 03/28/2021 32     ANION GAP 03/28/2021 7     BUN 03/28/2021 16     Creatinine 03/28/2021 0 99     Glucose 03/28/2021 91     Calcium 03/28/2021 8 8     eGFR 03/28/2021 89     SARS-CoV-2 03/28/2021 Negative     INFLUENZA A PCR 03/28/2021 Negative     INFLUENZA B PCR 03/28/2021 Negative     RSV PCR 03/28/2021 Negative     Ventricular Rate 03/28/2021 67     Atrial Rate 03/28/2021 67     MT Interval 03/28/2021 162     QRSD Interval 03/28/2021 92     QT Interval 03/28/2021 382     QTC Interval 03/28/2021 403     P Axis 03/28/2021 83     QRS Axis 03/28/2021 79     T Wave Axis 03/28/2021 59     Sodium 03/29/2021 141     Potassium 03/29/2021 3 5     Chloride 03/29/2021 106     CO2 03/29/2021 27     ANION GAP 03/29/2021 8     BUN 03/29/2021 14     Creatinine 03/29/2021 0 75     Glucose 03/29/2021 87     Calcium 03/29/2021 8 2*    eGFR 03/29/2021 108     WBC 03/29/2021 6 26     RBC 03/29/2021 4 49     Hemoglobin 03/29/2021 13 8     Hematocrit 03/29/2021 41 2     MCV 03/29/2021 92     MCH 03/29/2021 30 7     MCHC 03/29/2021 33 5     RDW 03/29/2021 13 0     MPV 03/29/2021 10 5     Platelets 67/99/5804 163     nRBC 03/29/2021 0     Neutrophils Relative 03/29/2021 43     Immat GRANS % 03/29/2021 0     Lymphocytes Relative 03/29/2021 46*    Monocytes Relative 03/29/2021 7     Eosinophils Relative 03/29/2021 3     Basophils Relative 03/29/2021 1     Neutrophils Absolute 03/29/2021 2 71     Immature Grans Absolute 03/29/2021 0 01     Lymphocytes Absolute 03/29/2021 2 92     Monocytes Absolute 03/29/2021 0 41     Eosinophils Absolute 03/29/2021 0 17     Basophils Absolute 03/29/2021 0 04     Total Bilirubin 03/29/2021 0 60     Bilirubin, Direct 03/29/2021 0 15     Alkaline Phosphatase 03/29/2021 92     AST 03/29/2021 27     ALT 03/29/2021 21     Total Protein 03/29/2021 5 8*    Albumin 03/29/2021 2 8*       I have personally reviewed pertinent lab results  , ABG: No results found for: PHART, ORN3YQU, PO2ART, SZE4OFS, W4QCXEER, BEART, SOURCE, BNP: No results found for: BNP, CBC: No results found for: WBC, HGB, HCT, MCV, PLT, ADJUSTEDWBC, MCH, MCHC, RDW, MPV, NRBC, CMP: No results found for: SODIUM, K, CL, CO2, ANIONGAP, BUN, CREATININE, GLUCOSE, CALCIUM, AST, ALT, ALKPHOS, PROT, BILITOT, EGFR, PT/INR: No results found for: PT, INR, Troponin: No results found for: TROPONINI  Lab Results   Component Value Date    WBC 6 26 03/29/2021    HGB 13 8 03/29/2021    HCT 41 2 03/29/2021    MCV 92 03/29/2021     03/29/2021     Lab Results   Component Value Date    CALCIUM 8 2 (L) 03/29/2021    K 3 5 03/29/2021    CO2 27 03/29/2021     03/29/2021    BUN 14 03/29/2021    CREATININE 0 75 03/29/2021     No results found for: IGE  Lab Results   Component Value Date    ALT 21 03/29/2021    AST 27 03/29/2021    ALKPHOS 92 03/29/2021     Lab Results   Component Value Date    IRON 90 09/21/2018    TIBC 318 09/21/2018    FERRITIN 141 09/21/2018     Lab Results   Component Value Date    WOEAUVJC51 372 09/21/2018     Lab Results   Component Value Date    FOLATE >20 0 (H) 09/21/2018          SLEEP:   Patient slept for 427 3 minutes out of 459 5 minutes in bed representing a sleep efficiency of 93 0%  Sleep architecture showed a sleep latency of 2 7 minutes and a REM sleep latency of 119 0minutes  There was 5 7% Stage N1 noted  There was 74 5% Stage N2 noted  There was 1 4% Stage N3 noted  There was 18 4% Stage REM noted  The patient had 37 arousals for an average of 5 2arousals per hour of sleep  TREATMENT:   Positive airway pressure was initiated at 5 cm H2O pressure and titrated up to 20 cm H2O pressure using the [employer] full face, medium mask interface  OXIMETRY:   The baseline oxygen saturation with the patient awake was 95 9%  The mean oxygen saturation throughout the study was 94 2%  The amount of sleep time below 90% was 15 8 minutes  The lowest oxygen saturation was 62 0%  BODY POSITION:   The patient slept 100 0% of the evening in the supine position, 0 0% on left side, 0 0% on right side, and 0 0% in the prone position  LEG MOVEMENT:   There were 0 PLMs; PLM index of 0 0; 0 PLMs associated with arousal; PLM w/arousal index of 0 0  IMPRESSION: Patient was optimally titrated to CPAP of 20 cm of water  Recommend CPAP 20 cm of water with heated humidification and a ResMed Quatro fullface mask to be used nightly  Patient will follow-up for the review of the sleep study results  Sleep study   PRESSION: This all night polysomnogram demonstrated moderate obstructive sleep apnea  Recommend CPAP titration study  Patient will follow-up for review of the sleep study results       AHI 21  Total Events 151     Compliance Data:  No COmplaince Data to review as patient has not been wearing machine                                          Miguel Ángel Moran PA-C

## 2021-08-05 ENCOUNTER — HOSPITAL ENCOUNTER (OUTPATIENT)
Facility: HOSPITAL | Age: 51
Setting detail: OBSERVATION
Discharge: HOME | End: 2021-08-06
Attending: EMERGENCY MEDICINE | Admitting: INTERNAL MEDICINE
Payer: COMMERCIAL

## 2021-08-05 ENCOUNTER — APPOINTMENT (EMERGENCY)
Dept: RADIOLOGY | Facility: HOSPITAL | Age: 51
End: 2021-08-05
Payer: COMMERCIAL

## 2021-08-05 DIAGNOSIS — R07.9 CHEST PAIN WITH MODERATE RISK FOR CARDIAC ETIOLOGY: ICD-10-CM

## 2021-08-05 DIAGNOSIS — R07.9 CHEST PAIN, RULE OUT ACUTE MYOCARDIAL INFARCTION: Primary | ICD-10-CM

## 2021-08-05 PROBLEM — Z86.73 HISTORY OF CVA (CEREBROVASCULAR ACCIDENT): Chronic | Status: ACTIVE | Noted: 2021-08-05

## 2021-08-05 LAB
ANION GAP SERPL CALCULATED.3IONS-SCNC: 10 MMOL/L (ref 4–13)
ATRIAL RATE: 81 BPM
BASOPHILS # BLD AUTO: 0.03 THOUSANDS/ΜL (ref 0–0.1)
BASOPHILS NFR BLD AUTO: 0 % (ref 0–1)
BUN SERPL-MCNC: 10 MG/DL (ref 5–25)
CALCIUM SERPL-MCNC: 8.1 MG/DL (ref 8.3–10.1)
CHLORIDE SERPL-SCNC: 104 MMOL/L (ref 100–108)
CO2 SERPL-SCNC: 26 MMOL/L (ref 21–32)
CREAT SERPL-MCNC: 0.97 MG/DL (ref 0.6–1.3)
EOSINOPHIL # BLD AUTO: 0.1 THOUSAND/ΜL (ref 0–0.61)
EOSINOPHIL NFR BLD AUTO: 1 % (ref 0–6)
ERYTHROCYTE [DISTWIDTH] IN BLOOD BY AUTOMATED COUNT: 13.2 % (ref 11.6–15.1)
GFR SERPL CREATININE-BSD FRML MDRD: 91 ML/MIN/1.73SQ M
GLUCOSE SERPL-MCNC: 123 MG/DL (ref 65–140)
HCT VFR BLD AUTO: 45 % (ref 36.5–49.3)
HGB BLD-MCNC: 14.7 G/DL (ref 12–17)
IMM GRANULOCYTES # BLD AUTO: 0.02 THOUSAND/UL (ref 0–0.2)
IMM GRANULOCYTES NFR BLD AUTO: 0 % (ref 0–2)
LYMPHOCYTES # BLD AUTO: 1.57 THOUSANDS/ΜL (ref 0.6–4.47)
LYMPHOCYTES NFR BLD AUTO: 18 % (ref 14–44)
MCH RBC QN AUTO: 30.7 PG (ref 26.8–34.3)
MCHC RBC AUTO-ENTMCNC: 32.7 G/DL (ref 31.4–37.4)
MCV RBC AUTO: 94 FL (ref 82–98)
MONOCYTES # BLD AUTO: 0.53 THOUSAND/ΜL (ref 0.17–1.22)
MONOCYTES NFR BLD AUTO: 6 % (ref 4–12)
NEUTROPHILS # BLD AUTO: 6.34 THOUSANDS/ΜL (ref 1.85–7.62)
NEUTS SEG NFR BLD AUTO: 75 % (ref 43–75)
NRBC BLD AUTO-RTO: 0 /100 WBCS
NT-PROBNP SERPL-MCNC: 79 PG/ML
P AXIS: 72 DEGREES
PLATELET # BLD AUTO: 189 THOUSANDS/UL (ref 149–390)
PMV BLD AUTO: 10.7 FL (ref 8.9–12.7)
POTASSIUM SERPL-SCNC: 4.4 MMOL/L (ref 3.5–5.3)
PR INTERVAL: 180 MS
QRS AXIS: 71 DEGREES
QRSD INTERVAL: 90 MS
QT INTERVAL: 348 MS
QTC INTERVAL: 404 MS
RBC # BLD AUTO: 4.79 MILLION/UL (ref 3.88–5.62)
SODIUM SERPL-SCNC: 140 MMOL/L (ref 136–145)
T WAVE AXIS: 50 DEGREES
TROPONIN I SERPL-MCNC: <0.02 NG/ML
TROPONIN I SERPL-MCNC: <0.02 NG/ML
VENTRICULAR RATE: 81 BPM
WBC # BLD AUTO: 8.59 THOUSAND/UL (ref 4.31–10.16)

## 2021-08-05 PROCEDURE — 99284 EMERGENCY DEPT VISIT MOD MDM: CPT | Performed by: EMERGENCY MEDICINE

## 2021-08-05 PROCEDURE — 96374 THER/PROPH/DIAG INJ IV PUSH: CPT

## 2021-08-05 PROCEDURE — 93005 ELECTROCARDIOGRAM TRACING: CPT

## 2021-08-05 PROCEDURE — 99220 PR INITIAL OBSERVATION CARE/DAY 70 MINUTES: CPT | Performed by: NURSE PRACTITIONER

## 2021-08-05 PROCEDURE — 84484 ASSAY OF TROPONIN QUANT: CPT | Performed by: NURSE PRACTITIONER

## 2021-08-05 PROCEDURE — 93010 ELECTROCARDIOGRAM REPORT: CPT | Performed by: INTERNAL MEDICINE

## 2021-08-05 PROCEDURE — 36415 COLL VENOUS BLD VENIPUNCTURE: CPT | Performed by: EMERGENCY MEDICINE

## 2021-08-05 PROCEDURE — 85025 COMPLETE CBC W/AUTO DIFF WBC: CPT | Performed by: EMERGENCY MEDICINE

## 2021-08-05 PROCEDURE — 80048 BASIC METABOLIC PNL TOTAL CA: CPT | Performed by: EMERGENCY MEDICINE

## 2021-08-05 PROCEDURE — 84484 ASSAY OF TROPONIN QUANT: CPT | Performed by: EMERGENCY MEDICINE

## 2021-08-05 PROCEDURE — 83880 ASSAY OF NATRIURETIC PEPTIDE: CPT | Performed by: EMERGENCY MEDICINE

## 2021-08-05 PROCEDURE — 71045 X-RAY EXAM CHEST 1 VIEW: CPT

## 2021-08-05 PROCEDURE — 99285 EMERGENCY DEPT VISIT HI MDM: CPT

## 2021-08-05 RX ORDER — ACETAMINOPHEN 325 MG/1
975 TABLET ORAL EVERY 6 HOURS PRN
Status: DISCONTINUED | OUTPATIENT
Start: 2021-08-05 | End: 2021-08-06 | Stop reason: HOSPADM

## 2021-08-05 RX ORDER — KETOROLAC TROMETHAMINE 30 MG/ML
15 INJECTION, SOLUTION INTRAMUSCULAR; INTRAVENOUS ONCE
Status: COMPLETED | OUTPATIENT
Start: 2021-08-05 | End: 2021-08-05

## 2021-08-05 RX ORDER — ONDANSETRON 2 MG/ML
4 INJECTION INTRAMUSCULAR; INTRAVENOUS EVERY 6 HOURS PRN
Status: DISCONTINUED | OUTPATIENT
Start: 2021-08-05 | End: 2021-08-06 | Stop reason: HOSPADM

## 2021-08-05 RX ORDER — SIMETHICONE 80 MG
80 TABLET,CHEWABLE ORAL 4 TIMES DAILY PRN
Status: DISCONTINUED | OUTPATIENT
Start: 2021-08-05 | End: 2021-08-06 | Stop reason: HOSPADM

## 2021-08-05 RX ORDER — AMLODIPINE BESYLATE 5 MG/1
5 TABLET ORAL DAILY
Status: DISCONTINUED | OUTPATIENT
Start: 2021-08-06 | End: 2021-08-06 | Stop reason: HOSPADM

## 2021-08-05 RX ORDER — MAGNESIUM HYDROXIDE/ALUMINUM HYDROXICE/SIMETHICONE 120; 1200; 1200 MG/30ML; MG/30ML; MG/30ML
30 SUSPENSION ORAL EVERY 6 HOURS PRN
Status: DISCONTINUED | OUTPATIENT
Start: 2021-08-05 | End: 2021-08-06 | Stop reason: HOSPADM

## 2021-08-05 RX ORDER — QUETIAPINE FUMARATE 25 MG/1
50 TABLET, FILM COATED ORAL
Status: DISCONTINUED | OUTPATIENT
Start: 2021-08-05 | End: 2021-08-06 | Stop reason: HOSPADM

## 2021-08-05 RX ORDER — ASPIRIN 81 MG/1
324 TABLET, CHEWABLE ORAL ONCE
Status: COMPLETED | OUTPATIENT
Start: 2021-08-05 | End: 2021-08-05

## 2021-08-05 RX ORDER — CLOPIDOGREL BISULFATE 75 MG/1
75 TABLET ORAL DAILY
Status: DISCONTINUED | OUTPATIENT
Start: 2021-08-06 | End: 2021-08-06 | Stop reason: HOSPADM

## 2021-08-05 RX ADMIN — QUETIAPINE FUMARATE 50 MG: 25 TABLET ORAL at 22:43

## 2021-08-05 RX ADMIN — ASPIRIN 81 MG CHEWABLE TABLET 324 MG: 81 TABLET CHEWABLE at 18:56

## 2021-08-05 RX ADMIN — ACETAMINOPHEN 975 MG: 325 TABLET, FILM COATED ORAL at 22:43

## 2021-08-05 RX ADMIN — KETOROLAC TROMETHAMINE 15 MG: 30 INJECTION, SOLUTION INTRAMUSCULAR; INTRAVENOUS at 18:56

## 2021-08-05 RX ADMIN — NITROGLYCERIN 1 INCH: 20 OINTMENT TOPICAL at 18:58

## 2021-08-05 NOTE — ASSESSMENT & PLAN NOTE
Reports chest pain associated with dizziness, diaphoresis and shortness of breath  MIRLANDE Score=0  Initial EKG nonischemic, NSR 81    Normal serum troponin  CXR: negative for acute cardiopulmonary disease  Normal stress test in 2017   ECHO LVEF 60-65%, no RWMA    PLAN:  · Cycle troponins and EKG  · Will order A1c and lipid panel  · Monitor on telemetry  · Consider cardiology consult

## 2021-08-05 NOTE — LETTER
[address]  Dept: [phone]    August 6, 2021     Patient: Tamar Villa   YOB: 1970   Date of Visit: 8/5/2021       To Whom it May Concern:    Tamar Villa is under my professional care  He was seen in the hospital from 8/5/2021   to 08/06/21  He may return to work on 8/11/21 without limitations  If you have any questions or concerns, please don't hesitate to call           Sincerely,          Milagro Soriano, DO

## 2021-08-05 NOTE — ED PROVIDER NOTES
History  Chief Complaint   Patient presents with    Chest Pain     Patient reports CP that began at approx  1530 todqay  Patient reports that the pain is constant and was strong when it started but has subsided since then  History provided by:  Patient  Chest Pain  Pain location:  L chest and substernal area  Pain quality: aching    Pain radiates to:  Does not radiate  Pain radiates to the back: no    Pain severity:  Moderate  Onset quality:  Gradual  Duration:  3 hours  Timing:  Constant  Progression:  Worsening  Chronicity:  New  Relieved by:  Nothing  Worsened by:  Nothing tried  Associated symptoms: no abdominal pain, no fatigue, no fever, no nausea, no numbness, no palpitations, no shortness of breath, not vomiting and no weakness        Prior to Admission Medications   Prescriptions Last Dose Informant Patient Reported? Taking?    Diclofenac Sodium (VOLTAREN) 1 % More than a month at Unknown time  No No   Sig: Apply 2 g topically 2 (two) times a day as needed (pain)   Multiple Vitamin (MULTIVITAMIN) tablet 8/5/2021 at Unknown time  No Yes   Sig: Take 1 tablet by mouth daily   QUEtiapine (SEROquel) 50 mg tablet 8/4/2021 at Unknown time  No Yes   Sig: Take 1 tablet (50 mg total) by mouth daily at bedtime   amLODIPine (NORVASC) 5 mg tablet 8/5/2021 at Unknown time  No Yes   Sig: Take 1 tablet (5 mg total) by mouth daily   artificial tear (LUBRIFRESH P M ) 83-15 % ophthalmic ointment Unknown at Unknown time Self Yes No   Sig: ADMINISTER 1 APPLICATION TO BOTH EYES DAILY AT BEDTIME   calcium carbonate (OS-SYLVIA) 1250 (500 Ca) MG tablet 8/5/2021 at Unknown time Self Yes Yes   Sig: Take 1 tablet by mouth daily   clopidogrel (PLAVIX) 75 mg tablet 8/5/2021 at Unknown time  No Yes   Sig: Take 1 tablet (75 mg total) by mouth daily   pantoprazole (PROTONIX) 40 mg tablet 8/5/2021 at Unknown time  No Yes   Sig: TAKE 1 TABLET BY MOUTH EVERY DAY      Facility-Administered Medications: None       Past Medical History: Diagnosis Date    Abnormal blood sugar     Abnormal TSH     Back pain     Bariatric surgery status     Bilateral anterior knee pain     Chronic pain disorder     back    CPAP (continuous positive airway pressure) dependence     DDD (degenerative disc disease), lumbar     GERD (gastroesophageal reflux disease)     Hematuria     History of Helicobacter pylori infection     History of transfusion     Hypertension     Knee pain     Morbid obesity (HCC)     Postgastrectomy malabsorption     Prediabetes     Sleep apnea     Suspicious nevus     Use of cane as ambulatory aid     Vitamin D deficiency        Past Surgical History:   Procedure Laterality Date    KNEE SURGERY Left 07/27/2018    PA EGD TRANSORAL BIOPSY SINGLE/MULTIPLE N/A 12/13/2017    Procedure: ESOPHAGOGASTRODUODENOSCOPY (EGD), with BIOPSY;  Surgeon: Penelope Casas MD;  Location: [facility]; Service: Bariatrics    PA LAP GASTRIC BYPASS/ENRICO-EN-Y N/A 5/8/2018    Procedure: BYPASS GASTRIC  ENRICO-EN-Y LAPAROSCOPIC AND INTRAOPERATIVE EGD;  Surgeon: Penelope Casas MD;  Location: [facility];  Service: Bariatrics       Family History   Problem Relation Age of Onset    Hypertension Mother     Prostate cancer Father         ALSO PROSTATE ENLARGEMENT    Hypertension Maternal Grandmother      I have reviewed and agree with the history as documented  E-Cigarette/Vaping    E-Cigarette Use Never User      E-Cigarette/Vaping Substances    Nicotine No     THC No     CBD No     Flavoring No     Other No     Unknown No      Social History     Tobacco Use    Smoking status: Never Smoker    Smokeless tobacco: Never Used   Vaping Use    Vaping Use: Never used   Substance Use Topics    Alcohol use: Never    Drug use: No       Review of Systems   Constitutional: Negative for activity change, appetite change, fatigue and fever  HENT: Negative for congestion, dental problem, ear pain, rhinorrhea and sore throat      Eyes: Negative for pain and redness  Respiratory: Negative for chest tightness, shortness of breath and wheezing  Cardiovascular: Positive for chest pain  Negative for palpitations  Gastrointestinal: Negative for abdominal pain, blood in stool, constipation, diarrhea, nausea and vomiting  Endocrine: Negative for cold intolerance and heat intolerance  Genitourinary: Negative for dysuria, frequency and hematuria  Musculoskeletal: Negative for arthralgias and myalgias  Skin: Negative for color change, pallor and rash  Neurological: Positive for light-headedness  Negative for weakness and numbness  Hematological: Does not bruise/bleed easily  Psychiatric/Behavioral: Negative for agitation, hallucinations and suicidal ideas  Physical Exam  Physical Exam  Constitutional:       Appearance: He is well-developed  HENT:      Head: Normocephalic and atraumatic  Eyes:      General: No scleral icterus  Conjunctiva/sclera: Conjunctivae normal    Neck:      Vascular: No JVD  Trachea: No tracheal deviation  Cardiovascular:      Rate and Rhythm: Normal rate and regular rhythm  Heart sounds: Normal heart sounds  Pulmonary:      Effort: Pulmonary effort is normal  No tachypnea or respiratory distress  Breath sounds: Normal breath sounds  Abdominal:      General: There is no distension  Musculoskeletal:         General: No deformity  Normal range of motion  Cervical back: Normal range of motion  Skin:     Coloration: Skin is not pale  Findings: No erythema or rash  Neurological:      Mental Status: He is alert and oriented to person, place, and time     Psychiatric:         Behavior: Behavior normal          Vital Signs  ED Triage Vitals [08/05/21 1808]   Temperature Pulse Respirations Blood Pressure SpO2   98 °F (36 7 °C) 83 18 163/84 99 %      Temp Source Heart Rate Source Patient Position - Orthostatic VS BP Location FiO2 (%)   Oral Monitor Sitting Right arm --      Pain Score 6           Vitals:    08/05/21 1808 08/05/21 1845   BP: 163/84 126/72   Pulse: 83 74   Patient Position - Orthostatic VS: Sitting          Visual Acuity      ED Medications  Medications   aspirin chewable tablet 324 mg (324 mg Oral Given 8/5/21 1856)   nitroglycerin (NITRO-BID) 2 % TD ointment 1 inch (1 inch Topical Given 8/5/21 1858)   ketorolac (TORADOL) injection 15 mg (15 mg Intravenous Given 8/5/21 1856)       Diagnostic Studies  Results Reviewed     Procedure Component Value Units Date/Time    Basic metabolic panel [[id number]]     Lab Status: No result Specimen: Blood     NT-BNP PRO [[id number]]  (Normal) Collected: 08/05/21 1855    Lab Status: Final result Specimen: Blood from Arm, Right Updated: 08/05/21 1931     NT-proBNP 79 pg/mL     Troponin I [[id number]]  (Normal) Collected: 08/05/21 1855    Lab Status: Final result Specimen: Blood from Arm, Right Updated: 08/05/21 1926     Troponin I <0 02 ng/mL     CBC and differential [[id number]] Collected: 08/05/21 1855    Lab Status: Final result Specimen: Blood from Arm, Right Updated: 08/05/21 1905     WBC 8 59 Thousand/uL      RBC 4 79 Million/uL      Hemoglobin 14 7 g/dL      Hematocrit 45 0 %      MCV 94 fL      MCH 30 7 pg      MCHC 32 7 g/dL      RDW 13 2 %      MPV 10 7 fL      Platelets 770 Thousands/uL      nRBC 0 /100 WBCs      Neutrophils Relative 75 %      Immat GRANS % 0 %      Lymphocytes Relative 18 %      Monocytes Relative 6 %      Eosinophils Relative 1 %      Basophils Relative 0 %      Neutrophils Absolute 6 34 Thousands/µL      Immature Grans Absolute 0 02 Thousand/uL      Lymphocytes Absolute 1 57 Thousands/µL      Monocytes Absolute 0 53 Thousand/µL      Eosinophils Absolute 0 10 Thousand/µL      Basophils Absolute 0 03 Thousands/µL                  XR chest 1 view portable   ED Interpretation by Serge Renee MD (08/05 1937)   Primary reviewed;  No acute abnormality                 Procedures  ECG 12 Lead Documentation Only    Date/Time: 8/5/2021 6:44 PM  Performed by: Rossi Reyes MD  Authorized by: Rossi Reyes MD     ECG reviewed by me, the ED Provider: yes    Patient location:  ED  Rate:     ECG rate:  81    ECG rate assessment: normal    Rhythm:     Rhythm: sinus rhythm    Ectopy:     Ectopy: none    QRS:     QRS axis:  Normal    QRS intervals:  Normal  Conduction:     Conduction: normal    ST segments:     ST segments:  Normal  T waves:     T waves: normal               ED Course             HEART Risk Score      Most Recent Value   Heart Score Risk Calculator   History  2 Filed at: 08/05/2021 1939   ECG  0 Filed at: 08/05/2021 1939   Age  1 Filed at: 08/05/2021 1939   Risk Factors  2 Filed at: 08/05/2021 1939   Troponin  0 Filed at: 08/05/2021 1939   HEART Score  5 Filed at: 08/05/2021 1939                      SBIRT 22yo+      Most Recent Value   SBIRT (23 yo +)   In order to provide better care to our patients, we are screening all of our patients for alcohol and drug use  Would it be okay to ask you these screening questions? Unable to answer at this time Filed at: 08/05/2021 1843                    MDM  Number of Diagnoses or Management Options  Diagnosis management comments: Chest pain with perc score 0-will do cardiac workup, aspirin, nitro, admit  Disposition  Final diagnoses:   None     ED Disposition     ED Disposition Condition Date/Time Comment    Admit Stable Thu Aug 5, 2021  7:44 PM Case was discussed with Dr Gracie Vidal and the patient's admission status was agreed to be Admission Status: observation status to the service of Dr Gracie Vidal   Follow-up Information    None         Patient's Medications   Discharge Prescriptions    No medications on file     No discharge procedures on file      PDMP Review       Value Time User    PDMP Reviewed  Yes 3/29/2021  5:20 PM Jayne Bonilla MD          ED Provider  Electronically Signed by           Rossi Reyes MD  08/05/21 5516

## 2021-08-06 VITALS
RESPIRATION RATE: 18 BRPM | HEART RATE: 66 BPM | OXYGEN SATURATION: 97 % | DIASTOLIC BLOOD PRESSURE: 72 MMHG | WEIGHT: 290.79 LBS | BODY MASS INDEX: 45.64 KG/M2 | HEIGHT: 67 IN | TEMPERATURE: 97.2 F | SYSTOLIC BLOOD PRESSURE: 130 MMHG

## 2021-08-06 LAB
ALBUMIN SERPL BCP-MCNC: 3 G/DL (ref 3.5–5)
ALP SERPL-CCNC: 90 U/L (ref 46–116)
ALT SERPL W P-5'-P-CCNC: 28 U/L (ref 12–78)
ANION GAP SERPL CALCULATED.3IONS-SCNC: 6 MMOL/L (ref 4–13)
AST SERPL W P-5'-P-CCNC: 23 U/L (ref 5–45)
ATRIAL RATE: 59 BPM
BASOPHILS # BLD AUTO: 0.04 THOUSANDS/ΜL (ref 0–0.1)
BASOPHILS NFR BLD AUTO: 1 % (ref 0–1)
BILIRUB SERPL-MCNC: 0.4 MG/DL (ref 0.2–1)
BUN SERPL-MCNC: 10 MG/DL (ref 5–25)
CALCIUM ALBUM COR SERPL-MCNC: 8.7 MG/DL (ref 8.3–10.1)
CALCIUM SERPL-MCNC: 7.9 MG/DL (ref 8.3–10.1)
CHLORIDE SERPL-SCNC: 105 MMOL/L (ref 100–108)
CHOLEST SERPL-MCNC: 97 MG/DL (ref 50–200)
CO2 SERPL-SCNC: 30 MMOL/L (ref 21–32)
CREAT SERPL-MCNC: 0.8 MG/DL (ref 0.6–1.3)
EOSINOPHIL # BLD AUTO: 0.1 THOUSAND/ΜL (ref 0–0.61)
EOSINOPHIL NFR BLD AUTO: 2 % (ref 0–6)
ERYTHROCYTE [DISTWIDTH] IN BLOOD BY AUTOMATED COUNT: 13.4 % (ref 11.6–15.1)
EST. AVERAGE GLUCOSE BLD GHB EST-MCNC: 111 MG/DL
GFR SERPL CREATININE-BSD FRML MDRD: 104 ML/MIN/1.73SQ M
GLUCOSE SERPL-MCNC: 79 MG/DL (ref 65–140)
HBA1C MFR BLD: 5.5 %
HCT VFR BLD AUTO: 43.1 % (ref 36.5–49.3)
HDLC SERPL-MCNC: 51 MG/DL
HGB BLD-MCNC: 14.3 G/DL (ref 12–17)
IMM GRANULOCYTES # BLD AUTO: 0.01 THOUSAND/UL (ref 0–0.2)
IMM GRANULOCYTES NFR BLD AUTO: 0 % (ref 0–2)
LDLC SERPL CALC-MCNC: 34 MG/DL (ref 0–100)
LYMPHOCYTES # BLD AUTO: 2.66 THOUSANDS/ΜL (ref 0.6–4.47)
LYMPHOCYTES NFR BLD AUTO: 41 % (ref 14–44)
MCH RBC QN AUTO: 30.7 PG (ref 26.8–34.3)
MCHC RBC AUTO-ENTMCNC: 33.2 G/DL (ref 31.4–37.4)
MCV RBC AUTO: 93 FL (ref 82–98)
MONOCYTES # BLD AUTO: 0.47 THOUSAND/ΜL (ref 0.17–1.22)
MONOCYTES NFR BLD AUTO: 7 % (ref 4–12)
NEUTROPHILS # BLD AUTO: 3.15 THOUSANDS/ΜL (ref 1.85–7.62)
NEUTS SEG NFR BLD AUTO: 49 % (ref 43–75)
NRBC BLD AUTO-RTO: 0 /100 WBCS
P AXIS: 72 DEGREES
PLATELET # BLD AUTO: 172 THOUSANDS/UL (ref 149–390)
PMV BLD AUTO: 10.8 FL (ref 8.9–12.7)
POTASSIUM SERPL-SCNC: 4.1 MMOL/L (ref 3.5–5.3)
PR INTERVAL: 178 MS
PROT SERPL-MCNC: 5.8 G/DL (ref 6.4–8.2)
QRS AXIS: 57 DEGREES
QRSD INTERVAL: 100 MS
QT INTERVAL: 418 MS
QTC INTERVAL: 413 MS
RBC # BLD AUTO: 4.66 MILLION/UL (ref 3.88–5.62)
SODIUM SERPL-SCNC: 141 MMOL/L (ref 136–145)
T WAVE AXIS: 56 DEGREES
TRIGL SERPL-MCNC: 58 MG/DL
TROPONIN I SERPL-MCNC: <0.02 NG/ML
VENTRICULAR RATE: 59 BPM
WBC # BLD AUTO: 6.43 THOUSAND/UL (ref 4.31–10.16)

## 2021-08-06 PROCEDURE — 80061 LIPID PANEL: CPT | Performed by: NURSE PRACTITIONER

## 2021-08-06 PROCEDURE — 93010 ELECTROCARDIOGRAM REPORT: CPT | Performed by: INTERNAL MEDICINE

## 2021-08-06 PROCEDURE — 84484 ASSAY OF TROPONIN QUANT: CPT | Performed by: NURSE PRACTITIONER

## 2021-08-06 PROCEDURE — 85025 COMPLETE CBC W/AUTO DIFF WBC: CPT | Performed by: NURSE PRACTITIONER

## 2021-08-06 PROCEDURE — 83036 HEMOGLOBIN GLYCOSYLATED A1C: CPT | Performed by: NURSE PRACTITIONER

## 2021-08-06 PROCEDURE — 99217 PR OBSERVATION CARE DISCHARGE MANAGEMENT: CPT | Performed by: INTERNAL MEDICINE

## 2021-08-06 PROCEDURE — 80053 COMPREHEN METABOLIC PANEL: CPT | Performed by: NURSE PRACTITIONER

## 2021-08-06 PROCEDURE — 93005 ELECTROCARDIOGRAM TRACING: CPT

## 2021-08-06 RX ADMIN — CLOPIDOGREL BISULFATE 75 MG: 75 TABLET ORAL at 08:52

## 2021-08-06 RX ADMIN — AMLODIPINE BESYLATE 5 MG: 5 TABLET ORAL at 08:52

## 2021-08-06 RX ADMIN — MULTIPLE VITAMINS W/ MINERALS TAB 1 TABLET: TAB ORAL at 08:52

## 2021-08-06 RX ADMIN — ENOXAPARIN SODIUM 40 MG: 40 INJECTION SUBCUTANEOUS at 08:52

## 2021-08-06 NOTE — PROGRESS NOTES
[address]  Progress Note - Jules Oas 1970, 49 y o  male MRN: [id number]  Unit/Bed#: E4 -01 Encounter: [id number]  Primary Care Provider: Corinne Saenz MD   Date and time admitted to hospital: 2021  6:11 PM    History of CVA (cerebrovascular accident)  Assessment & Plan  ·  History of lacunar infarct; maintained on plavix  Not maintained on statin  Primary insomnia  Assessment & Plan  ·  Continue Seroquel 50mg qhs    Bariatric surgery status  Assessment & Plan  · Morbid obesity s/p Vonnie-en-Y gastric bypass by Dr Bassem Quintanilla May 2018    Benign essential hypertension  Assessment & Plan  · Stable  ·  Continue amlodipine 5mg    Prediabetes  Assessment & Plan  · Last HgA1c 5 7 in Mar 2020,    GERD (gastroesophageal reflux disease)  Assessment & Plan  ·  Continue PPI    KAT (obstructive sleep apnea)  Assessment & Plan  ·  KAT on CPAP    * Chest pain with moderate risk for cardiac etiology  Assessment & Plan  · Chest pain at rest  Not reproducible with exertion  · Currently chest pain free  · Ambulated pt and no recurrent chest pain  · Troponin negative  · ekg wnl  · Will set pt up for outpt stress test        Discharge Plan / Estimated Discharge Date: d/c to home with outpt stress test    Code Status: Level 1 - Full Code      Subjective:   Pt seen and examined  He had chest pain this am while lying in bed  It was mild and felt like a squeezing sensation  Pain did not radiate to arm or jaw  No sob  No chest pain with ambulation or when he walks up stairs  Had pt ambulate the hallway with me and no chest pain on exertion  Objective:     Vitals:   Temp (24hrs), Av 5 °F (36 4 °C), Min:96 9 °F (36 1 °C), Max:98 °F (36 7 °C)    Temp:  [96 9 °F (36 1 °C)-98 °F (36 7 °C)] 96 9 °F (36 1 °C)  HR:  [51-83] 70  Resp:  [18-20] 18  BP: (119-163)/(60-84) 138/81  SpO2:  [96 %-99 %] 99 %  Body mass index is 45 54 kg/m²       Input and Output Summary (last 24 hours):     No intake or output data in the 24 hours ending 08/06/21 1008    Physical Exam:   Physical Exam  Constitutional:       Appearance: Normal appearance  HENT:      Head: Normocephalic and atraumatic  Eyes:      Extraocular Movements: Extraocular movements intact  Pupils: Pupils are equal, round, and reactive to light  Cardiovascular:      Rate and Rhythm: Normal rate and regular rhythm  Heart sounds: No murmur heard  No friction rub  No gallop  Pulmonary:      Effort: Pulmonary effort is normal  No respiratory distress  Breath sounds: Normal breath sounds  No wheezing or rales  Abdominal:      General: Bowel sounds are normal  There is no distension  Palpations: Abdomen is soft  Tenderness: There is no abdominal tenderness  There is no guarding  Neurological:      Mental Status: He is alert and oriented to person, place, and time          Additional Data:     Labs:  Results from last 7 days   Lab Units 08/06/21  0521   WBC Thousand/uL 6 43   HEMOGLOBIN g/dL 14 3   HEMATOCRIT % 43 1   PLATELETS Thousands/uL 172   NEUTROS PCT % 49   LYMPHS PCT % 41   MONOS PCT % 7   EOS PCT % 2     Results from last 7 days   Lab Units 08/06/21  0521   SODIUM mmol/L 141   POTASSIUM mmol/L 4 1   CHLORIDE mmol/L 105   CO2 mmol/L 30   BUN mg/dL 10   CREATININE mg/dL 0 80   ANION GAP mmol/L 6   CALCIUM mg/dL 7 9*   ALBUMIN g/dL 3 0*   TOTAL BILIRUBIN mg/dL 0 40   ALK PHOS U/L 90   ALT U/L 28   AST U/L 23   GLUCOSE RANDOM mg/dL 79                       Recent Cultures (last 7 days):           Lines/Drains:  Invasive Devices     Peripheral Intravenous Line            Peripheral IV 08/05/21 Right Hand <1 day              Last 24 Hours Medication List:   Current Facility-Administered Medications   Medication Dose Route Frequency Provider Last Rate    acetaminophen  975 mg Oral Q6H PRN Ninetta Mean, CRNP      aluminum-magnesium hydroxide-simethicone  30 mL Oral Q6H PRN Ninetta Mean, CRNP      amLODIPine  5 mg Oral Daily Fredo Abbott, CRTAMMI      clopidogrel  75 mg Oral Daily Fredo Abbott, CRNP      enoxaparin  40 mg Subcutaneous Daily Fredo Louisr, [address]      multivitamin-minerals  1 tablet Oral Daily Fredo Abbott, [address]      ondansetron  4 mg Intravenous Q6H PRN Fredo Abbott, HERMELINDA      QUEtiapine  50 mg Oral HS Fredo Abbott, HERMELINDA      simethicone  80 mg Oral 4x Daily PRN HERMELINDA Ventura          Today, Patient Was Seen By: Becki Landry DO

## 2021-08-06 NOTE — PLAN OF CARE
Problem: Potential for Falls  Goal: Patient will remain free of falls  Description: INTERVENTIONS:  - Educate patient/family on patient safety including physical limitations  - Instruct patient to call for assistance with activity   - Consult OT/PT to assist with strengthening/mobility   - Keep Call bell within reach  - Keep bed low and locked with side rails adjusted as appropriate  - Keep care items and personal belongings within reach  - Initiate and maintain comfort rounds  - Make Fall Risk Sign visible to staff  - Offer Toileting every  Hours, in advance of need  - Initiate/Maintain alarm  - Obtain necessary fall risk management equipment:   - Apply yellow socks and bracelet for high fall risk patients  - Consider moving patient to room near nurses station  8/6/2021 1143 by Adolfo Gilman RN  Outcome: Adequate for Discharge  8/6/2021 1043 by Adolfo Gilman RN  Outcome: Progressing  8/6/2021 1041 by Adolfo Gilman RN  Outcome: Progressing     Problem: Nutrition/Hydration-ADULT  Goal: Nutrient/Hydration intake appropriate for improving, restoring or maintaining nutritional needs  Description: Monitor and assess patient's nutrition/hydration status for malnutrition  Collaborate with interdisciplinary team and initiate plan and interventions as ordered  Monitor patient's weight and dietary intake as ordered or per policy  Utilize nutrition screening tool and intervene as necessary  Determine patient's food preferences and provide high-protein, high-caloric foods as appropriate       INTERVENTIONS:  - Monitor oral intake, urinary output, labs, and treatment plans  - Assess nutrition and hydration status and recommend course of action  - Evaluate amount of meals eaten  - Assist patient with eating if necessary   - Allow adequate time for meals  - Recommend/ encourage appropriate diets, oral nutritional supplements, and vitamin/mineral supplements  - Order, calculate, and assess calorie counts as needed  - Recommend, monitor, and adjust tube feedings and TPN/PPN based on assessed needs  - Assess need for intravenous fluids  - Provide specific nutrition/hydration education as appropriate  - Include patient/family/caregiver in decisions related to nutrition  8/6/2021 1143 by Olivia Pantoja RN  Outcome: Adequate for Discharge  8/6/2021 1043 by Olivia Pantoja RN  Outcome: Progressing  8/6/2021 1041 by Olivia Pantoja RN  Outcome: Progressing     Problem: PAIN - ADULT  Goal: Verbalizes/displays adequate comfort level or baseline comfort level  Description: Interventions:  - Encourage patient to monitor pain and request assistance  - Assess pain using appropriate pain scale  - Administer analgesics based on type and severity of pain and evaluate response  - Implement non-pharmacological measures as appropriate and evaluate response  - Consider cultural and social influences on pain and pain management  - Notify physician/advanced practitioner if interventions unsuccessful or patient reports new pain  8/6/2021 1143 by Olivia Pantoja RN  Outcome: Adequate for Discharge  8/6/2021 1043 by Olivia Pantoja RN  Outcome: Progressing  8/6/2021 1041 by Olivia Pantoja RN  Outcome: Progressing     Problem: INFECTION - ADULT  Goal: Absence or prevention of progression during hospitalization  Description: INTERVENTIONS:  - Assess and monitor for signs and symptoms of infection  - Monitor lab/diagnostic results  - Monitor all insertion sites, i e  indwelling lines, tubes, and drains  - Monitor endotracheal if appropriate and nasal secretions for changes in amount and color  - Andes appropriate cooling/warming therapies per order  - Administer medications as ordered  - Instruct and encourage patient and family to use good hand hygiene technique  - Identify and instruct in appropriate isolation precautions for identified infection/condition  8/6/2021 1143 by Elmer Jarrell April Delvis RN  Outcome: Adequate for Discharge  8/6/2021 1043 by Елена Rosas RN  Outcome: Progressing  8/6/2021 1041 by Елена Rosas RN  Outcome: Progressing  Goal: Absence of fever/infection during neutropenic period  Description: INTERVENTIONS:  - Monitor WBC    8/6/2021 1143 by Елена Rosas RN  Outcome: Adequate for Discharge  8/6/2021 1043 by Елена Rosas RN  Outcome: Progressing  8/6/2021 1041 by Елена Rosas RN  Outcome: Progressing     Problem: SAFETY ADULT  Goal: Patient will remain free of falls  Description: INTERVENTIONS:  - Educate patient/family on patient safety including physical limitations  - Instruct patient to call for assistance with activity   - Consult OT/PT to assist with strengthening/mobility   - Keep Call bell within reach  - Keep bed low and locked with side rails adjusted as appropriate  - Keep care items and personal belongings within reach  - Initiate and maintain comfort rounds  - Make Fall Risk Sign visible to staff  - Offer Toileting every  Hours, in advance of need  - Initiate/Maintain alarm  - Obtain necessary fall risk management equipment:   - Apply yellow socks and bracelet for high fall risk patients  - Consider moving patient to room near nurses station  8/6/2021 1143 by Елена Rosas RN  Outcome: Adequate for Discharge  8/6/2021 1043 by Елена Rosas RN  Outcome: Progressing  8/6/2021 1041 by Елена Rosas RN  Outcome: Progressing  Goal: Maintain or return to baseline ADL function  Description: INTERVENTIONS:  -  Assess patient's ability to carry out ADLs; assess patient's baseline for ADL function and identify physical deficits which impact ability to perform ADLs (bathing, care of mouth/teeth, toileting, grooming, dressing, etc )  - Assess/evaluate cause of self-care deficits   - Assess range of motion  - Assess patient's mobility; develop plan if impaired  - Assess patient's need for assistive devices and provide as appropriate  - Encourage maximum independence but intervene and supervise when necessary  - Involve family in performance of ADLs  - Assess for home care needs following discharge   - Consider OT consult to assist with ADL evaluation and planning for discharge  - Provide patient education as appropriate  8/6/2021 1143 by Rose Avendaño RN  Outcome: Adequate for Discharge  8/6/2021 1043 by Rose Avendaño RN  Outcome: Progressing  8/6/2021 1041 by Rose Avendaño RN  Outcome: Progressing  Goal: Maintains/Returns to pre admission functional level  Description: INTERVENTIONS:  - Perform BMAT or MOVE assessment daily    - Set and communicate daily mobility goal to care team and patient/family/caregiver  - Collaborate with rehabilitation services on mobility goals if consulted  - Perform Range of Motion  times a day  - Reposition patient every  hours    - Dangle patient  times a day  - Stand patient  times a day  - Ambulate patient  times a day  - Out of bed to chair  times a day   - Out of bed for meals times a day  - Out of bed for toileting  - Record patient progress and toleration of activity level   8/6/2021 1143 by Rose Avendaño RN  Outcome: Adequate for Discharge  8/6/2021 1043 by Rose Avendaño RN  Outcome: Progressing  8/6/2021 1041 by Rose Avendaño RN  Outcome: Progressing     Problem: DISCHARGE PLANNING  Goal: Discharge to home or other facility with appropriate resources  Description: INTERVENTIONS:  - Identify barriers to discharge w/patient and caregiver  - Arrange for needed discharge resources and transportation as appropriate  - Identify discharge learning needs (meds, wound care, etc )  - Arrange for interpretive services to assist at discharge as needed  - Refer to Case Management Department for coordinating discharge planning if the patient needs post-hospital services based on physician/advanced practitioner order or complex needs related to functional status, cognitive ability, or social support system  8/6/2021 1143 by Ramon Harrison RN  Outcome: Adequate for Discharge  8/6/2021 1043 by Ramon Harrison RN  Outcome: Progressing  8/6/2021 1041 by Ramon Harrison RN  Outcome: Progressing     Problem: Knowledge Deficit  Goal: Patient/family/caregiver demonstrates understanding of disease process, treatment plan, medications, and discharge instructions  Description: Complete learning assessment and assess knowledge base    Interventions:  - Provide teaching at level of understanding  - Provide teaching via preferred learning methods  8/6/2021 1143 by Ramon Harrison RN  Outcome: Adequate for Discharge  8/6/2021 1043 by Ramon Harrison RN  Outcome: Progressing  8/6/2021 1041 by Ramon Harrison RN  Outcome: Progressing     Problem: CARDIOVASCULAR - ADULT  Goal: Maintains optimal cardiac output and hemodynamic stability  Description: INTERVENTIONS:  - Monitor I/O, vital signs and rhythm  - Monitor for S/S and trends of decreased cardiac output  - Administer and titrate ordered vasoactive medications to optimize hemodynamic stability  - Assess quality of pulses, skin color and temperature  - Assess for signs of decreased coronary artery perfusion  - Instruct patient to report change in severity of symptoms  8/6/2021 1143 by Ramon Harrison RN  Outcome: Adequate for Discharge  8/6/2021 1043 by Ramon Harrison RN  Outcome: Progressing  8/6/2021 1041 by Ramon Harrison RN  Outcome: Progressing  Goal: Absence of cardiac dysrhythmias or at baseline rhythm  Description: INTERVENTIONS:  - Continuous cardiac monitoring, vital signs, obtain 12 lead EKG if ordered  - Administer antiarrhythmic and heart rate control medications as ordered  - Monitor electrolytes and administer replacement therapy as ordered  8/6/2021 1143 by Ramon Harrison RN  Outcome: Adequate for Discharge  8/6/2021 1043 by Dwight Dancer, RN  Outcome: Progressing  8/6/2021 1041 by Dwight Dancer, RN  Outcome: Progressing

## 2021-08-06 NOTE — H&P
Xanderharesh 89 1970, 49 y o  male MRN: [id number]  Unit/Bed#: ED 16 Encounter: [id number]  Primary Care Provider: Lorrain Closs, MD   Date and time admitted to hospital: 8/5/2021  6:11 PM    * Chest pain with moderate risk for cardiac etiology  Assessment & Plan  Reports chest pain associated with dizziness, diaphoresis and shortness of breath  MIRLANDE Score=0  Initial EKG nonischemic, NSR 81  Normal serum troponin  CXR: negative for acute cardiopulmonary disease  Normal stress test in 2017   ECHO LVEF 60-65%, no RWMA    PLAN:  · Cycle troponins and EKG  · Will order A1c and lipid panel  · Monitor on telemetry  · Consider cardiology consult    History of CVA (cerebrovascular accident)  Assessment & Plan  ·  History of lacunar infarct; maintained on plavix  Not maintained on statin  Bariatric surgery status  Assessment & Plan  · Morbid obesity s/p Vonnie-en-Y gastric bypass by Dr Cuello Counts May 2018    Benign essential hypertension  Assessment & Plan  ·  Continue amlodipine 5mg    Prediabetes  Assessment & Plan  · Last HgA1c 5 7 in Mar 2020, will update    GERD (gastroesophageal reflux disease)  Assessment & Plan  ·  Continue PPI    KAT (obstructive sleep apnea)  Assessment & Plan  ·  KAT on CPAP    Primary insomnia  Assessment & Plan  ·  Continue Seroquel 50mg qhs    VTE Prophylaxis: Enoxaparin (Lovenox)  / reason for no mechanical VTE prophylaxis ac   Code Status: FC  POLST: POLST is not applicable to this patient  Discussion with family:     Anticipated Length of Stay:  Patient will be admitted on an Observation basis with an anticipated length of stay of  < 2 midnights  Justification for Hospital Stay: CP r/o ACS    Total Time for Visit, including Counseling / Coordination of Care: 60 minutes  Greater than 50% of this total time spent on direct patient counseling and coordination of care      Chief Complaint:    Chest pain    History of Present Illness:    [place] Ace Ahuja is a 49 y o  male with PMH CVA, HTN, morbid obesity s/p gastric bypass, prediabetes who presents with c/o chest pain  Patient reports around 3p he developed chest pain associated with dizziness, shortness of breath and diaphoresis; pain in left chest described as pressure, nonradiating and reproducible on palpation, no aggravating or relieving factors identified, patient reports pain is continuous although decreased with treatment given in ED  Patient received aspirin bolus, NTG paste and Toradol  Denies any chest trauma or heavy lifting  Denies fevers  Review of Systems:    Review of Systems   Constitutional: Positive for diaphoresis  Negative for chills and fever  HENT: Negative  Respiratory: Positive for shortness of breath  Negative for cough, chest tightness and wheezing  Cardiovascular: Positive for chest pain  Negative for palpitations and leg swelling  Gastrointestinal: Positive for nausea  Endocrine: Negative  Genitourinary: Negative  Musculoskeletal: Negative  Skin: Negative  Neurological: Negative  Psychiatric/Behavioral: Negative          Past Medical and Surgical History:     Past Medical History:   Diagnosis Date    Abnormal blood sugar     Abnormal TSH     Back pain     Bariatric surgery status     Bilateral anterior knee pain     Chronic pain disorder     back    CPAP (continuous positive airway pressure) dependence     DDD (degenerative disc disease), lumbar     GERD (gastroesophageal reflux disease)     Hematuria     History of Helicobacter pylori infection     History of transfusion     Hypertension     Knee pain     Morbid obesity (HCC)     Postgastrectomy malabsorption     Prediabetes     Sleep apnea     Suspicious nevus     Use of cane as ambulatory aid     Vitamin D deficiency        Past Surgical History:   Procedure Laterality Date    KNEE SURGERY Left 07/27/2018    [place] EGD TRANSORAL BIOPSY SINGLE/MULTIPLE N/A 12/13/2017 Procedure: ESOPHAGOGASTRODUODENOSCOPY (EGD), with BIOPSY;  Surgeon: Brookie Holstein, MD;  Location: [facility]; Service: Bariatrics    [place] LAP GASTRIC BYPASS/ENRICO-EN-Y N/A 5/8/2018    Procedure: BYPASS GASTRIC  ENRICO-EN-Y LAPAROSCOPIC AND INTRAOPERATIVE EGD;  Surgeon: Brookie Holstein, MD;  Location: [facility];  Service: Bariatrics       Meds/Allergies:    Prior to Admission medications    Medication Sig Start Date End Date Taking? Authorizing Provider   amLODIPine (NORVASC) 5 mg tablet Take 1 tablet (5 mg total) by mouth daily 3/31/21  Yes Marco A Ching MD   calcium carbonate (OS-SYLVIA) 1250 (500 Ca) MG tablet Take 1 tablet by mouth daily   Yes Historical Provider, MD   clopidogrel (PLAVIX) 75 mg tablet Take 1 tablet (75 mg total) by mouth daily 3/31/21  Yes Marco A Ching MD   Multiple Vitamin (MULTIVITAMIN) tablet Take 1 tablet by mouth daily 6/16/20  Yes Marco A Ching MD   pantoprazole (PROTONIX) 40 mg tablet TAKE 1 TABLET BY MOUTH EVERY DAY 5/17/21  Yes Marco A Ching MD   QUEtiapine (SEROquel) 50 mg tablet Take 1 tablet (50 mg total) by mouth daily at bedtime 6/21/21  Yes Breanne Clarity, CRNP   artificial tear (LUBRIFRESH P M ) 83-15 % ophthalmic ointment ADMINISTER 1 APPLICATION TO BOTH EYES DAILY AT BEDTIME 4/2/18   Historical Provider, MD   Diclofenac Sodium (VOLTAREN) 1 % Apply 2 g topically 2 (two) times a day as needed (pain) 4/8/21   Marco A Ching MD     I have reviewed home medications with patient personally      Allergies: No Known Allergies    Social History:     Marital Status: Single   Occupation: works in a [employer]  Patient Pre-hospital Living Situation:  Resides at home with friend  Patient Pre-hospital Level of Mobility:  ambulatory  Patient Pre-hospital Diet Restrictions:   Substance Use History:   Social History     Substance and Sexual Activity   Alcohol Use Never     Social History     Tobacco Use   Smoking Status Never Smoker   Smokeless Tobacco Never Used     Social History Substance and Sexual Activity   Drug Use No       Family History:    Family History   Problem Relation Age of Onset    Hypertension Mother     Prostate cancer Father         ALSO PROSTATE ENLARGEMENT    Hypertension Maternal Grandmother        Physical Exam:     Vitals:   Blood Pressure: 126/72 (08/05/21 1845)  Pulse: 74 (08/05/21 1845)  Temperature: 98 °F (36 7 °C) (08/05/21 1808)  Temp Source: Oral (08/05/21 1808)  Respirations: 20 (08/05/21 1845)  SpO2: 98 % (08/05/21 1845)    Physical Exam  Constitutional:       General: He is not in acute distress  Appearance: Normal appearance  He is obese  He is not ill-appearing, toxic-appearing or diaphoretic  HENT:      Head: Normocephalic and atraumatic  Nose: No congestion or rhinorrhea  Mouth/Throat:      Mouth: Mucous membranes are moist    Eyes:      General: No scleral icterus  Extraocular Movements: Extraocular movements intact  Conjunctiva/sclera: Conjunctivae normal    Cardiovascular:      Rate and Rhythm: Normal rate and regular rhythm  Heart sounds: Normal heart sounds  Comments: Reproducible chest tenderness  Pulmonary:      Effort: Pulmonary effort is normal       Breath sounds: Normal breath sounds  Abdominal:      General: Bowel sounds are normal       Palpations: Abdomen is soft  Musculoskeletal:         General: No tenderness  Cervical back: Neck supple  Right lower leg: No edema  Left lower leg: No edema  Skin:     General: Skin is warm  Coloration: Skin is not pale  Neurological:      Mental Status: He is alert  Psychiatric:         Mood and Affect: Mood normal          Behavior: Behavior normal          Thought Content: Thought content normal          Judgment: Judgment normal       Comments: Pleasant     Additional Data:     Lab Results: I have personally reviewed pertinent reports        Results from last 7 days   Lab Units 08/05/21  1855   WBC Thousand/uL 8 59   HEMOGLOBIN g/dL 14  7   HEMATOCRIT % 45 0   PLATELETS Thousands/uL 189   NEUTROS PCT % 75   LYMPHS PCT % 18   MONOS PCT % 6   EOS PCT % 1                           Imaging: I have personally reviewed pertinent reports  XR chest 1 view portable   ED Interpretation by Autumn Manriquez MD (70/09 1937)   Primary reviewed; No acute abnormality          EKG, Pathology, and Other Studies Reviewed on Admission:   · EKG: echo stress test cxr nl    Allscripts / Epic Records Reviewed: Yes     ** Please Note: This note has been constructed using a voice recognition system   **

## 2021-08-06 NOTE — UTILIZATION REVIEW
Initial Clinical Review    Admission: Date/Time/Statement:   Admission Orders (From admission, onward)     Ordered        08/05/21 1951  Place in Observation  Once                   Orders Placed This Encounter   Procedures    Place in Observation     Standing Status:   Standing     Number of Occurrences:   1     Order Specific Question:   Level of Care     Answer:   Med Surg [16]     ED Arrival Information     Expected Arrival Acuity    - 8/5/2021 18:01 Urgent         Means of arrival Escorted by Service Admission type    Walk-In Self Hospitalist Urgent         Arrival complaint    chest pain        Chief Complaint   Patient presents with    Chest Pain     Patient reports CP that began at approx  1530 todqay  Patient reports that the pain is constant and was strong when it started but has subsided since then  Initial Presentation: 49 Y O male presents to ED from home after developing chest pain, dizziness, diaphoresis and  Shortness of breath the afternoon of admission  Pain left sided, pressure like, nonradiating and reproducible on palpation  Pain did improve  With ED treatment  PMH  Is CVA, HTN, pre diabetes and S/P gastric bypass  EKG  Nonischemic  CXR   NAD  MIRLANDE  0  Admit  Observation with  Chest pain,  Moderate risk for cardiac etiology and plan is tele, monitor labs, trend troponin, cardiology consult and continue home meds            ED Triage Vitals [08/05/21 1808]   Temperature Pulse Respirations Blood Pressure SpO2   98 °F (36 7 °C) 83 18 163/84 99 %      Temp Source Heart Rate Source Patient Position - Orthostatic VS BP Location FiO2 (%)   Oral Monitor Sitting Right arm --      Pain Score       6          Wt Readings from Last 1 Encounters:   08/05/21 132 kg (290 lb 12 6 oz)     Additional Vital Signs:   96 9 °F (36 1 °C)Abnormal   70  18  138/81  --  99 %  None (Room air)  Sitting    08/06/21 0306  97 3 °F (36 3 °C)Abnormal   51Abnormal   19  119/69  87  97 %  --  Lying   08/05/21 2115 --  72  20  119/60  85  97 %  --  --   08/05/21 2030  97 7 °F (36 5 °C)  67  18  134/79  101  96 %  None (Room air)  Sitting   08/05/21 1845  --  74  20  126/72  92  98 %  --  --   08/05/21 1808  98 °F (36 7 °C)  83  18  163/84  --  99 %  None (Room air)  Sitting       Pertinent Labs/Diagnostic Test Results:   CXR  ( 8/6)  NAd  EKG   ( 8/5)    NSR   HR 81   Normal QRS      Results from last 7 days   Lab Units 08/06/21  0521 08/05/21  1855   WBC Thousand/uL 6 43 8 59   HEMOGLOBIN g/dL 14 3 14 7   HEMATOCRIT % 43 1 45 0   PLATELETS Thousands/uL 172 189   NEUTROS ABS Thousands/µL 3 15 6 34         Results from last 7 days   Lab Units 08/06/21  0521 08/05/21  1855   SODIUM mmol/L 141 140   POTASSIUM mmol/L 4 1 4 4   CHLORIDE mmol/L 105 104   CO2 mmol/L 30 26   ANION GAP mmol/L 6 10   BUN mg/dL 10 10   CREATININE mg/dL 0 80 0 97   EGFR ml/min/1 73sq m 104 91   CALCIUM mg/dL 7 9* 8 1*     Results from last 7 days   Lab Units 08/06/21  0521   AST U/L 23   ALT U/L 28   ALK PHOS U/L 90   TOTAL PROTEIN g/dL 5 8*   ALBUMIN g/dL 3 0*   TOTAL BILIRUBIN mg/dL 0 40         Results from last 7 days   Lab Units 08/06/21  0521 08/05/21  1855   GLUCOSE RANDOM mg/dL 79 123               Results from last 7 days   Lab Units 08/06/21  0103 08/05/21  2235 08/05/21  1855   TROPONIN I ng/mL <0 02 <0 02 <0 02           Results from last 7 days   Lab Units 08/05/21  1855   NT-PRO BNP pg/mL 79             ED Treatment:   Medication Administration from 08/05/2021 1801 to 08/05/2021 2151       Date/Time Order Dose Route Action Comments     08/05/2021 1856 aspirin chewable tablet 324 mg 324 mg Oral Given      08/05/2021 1858 nitroglycerin (NITRO-BID) 2 % TD ointment 1 inch 1 inch Topical Given      08/05/2021 1856 ketorolac (TORADOL) injection 15 mg 15 mg Intravenous Given         Present on Admission:   KAT (obstructive sleep apnea)   GERD (gastroesophageal reflux disease)   Benign essential hypertension   Prediabetes   Chest pain with moderate risk for cardiac etiology   Primary insomnia      Admitting Diagnosis: Chest pain [R07 9]  Chest pain, rule out acute myocardial infarction [R07 9]  Age/Sex: 49 y o  male  Admission Orders:  Scheduled Medications:  amLODIPine, 5 mg, Oral, Daily  clopidogrel, 75 mg, Oral, Daily  enoxaparin, 40 mg, Subcutaneous, Daily  multivitamin-minerals, 1 tablet, Oral, Daily  QUEtiapine, 50 mg, Oral, HS      Continuous IV Infusions:     PRN Meds:  acetaminophen, 975 mg, Oral, Q6H PRN  aluminum-magnesium hydroxide-simethicone, 30 mL, Oral, Q6H PRN  ondansetron, 4 mg, Intravenous, Q6H PRN  simethicone, 80 mg, Oral, 4x Daily PRN          Network Utilization Review Department  ATTENTION: Please call with any questions or concerns to [phone] and carefully listen to the prompts so that you are directed to the right person  All voicemails are confidential   Kyle Pelayo all requests for admission clinical reviews, approved or denied determinations and any other requests to dedicated fax number below belonging to the campus where the patient is receiving treatment   List of dedicated fax numbers for the Facilities:  [address] DENIALS (Administrative/Medical Necessity) [phone]   [address] (Maternity/NICU/Pediatrics) [phone]   [address] Sim Ibanez [address]14 [address] [place] [address] [address]i [address] [facility] [address] [phone]

## 2021-08-06 NOTE — ASSESSMENT & PLAN NOTE
· Chest pain at rest  Not reproducible with exertion     · Currently chest pain free  · Ambulated pt and no recurrent chest pain  · Troponin negative  · ekg wnl  · Will set pt up for outpt stress test

## 2021-08-06 NOTE — DISCHARGE SUMMARY
Discharge Summary - [facility] Internal Medicine    Patient Information: Anna Sherman 49 y o  male MRN: [id number]  Unit/Bed#: E4 -01 Encounter: [id number]    Discharging Physician / Practitioner: Chele Lamar DO  PCP: Negro Briones MD  Admission Date: 8/5/2021  Discharge Date: 08/06/21    Disposition:     Home     Reason for Admission: chest pain    Discharge Diagnoses:     Principal Problem:    Chest pain with moderate risk for cardiac etiology  Active Problems:    KAT (obstructive sleep apnea)    GERD (gastroesophageal reflux disease)    Prediabetes    Benign essential hypertension    Bariatric surgery status    Primary insomnia    History of CVA (cerebrovascular accident)  Resolved Problems:    * No resolved hospital problems  *      Consultations During Hospital Stay:  · none    Procedures Performed:     · none    Significant Findings / Test Results:   XR chest 1 view portable  Result Date: 8/6/2021  Impression: No acute cardiopulmonary disease  Incidental Findings:   · none    Test Results Pending at Discharge (will require follow up):   · none     Outpatient Tests Requested:  Stress test    Hospital Course:     Anna Sherman is a 49 y o  male patient who originally presented to the hospital on 8/5/2021 due to chest pain  He had left sided squeezing like pressure while at rest  It was not reproducible with exertion  His work up was negative  He was set up for oupt stress test and discharged to home  Discharge Day Visit / Exam:     * Please refer to separate progress note for these details *    Discharge instructions/Information to patient and family:   See after visit summary for information provided to patient and family  Provisions for Follow-Up Care:  See after visit summary for information related to follow-up care and any pertinent home health orders  Discharge Medications:  See after visit summary for reconciled discharge medications provided to patient and family

## 2021-08-06 NOTE — PLAN OF CARE
Problem: Potential for Falls  Goal: Patient will remain free of falls  Description: INTERVENTIONS:  - Educate patient/family on patient safety including physical limitations  - Instruct patient to call for assistance with activity   - Consult OT/PT to assist with strengthening/mobility   - Keep Call bell within reach  - Keep bed low and locked with side rails adjusted as appropriate  - Keep care items and personal belongings within reach  - Initiate and maintain comfort rounds  - Make Fall Risk Sign visible to staff  - Offer Toileting every  Hours, in advance of need  - Initiate/Maintain alarm  - Obtain necessary fall risk management equipment:   - Apply yellow socks and bracelet for high fall risk patients  - Consider moving patient to room near nurses station  Outcome: Progressing     Problem: Nutrition/Hydration-ADULT  Goal: Nutrient/Hydration intake appropriate for improving, restoring or maintaining nutritional needs  Description: Monitor and assess patient's nutrition/hydration status for malnutrition  Collaborate with interdisciplinary team and initiate plan and interventions as ordered  Monitor patient's weight and dietary intake as ordered or per policy  Utilize nutrition screening tool and intervene as necessary  Determine patient's food preferences and provide high-protein, high-caloric foods as appropriate       INTERVENTIONS:  - Monitor oral intake, urinary output, labs, and treatment plans  - Assess nutrition and hydration status and recommend course of action  - Evaluate amount of meals eaten  - Assist patient with eating if necessary   - Allow adequate time for meals  - Recommend/ encourage appropriate diets, oral nutritional supplements, and vitamin/mineral supplements  - Order, calculate, and assess calorie counts as needed  - Recommend, monitor, and adjust tube feedings and TPN/PPN based on assessed needs  - Assess need for intravenous fluids  - Provide specific nutrition/hydration education as appropriate  - Include patient/family/caregiver in decisions related to nutrition  Outcome: Progressing     Problem: PAIN - ADULT  Goal: Verbalizes/displays adequate comfort level or baseline comfort level  Description: Interventions:  - Encourage patient to monitor pain and request assistance  - Assess pain using appropriate pain scale  - Administer analgesics based on type and severity of pain and evaluate response  - Implement non-pharmacological measures as appropriate and evaluate response  - Consider cultural and social influences on pain and pain management  - Notify physician/advanced practitioner if interventions unsuccessful or patient reports new pain  Outcome: Progressing     Problem: INFECTION - ADULT  Goal: Absence or prevention of progression during hospitalization  Description: INTERVENTIONS:  - Assess and monitor for signs and symptoms of infection  - Monitor lab/diagnostic results  - Monitor all insertion sites, i e  indwelling lines, tubes, and drains  - Monitor endotracheal if appropriate and nasal secretions for changes in amount and color  - [place] appropriate cooling/warming therapies per order  - Administer medications as ordered  - Instruct and encourage patient and family to use good hand hygiene technique  - Identify and instruct in appropriate isolation precautions for identified infection/condition  Outcome: Progressing  Goal: Absence of fever/infection during neutropenic period  Description: INTERVENTIONS:  - Monitor WBC    Outcome: Progressing     Problem: SAFETY ADULT  Goal: Patient will remain free of falls  Description: INTERVENTIONS:  - Educate patient/family on patient safety including physical limitations  - Instruct patient to call for assistance with activity   - Consult OT/PT to assist with strengthening/mobility   - Keep Call bell within reach  - Keep bed low and locked with side rails adjusted as appropriate  - Keep care items and personal belongings within reach  - Initiate and maintain comfort rounds  - Make Fall Risk Sign visible to staff  - Offer Toileting every  Hours, in advance of need  - Initiate/Maintain alarm  - Obtain necessary fall risk management equipment:   - Apply yellow socks and bracelet for high fall risk patients  - Consider moving patient to room near nurses station  Outcome: Progressing  Goal: Maintain or return to baseline ADL function  Description: INTERVENTIONS:  -  Assess patient's ability to carry out ADLs; assess patient's baseline for ADL function and identify physical deficits which impact ability to perform ADLs (bathing, care of mouth/teeth, toileting, grooming, dressing, etc )  - Assess/evaluate cause of self-care deficits   - Assess range of motion  - Assess patient's mobility; develop plan if impaired  - Assess patient's need for assistive devices and provide as appropriate  - Encourage maximum independence but intervene and supervise when necessary  - Involve family in performance of ADLs  - Assess for home care needs following discharge   - Consider OT consult to assist with ADL evaluation and planning for discharge  - Provide patient education as appropriate  Outcome: Progressing  Goal: Maintains/Returns to pre admission functional level  Description: INTERVENTIONS:  - Perform BMAT or MOVE assessment daily    - Set and communicate daily mobility goal to care team and patient/family/caregiver  - Collaborate with rehabilitation services on mobility goals if consulted  - Perform Range of Motion  times a day  - Reposition patient every  hours    - Dangle patient  times a day  - Stand patient  times a day  - Ambulate patient  times a day  - Out of bed to chair  times a day   - Out of bed for meal times a day  - Out of bed for toileting  - Record patient progress and toleration of activity level   Outcome: Progressing     Problem: DISCHARGE PLANNING  Goal: Discharge to home or other facility with appropriate resources  Description: INTERVENTIONS:  - Identify barriers to discharge w/patient and caregiver  - Arrange for needed discharge resources and transportation as appropriate  - Identify discharge learning needs (meds, wound care, etc )  - Arrange for interpretive services to assist at discharge as needed  - Refer to Case Management Department for coordinating discharge planning if the patient needs post-hospital services based on physician/advanced practitioner order or complex needs related to functional status, cognitive ability, or social support system  Outcome: Progressing     Problem: Knowledge Deficit  Goal: Patient/family/caregiver demonstrates understanding of disease process, treatment plan, medications, and discharge instructions  Description: Complete learning assessment and assess knowledge base    Interventions:  - Provide teaching at level of understanding  - Provide teaching via preferred learning methods  Outcome: Progressing     Problem: CARDIOVASCULAR - ADULT  Goal: Maintains optimal cardiac output and hemodynamic stability  Description: INTERVENTIONS:  - Monitor I/O, vital signs and rhythm  - Monitor for S/S and trends of decreased cardiac output  - Administer and titrate ordered vasoactive medications to optimize hemodynamic stability  - Assess quality of pulses, skin color and temperature  - Assess for signs of decreased coronary artery perfusion  - Instruct patient to report change in severity of symptoms  Outcome: Progressing  Goal: Absence of cardiac dysrhythmias or at baseline rhythm  Description: INTERVENTIONS:  - Continuous cardiac monitoring, vital signs, obtain 12 lead EKG if ordered  - Administer antiarrhythmic and heart rate control medications as ordered  - Monitor electrolytes and administer replacement therapy as ordered  Outcome: Progressing

## 2021-08-06 NOTE — PLAN OF CARE
Problem: Potential for Falls  Goal: Patient will remain free of falls  Description: INTERVENTIONS:  - Educate patient/family on patient safety including physical limitations  - Instruct patient to call for assistance with activity   - Consult OT/PT to assist with strengthening/mobility   - Keep Call bell within reach  - Keep bed low and locked with side rails adjusted as appropriate  - Keep care items and personal belongings within reach  - Initiate and maintain comfort rounds  - Make Fall Risk Sign visible to staff  - Offer Toileting every  Hours, in advance of need  - Initiate/Maintain alarm  - Obtain necessary fall risk management equipment:   - Apply yellow socks and bracelet for high fall risk patients  - Consider moving patient to room near nurses station  Outcome: Progressing     Problem: Nutrition/Hydration-ADULT  Goal: Nutrient/Hydration intake appropriate for improving, restoring or maintaining nutritional needs  Description: Monitor and assess patient's nutrition/hydration status for malnutrition  Collaborate with interdisciplinary team and initiate plan and interventions as ordered  Monitor patient's weight and dietary intake as ordered or per policy  Utilize nutrition screening tool and intervene as necessary  Determine patient's food preferences and provide high-protein, high-caloric foods as appropriate       INTERVENTIONS:  - Monitor oral intake, urinary output, labs, and treatment plans  - Assess nutrition and hydration status and recommend course of action  - Evaluate amount of meals eaten  - Assist patient with eating if necessary   - Allow adequate time for meals  - Recommend/ encourage appropriate diets, oral nutritional supplements, and vitamin/mineral supplements  - Order, calculate, and assess calorie counts as needed  - Recommend, monitor, and adjust tube feedings and TPN/PPN based on assessed needs  - Assess need for intravenous fluids  - Provide specific nutrition/hydration education as appropriate  - Include patient/family/caregiver in decisions related to nutrition  Outcome: Progressing     Problem: PAIN - ADULT  Goal: Verbalizes/displays adequate comfort level or baseline comfort level  Description: Interventions:  - Encourage patient to monitor pain and request assistance  - Assess pain using appropriate pain scale  - Administer analgesics based on type and severity of pain and evaluate response  - Implement non-pharmacological measures as appropriate and evaluate response  - Consider cultural and social influences on pain and pain management  - Notify physician/advanced practitioner if interventions unsuccessful or patient reports new pain  Outcome: Progressing     Problem: INFECTION - ADULT  Goal: Absence or prevention of progression during hospitalization  Description: INTERVENTIONS:  - Assess and monitor for signs and symptoms of infection  - Monitor lab/diagnostic results  - Monitor all insertion sites, i e  indwelling lines, tubes, and drains  - Monitor endotracheal if appropriate and nasal secretions for changes in amount and color  - [place] appropriate cooling/warming therapies per order  - Administer medications as ordered  - Instruct and encourage patient and family to use good hand hygiene technique  - Identify and instruct in appropriate isolation precautions for identified infection/condition  Outcome: Progressing  Goal: Absence of fever/infection during neutropenic period  Description: INTERVENTIONS:  - Monitor WBC    Outcome: Progressing     Problem: SAFETY ADULT  Goal: Patient will remain free of falls  Description: INTERVENTIONS:  - Educate patient/family on patient safety including physical limitations  - Instruct patient to call for assistance with activity   - Consult OT/PT to assist with strengthening/mobility   - Keep Call bell within reach  - Keep bed low and locked with side rails adjusted as appropriate  - Keep care items and personal belongings within reach  - Initiate and maintain comfort rounds  - Make Fall Risk Sign visible to staff  - Offer Toileting every  Hours, in advance of need  - Initiate/Maintain alarm  - Obtain necessary fall risk management equipment:   - Apply yellow socks and bracelet for high fall risk patients  - Consider moving patient to room near nurses station  Outcome: Progressing  Goal: Maintain or return to baseline ADL function  Description: INTERVENTIONS:  -  Assess patient's ability to carry out ADLs; assess patient's baseline for ADL function and identify physical deficits which impact ability to perform ADLs (bathing, care of mouth/teeth, toileting, grooming, dressing, etc )  - Assess/evaluate cause of self-care deficits   - Assess range of motion  - Assess patient's mobility; develop plan if impaired  - Assess patient's need for assistive devices and provide as appropriate  - Encourage maximum independence but intervene and supervise when necessary  - Involve family in performance of ADLs  - Assess for home care needs following discharge   - Consider OT consult to assist with ADL evaluation and planning for discharge  - Provide patient education as appropriate  Outcome: Progressing  Goal: Maintains/Returns to pre admission functional level  Description: INTERVENTIONS:  - Perform BMAT or MOVE assessment daily    - Set and communicate daily mobility goal to care team and patient/family/caregiver  - Collaborate with rehabilitation services on mobility goals if consulted  - Perform Range of Motion  times a day  - Reposition patient every  hours    - Dangle patient  times a day  - Stand patient  times a day  - Ambulate patient times a day  - Out of bed to chair  times a day   - Out of bed for meals  times a day  - Out of bed for toileting  - Record patient progress and toleration of activity level   Outcome: Progressing     Problem: DISCHARGE PLANNING  Goal: Discharge to home or other facility with appropriate resources  Description: INTERVENTIONS:  - Identify barriers to discharge w/patient and caregiver  - Arrange for needed discharge resources and transportation as appropriate  - Identify discharge learning needs (meds, wound care, etc )  - Arrange for interpretive services to assist at discharge as needed  - Refer to Case Management Department for coordinating discharge planning if the patient needs post-hospital services based on physician/advanced practitioner order or complex needs related to functional status, cognitive ability, or social support system  Outcome: Progressing     Problem: Knowledge Deficit  Goal: Patient/family/caregiver demonstrates understanding of disease process, treatment plan, medications, and discharge instructions  Description: Complete learning assessment and assess knowledge base    Interventions:  - Provide teaching at level of understanding  - Provide teaching via preferred learning methods  Outcome: Progressing     Problem: CARDIOVASCULAR - ADULT  Goal: Maintains optimal cardiac output and hemodynamic stability  Description: INTERVENTIONS:  - Monitor I/O, vital signs and rhythm  - Monitor for S/S and trends of decreased cardiac output  - Administer and titrate ordered vasoactive medications to optimize hemodynamic stability  - Assess quality of pulses, skin color and temperature  - Assess for signs of decreased coronary artery perfusion  - Instruct patient to report change in severity of symptoms  Outcome: Progressing  Goal: Absence of cardiac dysrhythmias or at baseline rhythm  Description: INTERVENTIONS:  - Continuous cardiac monitoring, vital signs, obtain 12 lead EKG if ordered  - Administer antiarrhythmic and heart rate control medications as ordered  - Monitor electrolytes and administer replacement therapy as ordered  Outcome: Progressing

## 2021-08-10 ENCOUNTER — TRANSITIONAL CARE MANAGEMENT (OUTPATIENT)
Dept: FAMILY MEDICINE CLINIC | Facility: CLINIC | Age: 51
End: 2021-08-10

## 2021-08-11 ENCOUNTER — HOSPITAL ENCOUNTER (OUTPATIENT)
Dept: RADIOLOGY | Facility: HOSPITAL | Age: 51
Discharge: HOME | End: 2021-08-11
Attending: INTERNAL MEDICINE
Payer: COMMERCIAL

## 2021-08-11 ENCOUNTER — HOSPITAL ENCOUNTER (OUTPATIENT)
Dept: NON INVASIVE DIAGNOSTICS | Facility: HOSPITAL | Age: 51
Discharge: HOME | End: 2021-08-11
Attending: INTERNAL MEDICINE
Payer: COMMERCIAL

## 2021-08-11 DIAGNOSIS — R07.9 CHEST PAIN WITH MODERATE RISK FOR CARDIAC ETIOLOGY: ICD-10-CM

## 2021-08-11 LAB
CHEST PAIN STATEMENT: NORMAL
MAX DIASTOLIC BP: 94 MMHG
MAX HEART RATE: 88 BPM
MAX PREDICTED HEART RATE: 170 BPM
MAX. SYSTOLIC BP: 126 MMHG
PROTOCOL NAME: NORMAL
REASON FOR TERMINATION: NORMAL
TARGET HR FORMULA: NORMAL
TEST INDICATION: NORMAL
TIME IN EXERCISE PHASE: NORMAL

## 2021-08-11 PROCEDURE — 93017 CV STRESS TEST TRACING ONLY: CPT

## 2021-08-11 PROCEDURE — 78452 HT MUSCLE IMAGE SPECT MULT: CPT | Performed by: INTERNAL MEDICINE

## 2021-08-11 PROCEDURE — 78452 HT MUSCLE IMAGE SPECT MULT: CPT

## 2021-08-11 PROCEDURE — 93018 CV STRESS TEST I&R ONLY: CPT | Performed by: INTERNAL MEDICINE

## 2021-08-11 PROCEDURE — A9502 TC99M TETROFOSMIN: HCPCS

## 2021-08-11 PROCEDURE — G1004 CDSM NDSC: HCPCS

## 2021-08-11 PROCEDURE — 93016 CV STRESS TEST SUPVJ ONLY: CPT | Performed by: INTERNAL MEDICINE

## 2021-08-11 RX ADMIN — REGADENOSON 0.4 MG: 0.08 INJECTION, SOLUTION INTRAVENOUS at 10:36

## 2021-08-12 ENCOUNTER — TELEPHONE (OUTPATIENT)
Dept: OTHER | Facility: HOSPITAL | Age: 51
End: 2021-08-12

## 2021-08-12 ENCOUNTER — OFFICE VISIT (OUTPATIENT)
Dept: BARIATRICS | Facility: CLINIC | Age: 51
End: 2021-08-12
Payer: COMMERCIAL

## 2021-08-12 VITALS
WEIGHT: 291 LBS | TEMPERATURE: 97.4 F | DIASTOLIC BLOOD PRESSURE: 88 MMHG | HEIGHT: 68 IN | SYSTOLIC BLOOD PRESSURE: 124 MMHG | BODY MASS INDEX: 44.1 KG/M2 | HEART RATE: 69 BPM

## 2021-08-12 DIAGNOSIS — E66.01 CLASS 3 SEVERE OBESITY DUE TO EXCESS CALORIES WITHOUT SERIOUS COMORBIDITY WITH BODY MASS INDEX (BMI) OF 40.0 TO 44.9 IN ADULT (HCC): Primary | ICD-10-CM

## 2021-08-12 DIAGNOSIS — Z98.84 S/P GASTRIC BYPASS: ICD-10-CM

## 2021-08-12 DIAGNOSIS — K91.2 POSTSURGICAL MALABSORPTION: ICD-10-CM

## 2021-08-12 PROCEDURE — 3008F BODY MASS INDEX DOCD: CPT | Performed by: SURGERY

## 2021-08-12 PROCEDURE — 1036F TOBACCO NON-USER: CPT | Performed by: SURGERY

## 2021-08-12 PROCEDURE — 99213 OFFICE O/P EST LOW 20 MIN: CPT | Performed by: SURGERY

## 2021-08-12 NOTE — TELEPHONE ENCOUNTER
Called patient and notified regarding normal stress testing  All questions answered at this time      ----- Message from Shelvy Dandy sent at 8/12/2021  9:33 AM EDT -----    ----- Message -----  From: [facility] Results In  Sent: 8/11/2021   6:00 PM EDT  To: Surinder Giron Results Routing

## 2021-08-12 NOTE — PROGRESS NOTES
FOLLOW UP VISIT - Aman Fernando [age] y o  male MRN: [id number]  Unit/Bed#:  Encounter: [id number]      HPI:  Aicha Joseph is a 49 y o  male who is status post Gastric bypass in 2018  Here for scheduled follow-up  Review of Systems   All other systems reviewed and are negative  Historical Information   Past Medical History:   Diagnosis Date    Abnormal blood sugar     Abnormal TSH     Back pain     Bariatric surgery status     Bilateral anterior knee pain     Chronic pain disorder     back    CPAP (continuous positive airway pressure) dependence     DDD (degenerative disc disease), lumbar     GERD (gastroesophageal reflux disease)     Hematuria     History of Helicobacter pylori infection     History of transfusion     Hypertension     Knee pain     Morbid obesity (HCC)     Postgastrectomy malabsorption     Prediabetes     Sleep apnea     Suspicious nevus     Use of cane as ambulatory aid     Vitamin D deficiency      Past Surgical History:   Procedure Laterality Date    KNEE SURGERY Left 07/27/2018    PA EGD TRANSORAL BIOPSY SINGLE/MULTIPLE N/A 12/13/2017    Procedure: ESOPHAGOGASTRODUODENOSCOPY (EGD), with BIOPSY;  Surgeon: Evaristo Weinstein MD;  Location: [facility];   Service: Bariatrics    PA LAP GASTRIC BYPASS/ENRICO-EN-Y N/A 5/8/2018    Procedure: BYPASS GASTRIC  ENRICO-EN-Y LAPAROSCOPIC AND INTRAOPERATIVE EGD;  Surgeon: Evaristo Weinstein MD;  Location: [facility];  Service: Bariatrics     Social History   Social History     Substance and Sexual Activity   Alcohol Use Never     Social History     Substance and Sexual Activity   Drug Use No     Social History     Tobacco Use   Smoking Status Never Smoker   Smokeless Tobacco Never Used     Family History: non-contributory    Meds/Allergies   all medications and allergies reviewed  No Known Allergies    Objective   First Vitals:   @VSFIRST2(5,8,6,7,9,11,14,10:FIRST)@    Current Vitals:   Blood Pressure: 124/88 (08/12/21 1114)  Pulse: 69 (08/12/21 1114)  Temperature: (!) 97 4 °F (36 3 °C) (08/12/21 1114)  Temp Source: Tympanic (08/12/21 1114)  Height: 5' 7 5" (171 5 cm) (08/12/21 1114)  Weight - Scale: 132 kg (291 lb) (08/12/21 1114)        Invasive Devices     None                 Physical Exam  Constitutional:       General: He is not in acute distress  Appearance: He is well-developed  Neurological:      Mental Status: He is alert and oriented to person, place, and time  Psychiatric:         Behavior: Behavior normal          Thought Content: Thought content normal          Judgment: Judgment normal          Lab Results: I have personally reviewed pertinent lab results  Imaging: I have personally reviewed pertinent reports  EKG, Pathology, and Other Studies: I have personally reviewed pertinent reports  Assessment/PLAN:    49 y o  yo male status post  Laparoscopic Vonnie-en-Y gastric bypass in 5/8/2018  At 1 point he had lost 57% excess weight loss and 34% total body weight  We saw him last about 2 years ago  Since then he has regained 30 lb  He exercises daily about 60 minutes walking on a treadmill  He continues to take his supplements  he is concerned about the weight that he has regained and comes to the [facility] for help  He does not have as much restriction as he did before  We are going to order an upper GI and an endoscopy to evaluate the anatomy of his bypass and he will return to the office to discuss options after the studies are done  I had a detailed discussion with him stressing the fact that long-term success is largely dependent on compliance and abidance to the recommendations of the program as well as participation within the support groups  He is committed to continue to observe his diet and to increase his physical activity  He will follow up with us as scheduled      Venus Dsouza MD  8/12/2021  11:39 AM

## 2021-08-19 ENCOUNTER — PREP FOR PROCEDURE (OUTPATIENT)
Dept: BARIATRICS | Facility: CLINIC | Age: 51
End: 2021-08-19

## 2021-08-19 DIAGNOSIS — Z98.84 S/P GASTRIC BYPASS: ICD-10-CM

## 2021-08-19 DIAGNOSIS — Z98.84 BARIATRIC SURGERY STATUS: Primary | ICD-10-CM

## 2021-08-23 ENCOUNTER — HOSPITAL ENCOUNTER (OUTPATIENT)
Dept: RADIOLOGY | Facility: HOSPITAL | Age: 51
Discharge: HOME | End: 2021-08-23
Attending: SURGERY
Payer: COMMERCIAL

## 2021-08-23 ENCOUNTER — HOSPITAL ENCOUNTER (OUTPATIENT)
Dept: SLEEP CENTER | Facility: CLINIC | Age: 51
Discharge: HOME | End: 2021-08-23
Payer: COMMERCIAL

## 2021-08-23 DIAGNOSIS — E66.01 CLASS 3 SEVERE OBESITY DUE TO EXCESS CALORIES WITHOUT SERIOUS COMORBIDITY WITH BODY MASS INDEX (BMI) OF 40.0 TO 44.9 IN ADULT (HCC): ICD-10-CM

## 2021-08-23 DIAGNOSIS — G47.33 OSA (OBSTRUCTIVE SLEEP APNEA): ICD-10-CM

## 2021-08-23 DIAGNOSIS — K91.2 POSTSURGICAL MALABSORPTION: ICD-10-CM

## 2021-08-23 DIAGNOSIS — Z98.84 S/P GASTRIC BYPASS: ICD-10-CM

## 2021-08-23 PROCEDURE — 95811 POLYSOM 6/>YRS CPAP 4/> PARM: CPT | Performed by: INTERNAL MEDICINE

## 2021-08-23 PROCEDURE — 95811 POLYSOM 6/>YRS CPAP 4/> PARM: CPT

## 2021-08-23 PROCEDURE — 74240 X-RAY XM UPR GI TRC 1CNTRST: CPT

## 2021-08-24 DIAGNOSIS — I10 ESSENTIAL HYPERTENSION: ICD-10-CM

## 2021-08-24 DIAGNOSIS — F51.01 PRIMARY INSOMNIA: ICD-10-CM

## 2021-08-24 DIAGNOSIS — G47.33 OSA (OBSTRUCTIVE SLEEP APNEA): ICD-10-CM

## 2021-08-24 DIAGNOSIS — G47.33 OSA (OBSTRUCTIVE SLEEP APNEA): Primary | ICD-10-CM

## 2021-08-24 DIAGNOSIS — K21.9 GASTROESOPHAGEAL REFLUX DISEASE WITHOUT ESOPHAGITIS: ICD-10-CM

## 2021-08-24 NOTE — PROGRESS NOTES
Patient had a auto titration CPAP  He unfortunately failed CPAP and needed BiPAP  I have ordered auto BiPAP with a minimum EPAP of 12 and a maximum IPAP of 25 with a minimum pressure support of 4    After he starts this he will need to follow up in the office in 2-3 months for a BiPAP compliance recheck

## 2021-08-24 NOTE — PROGRESS NOTES
Sleep Study Documentation    Pre-Sleep Study       Sleep testing procedure explained to patient:YES    Patient napped prior to study:NO    [address] [place] worker after 12PM   Caffeine use:NO    Alcohol:Dayshift workers after 5PM: Alcohol use:NO    Typical day for patient:YES       Study Documentation    Sleep Study Indications:  Failed CPAP  Sleep Study: Treatment   Optimal PAP pressure: BiPAP 18/13 cm H2O  Leak:Medium  Snore:Eliminated  REM Obtained:yes  Supplemental O2: no    Minimum SaO2  75 %  Baseline SaO2  94 %  PAP mask tried (list all) Respironics Kelley Medium Full Face Mask  ResMed Quattro Medium Full Face Mask with no humidity  PAP mask choice (final) ResMed Quattro Medium Full Face Mask with no humidity  PAP mask type:full face  PAP pressure at which snoring was eliminated  81/13 cm H2O  Minimum SaO2 at final PAP pressure  91 %  Mode of Therapy:BiPAP  ETCO2:No  CPAP changed to BiPAP:Yes  If yes why Started with BiPAP  Mode of Therapy:BiPAP    EKG abnormalities: no     EEG abnormalities: yes:  ECG artifact throughout study  Averaged Ms  Sleep Study Recorded < 2 hours: N/A    Sleep Study Recorded > 2 hours but incomplete study: N/A    Sleep Study Recorded 6 hours but no sleep obtained: NO          Post-Sleep Study    Medication used at bedtime or during sleep study:YES prescription sleep aid    Patient reports time it took to fall asleep:greater than 60 minutes    Patient reports waking up during study:3 or more times  Patient reports returning to sleep in 10 to 30 minutes  Patient reports sleeping 4 to 6 hours without dreaming  Patient reports sleep during study:worse than usual    Patient rated sleepiness: Very sleepy or tired    PAP treatment:yes: Post PAP treatment patient reports feeling worse and  would wear PAP mask at home

## 2021-08-25 DIAGNOSIS — I10 ESSENTIAL HYPERTENSION: ICD-10-CM

## 2021-08-25 RX ORDER — AMLODIPINE BESYLATE 5 MG/1
TABLET ORAL
Qty: 90 TABLET | Refills: 1 | OUTPATIENT
Start: 2021-08-25

## 2021-08-25 RX ORDER — QUETIAPINE FUMARATE 50 MG/1
50 TABLET, FILM COATED ORAL
Qty: 30 TABLET | Refills: 0 | Status: SHIPPED | OUTPATIENT
Start: 2021-08-25 | End: 2021-10-05 | Stop reason: SDUPTHER

## 2021-08-25 RX ORDER — PANTOPRAZOLE SODIUM 40 MG/1
40 TABLET, DELAYED RELEASE ORAL DAILY
Qty: 30 TABLET | Refills: 0 | Status: SHIPPED | OUTPATIENT
Start: 2021-08-25 | End: 2021-09-20

## 2021-08-25 RX ORDER — AMLODIPINE BESYLATE 5 MG/1
5 TABLET ORAL DAILY
Qty: 30 TABLET | Refills: 0 | Status: SHIPPED | OUTPATIENT
Start: 2021-08-25 | End: 2021-10-05 | Stop reason: SDUPTHER

## 2021-08-26 ENCOUNTER — TELEPHONE (OUTPATIENT)
Dept: SLEEP CENTER | Facility: CLINIC | Age: 51
End: 2021-08-26

## 2021-08-26 NOTE — TELEPHONE ENCOUNTER
Via  # [id number]    Advised patient sleep study resulted and new order for BIPAP in chart  Patient states his machine is broken   Patient reports that he will be out of town until October 1   Appointments scheduled accordingly

## 2021-09-19 DIAGNOSIS — K21.9 GASTROESOPHAGEAL REFLUX DISEASE WITHOUT ESOPHAGITIS: ICD-10-CM

## 2021-09-20 RX ORDER — PANTOPRAZOLE SODIUM 40 MG/1
TABLET, DELAYED RELEASE ORAL
Qty: 30 TABLET | Refills: 0 | Status: SHIPPED | OUTPATIENT
Start: 2021-09-20 | End: 2021-10-05 | Stop reason: SDUPTHER

## 2021-10-05 ENCOUNTER — OFFICE VISIT (OUTPATIENT)
Dept: FAMILY MEDICINE CLINIC | Facility: CLINIC | Age: 51
End: 2021-10-05

## 2021-10-05 ENCOUNTER — TELEPHONE (OUTPATIENT)
Dept: SLEEP CENTER | Facility: CLINIC | Age: 51
End: 2021-10-05

## 2021-10-05 ENCOUNTER — TELEPHONE (OUTPATIENT)
Dept: GASTROENTEROLOGY | Facility: HOSPITAL | Age: 51
End: 2021-10-05

## 2021-10-05 VITALS
HEIGHT: 68 IN | BODY MASS INDEX: 45.01 KG/M2 | SYSTOLIC BLOOD PRESSURE: 128 MMHG | RESPIRATION RATE: 20 BRPM | WEIGHT: 297 LBS | OXYGEN SATURATION: 97 % | DIASTOLIC BLOOD PRESSURE: 82 MMHG | TEMPERATURE: 97.8 F | HEART RATE: 61 BPM

## 2021-10-05 DIAGNOSIS — I10 ESSENTIAL HYPERTENSION: ICD-10-CM

## 2021-10-05 DIAGNOSIS — Z12.12 SCREENING FOR COLORECTAL CANCER: ICD-10-CM

## 2021-10-05 DIAGNOSIS — F51.01 PRIMARY INSOMNIA: ICD-10-CM

## 2021-10-05 DIAGNOSIS — Z11.59 NEED FOR HEPATITIS C SCREENING TEST: ICD-10-CM

## 2021-10-05 DIAGNOSIS — Z12.11 SCREENING FOR COLORECTAL CANCER: ICD-10-CM

## 2021-10-05 DIAGNOSIS — G47.33 OSA (OBSTRUCTIVE SLEEP APNEA): ICD-10-CM

## 2021-10-05 DIAGNOSIS — Z00.00 ANNUAL PHYSICAL EXAM: Primary | ICD-10-CM

## 2021-10-05 DIAGNOSIS — K21.9 GASTROESOPHAGEAL REFLUX DISEASE WITHOUT ESOPHAGITIS: ICD-10-CM

## 2021-10-05 DIAGNOSIS — I63.9 RIGHT-SIDED CEREBROVASCULAR ACCIDENT (CVA) (HCC): ICD-10-CM

## 2021-10-05 PROCEDURE — 99396 PREV VISIT EST AGE 40-64: CPT | Performed by: FAMILY MEDICINE

## 2021-10-05 RX ORDER — PANTOPRAZOLE SODIUM 40 MG/1
40 TABLET, DELAYED RELEASE ORAL DAILY
Qty: 90 TABLET | Refills: 1 | Status: SHIPPED | OUTPATIENT
Start: 2021-10-05

## 2021-10-05 RX ORDER — QUETIAPINE FUMARATE 50 MG/1
50 TABLET, FILM COATED ORAL
Qty: 90 TABLET | Refills: 1 | Status: SHIPPED | OUTPATIENT
Start: 2021-10-05 | End: 2022-01-02 | Stop reason: SDUPTHER

## 2021-10-05 RX ORDER — CLOPIDOGREL BISULFATE 75 MG/1
75 TABLET ORAL DAILY
Qty: 90 TABLET | Refills: 1 | Status: SHIPPED | OUTPATIENT
Start: 2021-10-05

## 2021-10-05 RX ORDER — AMLODIPINE BESYLATE 5 MG/1
5 TABLET ORAL DAILY
Qty: 90 TABLET | Refills: 1 | Status: SHIPPED | OUTPATIENT
Start: 2021-10-05 | End: 2022-04-01

## 2021-10-06 ENCOUNTER — HOSPITAL ENCOUNTER (OUTPATIENT)
Dept: GASTROENTEROLOGY | Facility: HOSPITAL | Age: 51
Setting detail: OUTPATIENT SURGERY
Discharge: HOME | End: 2021-10-06
Attending: SURGERY
Payer: COMMERCIAL

## 2021-10-06 ENCOUNTER — ANESTHESIA EVENT (OUTPATIENT)
Dept: GASTROENTEROLOGY | Facility: HOSPITAL | Age: 51
End: 2021-10-06

## 2021-10-06 ENCOUNTER — TELEPHONE (OUTPATIENT)
Dept: FAMILY MEDICINE CLINIC | Facility: CLINIC | Age: 51
End: 2021-10-06

## 2021-10-06 ENCOUNTER — ANESTHESIA (OUTPATIENT)
Dept: GASTROENTEROLOGY | Facility: HOSPITAL | Age: 51
End: 2021-10-06

## 2021-10-06 VITALS
HEART RATE: 81 BPM | RESPIRATION RATE: 19 BRPM | HEIGHT: 68 IN | BODY MASS INDEX: 45.01 KG/M2 | DIASTOLIC BLOOD PRESSURE: 75 MMHG | OXYGEN SATURATION: 96 % | SYSTOLIC BLOOD PRESSURE: 130 MMHG | WEIGHT: 297 LBS | TEMPERATURE: 97.3 F

## 2021-10-06 DIAGNOSIS — Z98.84 BARIATRIC SURGERY STATUS: ICD-10-CM

## 2021-10-06 PROBLEM — E66.01 MORBID OBESITY WITH BMI OF 50.0-59.9, ADULT (HCC): Status: RESOLVED | Noted: 2018-03-12 | Resolved: 2021-10-06

## 2021-10-06 PROCEDURE — 88305 TISSUE EXAM BY PATHOLOGIST: CPT | Performed by: PATHOLOGY

## 2021-10-06 PROCEDURE — 43239 EGD BIOPSY SINGLE/MULTIPLE: CPT | Performed by: SURGERY

## 2021-10-06 RX ORDER — PROPOFOL 10 MG/ML
INJECTION, EMULSION INTRAVENOUS AS NEEDED
Status: DISCONTINUED | OUTPATIENT
Start: 2021-10-06 | End: 2021-10-06

## 2021-10-06 RX ORDER — SODIUM CHLORIDE 9 MG/ML
125 INJECTION, SOLUTION INTRAVENOUS CONTINUOUS
Status: DISCONTINUED | OUTPATIENT
Start: 2021-10-06 | End: 2021-10-10 | Stop reason: HOSPADM

## 2021-10-06 RX ADMIN — PROPOFOL 200 MG: 10 INJECTION, EMULSION INTRAVENOUS at 12:57

## 2021-10-06 RX ADMIN — SODIUM CHLORIDE 125 ML/HR: 0.9 INJECTION, SOLUTION INTRAVENOUS at 12:22

## 2021-10-11 ENCOUNTER — IMMUNIZATIONS (OUTPATIENT)
Dept: FAMILY MEDICINE CLINIC | Facility: CLINIC | Age: 51
End: 2021-10-11

## 2021-10-11 DIAGNOSIS — Z23 ENCOUNTER FOR IMMUNIZATION: Primary | ICD-10-CM

## 2021-10-11 PROCEDURE — 90471 IMMUNIZATION ADMIN: CPT | Performed by: FAMILY MEDICINE

## 2021-10-11 PROCEDURE — 90682 RIV4 VACC RECOMBINANT DNA IM: CPT | Performed by: FAMILY MEDICINE

## 2021-11-16 ENCOUNTER — OFFICE VISIT (OUTPATIENT)
Dept: SLEEP CENTER | Facility: CLINIC | Age: 51
End: 2021-11-16
Payer: COMMERCIAL

## 2021-11-16 VITALS
WEIGHT: 297 LBS | DIASTOLIC BLOOD PRESSURE: 88 MMHG | HEIGHT: 68 IN | SYSTOLIC BLOOD PRESSURE: 132 MMHG | BODY MASS INDEX: 45.01 KG/M2

## 2021-11-16 DIAGNOSIS — G47.33 OSA (OBSTRUCTIVE SLEEP APNEA): Primary | ICD-10-CM

## 2021-11-16 PROCEDURE — 3075F SYST BP GE 130 - 139MM HG: CPT | Performed by: PHYSICIAN ASSISTANT

## 2021-11-16 PROCEDURE — 3079F DIAST BP 80-89 MM HG: CPT | Performed by: PHYSICIAN ASSISTANT

## 2021-11-16 PROCEDURE — 3008F BODY MASS INDEX DOCD: CPT | Performed by: PHYSICIAN ASSISTANT

## 2021-11-16 PROCEDURE — 1036F TOBACCO NON-USER: CPT | Performed by: PHYSICIAN ASSISTANT

## 2021-11-16 PROCEDURE — 99214 OFFICE O/P EST MOD 30 MIN: CPT | Performed by: PHYSICIAN ASSISTANT

## 2021-11-17 ENCOUNTER — TELEPHONE (OUTPATIENT)
Dept: SLEEP CENTER | Facility: CLINIC | Age: 51
End: 2021-11-17

## 2021-11-17 ENCOUNTER — VBI (OUTPATIENT)
Dept: ADMINISTRATIVE | Facility: OTHER | Age: 51
End: 2021-11-17

## 2021-12-02 ENCOUNTER — OFFICE VISIT (OUTPATIENT)
Dept: BARIATRICS | Facility: CLINIC | Age: 51
End: 2021-12-02
Payer: COMMERCIAL

## 2021-12-02 VITALS
TEMPERATURE: 97.5 F | HEIGHT: 68 IN | DIASTOLIC BLOOD PRESSURE: 88 MMHG | WEIGHT: 295.5 LBS | HEART RATE: 85 BPM | SYSTOLIC BLOOD PRESSURE: 138 MMHG | BODY MASS INDEX: 44.79 KG/M2

## 2021-12-02 DIAGNOSIS — Z98.84 BARIATRIC SURGERY STATUS: Primary | ICD-10-CM

## 2021-12-02 DIAGNOSIS — R63.5 ABNORMAL WEIGHT GAIN: ICD-10-CM

## 2021-12-02 PROCEDURE — 3075F SYST BP GE 130 - 139MM HG: CPT | Performed by: SURGERY

## 2021-12-02 PROCEDURE — 99213 OFFICE O/P EST LOW 20 MIN: CPT | Performed by: SURGERY

## 2021-12-02 PROCEDURE — 3008F BODY MASS INDEX DOCD: CPT | Performed by: SURGERY

## 2021-12-02 PROCEDURE — 3079F DIAST BP 80-89 MM HG: CPT | Performed by: SURGERY

## 2021-12-02 PROCEDURE — 1036F TOBACCO NON-USER: CPT | Performed by: SURGERY

## 2022-01-02 DIAGNOSIS — F51.01 PRIMARY INSOMNIA: ICD-10-CM

## 2022-01-02 DIAGNOSIS — G47.33 OSA (OBSTRUCTIVE SLEEP APNEA): ICD-10-CM

## 2022-01-02 DIAGNOSIS — I10 ESSENTIAL HYPERTENSION: ICD-10-CM

## 2022-01-03 RX ORDER — QUETIAPINE FUMARATE 50 MG/1
50 TABLET, FILM COATED ORAL
Qty: 90 TABLET | Refills: 0 | Status: SHIPPED | OUTPATIENT
Start: 2022-01-03 | End: 2022-03-25 | Stop reason: SDUPTHER

## 2022-01-13 ENCOUNTER — OFFICE VISIT (OUTPATIENT)
Dept: BARIATRICS | Facility: CLINIC | Age: 52
End: 2022-01-13

## 2022-01-13 VITALS — HEIGHT: 68 IN | BODY MASS INDEX: 43.38 KG/M2 | WEIGHT: 286.2 LBS

## 2022-01-13 DIAGNOSIS — E66.01 OBESITY, CLASS III, BMI 40-49.9 (MORBID OBESITY) (HCC): Primary | ICD-10-CM

## 2022-01-13 DIAGNOSIS — R63.5 WEIGHT GAIN FOLLOWING GASTRIC BYPASS SURGERY: ICD-10-CM

## 2022-01-13 DIAGNOSIS — Z98.84 WEIGHT GAIN FOLLOWING GASTRIC BYPASS SURGERY: ICD-10-CM

## 2022-01-13 PROCEDURE — 3008F BODY MASS INDEX DOCD: CPT | Performed by: SURGERY

## 2022-01-13 PROCEDURE — RECHECK: Performed by: DIETITIAN, REGISTERED

## 2022-01-13 NOTE — PROGRESS NOTES
Bariatric Follow Up Nutrition Note    Type of surgery  Gastric bypass: laparoscopic  Surgery Date: 05/08/2018  4 years post-op  Surgeon: Dr Sulema Campoverde  49 y o   male     Ht 5' 7 5" (1 715 m)   Wt 130 kg (286 lb 3 2 oz)   BMI 44 16 kg/m²     Celio Mosley Equation:  2,125  Estimated calories for weight loss 9377-8115 ( 1-2# per wk wt loss - sedentary )  Estimated protein needs 73-87 6 grams (1 0-1 2 gms/kg IBW )   Estimated fluid needs 2,555ml (30-35 ml/kg IBW )      Weight on Day of Weight Loss Surgery: 352#  Weight in (lb) to have BMI = 25: 161 2#  Pre-Op Excess Wt: 209 8#  Post-Op Wt Loss: 107 8#/ 46% EBWL in 4 year(s)    Review of History and Medications   Past Medical History:   Diagnosis Date    Abnormal blood sugar     Abnormal TSH     Back pain     Bariatric surgery status     Bilateral anterior knee pain     Chronic pain disorder     back    CPAP (continuous positive airway pressure) dependence     DDD (degenerative disc disease), lumbar     GERD (gastroesophageal reflux disease)     Hematuria     History of Helicobacter pylori infection     History of transfusion     Hypertension     Knee pain     Morbid obesity (HCC)     Postgastrectomy malabsorption     Prediabetes     Sleep apnea     Suspicious nevus     Use of cane as ambulatory aid     Vitamin D deficiency      Past Surgical History:   Procedure Laterality Date    KNEE SURGERY Left 07/27/2018    PA EGD TRANSORAL BIOPSY SINGLE/MULTIPLE N/A 12/13/2017    Procedure: ESOPHAGOGASTRODUODENOSCOPY (EGD), with BIOPSY;  Surgeon: Concepción Turpin MD;  Location[facility];   Service: Bariatrics    PA LAP GASTRIC BYPASS/ENRICO-EN-Y N/A 5/8/2018    Procedure: BYPASS GASTRIC  ENRICO-EN-Y LAPAROSCOPIC AND INTRAOPERATIVE EGD;  Surgeon: Concepción Turpin MD;  [facility];  Service: Bariatrics     Social History     Socioeconomic History    Marital status: Single     Spouse name: Not on file  Number of children: Not on file    Years of education: Not on file    Highest education level: Not on file   Occupational History    Not on file   Tobacco Use    Smoking status: Never Smoker    Smokeless tobacco: Never Used   Vaping Use    Vaping Use: Never used   Substance and Sexual Activity    Alcohol use: Never    Drug use: No    Sexual activity: Not on file   Other Topics Concern    Not on file   Social History Narrative    Not on file     Social Determinants of Health     Financial Resource Strain: Not on file   Food Insecurity: Not on file   Transportation Needs: Not on file   Physical Activity: Not on file   Stress: Not on file   Social Connections: Not on file   Intimate Partner Violence: Not on file   Housing Stability: Not on file       Current Outpatient Medications:     amLODIPine (NORVASC) 5 mg tablet, Take 1 tablet (5 mg total) by mouth daily, Disp: 90 tablet, Rfl: 1    calcium carbonate (OS-SYLVIA) 1250 (500 Ca) MG tablet, Take 1 tablet by mouth daily, Disp: , Rfl:     clopidogrel (PLAVIX) 75 mg tablet, Take 1 tablet (75 mg total) by mouth daily, Disp: 90 tablet, Rfl: 1    Diclofenac Sodium (VOLTAREN) 1 %, Apply 2 g topically 2 (two) times a day as needed (pain), Disp: 350 g, Rfl: 0    Multiple Vitamin (MULTIVITAMIN) tablet, Take 1 tablet by mouth daily, Disp: 90 tablet, Rfl: 1    pantoprazole (PROTONIX) 40 mg tablet, Take 1 tablet (40 mg total) by mouth daily, Disp: 90 tablet, Rfl: 1    QUEtiapine (SEROquel) 50 mg tablet, Take 1 tablet (50 mg total) by mouth daily at bedtime, Disp: 90 tablet, Rfl: 0    Food Intake and Lifestyle Assessment   Food Intake Assessment completed via usual diet recall  Breakfast: wakes up at 1 pm but only has coffee   Snack: None  Lunch: First meal is at 5 pm - typically soup from krishan (chicken soup with few vegetables, broth and noodles) or 4 inch hoagie from [employer]  Snack: None  Dinner: Second meal is at 8 pm - usually protein shake (Ensure plus) or once a week (or once a month) he has pizza as his second meal - patient was inconsistent in reporting  Snack: None, typically goes to sleep at 5/6 am and reports no grazing or snacking at night     Beverage intake: water and coffee  Diet texture/stage: regular  Protein supplement: 1 Ensure Plus/day  Estimated protein intake per day: < 30 g  Estimated fluid intake per day: 60 ounces or less   Meals eaten away from home: Pizza 1 day/week and soup or hoagie from [employer]  - patient is poor historian regarding frequency  Typical meal pattern: 1 small meal per day and 0 snacks with 1 protein shake/day  Eating Behaviors: Does not drink with meals and waits 30-minutes after meal before resuming drinking and patient reports loss of hunger  He is eating very few calories/day according to dietary recall    Food allergies or intolerances:   No Known Allergies   Cultural or Congregational considerations:     Physical Assessment  Nutrition Related Findings  Constipation, Headache, Dizziness, Dark Urine and Loss of Hunger  Pt is not hungry, usually only having 2 Tbs of food at mealtimes, 1 shake/day and 60 ounces or less of fluid  Physical Activity  Types of exercise: Walking - no specific routine, general walking reported  Current physical limitations: Surgery and pain in knees but tries to maintain standard exercise and walking     Psychosocial Assessment   Support systems: spouse  Socioeconomic factors: Unable to determine     Nutrition Diagnosis  Diagnosis: Overweight / Obesity (NC-3 3)  Related to: Food and nutrition-related knowledge deficit and Physical inactivity  As Evidenced by: BMI >25 and Unintentional weight gain     Nutrition Prescription: Recommend the following diet  High protein    Interventions and Teaching   Patient educated on post-op nutrition guidelines  Patient educated and handouts provided    Adequate hydration  Weight loss plateaus/ possibility of weight regain  Exercise  Nutrition considerations after surgery  Protein supplements  Meal planning and preparation  Dietary and lifestyle changes  Techniques for self monitoring and keeping daily food journal    Education provided to: patient    Barriers to learning: Language  [employer] # [phone]  [id number]  [facility] # [id number]    Readiness to change: preparation    Comprehension: needs reinforcement and verbalizes understanding     Expected Compliance: fair    Evaluation/Monitoring   Eating pattern as discussed Body weight Lab values Physical activity Bowel pattern    Goals  Food journal and consume 1 snack or small meal between 1-5 pm      Time Spent:   30 Minutes     Interpretor #[id number] and #[id number] used for [place] translation (initial connection was lost during session)

## 2022-01-13 NOTE — PATIENT INSTRUCTIONS
Goals:  Consume 1 snack or small meal between 1-5 pm to increase calorie and protein intake  Complete written  food logs consistently

## 2022-01-14 NOTE — PROGRESS NOTES
MWM referral  Half hour session with patient  Shaheen Leahy, #[id number]  Patient lives alone; rents a room in a family's home  States he could use the kitchen to prepare foods but most of the time, buys prepared foods, eats a lot of soups  Patient came to the [facility] in 2016  Patient  in 2018; wife is in [place], pregnant with twins, due in June  Twins are motivation to lose weight as he wants to be active dad with them  Making plans to bring family to [facility] once they are able to travel  Patient stopped working 4 months ago  Works as a fork ; however, lots of COVID at work and didn't feel safe  Very afraid of getting COVID so quit  Doing side jobs fixing computers; has applied for unemployment  Reports no Mental Health diagnosis or treatment  Reports no tobacco or alcohol  Reports he walks an hour every day  Has not been taking care of himself; snacks more  Talked about ways to distract and keep hands and mind busy   Patient enjoys listening to music; wells and ; patient appropriate for MWM program

## 2022-03-25 DIAGNOSIS — I10 ESSENTIAL HYPERTENSION: ICD-10-CM

## 2022-03-25 DIAGNOSIS — F51.01 PRIMARY INSOMNIA: ICD-10-CM

## 2022-03-25 DIAGNOSIS — G47.33 OSA (OBSTRUCTIVE SLEEP APNEA): ICD-10-CM

## 2022-03-27 RX ORDER — QUETIAPINE FUMARATE 50 MG/1
50 TABLET, FILM COATED ORAL
Qty: 90 TABLET | Refills: 0 | Status: SHIPPED | OUTPATIENT
Start: 2022-03-27 | End: 2022-07-27

## 2022-04-01 DIAGNOSIS — I10 ESSENTIAL HYPERTENSION: ICD-10-CM

## 2022-04-01 RX ORDER — AMLODIPINE BESYLATE 5 MG/1
TABLET ORAL
Qty: 90 TABLET | Refills: 1 | Status: SHIPPED | OUTPATIENT
Start: 2022-04-01 | End: 2022-07-29 | Stop reason: SDUPTHER

## 2022-04-15 ENCOUNTER — TELEPHONE (OUTPATIENT)
Dept: SLEEP CENTER | Facility: CLINIC | Age: 52
End: 2022-04-15

## 2022-04-15 NOTE — TELEPHONE ENCOUNTER
I called to get data but LVM he needs to return his machine from non-compliance i advised him of this on is VM

## 2022-07-25 DIAGNOSIS — G47.33 OSA (OBSTRUCTIVE SLEEP APNEA): ICD-10-CM

## 2022-07-25 DIAGNOSIS — I10 ESSENTIAL HYPERTENSION: ICD-10-CM

## 2022-07-25 DIAGNOSIS — F51.01 PRIMARY INSOMNIA: ICD-10-CM

## 2022-07-27 RX ORDER — QUETIAPINE FUMARATE 50 MG/1
TABLET, FILM COATED ORAL
Qty: 90 TABLET | Refills: 0 | Status: SHIPPED | OUTPATIENT
Start: 2022-07-27 | End: 2022-07-29 | Stop reason: SDUPTHER

## 2022-07-29 ENCOUNTER — OFFICE VISIT (OUTPATIENT)
Dept: FAMILY MEDICINE CLINIC | Facility: CLINIC | Age: 52
End: 2022-07-29

## 2022-07-29 VITALS
OXYGEN SATURATION: 95 % | TEMPERATURE: 97.1 F | RESPIRATION RATE: 18 BRPM | SYSTOLIC BLOOD PRESSURE: 106 MMHG | WEIGHT: 310 LBS | DIASTOLIC BLOOD PRESSURE: 60 MMHG | BODY MASS INDEX: 47.84 KG/M2 | HEART RATE: 102 BPM

## 2022-07-29 DIAGNOSIS — E66.01 CLASS 3 SEVERE OBESITY DUE TO EXCESS CALORIES WITHOUT SERIOUS COMORBIDITY WITH BODY MASS INDEX (BMI) OF 45.0 TO 49.9 IN ADULT (HCC): ICD-10-CM

## 2022-07-29 DIAGNOSIS — M54.50 CHRONIC LEFT-SIDED LOW BACK PAIN WITHOUT SCIATICA: ICD-10-CM

## 2022-07-29 DIAGNOSIS — G47.33 OSA (OBSTRUCTIVE SLEEP APNEA): ICD-10-CM

## 2022-07-29 DIAGNOSIS — Z59.9 FINANCIAL DIFFICULTIES: Primary | ICD-10-CM

## 2022-07-29 DIAGNOSIS — M25.562 CHRONIC PAIN OF BOTH KNEES: ICD-10-CM

## 2022-07-29 DIAGNOSIS — G89.29 CHRONIC PAIN OF BOTH KNEES: ICD-10-CM

## 2022-07-29 DIAGNOSIS — I10 ESSENTIAL HYPERTENSION: ICD-10-CM

## 2022-07-29 DIAGNOSIS — F51.01 PRIMARY INSOMNIA: ICD-10-CM

## 2022-07-29 DIAGNOSIS — M25.561 CHRONIC PAIN OF BOTH KNEES: ICD-10-CM

## 2022-07-29 DIAGNOSIS — G89.29 CHRONIC LEFT-SIDED LOW BACK PAIN WITHOUT SCIATICA: ICD-10-CM

## 2022-07-29 PROCEDURE — 99214 OFFICE O/P EST MOD 30 MIN: CPT | Performed by: FAMILY MEDICINE

## 2022-07-29 RX ORDER — BUPROPION HYDROCHLORIDE 150 MG/1
150 TABLET ORAL EVERY MORNING
Qty: 90 TABLET | Refills: 3 | Status: SHIPPED | OUTPATIENT
Start: 2022-07-29

## 2022-07-29 RX ORDER — AMLODIPINE BESYLATE 5 MG/1
5 TABLET ORAL DAILY
Qty: 90 TABLET | Refills: 1 | Status: SHIPPED | OUTPATIENT
Start: 2022-07-29

## 2022-07-29 RX ORDER — QUETIAPINE FUMARATE 50 MG/1
50 TABLET, FILM COATED ORAL
Qty: 90 TABLET | Refills: 1 | Status: SHIPPED | OUTPATIENT
Start: 2022-07-29

## 2022-07-29 SDOH — ECONOMIC STABILITY - INCOME SECURITY: PROBLEM RELATED TO HOUSING AND ECONOMIC CIRCUMSTANCES, UNSPECIFIED: Z59.9

## 2022-07-29 NOTE — PROGRESS NOTES
Assessment/Plan:    No problem-specific Assessment & Plan notes found for this encounter  Diagnoses and all orders for this visit:    Financial difficulties  -     Ambulatory referral to social work care management program; Future    Chronic left-sided low back pain without sciatica  -     Diclofenac Sodium (VOLTAREN) 1 %; Apply 2 g topically 2 (two) times a day as needed (pain)    Chronic pain of both knees  -     Diclofenac Sodium (VOLTAREN) 1 %; Apply 2 g topically 2 (two) times a day as needed (pain)  -     Ambulatory Referral to Physical Therapy; Future    Essential hypertension  -     amLODIPine (NORVASC) 5 mg tablet; Take 1 tablet (5 mg total) by mouth daily  -     QUEtiapine (SEROquel) 50 mg tablet; Take 1 tablet (50 mg total) by mouth daily at bedtime    KAT (obstructive sleep apnea)  -     QUEtiapine (SEROquel) 50 mg tablet; Take 1 tablet (50 mg total) by mouth daily at bedtime    Primary insomnia  -     QUEtiapine (SEROquel) 50 mg tablet; Take 1 tablet (50 mg total) by mouth daily at bedtime    Class 3 severe obesity due to excess calories without serious comorbidity with body mass index (BMI) of 45 0 to 49 9 in adult (HCC)  -     buPROPion (Wellbutrin XL) 150 mg 24 hr tablet; Take 1 tablet (150 mg total) by mouth every morning          Subjective:      Patient ID: Ramsey Peña is a 47 y o  male  48 yo  male with chronic LBP unchanged from prior, ne red flags, no history of trauma  7/10 not radiated worse with weight bearing  Constant    Also complains of difficulty falling and staying asleep, which has improved with Seroquel       The following portions of the patient's history were reviewed and updated as appropriate:   He  has a past medical history of Abnormal blood sugar, Abnormal TSH, Back pain, Bariatric surgery status, Bilateral anterior knee pain, Chronic pain disorder, CPAP (continuous positive airway pressure) dependence, DDD (degenerative disc disease), lumbar, GERD (gastroesophageal reflux disease), Hematuria, History of Helicobacter pylori infection, History of transfusion, Hypertension, Knee pain, Morbid obesity ([facility] [address] ), Postgastrectomy malabsorption, Prediabetes, Sleep apnea, Suspicious nevus, Use of cane as ambulatory aid, and Vitamin D deficiency  He   Patient Active Problem List    Diagnosis Date Noted    S/P gastric bypass 08/12/2021    History of CVA (cerebrovascular accident) 08/05/2021    Chest pain with moderate risk for cardiac etiology 08/05/2021    Ingestion of unknown substance 03/28/2021    Primary insomnia 03/28/2019    Gross hematuria 03/28/2019    Cerebrovascular disease 09/22/2018    Dysesthesia of multiple sites 09/21/2018    Bariatric surgery status 08/28/2018    Postsurgical malabsorption 08/28/2018    Muscle pain 08/28/2018    Varicose veins of both lower extremities with inflammation 07/19/2018    Cystitis 07/19/2018    CPAP (continuous positive airway pressure) dependence     Hypertension     Benign essential hypertension 05/04/2018    Morbid obesity with BMI of 45 0-49 9, adult ([facility] ) 04/10/2018    Benign hypertension 04/10/2018    Gastroesophageal reflux disease 04/10/2018    Dry eye 03/29/2018    KAT (obstructive sleep apnea) 03/12/2018    Benign essential HTN 03/12/2018    GERD (gastroesophageal reflux disease) 03/12/2018    Prediabetes 05/04/2017     He  has a past surgical history that includes pr egd transoral biopsy single/multiple (N/A, 12/13/2017); pr lap gastric bypass/dulce-en-y (N/A, 5/8/2018); and Knee surgery (Left, 07/27/2018)  His family history includes Hypertension in his maternal grandmother and mother; Prostate cancer in his father  He  reports that he has never smoked  He has never used smokeless tobacco  He reports that he does not drink alcohol and does not use drugs    Current Outpatient Medications   Medication Sig Dispense Refill    amLODIPine (NORVASC) 5 mg tablet Take 1 tablet (5 mg total) by mouth daily 90 tablet 1    buPROPion (Wellbutrin XL) 150 mg 24 hr tablet Take 1 tablet (150 mg total) by mouth every morning 90 tablet 3    Diclofenac Sodium (VOLTAREN) 1 % Apply 2 g topically 2 (two) times a day as needed (pain) 350 g 0    QUEtiapine (SEROquel) 50 mg tablet Take 1 tablet (50 mg total) by mouth daily at bedtime 90 tablet 1    calcium carbonate (OS-SYLVIA) 1250 (500 Ca) MG tablet Take 1 tablet by mouth daily      clopidogrel (PLAVIX) 75 mg tablet Take 1 tablet (75 mg total) by mouth daily 90 tablet 1    Multiple Vitamin (MULTIVITAMIN) tablet Take 1 tablet by mouth daily 90 tablet 1    pantoprazole (PROTONIX) 40 mg tablet Take 1 tablet (40 mg total) by mouth daily 90 tablet 1     No current facility-administered medications for this visit  Current Outpatient Medications on File Prior to Visit   Medication Sig    calcium carbonate (OS-SYLVIA) 1250 (500 Ca) MG tablet Take 1 tablet by mouth daily    clopidogrel (PLAVIX) 75 mg tablet Take 1 tablet (75 mg total) by mouth daily    Multiple Vitamin (MULTIVITAMIN) tablet Take 1 tablet by mouth daily    pantoprazole (PROTONIX) 40 mg tablet Take 1 tablet (40 mg total) by mouth daily     No current facility-administered medications on file prior to visit       Review of Systems   Musculoskeletal: Positive for arthralgias and back pain  Psychiatric/Behavioral: Positive for sleep disturbance  All other systems reviewed and are negative  Objective:      /60 (BP Location: Left arm, Patient Position: Sitting, Cuff Size: Adult)   Pulse 102   Temp (!) 97 1 °F (36 2 °C) (Temporal)   Resp 18   Wt (!) 141 kg (310 lb)   SpO2 95%   BMI 47 84 kg/m²          Physical Exam  Vitals and nursing note reviewed  Constitutional:       Appearance: He is well-developed  HENT:      Head: Normocephalic        Right Ear: External ear normal       Left Ear: External ear normal       Nose: Nose normal    Eyes:      Conjunctiva/sclera: Conjunctivae normal  Pupils: Pupils are equal, round, and reactive to light  Neck:      Thyroid: No thyromegaly  Cardiovascular:      Rate and Rhythm: Normal rate and regular rhythm  Heart sounds: Normal heart sounds  Pulmonary:      Effort: Pulmonary effort is normal       Breath sounds: Normal breath sounds  Abdominal:      Palpations: Abdomen is soft  Tenderness: There is no abdominal tenderness  There is no guarding or rebound  Musculoskeletal:         General: Normal range of motion  Cervical back: Normal range of motion and neck supple  Skin:     General: Skin is dry  Neurological:      Mental Status: He is alert and oriented to person, place, and time  Deep Tendon Reflexes: Reflexes are normal and symmetric

## 2022-08-11 ENCOUNTER — PATIENT OUTREACH (OUTPATIENT)
Dept: FAMILY MEDICINE CLINIC | Facility: CLINIC | Age: 52
End: 2022-08-11

## 2022-08-11 NOTE — PROGRESS NOTES
ELBA CARMONA did receive referral from provider  After chart review ELBA CARMONA did call Pt with no response  ELBA CARMONA unable to leave VM for returned call  ELBA CARMONA is remain available for further assistance as needed

## 2022-08-22 ENCOUNTER — PATIENT OUTREACH (OUTPATIENT)
Dept: FAMILY MEDICINE CLINIC | Facility: CLINIC | Age: 52
End: 2022-08-22

## 2022-08-22 NOTE — LETTER
08/22/22    Estimado/a Robinson Machado un coordinador de la atención médica en [address]  [address]  [address]  [phone]  Intentamos comunicarnos con usted por teléfono varias veces 8/11/22 and 8/22/22  Es importante que se comunique con nosotros SW CM at [phone] para que podamos ofrecerle ayuda con gabbie necesidades de [employer]  Atentamente           Dolores VEGA Mahajan

## 2022-08-22 NOTE — PROGRESS NOTES
ELBA CARMONA did place second call to Pt to follow up with no response  ELBA CARMONA left VM with clear details for returned call and will ELBA CARMONA will send letter  ELBA CARMONA is remain available for further assistance as needed

## 2022-09-06 ENCOUNTER — PATIENT OUTREACH (OUTPATIENT)
Dept: FAMILY MEDICINE CLINIC | Facility: CLINIC | Age: 52
End: 2022-09-06

## 2022-09-06 NOTE — PROGRESS NOTES
Per chart review SW CM placed second call to Pt 8/22/22 and sent a letter  Pt did not contact SW CM  SW CM will close this referral due to lack of communication  SW CM is remain available for further assistance as needed

## 2022-09-15 DIAGNOSIS — M54.50 CHRONIC LEFT-SIDED LOW BACK PAIN WITHOUT SCIATICA: ICD-10-CM

## 2022-09-15 DIAGNOSIS — M25.562 CHRONIC PAIN OF BOTH KNEES: ICD-10-CM

## 2022-09-15 DIAGNOSIS — G89.29 CHRONIC LEFT-SIDED LOW BACK PAIN WITHOUT SCIATICA: ICD-10-CM

## 2022-09-15 DIAGNOSIS — G89.29 CHRONIC PAIN OF BOTH KNEES: ICD-10-CM

## 2022-09-15 DIAGNOSIS — M25.561 CHRONIC PAIN OF BOTH KNEES: ICD-10-CM

## 2022-11-04 ENCOUNTER — OFFICE VISIT (OUTPATIENT)
Dept: FAMILY MEDICINE CLINIC | Facility: CLINIC | Age: 52
End: 2022-11-04

## 2022-11-04 VITALS
RESPIRATION RATE: 20 BRPM | TEMPERATURE: 97 F | BODY MASS INDEX: 47.41 KG/M2 | OXYGEN SATURATION: 97 % | DIASTOLIC BLOOD PRESSURE: 88 MMHG | HEIGHT: 68 IN | HEART RATE: 84 BPM | SYSTOLIC BLOOD PRESSURE: 128 MMHG | WEIGHT: 312.8 LBS

## 2022-11-04 DIAGNOSIS — R09.81 NASAL CONGESTION: ICD-10-CM

## 2022-11-04 DIAGNOSIS — I10 ESSENTIAL HYPERTENSION: ICD-10-CM

## 2022-11-04 DIAGNOSIS — Z23 ENCOUNTER FOR IMMUNIZATION: Primary | ICD-10-CM

## 2022-11-04 DIAGNOSIS — H61.23 BILATERAL IMPACTED CERUMEN: ICD-10-CM

## 2022-11-04 RX ORDER — FLUTICASONE PROPIONATE 50 MCG
1 SPRAY, SUSPENSION (ML) NASAL DAILY
Qty: 16 G | Refills: 0 | Status: SHIPPED | OUTPATIENT
Start: 2022-11-04

## 2022-11-04 RX ORDER — AMLODIPINE BESYLATE 5 MG/1
5 TABLET ORAL DAILY
Qty: 90 TABLET | Refills: 1 | Status: SHIPPED | OUTPATIENT
Start: 2022-11-04

## 2022-11-04 NOTE — PROGRESS NOTES
Name: Chemo Johnson      : 1970      MRN: [id number]  Encounter Provider: Emily Rod MD  Encounter Date: 2022   Encounter department: [address] [place] [address]     1  Encounter for immunization  -     influenza vaccine, quadrivalent, recombinant, PF, 0 5 mL, for patients 18 yr+ (FLUBLOK)  -     Age 15 y+, BOOSTER (BIVALENT): Анна 78 vac bivalent paty-sucr    2  Bilateral impacted cerumen  -     carbamide peroxide (DEBROX) 6 5 % otic solution; Administer 5 drops into the left ear 2 (two) times a day    3  Nasal congestion  -     fluticasone (FLONASE) 50 mcg/act nasal spray; 1 spray into each nostril daily    4  Essential hypertension  -     amLODIPine (NORVASC) 5 mg tablet; Take 1 tablet (5 mg total) by mouth daily    5  Obesity  -pt education on complex carbs and portion sizes  -start a food diary           Latosha Millan is a 46 y/o PMH KAT, CVA, obesity, HTN, presenting to the office for a f/u for knee pain  Knee Pain: Pt reports his knee pain is the same  It hurts all the time especially with movement and walking  Pt says he has not tried physical therapy yet  Pt is using diclofenac and ibuprofen for the knee pain with some relief  Pt is having difficulties carrying his son  Pt says his right knee is starting to bother him  Weight gain: Pt reports he is trying to lose weight  He says after his weight loss surgery and weight loss, he has gained a lot of weight  Pt reports his portion sizes are small  He reports sometimes skipping breakfast and not snacking  Pt is not keeping a food journal  He reports he has been eating bread, ramen, rice, beef, cookies, and some candies  COVID f/u: pt reports he was in the ED for COVID about a month ago  His symptoms were mild with some headache, fever, diarrhea   Pt is feeling a little weak and congested but otherwise back to normal  He denies any fevers, chills, cough, wheezing, chest pain, SOB, N/V  Cerumen impaction: Pt reports having some b/l hearing changes and ringing in the ear and some loss of balance  Pt reports no pain, itching, or feelings of fullness  He sometimes uses q-tips  Review of Systems   HENT: Positive for hearing loss and tinnitus  Negative for ear discharge, ear pain, postnasal drip, rhinorrhea, sinus pressure, sinus pain, sneezing and sore throat  Respiratory: Negative for cough  Cardiovascular: Negative for chest pain, palpitations and leg swelling  All other systems reviewed and are negative  Current Outpatient Medications on File Prior to Visit   Medication Sig   • amLODIPine (NORVASC) 5 mg tablet Take 1 tablet (5 mg total) by mouth daily   • buPROPion (Wellbutrin XL) 150 mg 24 hr tablet Take 1 tablet (150 mg total) by mouth every morning   • calcium carbonate (OS-SYLVIA) 1250 (500 Ca) MG tablet Take 1 tablet by mouth daily   • clopidogrel (PLAVIX) 75 mg tablet Take 1 tablet (75 mg total) by mouth daily   • Diclofenac Sodium (VOLTAREN) 1 % APPLY 2 G TOPICALLY 2 (TWO) TIMES A DAY AS NEEDED (PAIN)   • Multiple Vitamin (MULTIVITAMIN) tablet Take 1 tablet by mouth daily   • pantoprazole (PROTONIX) 40 mg tablet Take 1 tablet (40 mg total) by mouth daily   • QUEtiapine (SEROquel) 50 mg tablet Take 1 tablet (50 mg total) by mouth daily at bedtime       Objective     /88 (BP Location: Right arm, Patient Position: Sitting, Cuff Size: Adult)   Pulse 84   Temp (!) 97 °F (36 1 °C) (Temporal)   Resp 20   Ht 5' 7 5" (1 715 m)   Wt (!) 142 kg (312 lb 12 8 oz)   SpO2 97%   BMI 48 27 kg/m²     Physical Exam  Vitals and nursing note reviewed  Constitutional:       General: He is not in acute distress  Appearance: Normal appearance  He is obese  He is not ill-appearing, toxic-appearing or diaphoretic  HENT:      Head: Normocephalic and atraumatic        Ears:      Comments: B/l cerumen impaction     Nose:      Comments: Erythema b/l nares Mouth/Throat:      Mouth: Mucous membranes are moist       Comments: Pharynx is erythematous w/ cobblestoning  Eyes:      Extraocular Movements: Extraocular movements intact  Pupils: Pupils are equal, round, and reactive to light  Cardiovascular:      Rate and Rhythm: Normal rate and regular rhythm  Pulses: Normal pulses  Heart sounds: Normal heart sounds  Pulmonary:      Effort: Pulmonary effort is normal       Breath sounds: Normal breath sounds  Abdominal:      General: Bowel sounds are normal    Musculoskeletal:      Cervical back: Normal range of motion and neck supple  Right lower leg: No edema  Left lower leg: No edema  Comments: Pain to palpation and ROM of L knee   Skin:     General: Skin is warm and dry  Capillary Refill: Capillary refill takes less than 2 seconds  Neurological:      General: No focal deficit present  Mental Status: He is alert  Sensory: No sensory deficit  Motor: No weakness  Psychiatric:         Mood and Affect: Mood normal          Behavior: Behavior normal          Thought Content:  Thought content normal          Judgment: Judgment normal        Ziggy Zamora MD

## 2023-01-02 DIAGNOSIS — R09.81 NASAL CONGESTION: ICD-10-CM

## 2023-01-03 RX ORDER — FLUTICASONE PROPIONATE 50 MCG
SPRAY, SUSPENSION (ML) NASAL
Qty: 24 ML | Refills: 1 | Status: SHIPPED | OUTPATIENT
Start: 2023-01-03

## 2023-01-27 DIAGNOSIS — F51.01 PRIMARY INSOMNIA: ICD-10-CM

## 2023-01-27 DIAGNOSIS — I10 ESSENTIAL HYPERTENSION: ICD-10-CM

## 2023-01-27 DIAGNOSIS — G47.33 OSA (OBSTRUCTIVE SLEEP APNEA): ICD-10-CM

## 2023-01-30 RX ORDER — QUETIAPINE FUMARATE 50 MG/1
TABLET, FILM COATED ORAL
Qty: 90 TABLET | Refills: 1 | Status: SHIPPED | OUTPATIENT
Start: 2023-01-30

## 2023-02-10 ENCOUNTER — OFFICE VISIT (OUTPATIENT)
Dept: FAMILY MEDICINE CLINIC | Facility: CLINIC | Age: 53
End: 2023-02-10

## 2023-02-10 VITALS
OXYGEN SATURATION: 99 % | SYSTOLIC BLOOD PRESSURE: 142 MMHG | RESPIRATION RATE: 18 BRPM | HEIGHT: 68 IN | WEIGHT: 315 LBS | TEMPERATURE: 96.9 F | HEART RATE: 86 BPM | DIASTOLIC BLOOD PRESSURE: 90 MMHG | BODY MASS INDEX: 47.74 KG/M2

## 2023-02-10 DIAGNOSIS — E66.01 MORBID OBESITY WITH BMI OF 50.0-59.9, ADULT (HCC): ICD-10-CM

## 2023-02-10 DIAGNOSIS — Z11.59 NEED FOR HEPATITIS C SCREENING TEST: ICD-10-CM

## 2023-02-10 DIAGNOSIS — R73.03 PREDIABETES: ICD-10-CM

## 2023-02-10 DIAGNOSIS — I10 ESSENTIAL HYPERTENSION: Primary | ICD-10-CM

## 2023-02-10 RX ORDER — AMLODIPINE BESYLATE 5 MG/1
5 TABLET ORAL DAILY
Qty: 90 TABLET | Refills: 1 | Status: SHIPPED | OUTPATIENT
Start: 2023-02-10

## 2023-02-10 NOTE — ASSESSMENT & PLAN NOTE
Above goal  Check BP at home and keep track of it  Encouraged low sodium diet   Continue Amlodipine  If BP at home above 150/90 please call or return for sooner follow up

## 2023-02-10 NOTE — PROGRESS NOTES
Name: Carroll Evans      : 1970      MRN: [id number]  Encounter Provider: Sophia Monreal MD  Encounter Date: 2/10/2023   Encounter department: [address] [place] [address]     1  Essential hypertension  Assessment & Plan:  Above goal  Check BP at home and keep track of it  Encouraged low sodium diet   Continue Amlodipine  If BP at home above 150/90 please call or return for sooner follow up     Orders:  -     amLODIPine (NORVASC) 5 mg tablet; Take 1 tablet (5 mg total) by mouth daily    2  Need for hepatitis C screening test  -     Hepatitis C Antibody ([facility]); Future    3  Morbid obesity with BMI of 50 0-59 9, adult [facility])  Assessment & Plan:  S/p gastric bypass  Does not follow any diet  No exercise  Encouraged to follow low carb diet, keep food journal, cut back on dead calories such as sodas and juice  Increase fiber, whole grains      4  Prediabetes  Assessment & Plan:  Encouraged low carb diet and weight loss           Encouraged patient to continue PT and workers comp  ED precautions reviewed with patient  Subjective      46 yo morbidly obese male states he had a work accident in November  As per patient he reported it to his supervisor but was told to go back to work  Patient states he was in severe pain and felt his job was at fault so he called a worker's comp law firm  Patient currently states he is going through PT and is trying to get workers comp with aid of these attorneys  States has been having LBP since his injury  No red flags  Also complains of fatigue and weight gain  States he hardly eats but feels he is gaining weight     Review of Systems   Musculoskeletal: Positive for arthralgias, back pain and gait problem  All other systems reviewed and are negative        Current Outpatient Medications on File Prior to Visit   Medication Sig   • buPROPion (Wellbutrin XL) 150 mg 24 hr tablet Take 1 tablet (150 mg total) by mouth every morning • calcium carbonate (OS-SYLVIA) 1250 (500 Ca) MG tablet Take 1 tablet by mouth daily   • carbamide peroxide (DEBROX) 6 5 % otic solution Administer 5 drops into the left ear 2 (two) times a day   • clopidogrel (PLAVIX) 75 mg tablet Take 1 tablet (75 mg total) by mouth daily   • Diclofenac Sodium (VOLTAREN) 1 % APPLY 2 G TOPICALLY 2 (TWO) TIMES A DAY AS NEEDED (PAIN)   • fluticasone (FLONASE) 50 mcg/act nasal spray SPRAY 1 SPRAY INTO EACH NOSTRIL EVERY DAY   • Multiple Vitamin (MULTIVITAMIN) tablet Take 1 tablet by mouth daily   • pantoprazole (PROTONIX) 40 mg tablet Take 1 tablet (40 mg total) by mouth daily   • QUEtiapine (SEROquel) 50 mg tablet TAKE 1 TABLET BY MOUTH DAILY AT BEDTIME   • [DISCONTINUED] amLODIPine (NORVASC) 5 mg tablet Take 1 tablet (5 mg total) by mouth daily       Objective     /90 (BP Location: Right arm, Patient Position: Sitting, Cuff Size: Large)   Pulse 86   Temp (!) 96 9 °F (36 1 °C) (Temporal)   Resp 18   Ht 5' 7 5" (1 715 m)   Wt (!) 148 kg (327 lb)   SpO2 99%   BMI 50 46 kg/m²     Physical Exam  Vitals and nursing note reviewed  Constitutional:       Appearance: He is well-developed  HENT:      Head: Normocephalic  Right Ear: External ear normal       Left Ear: External ear normal       Nose: Nose normal    Eyes:      Conjunctiva/sclera: Conjunctivae normal       Pupils: Pupils are equal, round, and reactive to light  Neck:      Thyroid: No thyromegaly  Cardiovascular:      Rate and Rhythm: Normal rate and regular rhythm  Heart sounds: Normal heart sounds  Pulmonary:      Effort: Pulmonary effort is normal       Breath sounds: Normal breath sounds  Abdominal:      Palpations: Abdomen is soft  Tenderness: There is no abdominal tenderness  There is no guarding or rebound  Musculoskeletal:         General: Tenderness present  Normal range of motion  Cervical back: Normal range of motion and neck supple        Thoracic back: Spasms and tenderness present  Lumbar back: Spasms and tenderness present  Skin:     General: Skin is dry  Neurological:      Mental Status: He is alert and oriented to person, place, and time  Deep Tendon Reflexes: Reflexes are normal and symmetric  Noam Daniel MD BMI Counseling: Body mass index is 50 46 kg/m²  The BMI is above normal  Nutrition recommendations include reducing portion sizes, decreasing overall calorie intake, 3-5 servings of fruits/vegetables daily, reducing fast food intake, consuming healthier snacks, decreasing soda and/or juice intake, moderation in carbohydrate intake, increasing intake of lean protein, reducing intake of saturated fat and trans fat and reducing intake of cholesterol  Exercise recommendations include moderate aerobic physical activity for 150 minutes/week, exercising 3-5 times per week and strength training exercises

## 2023-02-10 NOTE — ASSESSMENT & PLAN NOTE
S/p gastric bypass  Does not follow any diet  No exercise  Encouraged to follow low carb diet, keep food journal, cut back on dead calories such as sodas and juice   Increase fiber, whole grains

## 2023-03-10 DIAGNOSIS — R09.81 NASAL CONGESTION: ICD-10-CM

## 2023-03-13 RX ORDER — FLUTICASONE PROPIONATE 50 MCG
SPRAY, SUSPENSION (ML) NASAL
Qty: 24 ML | Refills: 2 | Status: SHIPPED | OUTPATIENT
Start: 2023-03-13

## 2023-06-21 DIAGNOSIS — I10 ESSENTIAL HYPERTENSION: ICD-10-CM

## 2023-06-21 DIAGNOSIS — G47.33 OSA (OBSTRUCTIVE SLEEP APNEA): ICD-10-CM

## 2023-06-21 DIAGNOSIS — I63.9 RIGHT-SIDED CEREBROVASCULAR ACCIDENT (CVA) (HCC): ICD-10-CM

## 2023-06-21 DIAGNOSIS — F51.01 PRIMARY INSOMNIA: ICD-10-CM

## 2023-06-21 RX ORDER — QUETIAPINE FUMARATE 50 MG/1
TABLET, FILM COATED ORAL
Qty: 90 TABLET | Refills: 2 | Status: SHIPPED | OUTPATIENT
Start: 2023-06-21

## 2023-06-22 RX ORDER — CLOPIDOGREL BISULFATE 75 MG/1
75 TABLET ORAL DAILY
Qty: 90 TABLET | Refills: 1 | Status: SHIPPED | OUTPATIENT
Start: 2023-06-22

## 2023-07-22 ENCOUNTER — HOSPITAL ENCOUNTER (EMERGENCY)
Facility: HOSPITAL | Age: 53
Discharge: HOME | End: 2023-07-22
Attending: EMERGENCY MEDICINE | Admitting: EMERGENCY MEDICINE
Payer: COMMERCIAL

## 2023-07-22 ENCOUNTER — APPOINTMENT (EMERGENCY)
Dept: CT IMAGING | Facility: HOSPITAL | Age: 53
End: 2023-07-22
Payer: COMMERCIAL

## 2023-07-22 VITALS
DIASTOLIC BLOOD PRESSURE: 81 MMHG | SYSTOLIC BLOOD PRESSURE: 132 MMHG | WEIGHT: 315 LBS | TEMPERATURE: 98 F | OXYGEN SATURATION: 98 % | BODY MASS INDEX: 53.89 KG/M2 | HEART RATE: 93 BPM | RESPIRATION RATE: 18 BRPM

## 2023-07-22 DIAGNOSIS — H61.21 IMPACTED CERUMEN OF RIGHT EAR: ICD-10-CM

## 2023-07-22 DIAGNOSIS — R51.9 HEADACHE: Primary | ICD-10-CM

## 2023-07-22 LAB
ANION GAP SERPL CALCULATED.3IONS-SCNC: 4 MMOL/L
BASOPHILS # BLD AUTO: 0.02 THOUSANDS/ÂΜL (ref 0–0.1)
BASOPHILS NFR BLD AUTO: 0 % (ref 0–1)
BUN SERPL-MCNC: 12 MG/DL (ref 5–25)
CALCIUM SERPL-MCNC: 8.1 MG/DL (ref 8.4–10.2)
CHLORIDE SERPL-SCNC: 104 MMOL/L (ref 96–108)
CO2 SERPL-SCNC: 30 MMOL/L (ref 21–32)
CREAT SERPL-MCNC: 0.81 MG/DL (ref 0.6–1.3)
EOSINOPHIL # BLD AUTO: 0.11 THOUSAND/ÂΜL (ref 0–0.61)
EOSINOPHIL NFR BLD AUTO: 2 % (ref 0–6)
ERYTHROCYTE [DISTWIDTH] IN BLOOD BY AUTOMATED COUNT: 13.6 % (ref 11.6–15.1)
GFR SERPL CREATININE-BSD FRML MDRD: 102 ML/MIN/1.73SQ M
GLUCOSE SERPL-MCNC: 110 MG/DL (ref 65–140)
HCT VFR BLD AUTO: 42.3 % (ref 36.5–49.3)
HGB BLD-MCNC: 13.7 G/DL (ref 12–17)
IMM GRANULOCYTES # BLD AUTO: 0.01 THOUSAND/UL (ref 0–0.2)
IMM GRANULOCYTES NFR BLD AUTO: 0 % (ref 0–2)
LYMPHOCYTES # BLD AUTO: 2.77 THOUSANDS/ÂΜL (ref 0.6–4.47)
LYMPHOCYTES NFR BLD AUTO: 39 % (ref 14–44)
MCH RBC QN AUTO: 30 PG (ref 26.8–34.3)
MCHC RBC AUTO-ENTMCNC: 32.4 G/DL (ref 31.4–37.4)
MCV RBC AUTO: 93 FL (ref 82–98)
MONOCYTES # BLD AUTO: 0.7 THOUSAND/ÂΜL (ref 0.17–1.22)
MONOCYTES NFR BLD AUTO: 10 % (ref 4–12)
NEUTROPHILS # BLD AUTO: 3.56 THOUSANDS/ÂΜL (ref 1.85–7.62)
NEUTS SEG NFR BLD AUTO: 49 % (ref 43–75)
NRBC BLD AUTO-RTO: 0 /100 WBCS
PLATELET # BLD AUTO: 167 THOUSANDS/UL (ref 149–390)
PMV BLD AUTO: 10 FL (ref 8.9–12.7)
POTASSIUM SERPL-SCNC: 4.8 MMOL/L (ref 3.5–5.3)
RBC # BLD AUTO: 4.57 MILLION/UL (ref 3.88–5.62)
SODIUM SERPL-SCNC: 138 MMOL/L (ref 135–147)
WBC # BLD AUTO: 7.17 THOUSAND/UL (ref 4.31–10.16)

## 2023-07-22 PROCEDURE — 96374 THER/PROPH/DIAG INJ IV PUSH: CPT

## 2023-07-22 PROCEDURE — 99284 EMERGENCY DEPT VISIT MOD MDM: CPT | Performed by: EMERGENCY MEDICINE

## 2023-07-22 PROCEDURE — 36415 COLL VENOUS BLD VENIPUNCTURE: CPT | Performed by: EMERGENCY MEDICINE

## 2023-07-22 PROCEDURE — 80048 BASIC METABOLIC PNL TOTAL CA: CPT | Performed by: EMERGENCY MEDICINE

## 2023-07-22 PROCEDURE — G1004 CDSM NDSC: HCPCS

## 2023-07-22 PROCEDURE — 85025 COMPLETE CBC W/AUTO DIFF WBC: CPT | Performed by: EMERGENCY MEDICINE

## 2023-07-22 PROCEDURE — 99285 EMERGENCY DEPT VISIT HI MDM: CPT

## 2023-07-22 PROCEDURE — 70450 CT HEAD/BRAIN W/O DYE: CPT

## 2023-07-22 RX ORDER — KETOROLAC TROMETHAMINE 30 MG/ML
15 INJECTION, SOLUTION INTRAMUSCULAR; INTRAVENOUS ONCE
Status: COMPLETED | OUTPATIENT
Start: 2023-07-22 | End: 2023-07-22

## 2023-07-22 RX ADMIN — KETOROLAC TROMETHAMINE 15 MG: 30 INJECTION, SOLUTION INTRAMUSCULAR; INTRAVENOUS at 02:13

## 2023-07-22 NOTE — ED PROVIDER NOTES
History  Chief Complaint   Patient presents with   • Headache     Headache and right ear pain that started last night      81-year-old male history of CVA, hypertension, hyperlipidemia who presents with headache and right ear pain. Starting yesterday, patient started to experience right ear pain and a new frontal headache. No other associated symptoms. No nausea/vomiting, numbness/weakness, chest pain, shortness of breath. Prior to Admission Medications   Prescriptions Last Dose Informant Patient Reported? Taking?    Diclofenac Sodium (VOLTAREN) 1 %   No No   Sig: APPLY 2 G TOPICALLY 2 (TWO) TIMES A DAY AS NEEDED (PAIN)   Multiple Vitamin (MULTIVITAMIN) tablet   No No   Sig: Take 1 tablet by mouth daily   QUEtiapine (SEROquel) 50 mg tablet   No No   Sig: TAKE 1 TABLET BY MOUTH EVERYDAY AT BEDTIME   amLODIPine (NORVASC) 5 mg tablet   No No   Sig: Take 1 tablet (5 mg total) by mouth daily   buPROPion (Wellbutrin XL) 150 mg 24 hr tablet   No No   Sig: Take 1 tablet (150 mg total) by mouth every morning   calcium carbonate (OS-SYLVIA) 1250 (500 Ca) MG tablet  Self Yes No   Sig: Take 1 tablet by mouth daily   carbamide peroxide (DEBROX) 6.5 % otic solution   No No   Sig: Administer 5 drops into the left ear 2 (two) times a day   clopidogrel (PLAVIX) 75 mg tablet   No No   Sig: Take 1 tablet (75 mg total) by mouth daily   fluticasone (FLONASE) 50 mcg/act nasal spray   No No   Sig: SPRAY 1 SPRAY INTO EACH NOSTRIL EVERY DAY   pantoprazole (PROTONIX) 40 mg tablet   No No   Sig: Take 1 tablet (40 mg total) by mouth daily      Facility-Administered Medications: None       Past Medical History:   Diagnosis Date   • Abnormal blood sugar    • Abnormal TSH    • Back pain    • Bariatric surgery status    • Bilateral anterior knee pain    • Chronic pain disorder     back   • CPAP (continuous positive airway pressure) dependence    • DDD (degenerative disc disease), lumbar    • GERD (gastroesophageal reflux disease)    • Hematuria    • History of Helicobacter pylori infection    • History of transfusion    • Hypertension    • Knee pain    • Morbid obesity ([address])    • Postgastrectomy malabsorption    • Prediabetes    • Sleep apnea    • Suspicious nevus    • Use of cane as ambulatory aid    • Vitamin D deficiency        Past Surgical History:   Procedure Laterality Date   • KNEE SURGERY Left 07/27/2018   • LA EGD TRANSORAL BIOPSY SINGLE/MULTIPLE N/A 12/13/2017    Procedure: ESOPHAGOGASTRODUODENOSCOPY (EGD), with BIOPSY;  Surgeon: Meliton Leblanc MD;  Location: [facility]; Service: Bariatrics   • LA LAPS GSTR RSTCV PX W/BYP ENRICO-EN-Y LIMB <150 CM N/A 5/8/2018    Procedure: BYPASS GASTRIC  ENRICO-EN-Y LAPAROSCOPIC AND INTRAOPERATIVE EGD;  Surgeon: Meliton Leblanc MD;  Location: [facility];  Service: Bariatrics       Family History   Problem Relation Age of Onset   • Hypertension Mother    • Prostate cancer Father         ALSO PROSTATE ENLARGEMENT   • Hypertension Maternal Grandmother      I have reviewed and agree with the history as documented. E-Cigarette/Vaping   • E-Cigarette Use Never User      E-Cigarette/Vaping Substances   • Nicotine No    • THC No    • CBD No    • Flavoring No    • Other No    • Unknown No      Social History     Tobacco Use   • Smoking status: Never   • Smokeless tobacco: Never   Vaping Use   • Vaping Use: Never used   Substance Use Topics   • Alcohol use: Never   • Drug use: No       Review of Systems   Constitutional: Negative for chills, fatigue and fever. HENT: Positive for ear pain. Negative for rhinorrhea, sore throat and trouble swallowing. Eyes: Negative for photophobia and visual disturbance. Respiratory: Negative for cough, chest tightness and shortness of breath. Cardiovascular: Negative for chest pain, palpitations and leg swelling. Gastrointestinal: Negative for abdominal pain, blood in stool, diarrhea, nausea and vomiting. Endocrine: Negative for polyuria.    Genitourinary: Negative for dysuria, flank pain and hematuria. Musculoskeletal: Negative for back pain and neck pain. Skin: Negative for color change and rash. Allergic/Immunologic: Negative for immunocompromised state. Neurological: Positive for headaches. Negative for dizziness, weakness, light-headedness and numbness. All other systems reviewed and are negative. Physical Exam  Physical Exam  Vitals and nursing note reviewed. Constitutional:       General: He is not in acute distress. Appearance: He is well-developed. HENT:      Head: Normocephalic and atraumatic. Right Ear: Hearing, ear canal and external ear normal. There is impacted cerumen. Left Ear: Hearing, tympanic membrane, ear canal and external ear normal.      Mouth/Throat:      Lips: Pink. Mouth: Mucous membranes are moist.   Eyes:      General: Lids are normal.      Extraocular Movements: Extraocular movements intact. Conjunctiva/sclera: Conjunctivae normal.      Pupils: Pupils are equal, round, and reactive to light. Cardiovascular:      Rate and Rhythm: Normal rate and regular rhythm. Heart sounds: Normal heart sounds. No murmur heard. Pulmonary:      Effort: Pulmonary effort is normal.      Breath sounds: Normal breath sounds. Abdominal:      General: There is no distension. Palpations: Abdomen is soft. Tenderness: There is no abdominal tenderness. There is no guarding or rebound. Musculoskeletal:         General: No swelling. Cervical back: Full passive range of motion without pain, normal range of motion and neck supple. Skin:     General: Skin is warm. Capillary Refill: Capillary refill takes less than 2 seconds. Findings: No rash. Neurological:      General: No focal deficit present. Mental Status: He is alert and oriented to person, place, and time. GCS: GCS eye subscore is 4. GCS verbal subscore is 5. GCS motor subscore is 6.       Cranial Nerves: Cranial nerves 2-12 are intact. Sensory: Sensation is intact. Motor: Motor function is intact.    Psychiatric:         Mood and Affect: Mood normal.         Speech: Speech normal.         Behavior: Behavior normal.         Vital Signs  ED Triage Vitals   Temperature Pulse Respirations Blood Pressure SpO2   07/22/23 0141 07/22/23 0141 07/22/23 0141 07/22/23 0141 07/22/23 0141   98 °F (36.7 °C) 93 18 132/81 98 %      Temp src Heart Rate Source Patient Position - Orthostatic VS BP Location FiO2 (%)   -- 07/22/23 0141 07/22/23 0141 07/22/23 0141 --    Monitor Sitting Right arm       Pain Score       07/22/23 0213       8           Vitals:    07/22/23 0141   BP: 132/81   Pulse: 93   Patient Position - Orthostatic VS: Sitting         Visual Acuity  Visual Acuity    Flowsheet Row Most Recent Value   L Pupil Size (mm) 3   R Pupil Size (mm) 3          ED Medications  Medications   ketorolac (TORADOL) injection 15 mg (15 mg Intravenous Given 7/22/23 0213)       Diagnostic Studies  Results Reviewed     Procedure Component Value Units Date/Time    Basic metabolic panel [[id number]]  (Abnormal) Collected: 07/22/23 0213    Lab Status: Final result Specimen: Blood from Arm, Left Updated: 07/22/23 0246     Sodium 138 mmol/L      Potassium 4.8 mmol/L      Chloride 104 mmol/L      CO2 30 mmol/L      ANION GAP 4 mmol/L      BUN 12 mg/dL      Creatinine 0.81 mg/dL      Glucose 110 mg/dL      Calcium 8.1 mg/dL      eGFR 102 ml/min/1.73sq m     Narrative:      [place] guidelines for Chronic Kidney Disease (CKD):   •  Stage 1 with normal or high GFR (GFR > 90 mL/min/1.73 square meters)  •  Stage 2 Mild CKD (GFR = 60-89 mL/min/1.73 square meters)  •  Stage 3A Moderate CKD (GFR = 45-59 mL/min/1.73 square meters)  •  Stage 3B Moderate CKD (GFR = 30-44 mL/min/1.73 square meters)  •  Stage 4 Severe CKD (GFR = 15-29 mL/min/1.73 square meters)  •  Stage 5 End Stage CKD (GFR <15 mL/min/1.73 square meters)  Note: GFR calculation is accurate only with a steady state creatinine    CBC and differential [[id number]] Collected: 07/22/23 0213    Lab Status: Final result Specimen: Blood from Arm, Left Updated: 07/22/23 0225     WBC 7.17 Thousand/uL      RBC 4.57 Million/uL      Hemoglobin 13.7 g/dL      Hematocrit 42.3 %      MCV 93 fL      MCH 30.0 pg      MCHC 32.4 g/dL      RDW 13.6 %      MPV 10.0 fL      Platelets 267 Thousands/uL      nRBC 0 /100 WBCs      Neutrophils Relative 49 %      Immat GRANS % 0 %      Lymphocytes Relative 39 %      Monocytes Relative 10 %      Eosinophils Relative 2 %      Basophils Relative 0 %      Neutrophils Absolute 3.56 Thousands/µL      Immature Grans Absolute 0.01 Thousand/uL      Lymphocytes Absolute 2.77 Thousands/µL      Monocytes Absolute 0.70 Thousand/µL      Eosinophils Absolute 0.11 Thousand/µL      Basophils Absolute 0.02 Thousands/µL                  CT head without contrast   Final Result by Jami Amanda MD (07/22 0320)      No acute intracranial abnormality. Workstation performed: [id number]                    Procedures  Procedures         ED Course  ED Course as of 07/22/23 0335   Sat Jul 22, 2023 0245 CT head without contrast  No acute intracranial abnormality as interpreted myself. SBIRT 20yo+    Flowsheet Row Most Recent Value   Initial Alcohol Screen: US AUDIT-C     1. How often do you have a drink containing alcohol? 0 Filed at: 07/22/2023 0243   2. How many drinks containing alcohol do you have on a typical day you are drinking? 0 Filed at: 07/22/2023 0243   3a. Male UNDER 65: How often do you have five or more drinks on one occasion? 0 Filed at: 07/22/2023 0243   3b. FEMALE Any Age, or MALE 65+: How often do you have 4 or more drinks on one occassion? 0 Filed at: 07/22/2023 0243   Audit-C Score 0 Filed at: 07/22/2023 5850   [place]: How many times in the past year have you. ..     Used an illegal drug or used a prescription medication for non-medical reasons? Never Filed at: 07/22/2023 0243                    Medical Decision Making  Symptoms related to impacted cerumen versus tension headache versus migraine. Less likely CVA or intracranial bleed. -CBC to evaluate for evidence of leukocytosis or anemia. -BMP to evaluate electrolytes and renal function. -CT head to evaluate for intracranial abnormality given that this is a new headache for the patient. -IV Toradol for headache. Headache: complicated acute illness or injury with systemic symptoms  Impacted cerumen of right ear: self-limited or minor problem  Amount and/or Complexity of Data Reviewed  Labs: ordered. Radiology: ordered and independent interpretation performed. Decision-making details documented in ED Course. Risk  OTC drugs. Prescription drug management. Disposition  Final diagnoses:   Headache   Impacted cerumen of right ear     Time reflects when diagnosis was documented in both MDM as applicable and the Disposition within this note     Time User Action Codes Description Comment    7/22/2023  3:33 AM Merribeth Alpers Add [R51.9] Headache     7/22/2023  3:34 AM Merribeth Alpers Add [H61.21] Impacted cerumen of right ear       ED Disposition     ED Disposition   Discharge    Condition   Stable    Date/Time   Sat Jul 22, 2023  3:33 AM    [address] [place] [address] discharge to home/self care.                Follow-up Information     Follow up With Specialties Details Why Contact Info Additional [address] Otolaryngology Schedule an appointment as soon as possible for a visit  for follow up cerumen impaction [address]  [address]  [facility] [address]  [address], [address] [place], [place] [address]    [address] [facility] Emergency Department Emergency Medicine Go to  If symptoms worsen [address] [address]  [facility] Emergency Department, [address] [place], [place], [address]          Patient's Medications   Discharge Prescriptions    CARBAMIDE PEROXIDE (DEBROX) 6.5 % OTIC SOLUTION    Administer 5 drops into ears 2 (two) times a day       Start Date: 7/22/2023 End Date: --       Order Dose: 5 drops       Quantity: 15 mL    Refills: 0       No discharge procedures on file.     PDMP Review       Value Time User    PDMP Reviewed  Yes 3/29/2021  5:20 PM Yolande Hendrix MD          ED Provider  Electronically Signed by           Shanice Walton MD  07/22/23 0519

## 2023-07-26 DIAGNOSIS — E66.01 CLASS 3 SEVERE OBESITY DUE TO EXCESS CALORIES WITHOUT SERIOUS COMORBIDITY WITH BODY MASS INDEX (BMI) OF 45.0 TO 49.9 IN ADULT (HCC): ICD-10-CM

## 2023-07-27 RX ORDER — BUPROPION HYDROCHLORIDE 150 MG/1
150 TABLET ORAL EVERY MORNING
Qty: 90 TABLET | Refills: 3 | Status: SHIPPED | OUTPATIENT
Start: 2023-07-27

## 2023-08-22 DIAGNOSIS — I10 ESSENTIAL HYPERTENSION: ICD-10-CM

## 2023-08-22 RX ORDER — AMLODIPINE BESYLATE 5 MG/1
5 TABLET ORAL DAILY
Qty: 90 TABLET | Refills: 1 | OUTPATIENT
Start: 2023-08-22

## 2023-10-12 ENCOUNTER — TELEPHONE (OUTPATIENT)
Dept: FAMILY MEDICINE CLINIC | Facility: CLINIC | Age: 53
End: 2023-10-12

## 2023-10-26 NOTE — TELEPHONE ENCOUNTER
10/25/23  11:47 PM    Thank you for your request.  This PCP item is one the locked fields and cannot be edited; it is informational. PCP is not showing in the PCP-General field. PCP removal request is not needed.     Thank you  Adrien Ramirez

## 2023-11-30 ENCOUNTER — APPOINTMENT (EMERGENCY)
Dept: RADIOLOGY | Facility: HOSPITAL | Age: 53
End: 2023-11-30
Payer: COMMERCIAL

## 2023-11-30 ENCOUNTER — HOSPITAL ENCOUNTER (EMERGENCY)
Facility: HOSPITAL | Age: 53
Discharge: HOME | End: 2023-11-30
Attending: EMERGENCY MEDICINE
Payer: COMMERCIAL

## 2023-11-30 VITALS
OXYGEN SATURATION: 98 % | TEMPERATURE: 98.5 F | SYSTOLIC BLOOD PRESSURE: 138 MMHG | WEIGHT: 315 LBS | BODY MASS INDEX: 53.41 KG/M2 | RESPIRATION RATE: 19 BRPM | DIASTOLIC BLOOD PRESSURE: 82 MMHG | HEART RATE: 69 BPM

## 2023-11-30 DIAGNOSIS — R31.9 HEMATURIA: ICD-10-CM

## 2023-11-30 DIAGNOSIS — R07.89 ATYPICAL CHEST PAIN: Primary | ICD-10-CM

## 2023-11-30 LAB
2HR DELTA HS TROPONIN: -3 NG/L
ALBUMIN SERPL BCP-MCNC: 4.1 G/DL (ref 3.5–5)
ALP SERPL-CCNC: 84 U/L (ref 34–104)
ALT SERPL W P-5'-P-CCNC: 21 U/L (ref 7–52)
ANION GAP SERPL CALCULATED.3IONS-SCNC: 6 MMOL/L
APTT PPP: 26 SECONDS (ref 23–37)
AST SERPL W P-5'-P-CCNC: 27 U/L (ref 13–39)
ATRIAL RATE: 70 BPM
ATRIAL RATE: 73 BPM
BACTERIA UR QL AUTO: ABNORMAL /HPF
BASOPHILS # BLD AUTO: 0.03 THOUSANDS/ÂΜL (ref 0–0.1)
BASOPHILS NFR BLD AUTO: 0 % (ref 0–1)
BILIRUB SERPL-MCNC: 0.52 MG/DL (ref 0.2–1)
BILIRUB UR QL STRIP: NEGATIVE
BUN SERPL-MCNC: 10 MG/DL (ref 5–25)
CALCIUM SERPL-MCNC: 8.6 MG/DL (ref 8.4–10.2)
CARDIAC TROPONIN I PNL SERPL HS: 3 NG/L
CARDIAC TROPONIN I PNL SERPL HS: 6 NG/L
CHLORIDE SERPL-SCNC: 103 MMOL/L (ref 96–108)
CLARITY UR: CLEAR
CO2 SERPL-SCNC: 30 MMOL/L (ref 21–32)
COLOR UR: YELLOW
CREAT SERPL-MCNC: 0.86 MG/DL (ref 0.6–1.3)
EOSINOPHIL # BLD AUTO: 0.08 THOUSAND/ÂΜL (ref 0–0.61)
EOSINOPHIL NFR BLD AUTO: 1 % (ref 0–6)
ERYTHROCYTE [DISTWIDTH] IN BLOOD BY AUTOMATED COUNT: 14 % (ref 11.6–15.1)
GFR SERPL CREATININE-BSD FRML MDRD: 99 ML/MIN/1.73SQ M
GLUCOSE SERPL-MCNC: 84 MG/DL (ref 65–140)
GLUCOSE UR STRIP-MCNC: NEGATIVE MG/DL
HCT VFR BLD AUTO: 43.2 % (ref 36.5–49.3)
HGB BLD-MCNC: 14.3 G/DL (ref 12–17)
HGB UR QL STRIP.AUTO: ABNORMAL
IMM GRANULOCYTES # BLD AUTO: 0.01 THOUSAND/UL (ref 0–0.2)
IMM GRANULOCYTES NFR BLD AUTO: 0 % (ref 0–2)
INR PPP: 1.06 (ref 0.84–1.19)
KETONES UR STRIP-MCNC: NEGATIVE MG/DL
LEUKOCYTE ESTERASE UR QL STRIP: ABNORMAL
LYMPHOCYTES # BLD AUTO: 2.97 THOUSANDS/ÂΜL (ref 0.6–4.47)
LYMPHOCYTES NFR BLD AUTO: 43 % (ref 14–44)
MCH RBC QN AUTO: 29.7 PG (ref 26.8–34.3)
MCHC RBC AUTO-ENTMCNC: 33.1 G/DL (ref 31.4–37.4)
MCV RBC AUTO: 90 FL (ref 82–98)
MONOCYTES # BLD AUTO: 0.58 THOUSAND/ÂΜL (ref 0.17–1.22)
MONOCYTES NFR BLD AUTO: 8 % (ref 4–12)
MUCOUS THREADS UR QL AUTO: ABNORMAL
NEUTROPHILS # BLD AUTO: 3.22 THOUSANDS/ÂΜL (ref 1.85–7.62)
NEUTS SEG NFR BLD AUTO: 48 % (ref 43–75)
NITRITE UR QL STRIP: NEGATIVE
NON-SQ EPI CELLS URNS QL MICRO: ABNORMAL /HPF
NRBC BLD AUTO-RTO: 0 /100 WBCS
P AXIS: 67 DEGREES
P AXIS: 75 DEGREES
PH UR STRIP.AUTO: 7 [PH] (ref 4.5–8)
PLATELET # BLD AUTO: 185 THOUSANDS/UL (ref 149–390)
PMV BLD AUTO: 10.8 FL (ref 8.9–12.7)
POTASSIUM SERPL-SCNC: 4.1 MMOL/L (ref 3.5–5.3)
PR INTERVAL: 184 MS
PR INTERVAL: 194 MS
PROT SERPL-MCNC: 6.8 G/DL (ref 6.4–8.4)
PROT UR STRIP-MCNC: ABNORMAL MG/DL
PROTHROMBIN TIME: 14 SECONDS (ref 11.6–14.5)
QRS AXIS: 66 DEGREES
QRS AXIS: 69 DEGREES
QRSD INTERVAL: 86 MS
QRSD INTERVAL: 90 MS
QT INTERVAL: 376 MS
QT INTERVAL: 408 MS
QTC INTERVAL: 406 MS
QTC INTERVAL: 449 MS
RBC # BLD AUTO: 4.81 MILLION/UL (ref 3.88–5.62)
RBC #/AREA URNS AUTO: ABNORMAL /HPF
SODIUM SERPL-SCNC: 139 MMOL/L (ref 135–147)
SP GR UR STRIP.AUTO: 1.02 (ref 1–1.03)
T WAVE AXIS: 43 DEGREES
T WAVE AXIS: 44 DEGREES
UROBILINOGEN UR QL STRIP.AUTO: 0.2 E.U./DL
VENTRICULAR RATE: 70 BPM
VENTRICULAR RATE: 73 BPM
WBC # BLD AUTO: 6.89 THOUSAND/UL (ref 4.31–10.16)
WBC #/AREA URNS AUTO: ABNORMAL /HPF

## 2023-11-30 PROCEDURE — 99285 EMERGENCY DEPT VISIT HI MDM: CPT | Performed by: EMERGENCY MEDICINE

## 2023-11-30 PROCEDURE — 96374 THER/PROPH/DIAG INJ IV PUSH: CPT

## 2023-11-30 PROCEDURE — 85610 PROTHROMBIN TIME: CPT | Performed by: EMERGENCY MEDICINE

## 2023-11-30 PROCEDURE — 81001 URINALYSIS AUTO W/SCOPE: CPT

## 2023-11-30 PROCEDURE — 36415 COLL VENOUS BLD VENIPUNCTURE: CPT | Performed by: EMERGENCY MEDICINE

## 2023-11-30 PROCEDURE — 71045 X-RAY EXAM CHEST 1 VIEW: CPT

## 2023-11-30 PROCEDURE — 84484 ASSAY OF TROPONIN QUANT: CPT | Performed by: EMERGENCY MEDICINE

## 2023-11-30 PROCEDURE — 85730 THROMBOPLASTIN TIME PARTIAL: CPT | Performed by: EMERGENCY MEDICINE

## 2023-11-30 PROCEDURE — 93005 ELECTROCARDIOGRAM TRACING: CPT

## 2023-11-30 PROCEDURE — 99285 EMERGENCY DEPT VISIT HI MDM: CPT

## 2023-11-30 PROCEDURE — 80053 COMPREHEN METABOLIC PANEL: CPT | Performed by: EMERGENCY MEDICINE

## 2023-11-30 PROCEDURE — 85025 COMPLETE CBC W/AUTO DIFF WBC: CPT | Performed by: EMERGENCY MEDICINE

## 2023-11-30 RX ORDER — KETOROLAC TROMETHAMINE 30 MG/ML
15 INJECTION, SOLUTION INTRAMUSCULAR; INTRAVENOUS ONCE
Status: COMPLETED | OUTPATIENT
Start: 2023-11-30 | End: 2023-11-30

## 2023-11-30 RX ADMIN — KETOROLAC TROMETHAMINE 15 MG: 30 INJECTION, SOLUTION INTRAMUSCULAR; INTRAVENOUS at 01:39

## 2023-11-30 NOTE — ED PROVIDER NOTES
History  Chief Complaint   Patient presents with    Chest Pain     States developed L sided chest pain starting around 2130. States also was urinating blood. 70-year-old male who presents with chest pain. 2 hours ago, patient started to experience a throbbing left-sided chest pain associated with a similar sensation in his head. No nausea/vomiting, diaphoresis, exertional component. Patient also admits to hematuria earlier today. He had another episode of urination this evening described as urine mixed with blood. Prior to Admission Medications   Prescriptions Last Dose Informant Patient Reported? Taking?    Diclofenac Sodium (VOLTAREN) 1 %   No Yes   Sig: APPLY 2 G TOPICALLY 2 (TWO) TIMES A DAY AS NEEDED (PAIN)   Multiple Vitamin (MULTIVITAMIN) tablet   No Yes   Sig: Take 1 tablet by mouth daily   QUEtiapine (SEROquel) 50 mg tablet   No Yes   Sig: TAKE 1 TABLET BY MOUTH EVERYDAY AT BEDTIME   amLODIPine (NORVASC) 5 mg tablet   No Yes   Sig: Take 1 tablet (5 mg total) by mouth daily   buPROPion (WELLBUTRIN XL) 150 mg 24 hr tablet   No Yes   Sig: TAKE 1 TABLET BY MOUTH EVERY DAY IN THE MORNING   calcium carbonate (OS-SYLVIA) 1250 (500 Ca) MG tablet  Self Yes Yes   Sig: Take 1 tablet by mouth daily   carbamide peroxide (DEBROX) 6.5 % otic solution   No Yes   Sig: Administer 5 drops into the left ear 2 (two) times a day   carbamide peroxide (DEBROX) 6.5 % otic solution   No Yes   Sig: Administer 5 drops into ears 2 (two) times a day   clopidogrel (PLAVIX) 75 mg tablet   No Yes   Sig: Take 1 tablet (75 mg total) by mouth daily   fluticasone (FLONASE) 50 mcg/act nasal spray   No Yes   Sig: SPRAY 1 SPRAY INTO EACH NOSTRIL EVERY DAY   pantoprazole (PROTONIX) 40 mg tablet   No Yes   Sig: Take 1 tablet (40 mg total) by mouth daily      Facility-Administered Medications: None       Past Medical History:   Diagnosis Date    Abnormal blood sugar     Abnormal TSH     Back pain     Bariatric surgery status Bilateral anterior knee pain     Chronic pain disorder     back    CPAP (continuous positive airway pressure) dependence     DDD (degenerative disc disease), lumbar     GERD (gastroesophageal reflux disease)     Hematuria     History of Helicobacter pylori infection     History of transfusion     Hypertension     Knee pain     Morbid obesity ([address])     Postgastrectomy malabsorption     Prediabetes     Sleep apnea     Suspicious nevus     Use of cane as ambulatory aid     Vitamin D deficiency        Past Surgical History:   Procedure Laterality Date    KNEE SURGERY Left 07/27/2018    CA EGD TRANSORAL BIOPSY SINGLE/MULTIPLE N/A 12/13/2017    Procedure: ESOPHAGOGASTRODUODENOSCOPY (EGD), with BIOPSY;  Surgeon: Sridevi Mckenna MD;  Location: [facility]; Service: Bariatrics    CA LAPS GSTR RSTCV PX W/BYP ENRICO-EN-Y LIMB <150 CM N/A 5/8/2018    Procedure: BYPASS GASTRIC  ENRICO-EN-Y LAPAROSCOPIC AND INTRAOPERATIVE EGD;  Surgeon: Sridevi Mckenna MD;  Location: [facility];  Service: Bariatrics       Family History   Problem Relation Age of Onset    Hypertension Mother     Prostate cancer Father         ALSO PROSTATE ENLARGEMENT    Hypertension Maternal Grandmother      I have reviewed and agree with the history as documented. E-Cigarette/Vaping    E-Cigarette Use Never User      E-Cigarette/Vaping Substances    Nicotine No     THC No     CBD No     Flavoring No     Other No     Unknown No      Social History     Tobacco Use    Smoking status: Never    Smokeless tobacco: Never   Vaping Use    Vaping Use: Never used   Substance Use Topics    Alcohol use: Never    Drug use: No       Review of Systems   Constitutional:  Negative for chills, fatigue and fever. HENT:  Negative for rhinorrhea, sore throat and trouble swallowing. Eyes:  Negative for photophobia and visual disturbance. Respiratory:  Negative for cough, chest tightness and shortness of breath. Cardiovascular:  Positive for chest pain.  Negative for palpitations and leg swelling. Gastrointestinal:  Negative for abdominal pain, blood in stool, diarrhea, nausea and vomiting. Endocrine: Negative for polyuria. Genitourinary:  Positive for hematuria. Negative for dysuria and flank pain. Musculoskeletal:  Negative for back pain and neck pain. Skin:  Negative for color change and rash. Allergic/Immunologic: Negative for immunocompromised state. Neurological:  Positive for headaches. Negative for dizziness, weakness, light-headedness and numbness. All other systems reviewed and are negative. Physical Exam  Physical Exam  Vitals and nursing note reviewed. Constitutional:       General: He is not in acute distress. Appearance: He is well-developed. He is morbidly obese. Comments: Resting comfortably. HENT:      Head: Normocephalic and atraumatic. Mouth/Throat:      Lips: Pink. Mouth: Mucous membranes are moist.   Eyes:      General: Lids are normal.      Extraocular Movements: Extraocular movements intact. Conjunctiva/sclera: Conjunctivae normal.      Pupils: Pupils are equal, round, and reactive to light. Cardiovascular:      Rate and Rhythm: Normal rate and regular rhythm. Heart sounds: Normal heart sounds. No murmur heard. Pulmonary:      Effort: Pulmonary effort is normal.      Breath sounds: Normal breath sounds. Abdominal:      General: There is no distension. Palpations: Abdomen is soft. Tenderness: There is no abdominal tenderness. There is no guarding or rebound. Musculoskeletal:         General: No swelling. Cervical back: Full passive range of motion without pain, normal range of motion and neck supple. Skin:     General: Skin is warm. Capillary Refill: Capillary refill takes less than 2 seconds. Findings: No rash. Neurological:      General: No focal deficit present. Mental Status: He is alert.    Psychiatric:         Mood and Affect: Mood normal.         Speech: Speech normal.         Behavior: Behavior normal.         Vital Signs  ED Triage Vitals [11/30/23 0105]   Temperature Pulse Respirations Blood Pressure SpO2   98.5 °F (36.9 °C) 74 18 158/79 97 %      Temp Source Heart Rate Source Patient Position - Orthostatic VS BP Location FiO2 (%)   Oral Monitor Lying Right arm --      Pain Score       5           Vitals:    11/30/23 0105 11/30/23 0329   BP: 158/79 138/82   Pulse: 74 69   Patient Position - Orthostatic VS: Lying          Visual Acuity      ED Medications  Medications   ketorolac (TORADOL) injection 15 mg (15 mg Intravenous Given 11/30/23 0139)       Diagnostic Studies  Results Reviewed       Procedure Component Value Units Date/Time    HS Troponin I 2hr [[id number]]  (Normal) Collected: 11/30/23 0327    Lab Status: Final result Specimen: Blood from Arm, Left Updated: 11/30/23 0401     hs TnI 2hr 3 ng/L      Delta 2hr hsTnI -3 ng/L     Urine Microscopic [[id number]]  (Abnormal) Collected: 11/30/23 0203    Lab Status: Final result Specimen: Urine, Clean Catch Updated: 11/30/23 0228     RBC, UA Innumerable /hpf      WBC, UA 4-10 /hpf      Epithelial Cells Occasional /hpf      Bacteria, UA None Seen /hpf      MUCUS THREADS Moderate    Urine Macroscopic, POC [[id number]]  (Abnormal) Collected: 11/30/23 0203    Lab Status: Final result Specimen: Urine Updated: 11/30/23 0204     Color, UA Yellow     Clarity, UA Clear     pH, UA 7.0     Leukocytes, UA Trace     Nitrite, UA Negative     Protein, UA 30 (1+) mg/dl      Glucose, UA Negative mg/dl      Ketones, UA Negative mg/dl      Urobilinogen, UA 0.2 E.U./dl      Bilirubin, UA Negative     Occult Blood, UA Large     Specific Gravity, UA 1.025    Narrative:      CLINITEK RESULT    HS Troponin 0hr (reflex protocol) [[id number]]  (Normal) Collected: 11/30/23 0132    Lab Status: Final result Specimen: Blood from Arm, Left Updated: 11/30/23 0204     hs TnI 0hr 6 ng/L     Comprehensive metabolic panel [[id number]] Collected: 11/30/23 0132    Lab Status: Final result Specimen: Blood from Arm, Left Updated: 11/30/23 0156     Sodium 139 mmol/L      Potassium 4.1 mmol/L      Chloride 103 mmol/L      CO2 30 mmol/L      ANION GAP 6 mmol/L      BUN 10 mg/dL      Creatinine 0.86 mg/dL      Glucose 84 mg/dL      Calcium 8.6 mg/dL      AST 27 U/L      ALT 21 U/L      Alkaline Phosphatase 84 U/L      Total Protein 6.8 g/dL      Albumin 4.1 g/dL      Total Bilirubin 0.52 mg/dL      eGFR 99 ml/min/1.73sq m     Narrative:      [facility] guidelines for Chronic Kidney Disease (CKD):     Stage 1 with normal or high GFR (GFR > 90 mL/min/1.73 square meters)    Stage 2 Mild CKD (GFR = 60-89 mL/min/1.73 square meters)    Stage 3A Moderate CKD (GFR = 45-59 mL/min/1.73 square meters)    Stage 3B Moderate CKD (GFR = 30-44 mL/min/1.73 square meters)    Stage 4 Severe CKD (GFR = 15-29 mL/min/1.73 square meters)    Stage 5 End Stage CKD (GFR <15 mL/min/1.73 square meters)  Note: GFR calculation is accurate only with a steady state creatinine    Protime-INR [[id number]]  (Normal) Collected: 11/30/23 0132    Lab Status: Final result Specimen: Blood from Arm, Left Updated: 11/30/23 0154     Protime 14.0 seconds      INR 1.06    APTT [[id number]]  (Normal) Collected: 11/30/23 0132    Lab Status: Final result Specimen: Blood from Arm, Left Updated: 11/30/23 0154     PTT 26 seconds     CBC and differential [[id number]] Collected: 11/30/23 0132    Lab Status: Final result Specimen: Blood from Arm, Left Updated: 11/30/23 0145     WBC 6.89 Thousand/uL      RBC 4.81 Million/uL      Hemoglobin 14.3 g/dL      Hematocrit 43.2 %      MCV 90 fL      MCH 29.7 pg      MCHC 33.1 g/dL      RDW 14.0 %      MPV 10.8 fL      Platelets 843 Thousands/uL      nRBC 0 /100 WBCs      Neutrophils Relative 48 %      Immat GRANS % 0 %      Lymphocytes Relative 43 %      Monocytes Relative 8 %      Eosinophils Relative 1 %      Basophils Relative 0 %      Neutrophils Absolute 3.22 Thousands/µL      Immature Grans Absolute 0.01 Thousand/uL      Lymphocytes Absolute 2.97 Thousands/µL      Monocytes Absolute 0.58 Thousand/µL      Eosinophils Absolute 0.08 Thousand/µL      Basophils Absolute 0.03 Thousands/µL                    XR chest 1 view portable   ED Interpretation by Arvind Sandoval MD (11/30 9066)   No acute cardiopulmonary disease as interpreted by myself. Procedures  ECG 12 Lead Documentation Only    Date/Time: 11/30/2023 1:14 AM    Performed by: Arvind Sandoval MD  Authorized by: Arvind Sandoval MD    ECG reviewed by me, the ED Provider: yes    Patient location:  ED  Previous ECG:     Previous ECG:  Compared to current    Comparison ECG info:  8/6/21    Similarity:  No change    Comparison to cardiac monitor: Yes    Interpretation:     Interpretation: normal    Rate:     ECG rate:  70    ECG rate assessment: normal    Rhythm:     Rhythm: sinus rhythm    Ectopy:     Ectopy: PVCs      PVCs:  Infrequent and unifocal  QRS:     QRS axis:  Normal    QRS intervals:  Normal  Conduction:     Conduction: normal    ST segments:     ST segments:  Normal  T waves:     T waves: normal             ED Course  ED Course as of 11/30/23 0412   Thu Nov 30, 2023   0335 Repeat EKG interpreted by myself. Normal sinus rhythm at 73 bpm.  Normal axis. Infrequent, unifocal PVCs present. Normal QRS and QT intervals. No ST elevation or depression. Unremarkable T waves. SBIRT 22yo+      Flowsheet Row Most Recent Value   Initial Alcohol Screen: US AUDIT-C     1. How often do you have a drink containing alcohol? 0 Filed at: 11/30/2023 0141   2. How many drinks containing alcohol do you have on a typical day you are drinking? 0 Filed at: 11/30/2023 0141   3a. Male UNDER 65: How often do you have five or more drinks on one occasion?  0 Filed at: 11/30/2023 0141   Audit-C Score 0 Filed at: 11/30/2023 0141   [place]: How many times in the past year have you... Used an illegal drug or used a prescription medication for non-medical reasons? Never Filed at: 11/30/2023 0141                      Medical Decision Making  - Given patient's concerns, will do a cardiac workup. - Will do an EKG for arrythmia, strain; troponin for same as per protocol for evaluation of ACS. - CBC for anemia; CMP for kidney function and electrolytes. - Will check CXR for pneumonia, PTX, fluid overload    HEART score:  History 0=Slightly or non-suspicious  ECG 0=Normal  Age 1= > 45 - <65 years  Risk Factors 2= > 3 risk factors, or history of atherosclerotic disease  Troponin 0= Less than or equal to 12 ng/L  Total 3    -For the hematuria, check urinalysis to evaluate for UTI as cause. Also to check for actual hematuria. - Disposition per workup. Past Medical History:  No date: Abnormal blood sugar  No date: Abnormal TSH  No date: Back pain  No date: Bariatric surgery status  No date: Bilateral anterior knee pain  No date: Chronic pain disorder      Comment:  back  No date: CPAP (continuous positive airway pressure) dependence  No date: DDD (degenerative disc disease), lumbar  No date: GERD (gastroesophageal reflux disease)  No date: Hematuria  No date: History of Helicobacter pylori infection  No date: History of transfusion  No date: Hypertension  No date: Knee pain  No date: Morbid obesity ([address])  No date: Postgastrectomy malabsorption  No date: Prediabetes  No date: Sleep apnea  No date: Suspicious nevus  No date: Use of cane as ambulatory aid  No date: Vitamin D deficiency     Problems Addressed:  Atypical chest pain: complicated acute illness or injury that poses a threat to life or bodily functions  Hematuria: undiagnosed new problem with uncertain prognosis    Amount and/or Complexity of Data Reviewed  Labs: ordered. Radiology: ordered and independent interpretation performed. ECG/medicine tests: ordered and independent interpretation performed.     Risk  Prescription drug management. Disposition  Final diagnoses:   Atypical chest pain   Hematuria     Time reflects when diagnosis was documented in both MDM as applicable and the Disposition within this note       Time User Action Codes Description Comment    11/30/2023  4:11 AM Hipolito Cardozo Add [R07.89] Atypical chest pain     11/30/2023  4:11 AM Hipolito Cardozo Add [R31.9] Hematuria           ED Disposition       ED Disposition   Discharge    Condition   Stable    Date/Time   Thu Nov 30, 2023 2610 [facility] discharge to home/self care. Follow-up Information       Follow up With Specialties Details Why Contact Info Additional [address] [facility] Schedule an appointment as soon as possible for a visit   [facility] [address] [facility] [phone] [address] [facility], [address] [place], [place], [address]   [address] [facility] Emergency Department Emergency Medicine Go to  If symptoms worsen [address] [address]  [address] [facility] Emergency Department, [address] [place] [address], [place], [place], [address]            Patient's Medications   Discharge Prescriptions    No medications on file       No discharge procedures on file.     PDMP Review         Value Time User    PDMP Reviewed  Yes 3/29/2021  5:20 PM Peyman Grant MD            ED Provider  Electronically Signed by             Hipolito Cardozo MD  11/30/23 9335

## 2023-12-30 ENCOUNTER — APPOINTMENT (EMERGENCY)
Dept: CT IMAGING | Facility: HOSPITAL | Age: 53
End: 2023-12-30
Payer: COMMERCIAL

## 2023-12-30 ENCOUNTER — HOSPITAL ENCOUNTER (EMERGENCY)
Facility: HOSPITAL | Age: 53
Discharge: HOME | End: 2023-12-30
Payer: COMMERCIAL

## 2023-12-30 VITALS
BODY MASS INDEX: 53.68 KG/M2 | DIASTOLIC BLOOD PRESSURE: 66 MMHG | RESPIRATION RATE: 20 BRPM | TEMPERATURE: 98.6 F | HEART RATE: 62 BPM | OXYGEN SATURATION: 97 % | WEIGHT: 315 LBS | SYSTOLIC BLOOD PRESSURE: 135 MMHG

## 2023-12-30 DIAGNOSIS — M54.9 BACK PAIN: Primary | ICD-10-CM

## 2023-12-30 LAB
BACTERIA UR QL AUTO: ABNORMAL /HPF
BILIRUB UR QL STRIP: NEGATIVE
CLARITY UR: CLEAR
COLOR UR: YELLOW
GLUCOSE UR STRIP-MCNC: NEGATIVE MG/DL
HGB UR QL STRIP.AUTO: NEGATIVE
KETONES UR STRIP-MCNC: NEGATIVE MG/DL
LEUKOCYTE ESTERASE UR QL STRIP: NEGATIVE
MUCOUS THREADS UR QL AUTO: ABNORMAL
NITRITE UR QL STRIP: NEGATIVE
NON-SQ EPI CELLS URNS QL MICRO: ABNORMAL /HPF
PH UR STRIP.AUTO: 6 [PH] (ref 4.5–8)
PROT UR STRIP-MCNC: ABNORMAL MG/DL
RBC #/AREA URNS AUTO: ABNORMAL /HPF
SP GR UR STRIP.AUTO: >=1.03 (ref 1–1.03)
UROBILINOGEN UR QL STRIP.AUTO: 0.2 E.U./DL
WBC #/AREA URNS AUTO: ABNORMAL /HPF

## 2023-12-30 PROCEDURE — 99284 EMERGENCY DEPT VISIT MOD MDM: CPT

## 2023-12-30 PROCEDURE — 81001 URINALYSIS AUTO W/SCOPE: CPT

## 2023-12-30 PROCEDURE — G1004 CDSM NDSC: HCPCS

## 2023-12-30 PROCEDURE — 96372 THER/PROPH/DIAG INJ SC/IM: CPT

## 2023-12-30 PROCEDURE — 72131 CT LUMBAR SPINE W/O DYE: CPT

## 2023-12-30 PROCEDURE — 99285 EMERGENCY DEPT VISIT HI MDM: CPT

## 2023-12-30 RX ORDER — LIDOCAINE 50 MG/G
1 PATCH TOPICAL ONCE
Status: DISCONTINUED | OUTPATIENT
Start: 2023-12-30 | End: 2023-12-30 | Stop reason: HOSPADM

## 2023-12-30 RX ORDER — ACETAMINOPHEN 325 MG/1
975 TABLET ORAL ONCE
Status: COMPLETED | OUTPATIENT
Start: 2023-12-30 | End: 2023-12-30

## 2023-12-30 RX ORDER — KETOROLAC TROMETHAMINE 30 MG/ML
30 INJECTION, SOLUTION INTRAMUSCULAR; INTRAVENOUS ONCE
Status: COMPLETED | OUTPATIENT
Start: 2023-12-30 | End: 2023-12-30

## 2023-12-30 RX ORDER — ACETAMINOPHEN 500 MG
500 TABLET ORAL EVERY 6 HOURS PRN
Qty: 30 TABLET | Refills: 0 | Status: SHIPPED | OUTPATIENT
Start: 2023-12-30

## 2023-12-30 RX ADMIN — ACETAMINOPHEN 325MG 975 MG: 325 TABLET ORAL at 06:31

## 2023-12-30 RX ADMIN — KETOROLAC TROMETHAMINE 30 MG: 30 INJECTION, SOLUTION INTRAMUSCULAR; INTRAVENOUS at 06:32

## 2023-12-30 RX ADMIN — LIDOCAINE 1 PATCH: 50 PATCH TOPICAL at 06:34

## 2023-12-30 NOTE — DISCHARGE INSTRUCTIONS
-take Tylenol every 4-6 hours for pain   -use topical gel as needed  -follow up with your spine doctor     -luz marina Tylenol cada 4-6 horas para el dolor   -Use gel tópico según sea necesario  -Acuda a un seguimiento con [facility]atum médico especialista en [place]

## 2023-12-30 NOTE — ED PROVIDER NOTES
History  Chief Complaint   Patient presents with    Back Pain     Pt reports chronic lower back pain, reports pain radiates down both legs. No meds pta. Reports accident in January of 2023 and pain since.        53 YOM with PMH gastric bypass, HTN, GERD, chronic back pain since Jan 2023 presents today with worsening back pain since yesterday. Pt reports he initially injured his back at work in Jan and has been undergoing PT on M, W, F. Pt reports yesterday his back pain worsened and is now in the middle of his back when typically his pain is on the left side. Unknown injury. Describes the pain as tight. Reports he has been taking tizanidine and pregabalin without much relief. Denies any numbness, tingling or weakness in the lower extremities. Denies bowel/urinary incontinence or retention. Denies fever, chills. Denies history of IVDA or cancer.         Prior to Admission Medications   Prescriptions Last Dose Informant Patient Reported? Taking?   Diclofenac Sodium (VOLTAREN) 1 %   No Yes   Sig: APPLY 2 G TOPICALLY 2 (TWO) TIMES A DAY AS NEEDED (PAIN)   Multiple Vitamin (MULTIVITAMIN) tablet   No Yes   Sig: Take 1 tablet by mouth daily   QUEtiapine (SEROquel) 50 mg tablet Past Week  No Yes   Sig: TAKE 1 TABLET BY MOUTH EVERYDAY AT BEDTIME   amLODIPine (NORVASC) 5 mg tablet   No Yes   Sig: Take 1 tablet (5 mg total) by mouth daily   buPROPion (WELLBUTRIN XL) 150 mg 24 hr tablet   No Yes   Sig: TAKE 1 TABLET BY MOUTH EVERY DAY IN THE MORNING   calcium carbonate (OS-SYLVIA) 1250 (500 Ca) MG tablet  Self Yes Yes   Sig: Take 1 tablet by mouth daily   carbamide peroxide (DEBROX) 6.5 % otic solution Not Taking  No No   Sig: Administer 5 drops into the left ear 2 (two) times a day   Patient not taking: Reported on 12/30/2023   carbamide peroxide (DEBROX) 6.5 % otic solution   No Yes   Sig: Administer 5 drops into ears 2 (two) times a day   clopidogrel (PLAVIX) 75 mg tablet   No Yes   Sig: Take 1 tablet (75 mg total) by mouth  daily   fluticasone (FLONASE) 50 mcg/act nasal spray   No Yes   Sig: SPRAY 1 SPRAY INTO EACH NOSTRIL EVERY DAY   pantoprazole (PROTONIX) 40 mg tablet Not Taking  No No   Sig: Take 1 tablet (40 mg total) by mouth daily   Patient not taking: Reported on 12/30/2023      Facility-Administered Medications: None       Past Medical History:   Diagnosis Date    Abnormal blood sugar     Abnormal TSH     Back pain     Bariatric surgery status     Bilateral anterior knee pain     Chronic pain disorder     back    CPAP (continuous positive airway pressure) dependence     DDD (degenerative disc disease), lumbar     GERD (gastroesophageal reflux disease)     Hematuria     History of Helicobacter pylori infection     History of transfusion     Hypertension     Knee pain     Morbid obesity (HCC)     Postgastrectomy malabsorption     Prediabetes     Sleep apnea     Suspicious nevus     Use of cane as ambulatory aid     Vitamin D deficiency        Past Surgical History:   Procedure Laterality Date    KNEE SURGERY Left 07/27/2018    IA EGD TRANSORAL BIOPSY SINGLE/MULTIPLE N/A 12/13/2017    Procedure: ESOPHAGOGASTRODUODENOSCOPY (EGD), with BIOPSY;  Surgeon: Aditya Coronado MD;  Location: [facility];  Service: Bariatrics    IA LAPS GSTR RSTCV PX W/BYP ENRICO-EN-Y LIMB <150 CM N/A 5/8/2018    Procedure: BYPASS GASTRIC  ENRICO-EN-Y LAPAROSCOPIC AND INTRAOPERATIVE EGD;  Surgeon: Aditya Coronado MD;  Location: [facility];  Service: Bariatrics       Family History   Problem Relation Age of Onset    Hypertension Mother     Prostate cancer Father         ALSO PROSTATE ENLARGEMENT    Hypertension Maternal Grandmother      I have reviewed and agree with the history as documented.    E-Cigarette/Vaping    E-Cigarette Use Never User      E-Cigarette/Vaping Substances    Nicotine No     THC No     CBD No     Flavoring No     Other No     Unknown No      Social History     Tobacco Use    Smoking status: Never    Smokeless tobacco: Never   Vaping Use     Vaping status: Never Used   Substance Use Topics    Alcohol use: Never    Drug use: No       Review of Systems   Constitutional:  Negative for chills and fever.   Gastrointestinal:  Negative for nausea and vomiting.   Genitourinary:  Negative for dysuria, flank pain, hematuria and urgency.   Musculoskeletal:  Positive for back pain.       Physical Exam  Physical Exam  Vitals and nursing note reviewed.   Constitutional:       General: He is not in acute distress.     Appearance: Normal appearance. He is well-developed. He is not ill-appearing.   HENT:      Head: Normocephalic and atraumatic.   Eyes:      Conjunctiva/sclera: Conjunctivae normal.   Cardiovascular:      Rate and Rhythm: Normal rate.   Pulmonary:      Effort: Pulmonary effort is normal.   Musculoskeletal:         General: Tenderness present. Normal range of motion.      Cervical back: Normal range of motion and neck supple.        Back:       Comments: Sensation, pulses and strength intact in LE   Skin:     General: Skin is warm and dry.   Neurological:      Mental Status: He is alert.   Psychiatric:         Mood and Affect: Mood normal.         Behavior: Behavior normal.         Vital Signs  ED Triage Vitals   Temperature Pulse Respirations Blood Pressure SpO2   12/30/23 0511 12/30/23 0511 12/30/23 0511 12/30/23 0511 12/30/23 0511   98.6 °F (37 °C) 71 18 (!) 171/108 97 %      Temp Source Heart Rate Source Patient Position - Orthostatic VS BP Location FiO2 (%)   12/30/23 0511 12/30/23 0511 12/30/23 0511 12/30/23 0511 --   Oral Monitor Sitting Right arm       Pain Score       12/30/23 0538       10 - Worst Possible Pain           Vitals:    12/30/23 0600 12/30/23 0639 12/30/23 0700 12/30/23 0730   BP: 118/66 143/70 112/60 135/66   Pulse: 64 59 65 62   Patient Position - Orthostatic VS:             Visual Acuity      ED Medications  Medications   lidocaine (LIDODERM) 5 % patch 1 patch (1 patch Topical Medication Applied 12/30/23 0634)   ketorolac (TORADOL)  injection 30 mg (30 mg Intramuscular Given 12/30/23 0632)   acetaminophen (TYLENOL) tablet 975 mg (975 mg Oral Given 12/30/23 0631)       Diagnostic Studies  Results Reviewed       Procedure Component Value Units Date/Time    Urine Microscopic [[id number]]  (Abnormal) Collected: 12/30/23 0803    Lab Status: Final result Specimen: Urine, Clean Catch Updated: 12/30/23 0820     RBC, UA 1-2 /hpf      WBC, UA 1-2 /hpf      Epithelial Cells Occasional /hpf      Bacteria, UA None Seen /hpf      MUCUS THREADS Moderate    Urine Macroscopic, POC [[id number]]  (Abnormal) Collected: 12/30/23 0803    Lab Status: Final result Specimen: Urine Updated: 12/30/23 0804     Color, UA Yellow     Clarity, UA Clear     pH, UA 6.0     Leukocytes, UA Negative     Nitrite, UA Negative     Protein, UA 30 (1+) mg/dl      Glucose, UA Negative mg/dl      Ketones, UA Negative mg/dl      Urobilinogen, UA 0.2 E.U./dl      Bilirubin, UA Negative     Occult Blood, UA Negative     Specific Gravity, UA >=1.030    Narrative:      CLINITEK RESULT                   CT spine lumbar without contrast   Final Result by Hugo Galaviz MD (12/30 0900)      No acute osseous abnormality of lumbar spine.      Degenerative changes, as detailed above.      Additional chronic/incidental findings as detailed above.         Workstation performed: [id number]                    Procedures  Procedures         ED Course                               SBIRT 22yo+      Flowsheet Row Most Recent Value   Initial Alcohol Screen: US AUDIT-C     1. How often do you have a drink containing alcohol? 0 Filed at: 12/30/2023 0644   2. How many drinks containing alcohol do you have on a typical day you are drinking?  0 Filed at: 12/30/2023 0644   3a. Male UNDER 65: How often do you have five or more drinks on one occasion? 0 Filed at: 12/30/2023 0644   Audit-C Score 0 Filed at: 12/30/2023 0644   KINSEY: How many times in the past year have you...    Used an illegal drug or used a  prescription medication for non-medical reasons? Never Filed at: 12/30/2023 0644                      Medical Decision Making  53 YOM with PMH gastric bypass, HTN, GERD, chronic back pain since Jan 2023 presents today with worsening back pain since yesterday. Physical exam as noted above. No red flag signs or symptoms. I did discuss with the pt that he has chronic pain and we can only help to try and decrease the pain but we most likely will not relieve it completely- he understands this. CT of lumbar spine was negative for acute changes, just showed degenerative changes. Pt's pain decreased to 8/10 from 10/10. Pt requesting pain doctor referral- phone number given.     I have discussed the plan to discharge pt from ED. The patient was discharged in stable condition.  Patient ambulated off the department.  Extensive return to emergency department precautions were discussed.  Follow up with appropriate providers including primary care physician was discussed.  Patient and/or their  primary decision maker expressed understanding.  Patient remained stable during entire emergency department stay.      Problems Addressed:  Back pain: chronic illness or injury    Amount and/or Complexity of Data Reviewed  Radiology: ordered.    Risk  OTC drugs.  Prescription drug management.             Disposition  Final diagnoses:   Back pain     Time reflects when diagnosis was documented in both MDM as applicable and the Disposition within this note       Time User Action Codes Description Comment    12/30/2023  9:26 AM Dotty Bajwa Add [M54.9] Back pain           ED Disposition       ED Disposition   Discharge    Condition   Stable    Date/Time   Sat Dec 30, 2023 0934    Comment   Ryan Miles discharge to home/self care.                   Follow-up Information       Follow up With Specialties Details Why Contact Info Additional Information    [facility] Schedule an appointment as soon as  possible for a visit   [address]  [facility] [address] [address] [phone] [facility], [address], [address], [place], [place], [address] [phone]            Patient's Medications   Discharge Prescriptions    ACETAMINOPHEN (TYLENOL) 500 MG TABLET    Take 1 tablet (500 mg total) by mouth every 6 (six) hours as needed for mild pain       Start Date: 12/30/2023End Date: --       Order Dose: 500 mg       Quantity: 30 tablet    Refills: 0    DICLOFENAC SODIUM (VOLTAREN) 1 %    Apply 2 g topically 4 (four) times a day       Start Date: 12/30/2023End Date: --       Order Dose: 2 g       Quantity: 150 g    Refills: 0       No discharge procedures on file.    PDMP Review         Value Time User    PDMP Reviewed  Yes 3/29/2021  5:20 PM Jaylin Farias MD            ED Provider  Electronically Signed by             Dotty Bajwa PA-C  12/30/23 0954

## 2024-01-11 ENCOUNTER — APPOINTMENT (EMERGENCY)
Dept: RADIOLOGY | Facility: HOSPITAL | Age: 54
End: 2024-01-11
Payer: COMMERCIAL

## 2024-01-11 ENCOUNTER — HOSPITAL ENCOUNTER (EMERGENCY)
Facility: HOSPITAL | Age: 54
Discharge: HOME | End: 2024-01-11
Attending: EMERGENCY MEDICINE
Payer: COMMERCIAL

## 2024-01-11 VITALS
WEIGHT: 315 LBS | RESPIRATION RATE: 20 BRPM | HEART RATE: 66 BPM | SYSTOLIC BLOOD PRESSURE: 121 MMHG | OXYGEN SATURATION: 99 % | TEMPERATURE: 98.3 F | BODY MASS INDEX: 53.38 KG/M2 | DIASTOLIC BLOOD PRESSURE: 66 MMHG

## 2024-01-11 DIAGNOSIS — R07.9 CHEST PAIN: Primary | ICD-10-CM

## 2024-01-11 DIAGNOSIS — E66.01 MORBID OBESITY WITH BMI OF 50.0-59.9, ADULT (HCC): ICD-10-CM

## 2024-01-11 DIAGNOSIS — Z86.79 HISTORY OF HYPERTENSION: ICD-10-CM

## 2024-01-11 LAB
2HR DELTA HS TROPONIN: 1 NG/L
ANION GAP SERPL CALCULATED.3IONS-SCNC: 6 MMOL/L
ATRIAL RATE: 78 BPM
BASOPHILS # BLD AUTO: 0.05 THOUSANDS/ÂΜL (ref 0–0.1)
BASOPHILS NFR BLD AUTO: 1 % (ref 0–1)
BUN SERPL-MCNC: 11 MG/DL (ref 5–25)
CALCIUM SERPL-MCNC: 8.5 MG/DL (ref 8.4–10.2)
CARDIAC TROPONIN I PNL SERPL HS: 4 NG/L
CARDIAC TROPONIN I PNL SERPL HS: 5 NG/L
CHLORIDE SERPL-SCNC: 105 MMOL/L (ref 96–108)
CO2 SERPL-SCNC: 29 MMOL/L (ref 21–32)
CREAT SERPL-MCNC: 0.92 MG/DL (ref 0.6–1.3)
EOSINOPHIL # BLD AUTO: 0.17 THOUSAND/ÂΜL (ref 0–0.61)
EOSINOPHIL NFR BLD AUTO: 2 % (ref 0–6)
ERYTHROCYTE [DISTWIDTH] IN BLOOD BY AUTOMATED COUNT: 13.7 % (ref 11.6–15.1)
GFR SERPL CREATININE-BSD FRML MDRD: 95 ML/MIN/1.73SQ M
GLUCOSE SERPL-MCNC: 83 MG/DL (ref 65–140)
HCT VFR BLD AUTO: 43.5 % (ref 36.5–49.3)
HGB BLD-MCNC: 14.3 G/DL (ref 12–17)
IMM GRANULOCYTES # BLD AUTO: 0.02 THOUSAND/UL (ref 0–0.2)
IMM GRANULOCYTES NFR BLD AUTO: 0 % (ref 0–2)
LYMPHOCYTES # BLD AUTO: 2.76 THOUSANDS/ÂΜL (ref 0.6–4.47)
LYMPHOCYTES NFR BLD AUTO: 39 % (ref 14–44)
MCH RBC QN AUTO: 29.9 PG (ref 26.8–34.3)
MCHC RBC AUTO-ENTMCNC: 32.9 G/DL (ref 31.4–37.4)
MCV RBC AUTO: 91 FL (ref 82–98)
MONOCYTES # BLD AUTO: 0.56 THOUSAND/ÂΜL (ref 0.17–1.22)
MONOCYTES NFR BLD AUTO: 8 % (ref 4–12)
NEUTROPHILS # BLD AUTO: 3.61 THOUSANDS/ÂΜL (ref 1.85–7.62)
NEUTS SEG NFR BLD AUTO: 50 % (ref 43–75)
NRBC BLD AUTO-RTO: 0 /100 WBCS
P AXIS: 82 DEGREES
PLATELET # BLD AUTO: 198 THOUSANDS/UL (ref 149–390)
PMV BLD AUTO: 10.3 FL (ref 8.9–12.7)
POTASSIUM SERPL-SCNC: 4.3 MMOL/L (ref 3.5–5.3)
PR INTERVAL: 180 MS
QRS AXIS: 75 DEGREES
QRSD INTERVAL: 94 MS
QT INTERVAL: 352 MS
QTC INTERVAL: 401 MS
RBC # BLD AUTO: 4.78 MILLION/UL (ref 3.88–5.62)
SODIUM SERPL-SCNC: 140 MMOL/L (ref 135–147)
T WAVE AXIS: 61 DEGREES
VENTRICULAR RATE: 78 BPM
WBC # BLD AUTO: 7.17 THOUSAND/UL (ref 4.31–10.16)

## 2024-01-11 PROCEDURE — 96374 THER/PROPH/DIAG INJ IV PUSH: CPT

## 2024-01-11 PROCEDURE — 71046 X-RAY EXAM CHEST 2 VIEWS: CPT

## 2024-01-11 PROCEDURE — 85025 COMPLETE CBC W/AUTO DIFF WBC: CPT | Performed by: EMERGENCY MEDICINE

## 2024-01-11 PROCEDURE — 36415 COLL VENOUS BLD VENIPUNCTURE: CPT | Performed by: EMERGENCY MEDICINE

## 2024-01-11 PROCEDURE — 84484 ASSAY OF TROPONIN QUANT: CPT | Performed by: EMERGENCY MEDICINE

## 2024-01-11 PROCEDURE — 99285 EMERGENCY DEPT VISIT HI MDM: CPT

## 2024-01-11 PROCEDURE — 80048 BASIC METABOLIC PNL TOTAL CA: CPT | Performed by: EMERGENCY MEDICINE

## 2024-01-11 PROCEDURE — 99285 EMERGENCY DEPT VISIT HI MDM: CPT | Performed by: EMERGENCY MEDICINE

## 2024-01-11 PROCEDURE — 93005 ELECTROCARDIOGRAM TRACING: CPT

## 2024-01-11 RX ORDER — MORPHINE SULFATE 4 MG/ML
4 INJECTION, SOLUTION INTRAMUSCULAR; INTRAVENOUS ONCE
Status: COMPLETED | OUTPATIENT
Start: 2024-01-11 | End: 2024-01-11

## 2024-01-11 RX ADMIN — MORPHINE SULFATE 4 MG: 4 INJECTION INTRAVENOUS at 17:19

## 2024-01-11 NOTE — ED PROVIDER NOTES
"History  Chief Complaint   Patient presents with    Chest Pain     Pt reports sudden onset of chest pain in the middle of an argument. Pt reports this happens every times he gets in an argument. Pt reports face tingling and HA.        53 y.o. M w/h/o HTN p/w chest pain x 50 min.  Pt reports he got into an argument. Started with central chest pressure, nonradiating.  Tender to touch.  States he felt electricity in his head and face, which resolved.  Reports \"a little bit\" of SOB. Denies diaphoresis, abd pain, N/V, LE edema.      History provided by:  Patient  Chest Pain  Associated symptoms: shortness of breath (\"A little\")    Associated symptoms: no abdominal pain, no back pain, no cough, no diaphoresis, no fever, no headache, no nausea, no palpitations and no weakness        Prior to Admission Medications   Prescriptions Last Dose Informant Patient Reported? Taking?   Diclofenac Sodium (VOLTAREN) 1 %   No No   Sig: APPLY 2 G TOPICALLY 2 (TWO) TIMES A DAY AS NEEDED (PAIN)   Diclofenac Sodium (VOLTAREN) 1 %   No No   Sig: Apply 2 g topically 4 (four) times a day   Multiple Vitamin (MULTIVITAMIN) tablet   No No   Sig: Take 1 tablet by mouth daily   QUEtiapine (SEROquel) 50 mg tablet   No No   Sig: TAKE 1 TABLET BY MOUTH EVERYDAY AT BEDTIME   acetaminophen (TYLENOL) 500 mg tablet   No No   Sig: Take 1 tablet (500 mg total) by mouth every 6 (six) hours as needed for mild pain   amLODIPine (NORVASC) 5 mg tablet   No No   Sig: Take 1 tablet (5 mg total) by mouth daily   buPROPion (WELLBUTRIN XL) 150 mg 24 hr tablet   No No   Sig: TAKE 1 TABLET BY MOUTH EVERY DAY IN THE MORNING   calcium carbonate (OS-SYLVIA) 1250 (500 Ca) MG tablet  Self Yes No   Sig: Take 1 tablet by mouth daily   carbamide peroxide (DEBROX) 6.5 % otic solution   No No   Sig: Administer 5 drops into the left ear 2 (two) times a day   Patient not taking: Reported on 12/30/2023   carbamide peroxide (DEBROX) 6.5 % otic solution   No No   Sig: Administer 5 " drops into ears 2 (two) times a day   clopidogrel (PLAVIX) 75 mg tablet   No No   Sig: Take 1 tablet (75 mg total) by mouth daily   fluticasone (FLONASE) 50 mcg/act nasal spray   No No   Sig: SPRAY 1 SPRAY INTO EACH NOSTRIL EVERY DAY   pantoprazole (PROTONIX) 40 mg tablet   No No   Sig: Take 1 tablet (40 mg total) by mouth daily   Patient not taking: Reported on 12/30/2023      Facility-Administered Medications: None       Past Medical History:   Diagnosis Date    Abnormal blood sugar     Abnormal TSH     Back pain     Bariatric surgery status     Bilateral anterior knee pain     Chronic pain disorder     back    CPAP (continuous positive airway pressure) dependence     DDD (degenerative disc disease), lumbar     GERD (gastroesophageal reflux disease)     Hematuria     History of Helicobacter pylori infection     History of transfusion     Hypertension     Knee pain     Morbid obesity (HCC)     Postgastrectomy malabsorption     Prediabetes     Sleep apnea     Suspicious nevus     Use of cane as ambulatory aid     Vitamin D deficiency        Past Surgical History:   Procedure Laterality Date    KNEE SURGERY Left 07/27/2018    PA EGD TRANSORAL BIOPSY SINGLE/MULTIPLE N/A 12/13/2017    Procedure: ESOPHAGOGASTRODUODENOSCOPY (EGD), with BIOPSY;  Surgeon: Aditya Coronado MD;  Location: [facility];  Service: Bariatrics    PA LAPS GSTR RSTCV PX W/BYP ENRICO-EN-Y LIMB <150 CM N/A 5/8/2018    Procedure: BYPASS GASTRIC  ENRICO-EN-Y LAPAROSCOPIC AND INTRAOPERATIVE EGD;  Surgeon: Aditya Coronado MD;  Location: [facility];  Service: Bariatrics       Family History   Problem Relation Age of Onset    Hypertension Mother     Prostate cancer Father         ALSO PROSTATE ENLARGEMENT    Hypertension Maternal Grandmother      I have reviewed and agree with the history as documented.    E-Cigarette/Vaping    E-Cigarette Use Never User      E-Cigarette/Vaping Substances    Nicotine No     THC No     CBD No     Flavoring No     Other No      "Unknown No      Social History     Tobacco Use    Smoking status: Never    Smokeless tobacco: Never   Vaping Use    Vaping status: Never Used   Substance Use Topics    Alcohol use: Never    Drug use: No       Review of Systems   Constitutional:  Negative for chills, diaphoresis and fever.   Respiratory:  Positive for shortness of breath (\"A little\"). Negative for cough.    Cardiovascular:  Positive for chest pain. Negative for palpitations and leg swelling.   Gastrointestinal:  Negative for abdominal pain and nausea.   Musculoskeletal:  Negative for back pain.   Neurological:  Negative for facial asymmetry, weakness and headaches.       Physical Exam  Physical Exam  Vitals and nursing note reviewed.   Constitutional:       General: He is not in acute distress.     Appearance: He is well-developed. He is not ill-appearing, toxic-appearing or diaphoretic.   HENT:      Head: Normocephalic and atraumatic.   Eyes:      General: No scleral icterus.     Conjunctiva/sclera:      Right eye: Right conjunctiva is not injected.      Left eye: Left conjunctiva is not injected.   Neck:      Vascular: No JVD.      Trachea: Trachea normal.   Cardiovascular:      Rate and Rhythm: Normal rate and regular rhythm.      Pulses: Normal pulses.      Heart sounds: Normal heart sounds. No murmur heard.     No friction rub.   Pulmonary:      Effort: Pulmonary effort is normal. No accessory muscle usage or respiratory distress.      Breath sounds: Normal breath sounds. No stridor. No wheezing, rhonchi or rales.   Chest:      Chest wall: Tenderness present. No crepitus.       Abdominal:      General: There is no distension.      Palpations: Abdomen is soft. Abdomen is not rigid.      Tenderness: There is no abdominal tenderness. There is no guarding or rebound.   Musculoskeletal:      Cervical back: Normal range of motion.   Skin:     General: Skin is warm and dry.      Coloration: Skin is not pale.      Findings: No rash.   Neurological:      " Mental Status: He is alert.      GCS: GCS eye subscore is 4. GCS verbal subscore is 5. GCS motor subscore is 6.      Motor: No weakness (Grossly intact).         Vital Signs  ED Triage Vitals [01/11/24 1628]   Temperature Pulse Respirations Blood Pressure SpO2   98.3 °F (36.8 °C) 82 16 167/89 100 %      Temp Source Heart Rate Source Patient Position - Orthostatic VS BP Location FiO2 (%)   Oral Monitor Lying Right arm --      Pain Score       8           Vitals:    01/11/24 1800 01/11/24 1900 01/11/24 1959 01/11/24 2000   BP: 133/66 131/61 147/77 121/66   Pulse: 70 66 70 66   Patient Position - Orthostatic VS: Lying  Lying          Visual Acuity      ED Medications  Medications   morphine injection 4 mg (4 mg Intravenous Given 1/11/24 1719)       Diagnostic Studies  Results Reviewed       Procedure Component Value Units Date/Time    HS Troponin I 2hr [[id number]]  (Normal) Collected: 01/11/24 1922    Lab Status: Final result Specimen: Blood from Arm, Left Updated: 01/11/24 1956     hs TnI 2hr 5 ng/L      Delta 2hr hsTnI 1 ng/L     HS Troponin I 4hr [[id number]]     Lab Status: No result Specimen: Blood     HS Troponin 0hr (reflex protocol) [[id number]]  (Normal) Collected: 01/11/24 1719    Lab Status: Final result Specimen: Blood from Arm, Left Updated: 01/11/24 1758     hs TnI 0hr 4 ng/L     Basic metabolic panel [[id number]] Collected: 01/11/24 1719    Lab Status: Final result Specimen: Blood from Arm, Left Updated: 01/11/24 1750     Sodium 140 mmol/L      Potassium 4.3 mmol/L      Chloride 105 mmol/L      CO2 29 mmol/L      ANION GAP 6 mmol/L      BUN 11 mg/dL      Creatinine 0.92 mg/dL      Glucose 83 mg/dL      Calcium 8.5 mg/dL      eGFR 95 ml/min/1.73sq m     Narrative:      National Kid[employer] guidelines for Chronic Kidney Disease (CKD):     Stage 1 with normal or high GFR (GFR > 90 mL/min/1.73 square meters)    Stage 2 Mild CKD (GFR = 60-89 mL/min/1.73 square meters)    Stage 3A Moderate CKD (GFR  = 45-59 mL/min/1.73 square meters)    Stage 3B Moderate CKD (GFR = 30-44 mL/min/1.73 square meters)    Stage 4 Severe CKD (GFR = 15-29 mL/min/1.73 square meters)    Stage 5 End Stage CKD (GFR <15 mL/min/1.73 square meters)  Note: GFR calculation is accurate only with a steady state creatinine    CBC and differential [[id number]] Collected: 01/11/24 1719    Lab Status: Final result Specimen: Blood from Arm, Left Updated: 01/11/24 1733     WBC 7.17 Thousand/uL      RBC 4.78 Million/uL      Hemoglobin 14.3 g/dL      Hematocrit 43.5 %      MCV 91 fL      MCH 29.9 pg      MCHC 32.9 g/dL      RDW 13.7 %      MPV 10.3 fL      Platelets 198 Thousands/uL      nRBC 0 /100 WBCs      Neutrophils Relative 50 %      Immat GRANS % 0 %      Lymphocytes Relative 39 %      Monocytes Relative 8 %      Eosinophils Relative 2 %      Basophils Relative 1 %      Neutrophils Absolute 3.61 Thousands/µL      Immature Grans Absolute 0.02 Thousand/uL      Lymphocytes Absolute 2.76 Thousands/µL      Monocytes Absolute 0.56 Thousand/µL      Eosinophils Absolute 0.17 Thousand/µL      Basophils Absolute 0.05 Thousands/µL                    XR chest 2 views   ED Interpretation by Pauline Gibson DO (01/11 1703)   Interpreted by me as no acute abnormality                 Procedures  ECG 12 Lead Documentation Only    Date/Time: 1/11/2024 4:41 PM    Performed by: Pauline Gibson DO  Authorized by: Pauline Gibson DO    Indications / Diagnosis:  Chest pain  Rate:     ECG rate:  78    ECG rate assessment: normal    Rhythm:     Rhythm: sinus rhythm    QRS:     QRS axis:  Normal    QRS intervals:  Normal  ST segments:     ST segments:  Normal  T waves:     T waves: normal             ED Course  ED Course as of 01/11/24 2019   Thu Jan 11, 2024   1733 CBC and differential  Normal   1758 hs TnI 0hr: 4  Will require delta   1758 Basic metabolic panel  Normal   1842 Updated pt on labs and plan for delta trop.   2008 Updated pt on delta trop results and  plan to f/u with cardiology.             HEART Risk Score      Flowsheet Row Most Recent Value   Heart Score Risk Calculator    History 1 Filed at: 01/11/2024 1844   ECG 0 Filed at: 01/11/2024 1844   Age 1 Filed at: 01/11/2024 1844   Risk Factors 1 Filed at: 01/11/2024 1844   Troponin 0 Filed at: 01/11/2024 1844   HEART Score 3 Filed at: 01/11/2024 1844                                        Medical Decision Making  Chest pain - Will check CBC to r/o anemia, BMP to r/o lyte abnormality, troponin to assess for NSTEMI, EKG to r/o STEMI/ischemic changes, CXR to r/o PTX/PNA/pulmonary edema/effusion.    Amount and/or Complexity of Data Reviewed  Labs: ordered. Decision-making details documented in ED Course.  Radiology: ordered and independent interpretation performed.    Risk  Prescription drug management.             Disposition  Final diagnoses:   Chest pain   History of hypertension   Morbid obesity with BMI of 50.0-59.9, adult (HCC)     Time reflects when diagnosis was documented in both MDM as applicable and the Disposition within this note       Time User Action Codes Description Comment    1/11/2024  5:59 PM Pauline Gibson Add [R07.9] Chest pain     1/11/2024  5:59 PM Pauline Gibson Add [Z86.79] History of hypertension     1/11/2024  8:03 PM Pauline Gibson Add [E66.01,  Z68.43] Morbid obesity with BMI of 50.0-59.9, adult (HCC)           ED Disposition       ED Disposition   Discharge    Condition   Stable    Date/Time   Thu Jan 11, 2024 2003    Comment   Ryan Miles discharge to home/self care.                   Follow-up Information       Follow up With Specialties Details Why Contact Info Additional Information    [facility] Schedule an appointment as soon as possible for a visit   [address] [facility] [address] [phone] [facility], [address] [place], [place], [address] [phone]            Discharge  Medication List as of 1/11/2024  8:03 PM        CONTINUE these medications which have NOT CHANGED    Details   acetaminophen (TYLENOL) 500 mg tablet Take 1 tablet (500 mg total) by mouth every 6 (six) hours as needed for mild pain, Starting Sat 12/30/2023, Normal      amLODIPine (NORVASC) 5 mg tablet Take 1 tablet (5 mg total) by mouth daily, Starting Fri 2/10/2023, Normal      buPROPion (WELLBUTRIN XL) 150 mg 24 hr tablet TAKE 1 TABLET BY MOUTH EVERY DAY IN THE MORNING, Starting Thu 7/27/2023, Normal      calcium carbonate (OS-SYLVIA) 1250 (500 Ca) MG tablet Take 1 tablet by mouth daily, Historical Med      !! carbamide peroxide (DEBROX) 6.5 % otic solution Administer 5 drops into the left ear 2 (two) times a day, Starting Fri 11/4/2022, Normal      !! carbamide peroxide (DEBROX) 6.5 % otic solution Administer 5 drops into ears 2 (two) times a day, Starting Sat 7/22/2023, Normal      clopidogrel (PLAVIX) 75 mg tablet Take 1 tablet (75 mg total) by mouth daily, Starting Thu 6/22/2023, Normal      !! Diclofenac Sodium (VOLTAREN) 1 % APPLY 2 G TOPICALLY 2 (TWO) TIMES A DAY AS NEEDED (PAIN), Starting Mon 9/19/2022, Normal      !! Diclofenac Sodium (VOLTAREN) 1 % Apply 2 g topically 4 (four) times a day, Starting Sat 12/30/2023, Normal      fluticasone (FLONASE) 50 mcg/act nasal spray SPRAY 1 SPRAY INTO EACH NOSTRIL EVERY DAY, Normal      Multiple Vitamin (MULTIVITAMIN) tablet Take 1 tablet by mouth daily, Starting Tue 6/16/2020, Normal      pantoprazole (PROTONIX) 40 mg tablet Take 1 tablet (40 mg total) by mouth daily, Starting Tue 10/5/2021, Normal      QUEtiapine (SEROquel) 50 mg tablet TAKE 1 TABLET BY MOUTH EVERYDAY AT BEDTIME, Normal       !! - Potential duplicate medications found. Please discuss with provider.              PDMP Review         Value Time User    PDMP Reviewed  Yes 3/29/2021  5:20 PM Jaylin Farias MD            ED Provider  Electronically Signed by             Pauline Gibson DO  01/11/24  2020

## 2024-02-24 ENCOUNTER — HOSPITAL ENCOUNTER (EMERGENCY)
Facility: HOSPITAL | Age: 54
Discharge: HOME | End: 2024-02-24
Attending: EMERGENCY MEDICINE | Admitting: EMERGENCY MEDICINE
Payer: COMMERCIAL

## 2024-02-24 ENCOUNTER — APPOINTMENT (EMERGENCY)
Dept: RADIOLOGY | Facility: HOSPITAL | Age: 54
End: 2024-02-24
Payer: COMMERCIAL

## 2024-02-24 VITALS
WEIGHT: 315 LBS | SYSTOLIC BLOOD PRESSURE: 168 MMHG | BODY MASS INDEX: 53.68 KG/M2 | HEART RATE: 71 BPM | RESPIRATION RATE: 18 BRPM | OXYGEN SATURATION: 97 % | DIASTOLIC BLOOD PRESSURE: 79 MMHG | TEMPERATURE: 98.7 F

## 2024-02-24 DIAGNOSIS — T14.8XXA PUNCTURE WOUND: ICD-10-CM

## 2024-02-24 DIAGNOSIS — S69.90XA FINGER INJURY: Primary | ICD-10-CM

## 2024-02-24 PROCEDURE — 73130 X-RAY EXAM OF HAND: CPT

## 2024-02-24 PROCEDURE — 99284 EMERGENCY DEPT VISIT MOD MDM: CPT | Performed by: EMERGENCY MEDICINE

## 2024-02-24 PROCEDURE — 90471 IMMUNIZATION ADMIN: CPT

## 2024-02-24 PROCEDURE — 90715 TDAP VACCINE 7 YRS/> IM: CPT | Performed by: EMERGENCY MEDICINE

## 2024-02-24 PROCEDURE — 99283 EMERGENCY DEPT VISIT LOW MDM: CPT

## 2024-02-24 RX ORDER — AMOXICILLIN AND CLAVULANATE POTASSIUM 875; 125 MG/1; MG/1
1 TABLET, FILM COATED ORAL EVERY 12 HOURS
Qty: 14 TABLET | Refills: 0 | Status: SHIPPED | OUTPATIENT
Start: 2024-02-24 | End: 2024-03-02

## 2024-02-24 RX ORDER — AMOXICILLIN AND CLAVULANATE POTASSIUM 875; 125 MG/1; MG/1
1 TABLET, FILM COATED ORAL ONCE
Status: COMPLETED | OUTPATIENT
Start: 2024-02-24 | End: 2024-02-24

## 2024-02-24 RX ORDER — GINSENG 100 MG
1 CAPSULE ORAL ONCE
Status: COMPLETED | OUTPATIENT
Start: 2024-02-24 | End: 2024-02-24

## 2024-02-24 RX ADMIN — BACITRACIN ZINC 1 SMALL APPLICATION: 500 OINTMENT TOPICAL at 01:54

## 2024-02-24 RX ADMIN — TETANUS TOXOID, REDUCED DIPHTHERIA TOXOID AND ACELLULAR PERTUSSIS VACCINE, ADSORBED 0.5 ML: 5; 2.5; 8; 8; 2.5 SUSPENSION INTRAMUSCULAR at 01:52

## 2024-02-24 RX ADMIN — AMOXICILLIN AND CLAVULANATE POTASSIUM 1 TABLET: 875; 125 TABLET, FILM COATED ORAL at 01:51

## 2024-02-24 NOTE — DISCHARGE INSTRUCTIONS
Take the Augmentin twice daily for the next 7 days, make sure to finish off the entire course.    Use ibuprofen and acetaminophen for discomfort, ice may help.

## 2024-02-25 NOTE — ED PROVIDER NOTES
History  Chief Complaint   Patient presents with    Finger Injury     Pt reports putting a screw  into his left index finger     54 YO male presents after injuring his Left index finger. Patient states was working with tools and accidentally punctured his Left index finger with a screwdriver. The screwdriver did not remain in his finger. Patient has some bleeding but this has subsided spontaneously. He has some pain to the area. Patient is unsure when he has his last tetanus vaccination. He has no other injuries. Pt denies CP/SOB/F/C/N/V/D/C, no dysuria, burning on urination or blood in urine.       History provided by:  Patient   used: No        Prior to Admission Medications   Prescriptions Last Dose Informant Patient Reported? Taking?   Diclofenac Sodium (VOLTAREN) 1 %   No No   Sig: APPLY 2 G TOPICALLY 2 (TWO) TIMES A DAY AS NEEDED (PAIN)   Diclofenac Sodium (VOLTAREN) 1 %   No No   Sig: Apply 2 g topically 4 (four) times a day   Multiple Vitamin (MULTIVITAMIN) tablet   No No   Sig: Take 1 tablet by mouth daily   QUEtiapine (SEROquel) 50 mg tablet   No No   Sig: TAKE 1 TABLET BY MOUTH EVERYDAY AT BEDTIME   acetaminophen (TYLENOL) 500 mg tablet   No No   Sig: Take 1 tablet (500 mg total) by mouth every 6 (six) hours as needed for mild pain   amLODIPine (NORVASC) 5 mg tablet   No No   Sig: Take 1 tablet (5 mg total) by mouth daily   buPROPion (WELLBUTRIN XL) 150 mg 24 hr tablet   No No   Sig: TAKE 1 TABLET BY MOUTH EVERY DAY IN THE MORNING   calcium carbonate (OS-SYLVIA) 1250 (500 Ca) MG tablet  Self Yes No   Sig: Take 1 tablet by mouth daily   carbamide peroxide (DEBROX) 6.5 % otic solution   No No   Sig: Administer 5 drops into the left ear 2 (two) times a day   Patient not taking: Reported on 12/30/2023   carbamide peroxide (DEBROX) 6.5 % otic solution   No No   Sig: Administer 5 drops into ears 2 (two) times a day   clopidogrel (PLAVIX) 75 mg tablet   No No   Sig: Take 1 tablet (75 mg  total) by mouth daily   fluticasone (FLONASE) 50 mcg/act nasal spray   No No   Sig: SPRAY 1 SPRAY INTO EACH NOSTRIL EVERY DAY   pantoprazole (PROTONIX) 40 mg tablet   No No   Sig: Take 1 tablet (40 mg total) by mouth daily   Patient not taking: Reported on 12/30/2023      Facility-Administered Medications: None       Past Medical History:   Diagnosis Date    Abnormal blood sugar     Abnormal TSH     Back pain     Bariatric surgery status     Bilateral anterior knee pain     Chronic pain disorder     back    CPAP (continuous positive airway pressure) dependence     DDD (degenerative disc disease), lumbar     GERD (gastroesophageal reflux disease)     Hematuria     History of Helicobacter pylori infection     History of transfusion     Hypertension     Knee pain     Morbid obesity (HCC)     Postgastrectomy malabsorption     Prediabetes     Sleep apnea     Suspicious nevus     Use of cane as ambulatory aid     Vitamin D deficiency        Past Surgical History:   Procedure Laterality Date    KNEE SURGERY Left 07/27/2018    CO EGD TRANSORAL BIOPSY SINGLE/MULTIPLE N/A 12/13/2017    Procedure: ESOPHAGOGASTRODUODENOSCOPY (EGD), with BIOPSY;  Surgeon: Aditya Coronado MD;  Location: [facility];  Service: Bariatrics    CO LAPS GSTR RSTCV PX W/BYP ENRICO-EN-Y LIMB <150 CM N/A 5/8/2018    Procedure: BYPASS GASTRIC  ENRICO-EN-Y LAPAROSCOPIC AND INTRAOPERATIVE EGD;  Surgeon: Aditya Coronado MD;  Location: [facility];  Service: Bariatrics       Family History   Problem Relation Age of Onset    Hypertension Mother     Prostate cancer Father         ALSO PROSTATE ENLARGEMENT    Hypertension Maternal Grandmother      I have reviewed and agree with the history as documented.    E-Cigarette/Vaping    E-Cigarette Use Never User      E-Cigarette/Vaping Substances    Nicotine No     THC No     CBD No     Flavoring No     Other No     Unknown No      Social History     Tobacco Use    Smoking status: Never    Smokeless tobacco: Never   Vaping  Use    Vaping status: Never Used   Substance Use Topics    Alcohol use: Never    Drug use: No       Review of Systems   Constitutional:  Negative for fever.   HENT:  Negative for dental problem.    Eyes:  Negative for visual disturbance.   Respiratory:  Negative for shortness of breath.    Cardiovascular:  Negative for chest pain.   Gastrointestinal:  Negative for abdominal pain, nausea and vomiting.   Genitourinary:  Negative for dysuria and frequency.   Musculoskeletal:  Negative for neck pain and neck stiffness.   Skin:  Positive for wound. Negative for rash.   Neurological:  Negative for dizziness, weakness and light-headedness.   Psychiatric/Behavioral:  Negative for agitation, behavioral problems and confusion.    All other systems reviewed and are negative.      Physical Exam  Physical Exam  Vitals and nursing note reviewed.   Constitutional:       Appearance: He is well-developed.   HENT:      Head: Normocephalic and atraumatic.   Eyes:      Extraocular Movements: Extraocular movements intact.   Cardiovascular:      Rate and Rhythm: Normal rate.   Pulmonary:      Effort: Pulmonary effort is normal.   Abdominal:      General: There is no distension.   Musculoskeletal:         General: Normal range of motion.      Cervical back: Normal range of motion.   Skin:     Findings: No rash.      Comments: Small puncture wound to the Left index finger with fat extrusion, no active bleeding.   Neurological:      Mental Status: He is alert and oriented to person, place, and time.   Psychiatric:         Behavior: Behavior normal.         Vital Signs  ED Triage Vitals [02/24/24 0059]   Temperature Pulse Respirations Blood Pressure SpO2   98.7 °F (37.1 °C) 71 18 168/79 97 %      Temp Source Heart Rate Source Patient Position - Orthostatic VS BP Location FiO2 (%)   Oral -- -- -- --      Pain Score       5           Vitals:    02/24/24 0059   BP: 168/79   Pulse: 71         Visual Acuity      ED Medications  Medications    tetanus-diphtheria-acellular pertussis (BOOSTRIX) IM injection 0.5 mL (0.5 mL Intramuscular Given 2/24/24 0152)   amoxicillin-clavulanate (AUGMENTIN) 875-125 mg per tablet 1 tablet (1 tablet Oral Given 2/24/24 0151)   bacitracin topical ointment 1 small application (1 small application Topical Given 2/24/24 0154)       Diagnostic Studies  Results Reviewed       None                   XR hand 3+ views LEFT   ED Interpretation by Willy Diggs MD (02/24 0145)   No fracture                 Procedures  Procedures         ED Course  ED Course as of 02/25/24 0950   Sat Feb 24, 2024   0145 X-ray(s) personally evaluated, interpretation: No fracture                               SBIRT 20yo+      Flowsheet Row Most Recent Value   Initial Alcohol Screen: US AUDIT-C     1. How often do you have a drink containing alcohol? 0 Filed at: 02/24/2024 0100   2. How many drinks containing alcohol do you have on a typical day you are drinking?  0 Filed at: 02/24/2024 0100   3a. Male UNDER 65: How often do you have five or more drinks on one occasion? 0 Filed at: 02/24/2024 0100   Audit-C Score 0 Filed at: 02/24/2024 0100   [place]: How many times in the past year have you...    Used an illegal drug or used a prescription medication for non-medical reasons? Never Filed at: 02/24/2024 0100                      Medical Decision Making  1. Puncture wound - Patient with puncture just prior to arrival. The patient's wound does not appear to require closure, it was cleaned with sterile water. Will x-ray to assess possible Tuft fracture. Will provide tetanus vaccination and antibiotics.     Problems Addressed:  Finger injury: acute illness or injury  Puncture wound: acute illness or injury    Amount and/or Complexity of Data Reviewed  Radiology: ordered and independent interpretation performed. Decision-making details documented in ED Course.    Risk  OTC drugs.  Prescription drug management.             Disposition  Final diagnoses:    Finger injury   Puncture wound     Time reflects when diagnosis was documented in both MDM as applicable and the Disposition within this note       Time User Action Codes Description Comment    2/24/2024  2:02 AM Willy Diggs Add [S69.90XA] Finger injury     2/24/2024  2:02 AM Willy Diggs Add [T14.8XXA] Puncture wound           ED Disposition       ED Disposition   Discharge    Condition   Stable    Date/Time   Sat Feb 24, 2024 0202    Comment   Ryan Miles discharge to home/self care.                   Follow-up Information    None         Discharge Medication List as of 2/24/2024  2:03 AM        START taking these medications    Details   amoxicillin-clavulanate (AUGMENTIN) 875-125 mg per tablet Take 1 tablet by mouth every 12 (twelve) hours for 7 days, Starting Sat 2/24/2024, Until Sat 3/2/2024, Normal           CONTINUE these medications which have NOT CHANGED    Details   acetaminophen (TYLENOL) 500 mg tablet Take 1 tablet (500 mg total) by mouth every 6 (six) hours as needed for mild pain, Starting Sat 12/30/2023, Normal      amLODIPine (NORVASC) 5 mg tablet Take 1 tablet (5 mg total) by mouth daily, Starting Fri 2/10/2023, Normal      buPROPion (WELLBUTRIN XL) 150 mg 24 hr tablet TAKE 1 TABLET BY MOUTH EVERY DAY IN THE MORNING, Starting Thu 7/27/2023, Normal      calcium carbonate (OS-SYLVIA) 1250 (500 Ca) MG tablet Take 1 tablet by mouth daily, Historical Med      !! carbamide peroxide (DEBROX) 6.5 % otic solution Administer 5 drops into the left ear 2 (two) times a day, Starting Fri 11/4/2022, Normal      !! carbamide peroxide (DEBROX) 6.5 % otic solution Administer 5 drops into ears 2 (two) times a day, Starting Sat 7/22/2023, Normal      clopidogrel (PLAVIX) 75 mg tablet Take 1 tablet (75 mg total) by mouth daily, Starting Thu 6/22/2023, Normal      !! Diclofenac Sodium (VOLTAREN) 1 % APPLY 2 G TOPICALLY 2 (TWO) TIMES A DAY AS NEEDED (PAIN), Starting Mon 9/19/2022, Normal      !! Diclofenac Sodium  (VOLTAREN) 1 % Apply 2 g topically 4 (four) times a day, Starting Sat 12/30/2023, Normal      fluticasone (FLONASE) 50 mcg/act nasal spray SPRAY 1 SPRAY INTO EACH NOSTRIL EVERY DAY, Normal      Multiple Vitamin (MULTIVITAMIN) tablet Take 1 tablet by mouth daily, Starting Tue 6/16/2020, Normal      pantoprazole (PROTONIX) 40 mg tablet Take 1 tablet (40 mg total) by mouth daily, Starting Tue 10/5/2021, Normal      QUEtiapine (SEROquel) 50 mg tablet TAKE 1 TABLET BY MOUTH EVERYDAY AT BEDTIME, Normal       !! - Potential duplicate medications found. Please discuss with provider.          No discharge procedures on file.    PDMP Review         Value Time User    PDMP Reviewed  Yes 3/29/2021  5:20 PM Jaylin Farias MD            ED Provider  Electronically Signed by             Willy Diggs MD  02/25/24 0978

## 2024-03-08 ENCOUNTER — APPOINTMENT (EMERGENCY)
Dept: CT IMAGING | Facility: HOSPITAL | Age: 54
End: 2024-03-08
Payer: COMMERCIAL

## 2024-03-08 ENCOUNTER — APPOINTMENT (EMERGENCY)
Dept: RADIOLOGY | Facility: HOSPITAL | Age: 54
End: 2024-03-08
Payer: COMMERCIAL

## 2024-03-08 ENCOUNTER — HOSPITAL ENCOUNTER (EMERGENCY)
Facility: HOSPITAL | Age: 54
Discharge: HOME | End: 2024-03-09
Attending: EMERGENCY MEDICINE
Payer: COMMERCIAL

## 2024-03-08 DIAGNOSIS — S09.90XA CLOSED HEAD INJURY, INITIAL ENCOUNTER: Primary | ICD-10-CM

## 2024-03-08 DIAGNOSIS — S39.91XA BLUNT TRAUMA TO ABDOMEN, INITIAL ENCOUNTER: ICD-10-CM

## 2024-03-08 DIAGNOSIS — S80.00XA KNEE CONTUSION: ICD-10-CM

## 2024-03-08 PROCEDURE — 70486 CT MAXILLOFACIAL W/O DYE: CPT

## 2024-03-08 PROCEDURE — 70450 CT HEAD/BRAIN W/O DYE: CPT

## 2024-03-08 PROCEDURE — 74177 CT ABD & PELVIS W/CONTRAST: CPT

## 2024-03-08 PROCEDURE — 73564 X-RAY EXAM KNEE 4 OR MORE: CPT

## 2024-03-08 PROCEDURE — 99285 EMERGENCY DEPT VISIT HI MDM: CPT

## 2024-03-08 PROCEDURE — 71260 CT THORAX DX C+: CPT

## 2024-03-08 PROCEDURE — 99285 EMERGENCY DEPT VISIT HI MDM: CPT | Performed by: EMERGENCY MEDICINE

## 2024-03-08 RX ORDER — ACETAMINOPHEN 325 MG/1
975 TABLET ORAL ONCE
Status: COMPLETED | OUTPATIENT
Start: 2024-03-08 | End: 2024-03-08

## 2024-03-08 RX ADMIN — IOHEXOL 100 ML: 350 INJECTION, SOLUTION INTRAVENOUS at 23:15

## 2024-03-08 RX ADMIN — ACETAMINOPHEN 325MG 975 MG: 325 TABLET ORAL at 23:41

## 2024-03-09 VITALS
RESPIRATION RATE: 20 BRPM | DIASTOLIC BLOOD PRESSURE: 62 MMHG | TEMPERATURE: 97.9 F | HEART RATE: 68 BPM | SYSTOLIC BLOOD PRESSURE: 137 MMHG | OXYGEN SATURATION: 96 % | WEIGHT: 315 LBS | BODY MASS INDEX: 53.95 KG/M2

## 2024-03-09 NOTE — ED PROVIDER NOTES
History  Chief Complaint   Patient presents with    Fall     Pt states was filling car with gas when tripped over gas hose and fell forward. C/o bilateral knee pain, left abd pain, and mouth pain from chin hitting cement. Denies loc. Denies thinners.     This is a 53-year-old male presents emergency department with multiple complaints.  Patient was at a gas station and tripped over the hose landing on cement.  He is complaining about bilateral knee pain, he did strike his left lower quadrant of his abdomen on a cement structure and is complaining of abdominal pain.  Struck his chin and has a loose tooth in the left upper anterior portion of his mouth.  No bleeding.  Patient denies any loss of consciousness.  No neck pain.  No weakness or numbness.  Patient does take Plavix.  Patient was able to ambulate after the injury.  No other associated symptoms.  Differential diagnosis includes intracranial hemorrhage, head injury, fracture, intra-abdominal injury, knee contusion versus fracture.        Prior to Admission Medications   Prescriptions Last Dose Informant Patient Reported? Taking?   Diclofenac Sodium (VOLTAREN) 1 %   No No   Sig: APPLY 2 G TOPICALLY 2 (TWO) TIMES A DAY AS NEEDED (PAIN)   Diclofenac Sodium (VOLTAREN) 1 %   No No   Sig: Apply 2 g topically 4 (four) times a day   Multiple Vitamin (MULTIVITAMIN) tablet   No No   Sig: Take 1 tablet by mouth daily   QUEtiapine (SEROquel) 50 mg tablet   No No   Sig: TAKE 1 TABLET BY MOUTH EVERYDAY AT BEDTIME   acetaminophen (TYLENOL) 500 mg tablet   No No   Sig: Take 1 tablet (500 mg total) by mouth every 6 (six) hours as needed for mild pain   amLODIPine (NORVASC) 5 mg tablet   No No   Sig: Take 1 tablet (5 mg total) by mouth daily   buPROPion (WELLBUTRIN XL) 150 mg 24 hr tablet   No No   Sig: TAKE 1 TABLET BY MOUTH EVERY DAY IN THE MORNING   calcium carbonate (OS-SYLVIA) 1250 (500 Ca) MG tablet  Self Yes No   Sig: Take 1 tablet by mouth daily   carbamide peroxide  (DEBROX) 6.5 % otic solution   No No   Sig: Administer 5 drops into the left ear 2 (two) times a day   Patient not taking: Reported on 12/30/2023   carbamide peroxide (DEBROX) 6.5 % otic solution   No No   Sig: Administer 5 drops into ears 2 (two) times a day   clopidogrel (PLAVIX) 75 mg tablet   No No   Sig: Take 1 tablet (75 mg total) by mouth daily   fluticasone (FLONASE) 50 mcg/act nasal spray   No No   Sig: SPRAY 1 SPRAY INTO EACH NOSTRIL EVERY DAY   pantoprazole (PROTONIX) 40 mg tablet   No No   Sig: Take 1 tablet (40 mg total) by mouth daily   Patient not taking: Reported on 12/30/2023      Facility-Administered Medications: None       Past Medical History:   Diagnosis Date    Abnormal blood sugar     Abnormal TSH     Back pain     Bariatric surgery status     Bilateral anterior knee pain     Chronic pain disorder     back    CPAP (continuous positive airway pressure) dependence     DDD (degenerative disc disease), lumbar     GERD (gastroesophageal reflux disease)     Hematuria     History of Helicobacter pylori infection     History of transfusion     Hypertension     Knee pain     Morbid obesity (HCC)     Postgastrectomy malabsorption     Prediabetes     Sleep apnea     Suspicious nevus     Use of cane as ambulatory aid     Vitamin D deficiency        Past Surgical History:   Procedure Laterality Date    KNEE SURGERY Left 07/27/2018    [place] EGD TRANSORAL BIOPSY SINGLE/MULTIPLE N/A 12/13/2017    Procedure: ESOPHAGOGASTRODUODENOSCOPY (EGD), with BIOPSY;  Surgeon: Aditya Coronado MD;  Location: [facility];  Service: Bariatrics    ND LAPS GSTR RSTCV PX W/BYP ENRICO-EN-Y LIMB <150 CM N/A 5/8/2018    Procedure: BYPASS GASTRIC  ENRICO-EN-Y LAPAROSCOPIC AND INTRAOPERATIVE EGD;  Surgeon: Aditya Coronado MD;  Location: [facility];  Service: Bariatrics       Family History   Problem Relation Age of Onset    Hypertension Mother     Prostate cancer Father         ALSO PROSTATE ENLARGEMENT    Hypertension Maternal Grandmother       I have reviewed and agree with the history as documented.    E-Cigarette/Vaping    E-Cigarette Use Never User      E-Cigarette/Vaping Substances    Nicotine No     THC No     CBD No     Flavoring No     Other No     Unknown No      Social History     Tobacco Use    Smoking status: Never    Smokeless tobacco: Never   Vaping Use    Vaping status: Never Used   Substance Use Topics    Alcohol use: Never    Drug use: No       Review of Systems   Constitutional:  Negative for activity change, appetite change and fever.   HENT:  Positive for dental problem. Negative for congestion, ear pain, rhinorrhea, sore throat and trouble swallowing.    Eyes:  Negative for photophobia and visual disturbance.   Respiratory:  Negative for cough, shortness of breath and wheezing.    Cardiovascular:  Negative for chest pain and palpitations.   Gastrointestinal:  Positive for abdominal pain. Negative for diarrhea, nausea and vomiting.   Endocrine: Negative for polyuria.   Genitourinary:  Negative for difficulty urinating, dysuria, frequency and urgency.   Musculoskeletal:  Positive for arthralgias (Bilateral knee pain). Negative for back pain, myalgias, neck pain and neck stiffness.   Skin:  Negative for color change and rash.   Allergic/Immunologic: Negative for immunocompromised state.   Neurological:  Negative for dizziness, syncope, facial asymmetry, weakness, light-headedness, numbness and headaches.   Hematological:  Does not bruise/bleed easily.   Psychiatric/Behavioral:  Negative for confusion.        Physical Exam  Physical Exam  Vitals and nursing note reviewed.   Constitutional:       General: He is not in acute distress.     Appearance: He is well-developed.   HENT:      Head: Normocephalic and atraumatic.      Right Ear: External ear normal.      Left Ear: External ear normal.      Nose: Nose normal.      Mouth/Throat:      Mouth: Mucous membranes are moist.      Comments: Patient has slightly loose tooth left upper  incisor.  No bleeding.  No mandibular pain.  Eyes:      General: No scleral icterus.     Extraocular Movements: Extraocular movements intact.      Conjunctiva/sclera: Conjunctivae normal.      Pupils: Pupils are equal, round, and reactive to light.   Neck:      Comments: No midline C-spine tenderness.  C-spine cleared by Nexus criteria.  Cardiovascular:      Rate and Rhythm: Normal rate and regular rhythm.      Heart sounds: Normal heart sounds.   Pulmonary:      Effort: Pulmonary effort is normal. No respiratory distress.      Breath sounds: Normal breath sounds. No stridor. No wheezing.   Abdominal:      General: There is no distension.      Palpations: Abdomen is soft.      Tenderness: There is no abdominal tenderness. There is no guarding or rebound.   Musculoskeletal:         General: Tenderness (Bilateral knee tenderness.  There is no effusion.  No ecchymosis.  Patient was able to ambulate with the help of a cane.) present. No deformity.      Cervical back: Normal range of motion and neck supple. No rigidity or tenderness.   Skin:     General: Skin is warm and dry.      Findings: No rash.   Neurological:      General: No focal deficit present.      Mental Status: He is alert and oriented to person, place, and time.      Cranial Nerves: No cranial nerve deficit.      Motor: No weakness.   Psychiatric:         Thought Content: Thought content normal.         Vital Signs  ED Triage Vitals   Temperature Pulse Respirations Blood Pressure SpO2   03/08/24 2159 03/08/24 2159 03/08/24 2159 03/08/24 2159 03/08/24 2159   97.9 °F (36.6 °C) 79 20 157/99 97 %      Temp Source Heart Rate Source Patient Position - Orthostatic VS BP Location FiO2 (%)   03/08/24 2159 03/08/24 2159 03/08/24 2159 03/08/24 2159 --   Oral Monitor Sitting Right arm       Pain Score       03/08/24 2223       9           Vitals:    03/08/24 2159 03/09/24 0023   BP: 157/99 137/62   Pulse: 79 68   Patient Position - Orthostatic VS: Sitting Sitting          Visual Acuity  Visual Acuity      Flowsheet Row Most Recent Value   L Pupil Size (mm) 4   R Pupil Size (mm) 4            ED Medications  Medications   iohexol (OMNIPAQUE) 350 MG/ML injection (MULTI-DOSE) 100 mL (100 mL Intravenous Given 3/8/24 2315)   acetaminophen (TYLENOL) tablet 975 mg (975 mg Oral Given 3/8/24 2341)       Diagnostic Studies  Results Reviewed       None                   CT head without contrast   Final Result by Amandeep Melton MD (03/09 0018)      No acute intracranial abnormality.                  Workstation performed: [id number]         CT facial bones without contrast   Final Result by Amandeep Melton MD (03/09 0024)      No acute maxillofacial fracture identified.                  Workstation performed: [id number]         CT chest abdomen pelvis w contrast   Final Result by Amandeep Melton MD (03/09 0036)      No evidence of acute traumatic injury within the thorax, abdomen, or pelvis.      No displaced fracture identified.         Workstation performed: [id number]         XR knee 4+ views left injury   ED Interpretation by Heike Keene MD (03/08 2252)   No fracture, no dislocation      XR knee 4+ views Right injury   ED Interpretation by Heike Keene MD (03/08 2252)   No fracture, no dislocation.                 Procedures  Procedures         ED Course  ED Course as of 03/09/24 0209   Fri Mar 08, 2024   2211 C-spine cleared by Nexus criteria.  Patient has no midline tenderness.  No neck pain.   2212 Patient does take Plavix.  Positive head strike.                                             Medical Decision Making  Patient with fall.  Bilateral knee contusions.  No fractures noted.  CT head is negative.  No chest or abdominal trauma noted.  Symptoms improved with Tylenol.    Amount and/or Complexity of Data Reviewed  External Data Reviewed: notes.     Details: Reviewed medication list noted patient is on Plavix.  Prior labs were reviewed.  GFR 95 in January.  Radiology:  ordered and independent interpretation performed.    Risk  OTC drugs.  Prescription drug management.             Disposition  Final diagnoses:   Knee contusion   Closed head injury, initial encounter   Blunt trauma to abdomen, initial encounter     Time reflects when diagnosis was documented in both MDM as applicable and the Disposition within this note       Time User Action Codes Description Comment    3/9/2024 12:40 AM Heike Keene Add [S80.00XA] Knee contusion     3/9/2024 12:41 AM Heike Keene Add [S09.90XA] Closed head injury, initial encounter     3/9/2024 12:41 AM Heike Keene Add [S39.91XA] Blunt trauma to abdomen, initial encounter     3/9/2024 12:41 AM Heike Keene Modify [S80.00XA] Knee contusion     3/9/2024 12:41 AM Heike Keene Modify [S09.90XA] Closed head injury, initial encounter           ED Disposition       ED Disposition   Discharge    Condition   Stable    Date/Time   Sat Mar 9, 2024 12:40 AM    Comment   Ryan Miles discharge to home/self care.                   Follow-up Information       Follow up With Specialties Details Why Contact Info    Your Primary Care Physician  In 1 week              Discharge Medication List as of 3/9/2024 12:41 AM        CONTINUE these medications which have NOT CHANGED    Details   acetaminophen (TYLENOL) 500 mg tablet Take 1 tablet (500 mg total) by mouth every 6 (six) hours as needed for mild pain, Starting Sat 12/30/2023, Normal      amLODIPine (NORVASC) 5 mg tablet Take 1 tablet (5 mg total) by mouth daily, Starting Fri 2/10/2023, Normal      buPROPion (WELLBUTRIN XL) 150 mg 24 hr tablet TAKE 1 TABLET BY MOUTH EVERY DAY IN THE MORNING, Starting Thu 7/27/2023, Normal      calcium carbonate (OS-SYLVIA) 1250 (500 Ca) MG tablet Take 1 tablet by mouth daily, Historical Med      !! carbamide peroxide (DEBROX) 6.5 % otic solution Administer 5 drops into the left ear 2 (two) times a day, Starting Fri 11/4/2022, Normal      !! carbamide  peroxide (DEBROX) 6.5 % otic solution Administer 5 drops into ears 2 (two) times a day, Starting Sat 7/22/2023, Normal      clopidogrel (PLAVIX) 75 mg tablet Take 1 tablet (75 mg total) by mouth daily, Starting Thu 6/22/2023, Normal      !! Diclofenac Sodium (VOLTAREN) 1 % APPLY 2 G TOPICALLY 2 (TWO) TIMES A DAY AS NEEDED (PAIN), Starting Mon 9/19/2022, Normal      !! Diclofenac Sodium (VOLTAREN) 1 % Apply 2 g topically 4 (four) times a day, Starting Sat 12/30/2023, Normal      fluticasone (FLONASE) 50 mcg/act nasal spray SPRAY 1 SPRAY INTO EACH NOSTRIL EVERY DAY, Normal      Multiple Vitamin (MULTIVITAMIN) tablet Take 1 tablet by mouth daily, Starting Tue 6/16/2020, Normal      pantoprazole (PROTONIX) 40 mg tablet Take 1 tablet (40 mg total) by mouth daily, Starting Tue 10/5/2021, Normal      QUEtiapine (SEROquel) 50 mg tablet TAKE 1 TABLET BY MOUTH EVERYDAY AT BEDTIME, Normal       !! - Potential duplicate medications found. Please discuss with provider.          No discharge procedures on file.    PDMP Review         Value Time User    PDMP Reviewed  Yes 3/29/2021  5:20 PM Jaylin Farias MD            ED Provider  Electronically Signed by             Heike Keene MD  03/09/24 3696

## 2024-04-19 ENCOUNTER — TELEPHONE (OUTPATIENT)
Dept: FAMILY MEDICINE CLINIC | Facility: CLINIC | Age: 54
End: 2024-04-19

## 2024-04-19 NOTE — TELEPHONE ENCOUNTER
Ryan Miles, [phone]. I had an appointment at 1:00 p.m. Today please cancel it and call me to make a new appointment. Thank you      Unable to reach pt, left voice message

## 2024-09-28 ENCOUNTER — APPOINTMENT (EMERGENCY)
Dept: RADIOLOGY | Facility: HOSPITAL | Age: 54
End: 2024-09-28
Payer: COMMERCIAL

## 2024-09-28 ENCOUNTER — HOSPITAL ENCOUNTER (EMERGENCY)
Facility: HOSPITAL | Age: 54
Discharge: HOME | End: 2024-09-28
Attending: EMERGENCY MEDICINE | Admitting: EMERGENCY MEDICINE
Payer: COMMERCIAL

## 2024-09-28 ENCOUNTER — APPOINTMENT (EMERGENCY)
Dept: CT IMAGING | Facility: HOSPITAL | Age: 54
End: 2024-09-28
Payer: COMMERCIAL

## 2024-09-28 VITALS
OXYGEN SATURATION: 98 % | TEMPERATURE: 98.7 F | HEART RATE: 77 BPM | BODY MASS INDEX: 53.04 KG/M2 | DIASTOLIC BLOOD PRESSURE: 56 MMHG | RESPIRATION RATE: 19 BRPM | SYSTOLIC BLOOD PRESSURE: 117 MMHG | WEIGHT: 315 LBS

## 2024-09-28 DIAGNOSIS — Z86.79 HISTORY OF HYPERTENSION: ICD-10-CM

## 2024-09-28 DIAGNOSIS — R07.9 CHEST PAIN: ICD-10-CM

## 2024-09-28 DIAGNOSIS — R51.9 HEADACHE: Primary | ICD-10-CM

## 2024-09-28 LAB
2HR DELTA HS TROPONIN: 0 NG/L
ANION GAP SERPL CALCULATED.3IONS-SCNC: 6 MMOL/L (ref 4–13)
BASOPHILS # BLD AUTO: 0.03 THOUSANDS/ΜL (ref 0–0.1)
BASOPHILS NFR BLD AUTO: 0 % (ref 0–1)
BUN SERPL-MCNC: 10 MG/DL (ref 5–25)
CALCIUM SERPL-MCNC: 8.6 MG/DL (ref 8.4–10.2)
CARDIAC TROPONIN I PNL SERPL HS: 5 NG/L
CARDIAC TROPONIN I PNL SERPL HS: 5 NG/L
CHLORIDE SERPL-SCNC: 102 MMOL/L (ref 96–108)
CO2 SERPL-SCNC: 28 MMOL/L (ref 21–32)
CREAT SERPL-MCNC: 0.99 MG/DL (ref 0.6–1.3)
EOSINOPHIL # BLD AUTO: 0.13 THOUSAND/ΜL (ref 0–0.61)
EOSINOPHIL NFR BLD AUTO: 2 % (ref 0–6)
ERYTHROCYTE [DISTWIDTH] IN BLOOD BY AUTOMATED COUNT: 14 % (ref 11.6–15.1)
GFR SERPL CREATININE-BSD FRML MDRD: 86 ML/MIN/1.73SQ M
GLUCOSE SERPL-MCNC: 106 MG/DL (ref 65–140)
HCT VFR BLD AUTO: 44.6 % (ref 36.5–49.3)
HGB BLD-MCNC: 14.8 G/DL (ref 12–17)
IMM GRANULOCYTES # BLD AUTO: 0.02 THOUSAND/UL (ref 0–0.2)
IMM GRANULOCYTES NFR BLD AUTO: 0 % (ref 0–2)
LYMPHOCYTES # BLD AUTO: 2.87 THOUSANDS/ΜL (ref 0.6–4.47)
LYMPHOCYTES NFR BLD AUTO: 36 % (ref 14–44)
MCH RBC QN AUTO: 29.3 PG (ref 26.8–34.3)
MCHC RBC AUTO-ENTMCNC: 33.2 G/DL (ref 31.4–37.4)
MCV RBC AUTO: 88 FL (ref 82–98)
MONOCYTES # BLD AUTO: 0.56 THOUSAND/ΜL (ref 0.17–1.22)
MONOCYTES NFR BLD AUTO: 7 % (ref 4–12)
NEUTROPHILS # BLD AUTO: 4.27 THOUSANDS/ΜL (ref 1.85–7.62)
NEUTS SEG NFR BLD AUTO: 55 % (ref 43–75)
NRBC BLD AUTO-RTO: 0 /100 WBCS
PLATELET # BLD AUTO: 191 THOUSANDS/UL (ref 149–390)
PMV BLD AUTO: 10.5 FL (ref 8.9–12.7)
POTASSIUM SERPL-SCNC: 4 MMOL/L (ref 3.5–5.3)
RBC # BLD AUTO: 5.05 MILLION/UL (ref 3.88–5.62)
SODIUM SERPL-SCNC: 136 MMOL/L (ref 135–147)
WBC # BLD AUTO: 7.88 THOUSAND/UL (ref 4.31–10.16)

## 2024-09-28 PROCEDURE — 80048 BASIC METABOLIC PNL TOTAL CA: CPT | Performed by: EMERGENCY MEDICINE

## 2024-09-28 PROCEDURE — 84484 ASSAY OF TROPONIN QUANT: CPT | Performed by: EMERGENCY MEDICINE

## 2024-09-28 PROCEDURE — 96365 THER/PROPH/DIAG IV INF INIT: CPT

## 2024-09-28 PROCEDURE — 96375 TX/PRO/DX INJ NEW DRUG ADDON: CPT

## 2024-09-28 PROCEDURE — 85025 COMPLETE CBC W/AUTO DIFF WBC: CPT | Performed by: EMERGENCY MEDICINE

## 2024-09-28 PROCEDURE — 99285 EMERGENCY DEPT VISIT HI MDM: CPT | Performed by: EMERGENCY MEDICINE

## 2024-09-28 PROCEDURE — 99285 EMERGENCY DEPT VISIT HI MDM: CPT

## 2024-09-28 PROCEDURE — 71046 X-RAY EXAM CHEST 2 VIEWS: CPT

## 2024-09-28 PROCEDURE — 36415 COLL VENOUS BLD VENIPUNCTURE: CPT | Performed by: EMERGENCY MEDICINE

## 2024-09-28 PROCEDURE — 93005 ELECTROCARDIOGRAM TRACING: CPT

## 2024-09-28 PROCEDURE — 70450 CT HEAD/BRAIN W/O DYE: CPT

## 2024-09-28 RX ORDER — KETOROLAC TROMETHAMINE 30 MG/ML
15 INJECTION, SOLUTION INTRAMUSCULAR; INTRAVENOUS ONCE
Status: COMPLETED | OUTPATIENT
Start: 2024-09-28 | End: 2024-09-28

## 2024-09-28 RX ORDER — METOCLOPRAMIDE HYDROCHLORIDE 5 MG/ML
10 INJECTION INTRAMUSCULAR; INTRAVENOUS ONCE
Status: COMPLETED | OUTPATIENT
Start: 2024-09-28 | End: 2024-09-28

## 2024-09-28 RX ORDER — MAGNESIUM SULFATE HEPTAHYDRATE 40 MG/ML
2 INJECTION, SOLUTION INTRAVENOUS ONCE
Status: COMPLETED | OUTPATIENT
Start: 2024-09-28 | End: 2024-09-28

## 2024-09-28 RX ADMIN — KETOROLAC TROMETHAMINE 15 MG: 30 INJECTION, SOLUTION INTRAMUSCULAR; INTRAVENOUS at 18:29

## 2024-09-28 RX ADMIN — MAGNESIUM SULFATE HEPTAHYDRATE 2 G: 40 INJECTION, SOLUTION INTRAVENOUS at 19:56

## 2024-09-28 RX ADMIN — METOCLOPRAMIDE 10 MG: 5 INJECTION, SOLUTION INTRAMUSCULAR; INTRAVENOUS at 18:30

## 2024-09-28 NOTE — ED PROVIDER NOTES
Final diagnoses:   Headache   Chest pain   History of hypertension     ED Disposition       ED Disposition   Discharge    Condition   Stable    Date/Time   Sat Sep 28, 2024  9:03 PM    Comment   Ryan Miles discharge to home/self care.                   Assessment & Plan       Medical Decision Making  1.  Headache - Will CT head to r/o ICH/mass lesion and treat pain.  2. Chest pain - Will check CBC to r/o anemia, BMP to r/o lyte abnormality/VISHNU, troponin to assess for NSTEMI, EKG to r/o STEMI/ischemic changes, CXR to r/o PTX/PNA/pulmonary edema/effusion.    Amount and/or Complexity of Data Reviewed  Labs: ordered. Decision-making details documented in ED Course.  Radiology: ordered and independent interpretation performed.    Risk  Prescription drug management.        ED Course as of 09/28/24 2121   Sat Sep 28, 2024   1813 Temperature: 98.7 °F (37.1 °C)  Afebrile   1813 SpO2: 98 %  Not hypoxic   1859 CBC and differential  Normal   1859 Basic metabolic panel  Normal   1900 hs TnI 0hr: 5  Will delta   1922 Updated pt on labs/CXR and wait for CT head read.  Pt states he feels better.  CP is gone and HA is mostly gone.  No longer having sound sensitivity.  Pt would like more meds for HA while we wait for remaining results.   2000 Blood Pressure: 117/56  Improved   2100 hs TnI 2hr: 5  No change in delta trop.   2102 Updated pt on remaining results.  Instructed him to f/u with PCP for further evaluation of CP.       Medications   ketorolac (TORADOL) injection 15 mg (15 mg Intravenous Given 9/28/24 1829)   metoclopramide (REGLAN) injection 10 mg (10 mg Intravenous Given 9/28/24 1830)   magnesium sulfate 2 g/50 mL IVPB (premix) 2 g (0 g Intravenous Stopped 9/28/24 2028)       ED Risk Strat Scores   HEART Risk Score      Flowsheet Row Most Recent Value   Heart Score Risk Calculator    History 0 Filed at: 09/28/2024 1923   ECG 0 Filed at: 09/28/2024 1923   Age 1 Filed at: 09/28/2024 1923   Risk Factors 1 Filed at: 09/28/2024  1923   Troponin 0 Filed at: 09/28/2024 1923   HEART Score 2 Filed at: 09/28/2024 1923                               SBIRT 20yo+      Flowsheet Row Most Recent Value   Initial Alcohol Screen: US AUDIT-C     1. How often do you have a drink containing alcohol? 0 Filed at: 09/28/2024 1818   2. How many drinks containing alcohol do you have on a typical day you are drinking?  0 Filed at: 09/28/2024 1818   3a. Male UNDER 65: How often do you have five or more drinks on one occasion? 0 Filed at: 09/28/2024 1818   3b. FEMALE Any Age, or MALE 65+: How often do you have 4 or more drinks on one occassion? 0 Filed at: 09/28/2024 1818   Audit-C Score 0 Filed at: 09/28/2024 1818   [place]: How many times in the past year have you...    Used an illegal drug or used a prescription medication for non-medical reasons? Never Filed at: 09/28/2024 1818                            History of Present Illness       Chief Complaint   Patient presents with    Chest Pain     Chest pain that started two hours prior on left of chest along with headache for the past three days.       Past Medical History:   Diagnosis Date    Abnormal blood sugar     Abnormal TSH     Back pain     Bariatric surgery status     Bilateral anterior knee pain     Chronic pain disorder     back    CPAP (continuous positive airway pressure) dependence     DDD (degenerative disc disease), lumbar     GERD (gastroesophageal reflux disease)     Hematuria     History of Helicobacter pylori infection     History of transfusion     Hypertension     Knee pain     Morbid obesity (HCC)     Postgastrectomy malabsorption     Prediabetes     Sleep apnea     Suspicious nevus     Use of cane as ambulatory aid     Vitamin D deficiency       Past Surgical History:   Procedure Laterality Date    KNEE SURGERY Left 07/27/2018    IL EGD TRANSORAL BIOPSY SINGLE/MULTIPLE N/A 12/13/2017    Procedure: ESOPHAGOGASTRODUODENOSCOPY (EGD), with BIOPSY;  Surgeon: Aditya Coronado MD;  Location: [facility]  "LAB;  Service: Bariatrics    OR LAPS GSTR RSTCV PX W/BYP ENRICO-EN-Y LIMB <150 CM N/A 5/8/2018    Procedure: BYPASS GASTRIC  ENRICO-EN-Y LAPAROSCOPIC AND INTRAOPERATIVE EGD;  Surgeon: Aditya Coronado MD;  Location: [facility];  Service: Bariatrics      Family History   Problem Relation Age of Onset    Hypertension Mother     Prostate cancer Father         ALSO PROSTATE ENLARGEMENT    Hypertension Maternal Grandmother       Social History     Tobacco Use    Smoking status: Never    Smokeless tobacco: Never   Vaping Use    Vaping status: Never Used   Substance Use Topics    Alcohol use: Never    Drug use: No      E-Cigarette/Vaping    E-Cigarette Use Never User       E-Cigarette/Vaping Substances    Nicotine No     THC No     CBD No     Flavoring No     Other No     Unknown No       I have reviewed and agree with the history as documented.     54 y.o. M w/h/o HTN p/w chest pain x 2h and HA x 3 days.  Head is right-sided.  Gradual onset.  Feels like heat and \"electricity in face.\"  Associated with sound sensitivity. Taking Tylenol, last yesterday without relief.  Denies changes in vision, photophobia, neck pain, N/V, focal deficits. Also reports left sternal chest tightness.  Constant, but waxes and wanes.  Nonradiating. Denies F/C, cough, diaphoresis, SOB, cough, abd pain, N/V, back pain, LE edema.      History provided by:  Patient   used: Yes ([employer] [id number])    Chest Pain  Associated symptoms: headache    Associated symptoms: no abdominal pain, no back pain, no cough, no fever, no nausea, no numbness, no shortness of breath, not vomiting and no weakness        Review of Systems   Constitutional:  Negative for chills and fever.   HENT:  Negative for rhinorrhea and sore throat.    Eyes:  Negative for photophobia and visual disturbance.   Respiratory:  Negative for cough and shortness of breath.    Cardiovascular:  Positive for chest pain. Negative for leg swelling.   Gastrointestinal:  Negative for " abdominal pain, nausea and vomiting.   Musculoskeletal:  Negative for back pain.   Neurological:  Positive for headaches. Negative for weakness and numbness.           Objective       ED Triage Vitals   Temperature Pulse Blood Pressure Respirations SpO2 Patient Position - Orthostatic VS   09/28/24 1807 09/28/24 1807 09/28/24 1807 09/28/24 1807 09/28/24 1807 09/28/24 1807   98.7 °F (37.1 °C) 89 (!) 182/99 18 98 % Sitting      Temp Source Heart Rate Source BP Location FiO2 (%) Pain Score    09/28/24 1807 09/28/24 1807 09/28/24 1807 -- 09/28/24 1829    Oral Monitor Right arm  8      Vitals      Date and Time Temp Pulse SpO2 Resp BP Pain Score FACES Pain Rating User   09/28/24 2000 -- 77 98 % 19 117/56 -- -- VF   09/28/24 1829 -- -- -- -- -- 8 -- JL   09/28/24 1807 98.7 °F (37.1 °C) 89 98 % 18 182/99 -- -- AW            Physical Exam  Vitals and nursing note reviewed.   Constitutional:       General: He is not in acute distress.     Appearance: He is well-developed. He is not ill-appearing, toxic-appearing or diaphoretic.   HENT:      Head: Normocephalic and atraumatic.      Right Ear: Tympanic membrane normal.      Left Ear: Tympanic membrane normal.   Eyes:      Extraocular Movements: Extraocular movements intact.      Conjunctiva/sclera: Conjunctivae normal.      Pupils: Pupils are equal, round, and reactive to light.   Cardiovascular:      Rate and Rhythm: Normal rate and regular rhythm.      Heart sounds: Normal heart sounds. No murmur heard.     No friction rub.   Pulmonary:      Effort: Pulmonary effort is normal. No accessory muscle usage or respiratory distress.      Breath sounds: Normal breath sounds. No stridor. No wheezing, rhonchi or rales.   Chest:      Chest wall: Tenderness present.       Abdominal:      General: There is no distension.      Palpations: Abdomen is soft.      Tenderness: There is no abdominal tenderness. There is no guarding or rebound.   Musculoskeletal:      Cervical back: Full passive  range of motion without pain, normal range of motion and neck supple. No rigidity.   Lymphadenopathy:      Cervical: No cervical adenopathy.   Skin:     General: Skin is warm and dry.      Coloration: Skin is not pale.      Findings: No ecchymosis, petechiae or rash.   Neurological:      General: No focal deficit present.      Mental Status: He is alert.      GCS: GCS eye subscore is 4. GCS verbal subscore is 5. GCS motor subscore is 6.      Cranial Nerves: No cranial nerve deficit or dysarthria.      Sensory: No sensory deficit.      Motor: Motor function is intact. No weakness or seizure activity.   Psychiatric:         Behavior: Behavior normal.         Results Reviewed       Procedure Component Value Units Date/Time    HS Troponin I 2hr [[id number]]  (Normal) Collected: 09/28/24 2030    Lab Status: Final result Specimen: Blood from Line, Venous Updated: 09/28/24 2100     hs TnI 2hr 5 ng/L      Delta 2hr hsTnI 0 ng/L     HS Troponin 0hr (reflex protocol) [[id number]]  (Normal) Collected: 09/28/24 1829    Lab Status: Final result Specimen: Blood from Arm, Left Updated: 09/28/24 1900     hs TnI 0hr 5 ng/L     Basic metabolic panel [[id number]] Collected: 09/28/24 1829    Lab Status: Final result Specimen: Blood from Arm, Left Updated: 09/28/24 1858     Sodium 136 mmol/L      Potassium 4.0 mmol/L      Chloride 102 mmol/L      CO2 28 mmol/L      ANION GAP 6 mmol/L      BUN 10 mg/dL      Creatinine 0.99 mg/dL      Glucose 106 mg/dL      Calcium 8.6 mg/dL      eGFR 86 ml/min/1.73sq m     Narrative:      National Kid[employer] Disease Foundation guidelines for Chronic Kidney Disease (CKD):     Stage 1 with normal or high GFR (GFR > 90 mL/min/1.73 square meters)    Stage 2 Mild CKD (GFR = 60-89 mL/min/1.73 square meters)    Stage 3A Moderate CKD (GFR = 45-59 mL/min/1.73 square meters)    Stage 3B Moderate CKD (GFR = 30-44 mL/min/1.73 square meters)    Stage 4 Severe CKD (GFR = 15-29 mL/min/1.73 square meters)    Stage 5 End Stage  CKD (GFR <15 mL/min/1.73 square meters)  Note: GFR calculation is accurate only with a steady state creatinine    CBC and differential [[id number]] Collected: 09/28/24 1829    Lab Status: Final result Specimen: Blood from Arm, Left Updated: 09/28/24 1838     WBC 7.88 Thousand/uL      RBC 5.05 Million/uL      Hemoglobin 14.8 g/dL      Hematocrit 44.6 %      MCV 88 fL      MCH 29.3 pg      MCHC 33.2 g/dL      RDW 14.0 %      MPV 10.5 fL      Platelets 191 Thousands/uL      nRBC 0 /100 WBCs      Segmented % 55 %      Immature Grans % 0 %      Lymphocytes % 36 %      Monocytes % 7 %      Eosinophils Relative 2 %      Basophils Relative 0 %      Absolute Neutrophils 4.27 Thousands/µL      Absolute Immature Grans 0.02 Thousand/uL      Absolute Lymphocytes 2.87 Thousands/µL      Absolute Monocytes 0.56 Thousand/µL      Eosinophils Absolute 0.13 Thousand/µL      Basophils Absolute 0.03 Thousands/µL             CT head without contrast   Final Interpretation by Mark Hester MD (09/28 1948)      No acute intracranial hemorrhage, mass effect or midline shift.                  Workstation performed: [id number]         XR chest 2 views   ED Interpretation by Pauline Gibson DO (09/28 1903)   Interpreted by me as no acute abnormalities          ECG 12 Lead Documentation Only    Date/Time: 9/28/2024 6:14 PM    Performed by: Pauline Gibson DO  Authorized by: Pauline Gibson DO    Indications / Diagnosis:  Chest pain  ECG reviewed by me, the ED Provider: yes    Patient location:  Bedside  Rate:     ECG rate:  80    ECG rate assessment: normal    Rhythm:     Rhythm: sinus rhythm    Ectopy:     Ectopy: none    QRS:     QRS intervals:  Normal  Conduction:     Conduction: normal    ST segments:     ST segments:  Normal  T waves:     T waves: normal    ECG 12 Lead Documentation Only    Date/Time: 9/28/2024 8:31 PM    Performed by: Pauline Gibson DO  Authorized by: Pauline Gibson DO    Indications / Diagnosis:  Chest  pain  ECG reviewed by me, the ED Provider: yes    Patient location:  ED  Previous ECG:     Previous ECG:  Compared to current    Comparison ECG info:  9/28/24    Similarity:  No change  Rate:     ECG rate:  83    ECG rate assessment: normal    Rhythm:     Rhythm: sinus rhythm    QRS:     QRS intervals:  Normal  ST segments:     ST segments:  Normal  T waves:     T waves: normal        ED Medication and Procedure Management   Prior to Admission Medications   Prescriptions Last Dose Informant Patient Reported? Taking?   Diclofenac Sodium (VOLTAREN) 1 %   No No   Sig: APPLY 2 G TOPICALLY 2 (TWO) TIMES A DAY AS NEEDED (PAIN)   Diclofenac Sodium (VOLTAREN) 1 %   No No   Sig: Apply 2 g topically 4 (four) times a day   Multiple Vitamin (MULTIVITAMIN) tablet   No No   Sig: Take 1 tablet by mouth daily   QUEtiapine (SEROquel) 50 mg tablet   No No   Sig: TAKE 1 TABLET BY MOUTH EVERYDAY AT BEDTIME   acetaminophen (TYLENOL) 500 mg tablet   No No   Sig: Take 1 tablet (500 mg total) by mouth every 6 (six) hours as needed for mild pain   amLODIPine (NORVASC) 5 mg tablet   No No   Sig: Take 1 tablet (5 mg total) by mouth daily   buPROPion (WELLBUTRIN XL) 150 mg 24 hr tablet   No No   Sig: TAKE 1 TABLET BY MOUTH EVERY DAY IN THE MORNING   calcium carbonate (OS-SYLVIA) 1250 (500 Ca) MG tablet  Self Yes No   Sig: Take 1 tablet by mouth daily   carbamide peroxide (DEBROX) 6.5 % otic solution   No No   Sig: Administer 5 drops into the left ear 2 (two) times a day   Patient not taking: Reported on 12/30/2023   carbamide peroxide (DEBROX) 6.5 % otic solution   No No   Sig: Administer 5 drops into ears 2 (two) times a day   clopidogrel (PLAVIX) 75 mg tablet   No No   Sig: Take 1 tablet (75 mg total) by mouth daily   fluticasone (FLONASE) 50 mcg/act nasal spray   No No   Sig: SPRAY 1 SPRAY INTO EACH NOSTRIL EVERY DAY   pantoprazole (PROTONIX) 40 mg tablet   No No   Sig: Take 1 tablet (40 mg total) by mouth daily   Patient not taking: Reported  on 12/30/2023      Facility-Administered Medications: None     Discharge Medication List as of 9/28/2024  9:03 PM        CONTINUE these medications which have NOT CHANGED    Details   acetaminophen (TYLENOL) 500 mg tablet Take 1 tablet (500 mg total) by mouth every 6 (six) hours as needed for mild pain, Starting Sat 12/30/2023, Normal      amLODIPine (NORVASC) 5 mg tablet Take 1 tablet (5 mg total) by mouth daily, Starting Fri 2/10/2023, Normal      buPROPion (WELLBUTRIN XL) 150 mg 24 hr tablet TAKE 1 TABLET BY MOUTH EVERY DAY IN THE MORNING, Starting Thu 7/27/2023, Normal      calcium carbonate (OS-SYLVIA) 1250 (500 Ca) MG tablet Take 1 tablet by mouth daily, Historical Med      !! carbamide peroxide (DEBROX) 6.5 % otic solution Administer 5 drops into the left ear 2 (two) times a day, Starting Fri 11/4/2022, Normal      !! carbamide peroxide (DEBROX) 6.5 % otic solution Administer 5 drops into ears 2 (two) times a day, Starting Sat 7/22/2023, Normal      clopidogrel (PLAVIX) 75 mg tablet Take 1 tablet (75 mg total) by mouth daily, Starting Thu 6/22/2023, Normal      !! Diclofenac Sodium (VOLTAREN) 1 % APPLY 2 G TOPICALLY 2 (TWO) TIMES A DAY AS NEEDED (PAIN), Starting Mon 9/19/2022, Normal      !! Diclofenac Sodium (VOLTAREN) 1 % Apply 2 g topically 4 (four) times a day, Starting Sat 12/30/2023, Normal      fluticasone (FLONASE) 50 mcg/act nasal spray SPRAY 1 SPRAY INTO EACH NOSTRIL EVERY DAY, Normal      Multiple Vitamin (MULTIVITAMIN) tablet Take 1 tablet by mouth daily, Starting Tue 6/16/2020, Normal      pantoprazole (PROTONIX) 40 mg tablet Take 1 tablet (40 mg total) by mouth daily, Starting Tue 10/5/2021, Normal      QUEtiapine (SEROquel) 50 mg tablet TAKE 1 TABLET BY MOUTH EVERYDAY AT BEDTIME, Normal       !! - Potential duplicate medications found. Please discuss with provider.        No discharge procedures on file.  ED SEPSIS DOCUMENTATION   Time reflects when diagnosis was documented in both MDM as  applicable and the Disposition within this note       Time User Action Codes Description Comment    9/28/2024  7:24 PM Pauline Gibson [R51.9] Headache     9/28/2024  7:24 PM Pauline Gibson [R07.9] Chest pain     9/28/2024  7:24 PM Pauline Gibson [Z86.79] History of hypertension                  Pauline Gibson DO  09/28/24 2121

## 2024-09-29 LAB
ATRIAL RATE: 80 BPM
ATRIAL RATE: 83 BPM
P AXIS: 74 DEGREES
P AXIS: 82 DEGREES
PR INTERVAL: 182 MS
PR INTERVAL: 186 MS
QRS AXIS: 72 DEGREES
QRS AXIS: 74 DEGREES
QRSD INTERVAL: 88 MS
QRSD INTERVAL: 96 MS
QT INTERVAL: 368 MS
QT INTERVAL: 378 MS
QTC INTERVAL: 424 MS
QTC INTERVAL: 444 MS
T WAVE AXIS: 57 DEGREES
T WAVE AXIS: 63 DEGREES
VENTRICULAR RATE: 80 BPM
VENTRICULAR RATE: 83 BPM

## 2024-09-29 PROCEDURE — 93010 ELECTROCARDIOGRAM REPORT: CPT

## 2024-11-07 NOTE — PROGRESS NOTES
[facility] Progress Note  Patient: Kristan Cano 56 y o  male   MRN: [id number]  PCP: Fiona Garg MD  Unit/Bed#: E5 -01 Encounter: [id number]  Date Of Visit: 18    Assessment:    Principal Problem: Morbid obesity ([address] )  Active Problems:    Gastroesophageal reflux disease    Benign essential hypertension    Sleep apnea    CPAP (continuous positive airway pressure) dependence    Hypertension      Plan:    · Morbid obesity status post RYGB by bariatric service  · Hypertension would continue amlodipine lisinopril hydrochlorothiazide combination on discharge presently stable  · Urine retention postop no doubt in relation at underlying anesthesia was started on Flomax by primary service  · Sleep apnea nightly CPAP  · Hypokalemia repletion ordered  · Chronic pain,onNucynta as outpatient which is long-acting 100 mg b i d  presently on oxycodone which he had also been taking at home for breakthrough pain will need to changed to short-actingNucynta as outpatient unavailable here should follow up also with pain management      VTE Pharmacologic Prophylaxis:   Pharmacologic: Enoxaparin (Lovenox)  Mechanical VTE Prophylaxis in Place: Yes    Discussions with Specialists or Other Care Team Provider: no    Time Spent for Care: 45 minutes  More than 50% of total time spent on counseling and coordination of care as described above  Subjective:   Some urine retention overnight requiring straight catheterization seems to be tolerating clear liquids, usual knee pain, somewhat ameliorated by oxycodone ordered, no signs of significant withdrawal symptoms,    Objective:     Vitals:   Temp (24hrs), Av 4 °F (36 9 °C), Min:97 4 °F (36 3 °C), Max:99 5 °F (37 5 °C)    HR:  [82-97] 83  Resp:  [12-22] 19  BP: (120-147)/(59-83) 147/83  SpO2:  [93 %-100 %] 97 %  Body mass index is 55 26 kg/m²  Input and Output Summary (last 24 hours):        Intake/Output Summary (Last 24 hours) at 18 1011  Last data filed at 05/09/18 0820   Gross per 24 hour   Intake          4258 33 ml   Output             1181 ml   Net          3077 33 ml       Physical Exam:     Physical Exam:   General appearance: alert, morbidly obese, appears stated age and cooperative  Head: Normocephalic, without obvious abnormality, atraumatic  Lungs: clear to auscultation bilaterally  Heart: regular rate and rhythm  Abdomen: soft, non-tender; bowel sounds normal; no masses,  no organomegaly incisions clean dry intact  Back: negative  Extremities: extremities normal, atraumatic, no cyanosis or edema  Neurologic: Grossly normal      Additional Data:     Labs:      Results from last 7 days  Lab Units 05/09/18  0518   WBC Thousand/uL 10 36*   HEMOGLOBIN g/dL 13 2   HEMATOCRIT % 39 2   PLATELETS Thousands/uL 168       Results from last 7 days  Lab Units 05/09/18  0518   SODIUM mmol/L 138   POTASSIUM mmol/L 3 5   CHLORIDE mmol/L 102   CO2 mmol/L 28   BUN mg/dL 8   CREATININE mg/dL 0 83   CALCIUM mg/dL 8 2*   GLUCOSE RANDOM mg/dL 134           * I Have Reviewed All Lab Data Listed Above  * Additional Pertinent Lab Tests Reviewed: [facility] Admission Reviewed    Imaging:  No results found  Imaging Reports Reviewed Today Include:  None  Imaging Personally Reviewed by Myself Includes:  None  No results found       Recent Cultures (last 7 days):           Last 24 Hours Medication List:     Current Facility-Administered Medications:  acetaminophen 320 mg Oral Q4H PRN Hussein Luong MD    oxyCODONE 5 mg Oral Q4H PRN Hussein Luong MD    And        acetaminophen 325 mg Oral Q4H PRN Hussein Luong MD    oxyCODONE 10 mg Oral Q4H PRN Hussein Luong MD    And        acetaminophen 325 mg Oral Q4H PRN Hussein Luong MD    amitriptyline 50 mg Oral BID Priscila Brody MD    amLODIPine 5 mg Oral QAM Priscila Brody MD    hydrALAZINE 5 mg Intravenous Q4H PRN Hussein Luong MD    lactated ringers 50 mL/hr Intravenous Continuous Tommy Grant MD Last Rate: 125 mL/hr (05/09/18 0820)   morphine injection 4 mg Intravenous Q2H PRN Ligia Murray MD    morphine injection 2 mg Intravenous Q2H PRN Ligia Murray MD    ondansetron 4 mg Intravenous Q4H PRN Ligia Murray MD    pantoprazole 40 mg Intravenous Q24H Dimitri Khan MD    phenol 2 spray Mouth/Throat Q2H PRN Ligia Murray MD    tamsulosin 0 4 mg Oral Daily With Akilah Alvarado MD         Today, Patient Was Seen By: Padilla Echeverria MD    ** Please Note: Dragon 360 Dictation voice to text software may have been used in the creation of this document   ** docusate: once a day  gabapentin 300 mg oral capsule: 1 cap(s) orally 3 times a day  mag, vit C, calcium / vit d: once daily  Refresh ophthalmic solution: 1 drop(s) in each eye once a day daily as needed  Tylenol 500 mg oral tablet: 2 tab(s) orally once a day as needed   docusate: once a day  gabapentin 300 mg oral capsule: 1 cap(s) orally 3 times a day  mag, vit C, calcium / vit d: once daily  methocarbamol 500 mg oral tablet: 1 tab(s) orally 3 times a day as needed for Muscle Spasm MDD: 3 tabs  oxyCODONE 5 mg oral tablet: 1 tab(s) orally every 6 hours as needed for Moderate Pain (4 - 6) MDD: 4 tabs  Refresh ophthalmic solution: 1 drop(s) in each eye once a day daily as needed   acetaminophen 325 mg oral tablet: 2 tab(s) orally every 6 hours as needed for  mild pain  docusate: 2 tab(s) orally once a day as needed for  constipation  gabapentin 300 mg oral capsule: 1 cap(s) orally 3 times a day  mag, vit C, calcium / vit d: once daily  methocarbamol 500 mg oral tablet: 1 tab(s) orally 3 times a day as needed for Muscle Spasm MDD: 3 tabs  oxyCODONE 5 mg oral tablet: 1 tab(s) orally every 6 hours as needed for Moderate Pain (4 - 6) MDD: 4 tabs  Refresh ophthalmic solution: 1 drop(s) in each eye once a day daily as needed

## 2024-12-02 ENCOUNTER — TELEPHONE (OUTPATIENT)
Dept: FAMILY MEDICINE CLINIC | Facility: CLINIC | Age: 54
End: 2024-12-02

## 2024-12-02 NOTE — TELEPHONE ENCOUNTER
Patient last seen in our office on 2/10/23. Called patient to confirm pcp, stated he is now seen at [facility].

## 2024-12-02 NOTE — TELEPHONE ENCOUNTER
Received [facility] request from  [employer] received on 12/2/24. Request was scanned into chart and faxed to [facility].

## 2024-12-19 ENCOUNTER — TELEPHONE (OUTPATIENT)
Dept: FAMILY MEDICINE CLINIC | Facility: CLINIC | Age: 54
End: 2024-12-19

## 2024-12-19 NOTE — TELEPHONE ENCOUNTER
Received [facility]O request from  Leyda Brice received on 12/12/24. Request was scanned into chart and faxed to [facility].

## 2024-12-26 NOTE — TELEPHONE ENCOUNTER
12/26/24 12:08 PM        The office's request has been received, reviewed, and the patient chart updated. The PCP has successfully been removed with a patient attribution note. This message will now be completed.        Thank you  Jana Anderson

## 2025-06-09 ENCOUNTER — OFFICE VISIT (OUTPATIENT)
Dept: FAMILY MEDICINE CLINIC | Facility: CLINIC | Age: 55
End: 2025-06-09

## 2025-06-09 VITALS
RESPIRATION RATE: 18 BRPM | BODY MASS INDEX: 49.44 KG/M2 | HEIGHT: 67 IN | HEART RATE: 83 BPM | OXYGEN SATURATION: 98 % | TEMPERATURE: 98 F | DIASTOLIC BLOOD PRESSURE: 80 MMHG | SYSTOLIC BLOOD PRESSURE: 110 MMHG | WEIGHT: 315 LBS

## 2025-06-09 DIAGNOSIS — Z86.73 HISTORY OF CVA (CEREBROVASCULAR ACCIDENT): Chronic | ICD-10-CM

## 2025-06-09 DIAGNOSIS — R73.03 PREDIABETES: ICD-10-CM

## 2025-06-09 DIAGNOSIS — F51.01 PRIMARY INSOMNIA: ICD-10-CM

## 2025-06-09 DIAGNOSIS — Z00.00 ANNUAL PHYSICAL EXAM: Primary | ICD-10-CM

## 2025-06-09 DIAGNOSIS — Z12.12 SCREENING FOR COLORECTAL CANCER: ICD-10-CM

## 2025-06-09 DIAGNOSIS — G47.33 OSA (OBSTRUCTIVE SLEEP APNEA): ICD-10-CM

## 2025-06-09 DIAGNOSIS — Z98.84 S/P GASTRIC BYPASS: ICD-10-CM

## 2025-06-09 DIAGNOSIS — Z11.59 NEED FOR HEPATITIS C SCREENING TEST: ICD-10-CM

## 2025-06-09 DIAGNOSIS — Z12.5 SCREENING FOR PROSTATE CANCER: ICD-10-CM

## 2025-06-09 DIAGNOSIS — Z12.11 SCREENING FOR COLORECTAL CANCER: ICD-10-CM

## 2025-06-09 DIAGNOSIS — I10 ESSENTIAL HYPERTENSION: ICD-10-CM

## 2025-06-09 DIAGNOSIS — M76.71 PERONEAL TENDINITIS OF RIGHT LOWER EXTREMITY: ICD-10-CM

## 2025-06-09 DIAGNOSIS — E66.01 MORBID OBESITY WITH BMI OF 50.0-59.9, ADULT (HCC): ICD-10-CM

## 2025-06-09 PROBLEM — R07.9 CHEST PAIN WITH MODERATE RISK FOR CARDIAC ETIOLOGY: Status: RESOLVED | Noted: 2021-08-05 | Resolved: 2025-06-09

## 2025-06-09 PROBLEM — T65.91XA INGESTION OF UNKNOWN SUBSTANCE: Status: RESOLVED | Noted: 2021-03-28 | Resolved: 2025-06-09

## 2025-06-09 PROBLEM — I83.12 VARICOSE VEINS OF BOTH LOWER EXTREMITIES WITH INFLAMMATION: Status: RESOLVED | Noted: 2018-07-19 | Resolved: 2025-06-09

## 2025-06-09 PROBLEM — R31.0 GROSS HEMATURIA: Status: RESOLVED | Noted: 2019-03-28 | Resolved: 2025-06-09

## 2025-06-09 PROBLEM — H04.129 DRY EYE: Status: RESOLVED | Noted: 2018-03-29 | Resolved: 2025-06-09

## 2025-06-09 PROBLEM — I83.11 VARICOSE VEINS OF BOTH LOWER EXTREMITIES WITH INFLAMMATION: Status: RESOLVED | Noted: 2018-07-19 | Resolved: 2025-06-09

## 2025-06-09 PROBLEM — M79.10 MUSCLE PAIN: Status: RESOLVED | Noted: 2018-08-28 | Resolved: 2025-06-09

## 2025-06-09 PROBLEM — N30.90 CYSTITIS: Status: RESOLVED | Noted: 2018-07-19 | Resolved: 2025-06-09

## 2025-06-09 PROCEDURE — 3074F SYST BP LT 130 MM HG: CPT

## 2025-06-09 PROCEDURE — 99396 PREV VISIT EST AGE 40-64: CPT

## 2025-06-09 PROCEDURE — 99214 OFFICE O/P EST MOD 30 MIN: CPT

## 2025-06-09 PROCEDURE — 3079F DIAST BP 80-89 MM HG: CPT

## 2025-06-09 RX ORDER — CLOPIDOGREL BISULFATE 75 MG/1
75 TABLET ORAL DAILY
Qty: 90 TABLET | Refills: 1 | Status: SHIPPED | OUTPATIENT
Start: 2025-06-09

## 2025-06-09 RX ORDER — AMLODIPINE BESYLATE 5 MG/1
5 TABLET ORAL DAILY
Qty: 100 TABLET | Refills: 1 | Status: SHIPPED | OUTPATIENT
Start: 2025-06-09

## 2025-06-09 NOTE — ASSESSMENT & PLAN NOTE
s/p Vonnie-en-Y gastric bypass by Dr Coronado May 2018.   He was previously referred to the weight management program for discussion of weight loss as well as ongoing monitoring of postsurgical malabsorption at [facility].  He wishes to switch care to [facility] and therefore new referral will be placed.    Orders:    Ambulatory Referral to Weight Management; Future

## 2025-06-09 NOTE — ASSESSMENT & PLAN NOTE
Patient with a history of KAT and CPAP prescription.  He does not use his CPAP.  He states he cannot tolerate it and that it makes his sleeping worse.  He reports ongoing fatigue, daytime sleepiness, snoring, periods of witnessed apnea.  Counseled on the importance of CPAP compliance in the setting of KAT and the risks associated with leaving KAT untreated.  Offered to refer back to sleep medicine for discussion of alternatives but patient declined.

## 2025-06-09 NOTE — ASSESSMENT & PLAN NOTE
Advised to follow-up with weight management.    Orders:    Ambulatory Referral to Weight Management; Future    Lipid panel; Future    Hemoglobin A1C; Future    TSH, 3rd generation with Free T4 reflex; Future    CBC and differential; Future    Comprehensive metabolic panel; Future

## 2025-06-09 NOTE — PROGRESS NOTES
Adult Annual Physical  Name: Ryan Miles      : 1970      MRN: [id number]  Encounter Provider: HERMELINDA Carvajal  Encounter Date: 2025   Encounter department: [facility]    :  Assessment & Plan  Annual physical exam  Immunizations and preventive care screenings were discussed with patient today. Appropriate education was printed on patient's after visit summary.        Essential hypertension  BP noted to be well controlled today in office., Exam notable for S1, S2 without murmur, lungs are clear to auscultation, and no edema appreciated.    Patient reports he is taking medications as instructed., He is not performing home BP monitoring.     BP Readings from Last 3 Encounters:   25 110/80   24 117/56   24 137/62       Current medication regimen: amlodipine (Norvasc) 5 mg daily    PLAN:  No medication changes. Continue current regimen  Counseled on dietary sodium restriction.  Counseled on regular aerobic exercise.  Educational handouts given on AVS  Follow up in 3 months or sooner as needed.    Orders:    amLODIPine (NORVASC) 5 mg tablet; Take 1 tablet (5 mg total) by mouth daily    KAT (obstructive sleep apnea)  Patient with a history of KAT and CPAP prescription.  He does not use his CPAP.  He states he cannot tolerate it and that it makes his sleeping worse.  He reports ongoing fatigue, daytime sleepiness, snoring, periods of witnessed apnea.  Counseled on the importance of CPAP compliance in the setting of KAT and the risks associated with leaving KAT untreated.  Offered to refer back to sleep medicine for discussion of alternatives but patient declined.       Primary insomnia  Discussed sleep hygiene.  He reports previously having a good effect from Seroquel however, patient is morbidly obese and desires to lose weight and therefore this would not be an ideal medical occasion choice as weight gain is a known side effect.  Discussed this with  "the patient and he is agreeable to discontinue.  After discussion of other medication options, he would like to trial melatonin at bedtime.  Also discussed that his untreated KAT is likely contributing to his poor sleep quantity and quality.  See KAT note for further plan.    Orders:    Melatonin 5 MG TABS; Take 1 tablet (5 mg total) by mouth daily at bedtime    History of CVA (cerebrovascular accident)  Patient has a history of CVA (lacunar infarct) and has been maintained on daily Plavix.  Will continue.    Orders:    clopidogrel (PLAVIX) 75 mg tablet; Take 1 tablet (75 mg total) by mouth daily    Screening for colorectal cancer    Orders:    Ambulatory referral to Gastroenterology; Future    S/P gastric bypass  s/p Vonnie-en-Y gastric bypass by Dr Coronado May 2018.   He was previously referred to the weight management program for discussion of weight loss as well as ongoing monitoring of postsurgical malabsorption at [facility].  He wishes to switch care to [facility] and therefore new referral will be placed.    Orders:    Ambulatory Referral to Weight Management; Future    Morbid obesity with BMI of 50.0-59.9, adult (HCC)  Advised to follow-up with weight management.    Orders:    Ambulatory Referral to Weight Management; Future    Lipid panel; Future    Hemoglobin A1C; Future    TSH, 3rd generation with Free T4 reflex; Future    CBC and differential; Future    Comprehensive metabolic panel; Future    Need for hepatitis C screening test    Orders:    HCV Antibody Reflx to Quant PCR; Future    Prediabetes    Patient overdue for routine screening.  Will recheck A1c.    Lab Results   Component Value Date    HGBA1C 5.5 08/06/2021            Peroneal tendinitis of right lower extremity  Follows with podiatry at [facility].  Was last evaluated by them on 3/24/2025.  Their note was reviewed and recommendations as follows:  \"I recommended bracing the right ankle for stability and support with activity. Prescription for " "tri-lock ankle brace provided. Prescription for cane also provided for ambulation assistance.   Recommended an icing protocol and appropriate anti-inflammatory medications at clinically recommended dosages for inflammation if needed.  I also recommend physical therapy for strength and mobility development. Formal PT referral provided.  Informed the patient that he is only recommended to return for reevaluation if symptoms worsen or fail to improve beyond physical therapy. We would likely order an MRI at that time to further evaluate the integrity of the right peroneal tendon and adjust treatment plan accordingly.\"    Patient is not wearing the brace as advised by podiatry and has not started PT.  He was strongly encouraged to follow podiatry recommendations for recovery of peroneal tendinitis and continue follow-up with their specialty.       Screening for prostate cancer  Patient's father had prostate cancer.  He denies current symptoms.  However is agreeable to screening.    Discussed risks and benefits of prostate cancer screening. We discussed the controversial history of PSA screening for prostate cancer in the [place] as well as the risk of over detection and over treatment of prostate cancer by way of PSA screening.  The patient understands that PSA blood testing is an imperfect way to screen for prostate cancer and that elevated PSA levels in the blood may also be caused by infection, inflammation, prostatic trauma or manipulation, urological procedures, or by benign prostatic enlargement.     The role of the digital rectal examination in prostate cancer screening was also discussed and I discussed with him that there is large interobserver variability in the findings of digital rectal examination.    Orders:    PSA, total and free; Future        Preventive Screenings:  - Diabetes Screening: orders placed  - Cholesterol Screening: orders placed   - Hepatitis C screening: orders placed   - HIV " screening: screening up-to-date   - Colon cancer screening: orders placed and risks/benefits discussed   - Lung cancer screening: screening not indicated   - Prostate cancer screening: orders placed and risks/benefits discussed     Immunizations:  - Immunizations due: Prevnar 20 and Zoster (Shingrix)  - Risks/benefits immunizations discussed    - The patient declines recommended vaccines currently despite my recommendations      Counseling/Anticipatory Guidance:  - Alcohol: discussed moderation in alcohol intake and recommendations for healthy alcohol use.   - Dental health: discussed importance of regular tooth brushing, flossing, and dental visits.   - Sexual health: discussed sexually transmitted diseases, partner selection, use of condoms, avoidance of unintended pregnancy, and contraceptive alternatives.   - Diet: discussed recommendations for a healthy/well-balanced diet.   - Exercise: the importance of regular exercise/physical activity was discussed. Recommend exercise 3-5 times per week for at least 30 minutes.   - Injury prevention: discussed safety/seat belts, safety helmets, smoke detectors, carbon monoxide detectors, and smoking near bedding or upholstery.     BMI Counseling: Body mass index is 54.35 kg/m². The BMI is above normal. Nutrition recommendations include decreasing portion sizes, encouraging healthy choices of fruits and vegetables, decreasing fast food intake, consuming healthier snacks, limiting drinks that contain sugar, moderation in carbohydrate intake, increasing intake of lean protein, reducing intake of saturated and trans fat and reducing intake of cholesterol. Exercise recommendations include moderate physical activity 150 minutes/week, exercising 3-5 times per week and strength training exercises. Patient referred to weight management. Rationale for BMI follow-up plan is due to patient being overweight or obese.     Depression Screening and Follow-up Plan: Patient was screened for  depression during today's encounter. They screened negative with a PHQ-2 score of 2.          History of Present Illness     Adult Annual Physical:  Patient presents for annual physical.       Ryan Miles is a 54 year old male with a history of Morbid obesity with BMI of 50.0-59.9, adult ([facility]), KAT (obstructive sleep apnea), GERD (gastroesophageal reflux disease), Prediabetes, Essential hypertension, Postsurgical malabsorption, Dysesthesia of multiple sites, Cerebrovascular disease, Primary insomnia, History of CVA (cerebrovascular accident), S/P gastric bypass    He presents to the office today for a routine physical exam and to establish care. .     Diet and Physical Activity:  - Diet/Nutrition: no special diet.  - Exercise: walking.    Depression Screening:  - PHQ-2 Score: 2    General Health:  - Sleep: sleeps poorly and 1-3 hours of sleep on average.  - Hearing: normal hearing right ear and normal hearing left ear.  - Vision: vision problems.  - Dental: no dental visits for > 1 year and brushes teeth twice daily.     Health:  - History of STDs: no.   - Urinary symptoms: incomplete bladder emptying.     Advanced Care Planning:  - Has an advanced directive?: no    - Has a durable medical POA?: no    - ACP document given to patient?: no      Review of Systems   Constitutional:  Negative for chills and fever.   HENT:  Negative for ear pain and sore throat.    Eyes:  Negative for pain and visual disturbance.   Respiratory:  Negative for cough and shortness of breath.    Cardiovascular:  Negative for chest pain and palpitations.   Gastrointestinal:  Negative for abdominal pain and vomiting.   Genitourinary:  Negative for dysuria and hematuria.   Musculoskeletal:  Negative for arthralgias and back pain.        Foot pain   Skin:  Negative for color change and rash.   Neurological:  Negative for seizures and syncope.   Psychiatric/Behavioral:  Positive for sleep disturbance. Negative for dysphoric mood, self-injury  "and suicidal ideas. The patient is not nervous/anxious.    All other systems reviewed and are negative.    Pertinent Medical History   Patient Active Problem List   Diagnosis    Morbid obesity with BMI of 50.0-59.9, adult (HCC)    KAT (obstructive sleep apnea)    GERD (gastroesophageal reflux disease)    Prediabetes    Essential hypertension    Postsurgical malabsorption    Dysesthesia of multiple sites    Cerebrovascular disease    Primary insomnia    History of CVA (cerebrovascular accident)    S/P gastric bypass    Peroneal tendinitis of right lower extremity     Past Medical History:   Diagnosis Date    Abnormal blood sugar     Abnormal TSH     Back pain     Bariatric surgery status     Benign hypertension 04/10/2018    Added automatically from request for surgery [id number]      Bilateral anterior knee pain     Chronic pain disorder     back    CPAP (continuous positive airway pressure) dependence     DDD (degenerative disc disease), lumbar     GERD (gastroesophageal reflux disease)     Hematuria     History of Helicobacter pylori infection     History of transfusion     Hypertension     Knee pain     Morbid obesity (HCC)     Postgastrectomy malabsorption     Prediabetes     Sleep apnea     Suspicious nevus     Use of cane as ambulatory aid     Vitamin D deficiency          Medical History Reviewed by provider this encounter:  Tobacco  Allergies  Meds  Problems  Med Hx  Surg Hx  Fam Hx     .  No current outpatient medications on file prior to visit.     No current facility-administered medications on file prior to visit.      Social History     Tobacco Use    Smoking status: Never    Smokeless tobacco: Never   Vaping Use    Vaping status: Never Used   Substance and Sexual Activity    Alcohol use: Never    Drug use: No       Objective   /80 (BP Location: Right arm, Patient Position: Sitting, Cuff Size: Large)   Pulse 83   Temp 98 °F (36.7 °C) (Temporal)   Resp 18   Ht 5' 7\" (1.702 m)   Wt (!) 157 " kg (347 lb)   SpO2 98%   BMI 54.35 kg/m²     Physical Exam  Vitals and nursing note reviewed.   Constitutional:       General: He is not in acute distress.     Appearance: He is well-developed.   HENT:      Head: Normocephalic and atraumatic.      Right Ear: Tympanic membrane, ear canal and external ear normal.      Left Ear: Tympanic membrane, ear canal and external ear normal.      Nose: Nose normal. No congestion.      Mouth/Throat:      Mouth: Mucous membranes are moist.      Pharynx: Oropharynx is clear. No oropharyngeal exudate or posterior oropharyngeal erythema.     Eyes:      Conjunctiva/sclera: Conjunctivae normal.      Pupils: Pupils are equal, round, and reactive to light.       Cardiovascular:      Rate and Rhythm: Normal rate and regular rhythm.      Heart sounds: No murmur heard.  Pulmonary:      Effort: Pulmonary effort is normal. No respiratory distress.      Breath sounds: Normal breath sounds.   Abdominal:      General: Bowel sounds are normal.      Palpations: Abdomen is soft.      Tenderness: There is no abdominal tenderness.     Musculoskeletal:         General: No swelling.      Cervical back: Neck supple.      Right lower leg: No edema.      Left lower leg: No edema.   Lymphadenopathy:      Cervical: No cervical adenopathy.     Skin:     General: Skin is warm and dry.      Capillary Refill: Capillary refill takes less than 2 seconds.      Findings: No rash.     Neurological:      Mental Status: He is alert.     Psychiatric:         Mood and Affect: Mood normal.

## 2025-06-09 NOTE — ASSESSMENT & PLAN NOTE
BP noted to be well controlled today in office., Exam notable for S1, S2 without murmur, lungs are clear to auscultation, and no edema appreciated.    Patient reports he is taking medications as instructed., He is not performing home BP monitoring.     BP Readings from Last 3 Encounters:   06/09/25 110/80   09/28/24 117/56   03/09/24 137/62       Current medication regimen: amlodipine (Norvasc) 5 mg daily    PLAN:  No medication changes. Continue current regimen  Counseled on dietary sodium restriction.  Counseled on regular aerobic exercise.  Educational handouts given on AVS  Follow up in 3 months or sooner as needed.    Orders:    amLODIPine (NORVASC) 5 mg tablet; Take 1 tablet (5 mg total) by mouth daily

## 2025-06-09 NOTE — PATIENT INSTRUCTIONS
"Sleep Hygiene Tips:  Keep a consistent sleep schedule (reinforces sleep cycle)  Create a relaxing bedtime routine (promotes relaxation)  Turn off electronic devices (reduces blue light exposure)  Exercise regularly (this improves sleep quality)  Limit caffeine intake (prevents alertness)  Optimize sleep environment (promotes comfort)  Use bed only for sleep and sex (strengthens association)  Go to bed when tired (prevents frustration)  Limit napping (prevents interference)    General Lifestyle Recommendations for Weight Loss:     Nutrition    - Avoid skipping meals. Avoid sugary beverages. Drink at least 64 oz of water daily.  Limit processed food, refined sugars and grains. Healthy choices for meals and snacks.   - Focus on increasing protein and non-starchy fiber-rich vegetables for satiety effect (feeling full) and to help support a calorie deficit.    - Focus on portion control with well-balanced macronutrient's (protein, complex carbohydrates, healthy fat using the MyPlate method). Ensure adequate protein with each meal/snacks and distribute calories equally throughout the day.   - Starting the day with a protein rich breakfast.    Behavioral/Stress    - Keep a food log with a SmartPhone kirs or a paper log (kris options include www.myfitnesspal.com, sparkpeople.com, loseit.com, calorieking.com, [employer]).    - Be mindful of your eating. Be sure to set aside time to eat, to eat slowly, and to savor your food.    - Consider meditation apps and/or taking a few minutes of mindfulness every morning.    - Understand the role of regarding the role of stress hormone cortisol in promoting weight gain and visceral fat accumulation.    - Weigh yourself daily or at least 2-3 times per week.    Physical Activity    - Increase physical activity by 10 minutes each day. Gradually increase physical activity to a goal of 5 days per week for 30 minutes of MODERATE intensity (should be able to pass the \"talk test\" but should not " "be able to sing). Target goal is 150-300 minutes per week PLUS 2 days per week of FULL BODY resistance training.    - Resistance training along with increase protein intake is important to maintain and enhance metabolism.    Sleep    - Prioritize sleep hygiene and importance of having adequate sleep (duration at least 6+ hours) to support response in weight loss efforts.  Weight Loss Websites    Go to this website to find strategies for successful weight loss: https://www.nutrition.gov/weight-management/strategies-success    Here is a basic handout to help you start losing weight: https://www.nutrition.gov/weight-management/strategies-success/interested-losing-weight    This website tells you how to find out what a healthy weight is for you: https://www.nhlbi.nih.gov/health/educational/lose_wt/index.htm    This website helps you customize an eating plan to your height, weight, and weight loss goal: https://www.[employer] Mediamyplate.gov/MyPlate-Daily-Checklist    Patient Education     Examen físico de rutina para adultos   Conceptos Básicos   Redactado por los médicos y editores de UpToDate   ¿Qué es un examen físico? -- Un examen físico es hilda consulta de rutina o \"revisión\" con tatum médico. También se conoce soy \"consulta de bienestar\" o \"consulta preventiva\".  Mariah cada consulta, el médico hará lo siguiente:   Preguntará por tatum wendy física y mental   Preguntará sobre gabbie hábitos, conductas y estilo de herman   Le hará un examen   Le administrará las vacunas que marlen necesarias   Hablará con usted sobre cualquier medicina que tome   Le dará consejos sobre tatum wendy   Responderá gabbie preguntas  Hacerse revisiones periódicas es hilda parte importante del cuidado de tatum wendy. Puede ayudar a tatum médico a encontrar y tratar cualquier problema que tenga. [place]wal también es importante para prevenir problemas de wendy.  Un examen físico de rutina es diferente de hilda \"consulta por enfermedad\". Hilda consulta por enfermedad es cuando lo " atiende un médico debido a un problema o inquietud de wendy. Dado que los exámenes físicos se programan con anticipación, usted puede pensar en lo que quiere preguntarle al médico.  ¿Con qué frecuencia misha hacerme un examen físico? -- Depende de tatum edad y de tatum estado de wendy. En general, en el ricardo de las personas mayores de 21 años:   Si tiene menos de 50 años, es posible que pueda hacerse un examen físico cada 3 años.   Si tiene 50 años o más, tatum médico podría recomendarle un examen físico cada año.  Si tiene un padecimiento de wendy crónico, anastacia soy diabetes o presión arterial darius, tatum médico probablemente querrá verlo con más frecuencia.  ¿Qué sucede marylin un examen físico? -- En general, cada consulta incluirá:   Examen físico - El médico o enfermero revisará tatum estatura, peso, frecuencia cardíaca y presión arterial. También le examinará los ojos y los oídos. Le preguntará cómo se siente y si tiene algún síntoma que le moleste.   Medicinas - Es hilda buena idea llevar hilda lista de todos las medicinas que janelle cada vez que acude a la consulta médica. Tatum médico le hablará sobre lea medicinas y responderá a lea preguntas. Dígale si tiene algún efecto secundario que le moleste. También debe informarle si tiene dificultades para pagar alguna de lea medicinas.   Hábitos y comportamientos - [place] incluye:   Tatum dieta   Lea hábitos de ejercicio   Si fuma, isabel alcohol o consume drogas   Si es sexualmente activo   Si se siente seguro en casa  Tatum médico hablará con usted sobre las cosas que puede hacer para mejorar tatum wendy y reducir el riesgo de tener problemas de wendy. También ofrecerá ayuda y apoyo. Por ejemplo, si quiere dejar de fumar, puede darle consejos y recetarle medicinas. Si quiere mejorar tatum alimentación o realizar más actividad física, tatum médico también puede ayudarlo a lograr estos objetivos.   Pruebas de laboratorio, si son necesarias - Las pruebas que le realicen dependerán de tatum edad y situación. Por  "ejemplo, es posible que tatum médico quiera revisar tatum:   Colesterol   Azúcar en yessica   Nivel de sindhu   Vacunas - Las vacunas recomendadas dependerán de tatum edad, tatum wendy y de las vacunas que ya haya recibido. Las vacunas son muy importantes porque pueden prevenir ciertas infecciones graves o mortales.   Análisis sobre las pruebas de detección - \"Detección\" significa revisar si hay enfermedades u otros problemas de wendy antes de que causen síntomas. Tatum médico puede recomendar pruebas de detección según tatum edad, riesgo y preferencias. [place] podría incluir pruebas para detectar:   Cáncer, soy cáncer de seno, próstata, freddie uterino, ovario, colorrectal, próstata, pulmón o piel   Infecciones de transmisión sexual tales soy clamidia y gonorrea   Padecimientos de wendy mental tales soy depresión y ansiedad.  El médico le hablará sobre los diferentes tipos de pruebas de detección. Puede ayudarlo a decidir qué pruebas de detección debe hacerse. También le puede explicar lo que podrían significar los resultados.   Responder preguntas - El examen físico es un buen momento para hacerle preguntas al médico o enfermero sobre tatum wendy. Si es necesario, también puede derivarlo a otros médicos o especialistas.  Los adultos mayores de 65 años a menudo también necesitan otros cuidados. A medida que envejece, tatum médico hablará con usted sobre:   Cómo evitar las caídas en el hogar   Pruebas de audición o visión   Pruebas de memoria   Cómo luz marina gabbie medicinas de manera arechiga   Asegurarse de tener la ayuda y el apoyo que necesita en casa  Todos los artículos se actualizan a medida que se descubre nueva evidencia y culmina nuestro proceso de evaluación por homólogos   Keisha artículo se recuperó de UpToDate el: May 02, 2024.  Artículo 424349 Versión [id number]  Release: 32.4.3 - C32.122  © 2024 [employer] Todos los derechos reservados.  Exención de responsabilidad y uso de la información del consumidor   Descargo de " responsabilidad: esta información generalizada es un resumen limitado de información sobre el diagnóstico, el tratamiento y/o los medicamentos. No pretende ser exhaustiva y se debe utilizar soy herramienta para ayudar al usuario a comprender y/o evaluar las posibles opciones de diagnóstico y tratamiento. No incluye toda la información sobre afecciones, tratamientos, medicamentos, efectos secundarios o riesgos puedan ser aplicables a un paciente específico. No tiene el propósito de servir soy recomendación médica ni de sustituir la recomendación médica, el diagnóstico o el tratamiento de un profesional de atención médica que se base en el examen y la evaluación de manda profesional de la wendy respecto a las circunstancias específicas y únicas del paciente. Los pacientes deben hablar con un profesional de atención médica para obtener información completa sobre tatum wendy, cuestiones médicas y opciones de tratamiento, incluidos los riesgos o los beneficios relacionados con el uso de medicamentos. Esta información no certifica que los tratamientos o medicamentos marlen seguros, eficaces o estén aprobados para tratar a un paciente específico. UpToDate, Inc. y gabbie afiliados renuncian a cualquier garantía o responsabilidad relacionada con esta información o el uso de la misma.El uso de esta información está sujeto a las Condiciones de uso, disponibles en https://www.[employer].com/en/know/clinical-effectiveness-terms. 2024© UpToDate, Inc. y gabbie afiliados y/o licenciantes. Todos los derechos reservados.  Copyright   © 2024 UpToDate, Inc. Todos los derechos reservados.    Patient Education     Cómo controlar la presión arterial a través del estilo de herman   Conceptos Básicos   Redactado por los médicos y editores de UpToDate   ¿Cuál es la relación entre mi estilo de herman y mi presión arterial? -- Lo que hace y come afecta en gran medida tatum presión arterial y tatum wendy en general. Si lleva un estilo de herman adecuado, puede:    "Bajar tatum presión arterial, o prevenir la aparición de presión arterial darius   Disminuir la necesidad de luz marina medicinas para la presión arterial   Hacer que las medicinas para la presión arterial darius funcionen mejor, si es que janelle alguna   Disminuir las probabilidades de sufrir un infarto o un accidente cerebrovascular (derrame), o de desarrollar hilda enfermedad renal  ¿Qué elecciones de estilo de herman pueden ayudar a bajar la presión arterial? -- Puede hacer lo siguiente:   Baja de peso (si tiene sobrepeso).   Elija hilda dieta bhavin en frutas, verduras y productos lácteos bajos en grasa, y baja en richy, dulces y cereales refinados.   Consuma menos sal (sodio).   [place] actividad física al menos 30 minutos al día, carson todos los días de la semana.   Limite la cantidad de alcohol que isabel.  Si tiene presión arterial darius, también es muy importante que deje de fumar (si fuma). Es posible que dejar de fumar no baje tatum presión arterial, roc disminuirá las posibilidades de que sufra un infarto o un accidente cerebrovascular (derrame), y lo ayudará a sentirse mejor y a vivir más tiempo.  Empiece de a poco, y vaya despacio -- Los cambios que se mencionan arriba podrían parecer numerosos, roc no se preocupe; no es necesario que cambie todo a la vez. La clave para mejorar el estilo de herman es \"empezar de a poco e ir despacio\". Elija algo pequeño y específico para cambiar, y trate de hacerlo marylin un tiempo. Si funciona, continúe haciéndolo hasta que se convierta en un hábito, roc si no funciona, no se dé por vencido. Elija otra cosa para cambiar y observe qué sucede.  Digamos, por ejemplo, que le gustaría mejorar tatum dieta. Si es el tipo de persona que come hamburguesas con queso y abdias fritas con frecuencia, no puede pasar a comer solo ensaladas de un día para el otro. Cuando las personas tratan de hacer cambios así, con frecuencia fracasan. Luego, se sienten frustradas y tienden a darse por vencidas. Entonces, en lugar " "de tratar de cambiar toda tatum dieta en un día, cambie hilda o dos cosas pequeñas y tómese un tiempo para acostumbrarse a esos cambios. Por ejemplo, continúe comiendo hamburguesas con queso, roc deje las abdias fritas, o coma las mismas cosas, roc reduzca las porciones a la mitad.  A medida que encuentre cosas que pueda cambiar y mantener, continúe agregando cambios. Con el tiempo, verá que en realidad puede cambiar mucho. Solo debe acostumbrarse a los cambios de manera gradual.  Bajar de peso -- Cuando las personas piensan en bajar de peso, a veces imaginan que es más complicado de lo que en realidad es. Para adelgazar, debe comer menos o moverse más, y si hace ambas cosas, mejor aún. No existe ninguna dieta ni actividad para bajar de peso que sea mejor que otra. Cuando se trata de bajar de peso, el plan más eficaz es aquel que pueda mantener.  Mejore tatum dieta -- No hay hilda dieta única que resulte adecuada para todos, roc en general, en hilda dieta saludable se pueden incluir:   Muchas frutas, verduras y cereales integrales   Algunos frijoles, arvejas, lentejas, garbanzos y alimentos similares   Algunos francheska secos, soy nueces, almendras y maníes   Leche y productos lácteos descremados o bajos en grasa   Algunos pescados  Para tener hilda dieta saludable, también es importante limitar o evitar el azúcar, los dulces, las richy y los cereales refinados. (Los cereales refinados se encuentran en el pan landers, el arroz landers, la mayoría de las pastas y la mayoría de los alimentos de \"refrigerio\" envasados).  Reduzca el consumo de sal -- Muchas personas creen que tener hilda dieta baja en sodio significa dejar de usar el salero y no agregar sal al cocinar. La verdad es que no agregar sal en la young o al cocinar ayuda muy poco. Sherry todo el sodio que consume ya se encuentra en los alimentos que compra en la omar o en los restaurantes (figura 1).  Lo más importante que puede hacer para reducir el consumo de sodio es comer " "menos alimentos procesados. Eso significa que debe evitar la mayoría de los alimentos que se venden en latas, omi, frascos y bolsas. También debe comer con menos frecuencia en restaurantes.  Para disminuir la cantidad de sodio que consume, compre frutas, verduras y richy frescas o frescas congeladas. (Los alimentos frescos congelados son aquellos a los que no se ha agregado nada antes de congelarlos). Luego, puede preparar comidas caseras usando estos ingredientes.  Al igual que con los otros cambios, no trate de eliminar la sal por completo. En lugar de eso, elija ophelia o dos alimentos que tengan mucho sodio y trate de reemplazarlos por opciones con bajo sodio. Cuando se acostumbre a estas opciones de baja cantidad de sodio, encuentre otro alimento para cambiar, y continúe así hasta que todos los alimentos que consuma marlen sin sodio o con bajo sodio.  Hacer más actividad física -- Si desea hacer más actividad física, no es necesario que vaya al gimnasio o que transpire en exceso. Es posible aumentar el nivel de actividad mientras hace las cosas cotidianas que le agradan. Caminar, trabajar en el jardín y bailar son algunas actividades que podría probar. Al igual que con los otros cambios, la clave es no hacer demasiado con demasiada rapidez. Si no hace ningún tipo de actividad ahora, comience a caminar unos minutos cada dos días y hágalo unas cuantas semanas. Si se acostumbra a hacerlo, trate de hacerlo marylin más tiempo, roc si nota que no le gusta caminar, pruebe otra actividad.  Arcelia menos alcohol -- Si es hilda persona de sexo femenino, no arcelia más de hilda \"copa estándar\" de alcohol por día. Si es hilda persona de sexo masculino, no arcelia más de dos. Hilda \"copa estándar\" es:   Hilda zainab o botella de 12 onzas de cerveza   Hilda copa de 5 onzas de vino   Hilda copita de 1.5 onzas de bebidas alcohólicas destiladas  ¿Por dónde misha comenzar? -- Si desea mejorar tatum estilo de herman, comience por hacer los cambios que le " "parezcan más fáciles. Si antes hacía ejercicio y abandonó el hábito, quizás le resulte fácil retomarlo. O si verdaderamente le gusta preparar comidas desde cero, quizás lo fabien en lo que se debería concentrar es en comer comidas caseras con bajo contenido de sodio.  Sea lo que sea que enfrente fabien, elija objetivos específicos y realistas, y fije un plazo para cumplirlos. Por ejemplo, no decida que \"hará más ejercicio\". En lugar de eso, decida que caminará 10 minutos los lunes, miércoles y viernes, y que hará esto marylin las próximas dos semanas.  Cuando los cambios de estilo de herman son demasiado generales, es difícil cumplirlos.  Ahora inténtelo. ¡Usted puede hacerlo!  Todos los artículos se actualizan a medida que se descubre nueva evidencia y culmina nuestro proceso de evaluación por homólogos   Keisha artículo se recuperó de UpToDate el: Feb 28, 2024.  Artículo 95735 Versión [id number]  Release: 32.2.4 - C32.58  © 2024 UpToDate, Inc. Todos los derechos reservados.  figura 1: Ledesma de sodio en tatum dieta     Gráfico 85880 Versión 2.0  Exención de responsabilidad y uso de la información del consumidor   Descargo de responsabilidad: esta información generalizada es un resumen limitado de información sobre el diagnóstico, el tratamiento y/o los medicamentos. No pretende ser exhaustiva y se debe utilizar soy herramienta para ayudar al usuario a comprender y/o evaluar las posibles opciones de diagnóstico y tratamiento. No incluye toda la información sobre afecciones, tratamientos, medicamentos, efectos secundarios o riesgos puedan ser aplicables a un paciente específico. No tiene el propósito de servir soy recomendación médica ni de sustituir la recomendación médica, el diagnóstico o el tratamiento de un profesional de atención médica que se base en el examen y la evaluación de keisha profesional de la wendy respecto a las circunstancias específicas y únicas del paciente. Los pacientes deben hablar con un " profesional de atención médica para obtener información completa sobre tatum wendy, cuestiones médicas y opciones de tratamiento, incluidos los riesgos o los beneficios relacionados con el uso de medicamentos. Esta información no certifica que los tratamientos o medicamentos marlen seguros, eficaces o estén aprobados para tratar a un paciente específico. UpToDate, Inc. y gabbie afiliados renuncian a cualquier garantía o responsabilidad relacionada con esta información o el uso de la misma.El uso de esta información está sujeto a las Condiciones de uso, disponibles en https://[email or web address]/en/know/clinical-effectiveness-terms. 2024© UpToDate, Inc. y gabbie afiliados y/o licenciantes. Todos los derechos reservados.  Copyright   © 2024 UpToDate, Inc. Todos los derechos reservados.    Patient Education     Dieta DASH   Conceptos Básicos   Redactado por los médicos y editores de UpToDate   ¿Qué es la dieta DASH? -- DASH es la sigla en inglés de “método  dietético para  detener la hipertensión”. Es un plan de alimentación que puede ayudar a bajar la presión arterial. También puede ayudar a prevenir la presión arterial darius, que los médicos llaman “hipertensión”. No se necesitan alimentos ni recetas especiales para seguir la dieta DASH. Consiste principalmente en comer ciertos tipos de alimentos en ciertas cantidades.  La dieta DASH contiene muchas frutas y verduras, cereales integrales, richy magras, grasas saludables y productos lácteos bajos en grasa o sin grasa. (figura 1). Contiene pocas grasas saturadas, grasas trans, colesterol, azúcares añadidos y sodio (sal).  La dieta DASH habitual tiene un límite diario de sodio de 2300 mg. Tatum médico o enfermero puede hablar con usted sobre gabbie objetivos específicos.  ¿Por qué necesito la dieta DASH? -- La dieta DASH puede ayudarlo a:   Bajar la presión arterial y el colesterol   Disminuir el riesgo de cáncer, enfermedad cardíaca, infarto y accidente cerebrovascular (derrame).  También podría reducir tatum riesgo de insuficiencia cardíaca, cálculos renales y diabetes.   Bajar de peso o mantener un peso saludable  ¿Qué puedo comer y beber con la dieta DASH? -- A continuación se presentan algunas pautas y ejemplos para gabbie objetivos de nutrición diarios y semanales. Se basan en un plan de alimentación de 2000 calorías diarias.  Objetivos diarios:   Cereales - Trate de consumir entre 6 y 8 porciones de cereales integrales con un alto contenido de fibra por día. Algunos ejemplos de hilda porción son 1 rebanada de pan, 1 onza (30 gramos) de cereal seco o 1/2 taza (120 gramos) de cereal, pasta o arroz integral cocido.   Frutas - Trate de consumir entre 4 y 5 porciones de fruta por día. Algunos ejemplos de hilda porción son 1 fruta mediana o 1/2 taza (75 gramos) de fruta fresca, congelada o enlatada. Intente consumir frutas de distintos tipos y colores. La fruta congelada o enlatada no debe tener azúcar agregado. Busque frutas congeladas o enlatadas con 100 por ciento de jugo de frutas o agua.   Verduras - Trate de consumir entre 4 y 5 porciones de verduras por día. Algunos ejemplos de hilda porción son 1 taza (40 gramos) de verduras de hoja samuel o 1/2 taza (75 gramos) de verduras frescas o cocidas. Intente consumir verduras de muchos tipos y colores. Si compra verduras enlatadas, busque las que marlen “bajas en sodio” o “sin sal”. Compre verduras simples y congeladas para evitar las grasas y el sodio agregados.   Lácteos - Intente consumir entre 2 y 3 porciones de productos lácteos sin grasa o bajos en grasa por día. Algunos ejemplos de hilda porción son 1 taza (240 ml) de leche o yogur o 1.5 onzas (45 gramos) de queso.   Batsheva magras, aves y mariscos - Intente consumir 6 porciones o menos de carne magra, aves de velez y mariscos por día. Algunos ejemplos de hilda porción son 1 huevo o 1 onza (30 gramos) de carne vladimir, aves de velez o pescado cocidos. Intente priorizar batsheva más magras o bajas en grasa  soy las de luis, pescado o pavo. Consuma menos carne vladimir.   Grasas y aceites - Intente consumir entre 2 y 3 porciones de grasas y aceites por día. Algunos ejemplos de hilda porción son 1 cucharadita (5 ml) de margarina blanda o aceite vegetal, o 1 cucharada (18 gramos) de mayonesa. Consuma grasas saludables soy las que se encuentran en el pescado, los francheska secos y el aguacate. Intente usar aceite de munoz o aceites vegetales soy el de canola. También puede probar los aceites de maíz, cártamo, girasol o soya. Use aderezo para ensaladas y mayonesa que marlen bajos en sodio y en grasa.  Objetivos semanales:   Francheska secos, semillas y legumbres (frijoles y arvejas secos) - Intente consumir entre 4 y 5 porciones por semana. Algunos ejemplos de hilda porción son 1/3 de taza (45 gramos) de francheska secos, 2 cucharadas (50 gramos) de mantequilla de francheska secos o semillas o 1/2 taza (75 gramos) de legumbres cocidas. Pruebe a comer almendras y nueces, semillas de girasol, mantequilla de maní o de otros francheska secos, frijoles de soya, lentejas, frijoles rojos y arvejas partidas.   Dulces - Intente consumir menos de 5 porciones por semana. Algunos ejemplos de hilda porción son 1 cucharada (14 gramos) de azúcar o jalea, o 1/2 taza (120 gramos) de gelatina. Elija postres bajos en grasa y sin grasas trans. Algunos de ellos son la gelatina con sabor a fruta, los sorbetes, los caramelos jellybeans, las galletas Stepan, las galletas de animalitos, las barras de higo bajas en grasa y las galletas de jengibre. Consuma frutas para satisfacer las ansias de comer algo demond.  Para añadir sabor, use sue, hierbas, especias, vinagre o jugo de albert o de lima. Elija productos bajos en sodio o sin sal siempre que pueda. [place] es especialmente importante en el ricardo de alimentos tales soy caldos, sopas o salsa de soya.  ¿Qué alimentos y bebidas misha evitar con la dieta DASH?    Granos que se deben evitar - Panes, panecillos, galletas saladas,  panes rápidos, harinas leudantes, premezclas para galletas, pan rallado común, cereales calientes instantáneos, arroz preparado comercialmente, pasta, mezclas preparadas para relleno de batsheva que contengan crow.   Frutas y verduras que se deben evitar - Aleta y mezclas de verduras preparadas compradas en la omar, vegetales y jugos enlatados comunes, verduras congeladas con salsa, verduras encurtidas, frutas procesadas con sal o sodio.   Productos lácteos que se deben evitar - Leche entera, leche malteada, leche chocolatada, leno de mantequilla, quesos elaborados con leche entera, helado.   Batsheva que se deben evitar - Pescados ahumados, curados, salados o enlatados, soy las neto y las anchoas. Marie de carne con un alto contenido de grasa, soy las carne de res, herbert y cerdo, el tocino y las salchichas, y el luis con piel.   Grasas y aceites que se deben evitar - Consuma menos grasas sólidas soy la mantequilla, la manteca de cerdo y la margarina dura. Consuma menos grasas saturadas, grasas trans y grasas totales.   Condimentos y refrigerios que se deben evitar - Guisantes (arvejas), frijoles y aceitunas salados y enlatados. Refrigerios salados, alimentos fritos, gaseosas, otras bebidas endulzadas.   Dulces que se deben evitar - Productos horneados con un alto contenido de grasa, soy los pastelillos, las donas, los pasteles y los productos horneados comerciales. Barras de demond.   Alcohol - Si opta por beber alcohol, limite la cantidad. La mayoría de los médicos recomiendan no deber más de hilda copa de alcohol al día (en el ricardo de las personas de sexo femenino) o dos copas de alcohol al día (en el ricardo de las personas de sexo masculino).  ¿Qué más misha saber?    [place] actividad física regularmente para que la dieta lo ayude aún más. Incluso las actividades suaves, soy caminar, son buenas para la wendy.   Trate de hornear o asar los alimentos en vez de freírlos.   Anote los alimentos que consume. [place]  lo ayudará a llevar un registro de lo que come cada semana.   Cuando vaya a la omar, lleve hilda lista o un plan de comidas. No chantelle las compras cuando tenga hambre, ya que podría inducirlo a comprar más alimentos poco saludables.   Jes detenidamente las etiquetas de los alimentos. (figura 2). [place] lo que contiene hilda porción. La cantidad se expresa soy un porcentaje de la cantidad total que necesita por día. Leer etiquetas ayuda a elegir alimentos saludables.  Todos los artículos se actualizan a medida que se descubre nueva evidencia y culmina nuestro proceso de evaluación por homólogos   Manda artículo se recuperó de UpToDate el: Feb 26, 2024.  Artículo [id number] Versión [id number].es-419.1  Release: 32.2.4 - C32.56  © 2024 [facility] Todos los derechos reservados.  figura 1: Dieta DASH     Gráfico [id number] Versión 1.0  figura 2: Etiqueta de alimentos     Gráfico [id number] Versión 1.0  Exención de responsabilidad y uso de la información del consumidor   Descargo de responsabilidad: esta información generalizada es un resumen limitado de información sobre el diagnóstico, el tratamiento y/o los medicamentos. No pretende ser exhaustiva y se debe utilizar soy herramienta para ayudar al usuario a comprender y/o evaluar las posibles opciones de diagnóstico y tratamiento. No incluye toda la información sobre afecciones, tratamientos, medicamentos, efectos secundarios o riesgos puedan ser aplicables a un paciente específico. No tiene el propósito de servir soy recomendación médica ni de sustituir la recomendación médica, el diagnóstico o el tratamiento de un profesional de atención médica que se base en el examen y la evaluación de manda profesional de la wendy respecto a las circunstancias específicas y únicas del paciente. Los pacientes deben hablar con un profesional de atención médica para obtener información completa sobre tatum wendy, cuestiones médicas y opciones de tratamiento, incluidos los riesgos o los beneficios  relacionados con el uso de medicamentos. Esta información no certifica que los tratamientos o medicamentos marlen seguros, eficaces o estén aprobados para tratar a un paciente específico. [employer] y gabbie afiliados renuncian a cualquier garantía o responsabilidad relacionada con esta información o el uso de la misma.El uso de esta información está sujeto a las Condiciones de uso, disponibles en https://www.[employer].com/en/know/clinical-effectiveness-terms. 2024© [employer] y gabbie afiliados y/o licenciantes. Todos los derechos reservados.  Copyright   © 2024 [employer] Todos los derechos reservados.

## 2025-06-09 NOTE — ASSESSMENT & PLAN NOTE
Discussed sleep hygiene.  He reports previously having a good effect from Seroquel however, patient is morbidly obese and desires to lose weight and therefore this would not be an ideal medical occasion choice as weight gain is a known side effect.  Discussed this with the patient and he is agreeable to discontinue.  After discussion of other medication options, he would like to trial melatonin at bedtime.  Also discussed that his untreated KAT is likely contributing to his poor sleep quantity and quality.  See KAT note for further plan.    Orders:    Melatonin 5 MG TABS; Take 1 tablet (5 mg total) by mouth daily at bedtime

## 2025-06-09 NOTE — ASSESSMENT & PLAN NOTE
Patient has a history of CVA (lacunar infarct) and has been maintained on daily Plavix.  Will continue.    Orders:    clopidogrel (PLAVIX) 75 mg tablet; Take 1 tablet (75 mg total) by mouth daily

## 2025-06-10 PROBLEM — M76.71 PERONEAL TENDINITIS OF RIGHT LOWER EXTREMITY: Status: ACTIVE | Noted: 2025-06-10

## 2025-06-10 NOTE — ASSESSMENT & PLAN NOTE
"Follows with podiatry at [facility].  Was last evaluated by them on 3/24/2025.  Their note was reviewed and recommendations as follows:  \"I recommended bracing the right ankle for stability and support with activity. Prescription for tri-lock ankle brace provided. Prescription for cane also provided for ambulation assistance.   Recommended an icing protocol and appropriate anti-inflammatory medications at clinically recommended dosages for inflammation if needed.  I also recommend physical therapy for strength and mobility development. Formal PT referral provided.  Informed the patient that he is only recommended to return for reevaluation if symptoms worsen or fail to improve beyond physical therapy. We would likely order an MRI at that time to further evaluate the integrity of the right peroneal tendon and adjust treatment plan accordingly.\"    Patient is not wearing the brace as advised by podiatry and has not started PT.  He was strongly encouraged to follow podiatry recommendations for recovery of peroneal tendinitis and continue follow-up with their specialty.       "

## 2025-06-10 NOTE — ASSESSMENT & PLAN NOTE
Patient overdue for routine screening.  Will recheck A1c.    Lab Results   Component Value Date    HGBA1C 5.5 08/06/2021

## 2025-06-18 ENCOUNTER — TELEMEDICINE (OUTPATIENT)
Dept: FAMILY MEDICINE CLINIC | Facility: CLINIC | Age: 55
End: 2025-06-18

## 2025-06-18 DIAGNOSIS — F51.01 PRIMARY INSOMNIA: Primary | ICD-10-CM

## 2025-06-18 PROCEDURE — 99213 OFFICE O/P EST LOW 20 MIN: CPT

## 2025-06-18 PROCEDURE — 3079F DIAST BP 80-89 MM HG: CPT

## 2025-06-18 PROCEDURE — 3074F SYST BP LT 130 MM HG: CPT

## 2025-06-18 RX ORDER — TRAZODONE HYDROCHLORIDE 50 MG/1
50 TABLET ORAL
Qty: 30 TABLET | Refills: 2 | Status: SHIPPED | OUTPATIENT
Start: 2025-06-18

## 2025-06-18 NOTE — PROGRESS NOTES
Virtual Regular Visit  Name: Ryan Miles      : 1970      MRN: [id number]  Encounter Provider: HERMELINDA Carvajal  Encounter Date: 2025   Encounter department: [facility]  :  Assessment & Plan  Primary insomnia    Orders:    traZODone (DESYREL) 50 mg tablet; Take 1 tablet (50 mg total) by mouth daily at bedtime    Primary insomnia  Will d/c melatonin and trial trazodone.   Medication use and side effects discussed.   Follow up in 1 month.                History of Present Illness          Ryan Miles is a 54 year old male with a history of Morbid obesity with BMI of 50.0-59.9, adult (HCC), KAT (obstructive sleep apnea), GERD (gastroesophageal reflux disease), Prediabetes, Essential hypertension, Postsurgical malabsorption, Dysesthesia of multiple sites, Cerebrovascular disease, Primary insomnia, History of CVA (cerebrovascular accident), S/P gastric bypass.    He requested this virtual visit to discuss the prescription for melatonin. He states it has not been effective and would like to try something else.            Review of Systems   Constitutional:  Negative for chills and fever.   HENT:  Negative for ear pain and sore throat.    Eyes:  Negative for pain and visual disturbance.   Respiratory:  Negative for cough and shortness of breath.    Cardiovascular:  Negative for chest pain and palpitations.   Gastrointestinal:  Negative for abdominal pain and vomiting.   Genitourinary:  Negative for dysuria and hematuria.   Musculoskeletal:  Negative for arthralgias and back pain.   Skin:  Negative for color change and rash.   Neurological:  Negative for seizures and syncope.   Psychiatric/Behavioral:  Positive for sleep disturbance.    All other systems reviewed and are negative.      Objective   There were no vitals taken for this visit.    Physical Exam  Constitutional:       General: He is not in acute distress.     Appearance: He is not ill-appearing.   Pulmonary:       Effort: Pulmonary effort is normal. No respiratory distress.     Neurological:      Mental Status: He is alert.     Psychiatric:         Mood and Affect: Mood normal.         Behavior: Behavior normal.         Administrative Statements   Encounter provider HERMELINDA Carvajal    The Patient is located at Home and in the following state in which I hold an active license [place].    The patient was identified by name and date of birth. Ryan Miles was informed that this is a telemedicine visit and that the visit is being conducted through the Epic Embedded platform. He agrees to proceed..  My office door was closed. No one else was in the room.  He acknowledged consent and understanding of privacy and security of the video platform. The patient has agreed to participate and understands they can discontinue the visit at any time.    I have spent a total time of 10 minutes in caring for this patient on the day of the visit/encounter including Risks and benefits of tx options, Instructions for management, Patient and family education, Documenting in the medical record, Reviewing/placing orders in the medical record (including tests, medications, and/or procedures), and Obtaining or reviewing history  , not including the time spent for establishing the audio/video connection.

## 2025-07-06 DIAGNOSIS — F51.01 PRIMARY INSOMNIA: ICD-10-CM

## 2025-07-12 DIAGNOSIS — F51.01 PRIMARY INSOMNIA: ICD-10-CM

## 2025-07-14 RX ORDER — TRAZODONE HYDROCHLORIDE 50 MG/1
50 TABLET ORAL
Qty: 90 TABLET | Refills: 1 | Status: SHIPPED | OUTPATIENT
Start: 2025-07-14

## 2025-07-18 ENCOUNTER — TELEPHONE (OUTPATIENT)
Age: 55
End: 2025-07-18

## 2025-07-21 ENCOUNTER — TELEPHONE (OUTPATIENT)
Age: 55
End: 2025-07-21

## (undated) DEVICE — ADHESIVE SKIN CLSR DERMABOND NX

## (undated) DEVICE — ENDO STITCH DEVICE 10 MM

## (undated) DEVICE — TUBING SMOKE EVAC W/FILTRATION DEVICE PLUMEPORT ACTIV

## (undated) DEVICE — TRAVELKIT CONTAINS FIRST STEP KIT (200ML EP-4 KIT) AND SOILED SCOPE BAG - 1 KIT: Brand: TRAVELKIT CONTAINS FIRST STEP KIT AND SOILED SCOPE BAG

## (undated) DEVICE — WEBRIL 6 IN UNSTERILE

## (undated) DEVICE — VIOLET BRAIDED (POLYGLACTIN 910), SYNTHETIC ABSORBABLE SUTURE: Brand: COATED VICRYL

## (undated) DEVICE — 10 MM BABCOCKS WITH RATCHET HANDLES: Brand: ENDOPATH

## (undated) DEVICE — STAPLER EEA 25 MM COVIDIEN

## (undated) DEVICE — ADHESIVE SKN CLSR HISTOACRYL FLEX 0.5ML LF

## (undated) DEVICE — SCD SEQUENTIAL COMPRESSION COMFORT SLEEVE MEDIUM KNEE LENGTH: Brand: KENDALL SCD

## (undated) DEVICE — GLOVE INDICATOR PI UNDERGLOVE SZ 6.5 BLUE

## (undated) DEVICE — CHLORAPREP HI-LITE 26ML ORANGE

## (undated) DEVICE — ENDOPATH XCEL BLADELESS TROCARS WITH STABILITY SLEEVES: Brand: ENDOPATH XCEL

## (undated) DEVICE — STAPLER ENDO GIA ROTICULATOR 60-2.5

## (undated) DEVICE — SEAMGUARD STPL REINF ENDO GIA ULTRA UNIV 60 PURPLE

## (undated) DEVICE — 3000CC GUARDIAN II: Brand: GUARDIAN

## (undated) DEVICE — GLOVE SRG BIOGEL 6.5

## (undated) DEVICE — URETERAL CATHETER ADAPTOR TIP

## (undated) DEVICE — [HIGH FLOW INSUFFLATOR,  DO NOT USE IF PACKAGE IS DAMAGED,  KEEP DRY,  KEEP AWAY FROM SUNLIGHT,  PROTECT FROM HEAT AND RADIOACTIVE SOURCES.]: Brand: PNEUMOSURE

## (undated) DEVICE — GLOVE INDICATOR PI UNDERGLOVE SZ 7 BLUE

## (undated) DEVICE — ENDOPOUCH RETRIEVER SPECIMEN RETRIEVAL BAGS: Brand: ENDOPOUCH RETRIEVER

## (undated) DEVICE — COVIDIEN ENDO GIA PURPLE (MED) RELOAD 45MM

## (undated) DEVICE — POWER SHELL SIGNIA

## (undated) DEVICE — ENDOPATH XCEL DILATING TIP TROCARS WITH STABILITY SLEEVES: Brand: ENDOPATH XCEL

## (undated) DEVICE — SYRINGE 30ML LL

## (undated) DEVICE — TUBING SUCTION 5MM X 12 FT

## (undated) DEVICE — SURGICAL GOWN, XL SMARTSLEEVE: Brand: CONVERTORS

## (undated) DEVICE — ENDOPATH XCEL UNIVERSAL TROCAR STABLILITY SLEEVES: Brand: ENDOPATH XCEL

## (undated) DEVICE — GLOVE SRG BIOGEL 8

## (undated) DEVICE — Device

## (undated) DEVICE — BLUE HEAT SCOPE WARMER

## (undated) DEVICE — INTRODUCER ANAL ABDOM EEA25 DISP STRL

## (undated) DEVICE — ENDO STITCH 2-0 SURGIDAC 48 IN

## (undated) DEVICE — GLOVE SRG BIOGEL 7.5

## (undated) DEVICE — IRRIG ENDO FLO TUBING

## (undated) DEVICE — ALLENTOWN LAP CHOLE APP PACK: Brand: CARDINAL HEALTH

## (undated) DEVICE — GLOVE SRG BIOGEL 7

## (undated) DEVICE — TIBURON LAPAROSCOPIC ABDOMINAL DRAPE: Brand: CONVERTORS

## (undated) DEVICE — 2000CC GUARDIAN II: Brand: GUARDIAN

## (undated) DEVICE — ELECTRODE LAP SPATULA CRV E-Z CLEAN 33CM -0018C

## (undated) DEVICE — TROCAR VISIPORT

## (undated) DEVICE — SINGLE-USE BIOPSY FORCEPS: Brand: RADIAL JAW 4

## (undated) DEVICE — INTENDED FOR TISSUE SEPARATION, AND OTHER PROCEDURES THAT REQUIRE A SHARP SURGICAL BLADE TO PUNCTURE OR CUT.: Brand: BARD-PARKER SAFETY BLADES SIZE 15, STERILE

## (undated) DEVICE — PMI DISPOSABLE PUNCTURE CLOSURE DEVICE / SUTURE GRASPER: Brand: PMI

## (undated) DEVICE — HARMONIC ACE +7 LAPAROSCOPIC SHEARS ADVANCED HEMOSTASIS 5MM DIAMETER 36CM SHAFT LENGTH  FOR USE WITH GRAY HAND PIECE ONLY: Brand: HARMONIC ACE

## (undated) DEVICE — NEEDLE HYPO 22G X 1-1/2 IN

## (undated) DEVICE — COVIDIEN ENDO GIA PURPLE (MED) RELOAD 60MM

## (undated) DEVICE — ENDO STITCH 2-0 VICRYL

## (undated) DEVICE — ENDOPATH 5MM CURVED SCISSORS WITH MONOPOLAR CAUTERY: Brand: ENDOPATH

## (undated) DEVICE — SUT MONOCRYL 4-0 PS-2 27 IN Y426H